# Patient Record
Sex: FEMALE | Race: WHITE | NOT HISPANIC OR LATINO | Employment: OTHER | URBAN - METROPOLITAN AREA
[De-identification: names, ages, dates, MRNs, and addresses within clinical notes are randomized per-mention and may not be internally consistent; named-entity substitution may affect disease eponyms.]

---

## 2022-06-06 ENCOUNTER — HOSPITAL ENCOUNTER (OUTPATIENT)
Facility: HOSPITAL | Age: 62
Discharge: HOME | End: 2022-06-08
Attending: EMERGENCY MEDICINE | Admitting: INTERNAL MEDICINE
Payer: MEDICARE

## 2022-06-06 DIAGNOSIS — R41.82 ALTERED MENTAL STATUS, UNSPECIFIED ALTERED MENTAL STATUS TYPE: Primary | ICD-10-CM

## 2022-06-06 DIAGNOSIS — R41.82 ALTERED MENTAL STATUS: ICD-10-CM

## 2022-06-06 DIAGNOSIS — I10 HYPERTENSION, UNSPECIFIED TYPE: ICD-10-CM

## 2022-06-06 DIAGNOSIS — Z79.899 POLYPHARMACY: ICD-10-CM

## 2022-06-06 DIAGNOSIS — G92.9 TOXIC ENCEPHALOPATHY: ICD-10-CM

## 2022-06-06 LAB
ALBUMIN SERPL-MCNC: 4.4 GM/DL (ref 3.4–4.8)
ALBUMIN/GLOB SERPL: 1.5 RATIO (ref 1.1–2)
ALP SERPL-CCNC: 127 UNIT/L (ref 40–150)
ALT SERPL-CCNC: 19 UNIT/L (ref 0–55)
AMPHET UR QL SCN: NEGATIVE
APAP SERPL-MCNC: <17.4 UG/ML (ref 17.4–30)
APPEARANCE UR: CLEAR
AST SERPL-CCNC: 24 UNIT/L (ref 5–34)
BACTERIA #/AREA URNS AUTO: NORMAL /HPF
BARBITURATE SCN PRESENT UR: NEGATIVE
BASOPHILS # BLD AUTO: 0.06 X10(3)/MCL (ref 0–0.2)
BASOPHILS NFR BLD AUTO: 1.1 %
BENZODIAZ UR QL SCN: POSITIVE
BILIRUB UR QL STRIP.AUTO: NEGATIVE MG/DL
BILIRUBIN DIRECT+TOT PNL SERPL-MCNC: 0.3 MG/DL
BUN SERPL-MCNC: 23.7 MG/DL (ref 9.8–20.1)
CALCIUM SERPL-MCNC: 9.2 MG/DL (ref 8.4–10.2)
CANNABINOIDS UR QL SCN: NEGATIVE
CHLORIDE SERPL-SCNC: 105 MMOL/L (ref 98–107)
CO2 SERPL-SCNC: 23 MMOL/L (ref 23–31)
COCAINE UR QL SCN: NEGATIVE
COLOR UR AUTO: YELLOW
CORRECTED TEMPERATURE (PCO2): 46 MMHG (ref 35–45)
CORRECTED TEMPERATURE (PH): 7.31 (ref 7.35–7.45)
CORRECTED TEMPERATURE (PO2): 76 MMHG (ref 80–100)
CREAT SERPL-MCNC: 1.11 MG/DL (ref 0.55–1.02)
EOSINOPHIL # BLD AUTO: 0.19 X10(3)/MCL (ref 0–0.9)
EOSINOPHIL NFR BLD AUTO: 3.6 %
ERYTHROCYTE [DISTWIDTH] IN BLOOD BY AUTOMATED COUNT: 14.4 % (ref 11.5–17)
ETHANOL SERPL-MCNC: <10 MG/DL
FENTANYL UR QL SCN: NEGATIVE
GLOBULIN SER-MCNC: 3 GM/DL (ref 2.4–3.5)
GLUCOSE SERPL-MCNC: 108 MG/DL (ref 82–115)
GLUCOSE UR QL STRIP.AUTO: NEGATIVE MG/DL
HCO3 UR-SCNC: 23.2 MMOL/L
HCT VFR BLD AUTO: 36.4 % (ref 37–47)
HGB BLD-MCNC: 11.2 G/DL (ref 12–16)
HGB BLD-MCNC: 11.4 GM/DL (ref 12–16)
IMM GRANULOCYTES # BLD AUTO: 0.02 X10(3)/MCL (ref 0–0.02)
IMM GRANULOCYTES NFR BLD AUTO: 0.4 % (ref 0–0.43)
KETONES UR QL STRIP.AUTO: NEGATIVE MG/DL
LEUKOCYTE ESTERASE UR QL STRIP.AUTO: NEGATIVE UNIT/L
LYMPHOCYTES # BLD AUTO: 1.9 X10(3)/MCL (ref 0.6–4.6)
LYMPHOCYTES NFR BLD AUTO: 36.2 %
MCH RBC QN AUTO: 29.7 PG (ref 27–31)
MCHC RBC AUTO-ENTMCNC: 31.3 MG/DL (ref 33–36)
MCV RBC AUTO: 94.8 FL (ref 80–94)
MDMA UR QL SCN: NEGATIVE
MONOCYTES # BLD AUTO: 0.42 X10(3)/MCL (ref 0.1–1.3)
MONOCYTES NFR BLD AUTO: 8 %
NEUTROPHILS # BLD AUTO: 2.7 X10(3)/MCL (ref 2.1–9.2)
NEUTROPHILS NFR BLD AUTO: 50.7 %
NITRITE UR QL STRIP.AUTO: NEGATIVE
NRBC BLD AUTO-RTO: 0 %
OPIATES UR QL SCN: POSITIVE
PCO2 BLDA: 46 MMHG (ref 35–45)
PCP UR QL: NEGATIVE
PH SMN: 7.31 [PH] (ref 7.35–7.45)
PH UR STRIP.AUTO: 5 [PH]
PH UR: 5 [PH] (ref 3–11)
PLATELET # BLD AUTO: 272 X10(3)/MCL (ref 130–400)
PMV BLD AUTO: 9 FL (ref 9.4–12.4)
PO2 BLDA: 76 MMHG (ref 80–100)
POC BASE DEFICIT: -3.1 MMOL/L (ref -2–2)
POC COHB: 0 %
POC IONIZED CALCIUM: 1.2 MMOL/L (ref 1.12–1.23)
POC METHB: 0 % (ref 0.4–1.5)
POC O2HB: 92.2 % (ref 94–97)
POC SATURATED O2: 93.7 %
POC TEMPERATURE: 37 °C
POTASSIUM BLD-SCNC: 3.8 MMOL/L (ref 3.5–5)
POTASSIUM SERPL-SCNC: 4.5 MMOL/L (ref 3.5–5.1)
PROT SERPL-MCNC: 7.4 GM/DL (ref 5.8–7.6)
PROT UR QL STRIP.AUTO: NEGATIVE MG/DL
RBC # BLD AUTO: 3.84 X10(6)/MCL (ref 4.2–5.4)
RBC #/AREA URNS AUTO: <5 /HPF
RBC UR QL AUTO: NEGATIVE UNIT/L
SALICYLATES SERPL-MCNC: <5 MG/DL
SARS-COV-2 RDRP RESP QL NAA+PROBE: NEGATIVE
SODIUM BLD-SCNC: 136 MMOL/L (ref 137–145)
SODIUM SERPL-SCNC: 140 MMOL/L (ref 136–145)
SP GR UR STRIP.AUTO: 1.02 (ref 1–1.03)
SPECIFIC GRAVITY, URINE AUTO (.000) (OHS): 1.02 (ref 1–1.03)
SPECIMEN SOURCE: ABNORMAL
SQUAMOUS #/AREA URNS AUTO: <4 /LPF
T4 FREE SERPL-MCNC: 0.91 NG/DL (ref 0.7–1.48)
TROPONIN I SERPL-MCNC: <0.01 NG/ML (ref 0–0.04)
TSH SERPL-ACNC: 9.41 UIU/ML (ref 0.35–4.94)
UROBILINOGEN UR STRIP-ACNC: 0.2 MG/DL
WBC # SPEC AUTO: 5.3 X10(3)/MCL (ref 4.5–11.5)
WBC #/AREA URNS AUTO: <5 /HPF

## 2022-06-06 PROCEDURE — 84439 ASSAY OF FREE THYROXINE: CPT | Performed by: EMERGENCY MEDICINE

## 2022-06-06 PROCEDURE — G0378 HOSPITAL OBSERVATION PER HR: HCPCS

## 2022-06-06 PROCEDURE — 82077 ASSAY SPEC XCP UR&BREATH IA: CPT | Performed by: EMERGENCY MEDICINE

## 2022-06-06 PROCEDURE — 25000003 PHARM REV CODE 250: Performed by: NURSE PRACTITIONER

## 2022-06-06 PROCEDURE — 82803 BLOOD GASES ANY COMBINATION: CPT

## 2022-06-06 PROCEDURE — 87635 SARS-COV-2 COVID-19 AMP PRB: CPT | Performed by: EMERGENCY MEDICINE

## 2022-06-06 PROCEDURE — 36415 COLL VENOUS BLD VENIPUNCTURE: CPT | Performed by: EMERGENCY MEDICINE

## 2022-06-06 PROCEDURE — 99900035 HC TECH TIME PER 15 MIN (STAT)

## 2022-06-06 PROCEDURE — 99285 EMERGENCY DEPT VISIT HI MDM: CPT | Mod: 25

## 2022-06-06 PROCEDURE — 36600 WITHDRAWAL OF ARTERIAL BLOOD: CPT

## 2022-06-06 PROCEDURE — 80143 DRUG ASSAY ACETAMINOPHEN: CPT | Performed by: EMERGENCY MEDICINE

## 2022-06-06 PROCEDURE — 85025 COMPLETE CBC W/AUTO DIFF WBC: CPT | Performed by: EMERGENCY MEDICINE

## 2022-06-06 PROCEDURE — 63600175 PHARM REV CODE 636 W HCPCS: Performed by: INTERNAL MEDICINE

## 2022-06-06 PROCEDURE — 63600175 PHARM REV CODE 636 W HCPCS: Performed by: EMERGENCY MEDICINE

## 2022-06-06 PROCEDURE — 96372 THER/PROPH/DIAG INJ SC/IM: CPT | Mod: 59 | Performed by: INTERNAL MEDICINE

## 2022-06-06 PROCEDURE — 96375 TX/PRO/DX INJ NEW DRUG ADDON: CPT

## 2022-06-06 PROCEDURE — 96374 THER/PROPH/DIAG INJ IV PUSH: CPT

## 2022-06-06 PROCEDURE — 84443 ASSAY THYROID STIM HORMONE: CPT | Performed by: EMERGENCY MEDICINE

## 2022-06-06 PROCEDURE — 93005 ELECTROCARDIOGRAM TRACING: CPT

## 2022-06-06 PROCEDURE — 96361 HYDRATE IV INFUSION ADD-ON: CPT

## 2022-06-06 PROCEDURE — 80307 DRUG TEST PRSMV CHEM ANLYZR: CPT | Performed by: EMERGENCY MEDICINE

## 2022-06-06 PROCEDURE — 84484 ASSAY OF TROPONIN QUANT: CPT | Performed by: EMERGENCY MEDICINE

## 2022-06-06 PROCEDURE — 63600175 PHARM REV CODE 636 W HCPCS

## 2022-06-06 PROCEDURE — 80053 COMPREHEN METABOLIC PANEL: CPT | Performed by: EMERGENCY MEDICINE

## 2022-06-06 PROCEDURE — 96372 THER/PROPH/DIAG INJ SC/IM: CPT | Performed by: INTERNAL MEDICINE

## 2022-06-06 PROCEDURE — 81001 URINALYSIS AUTO W/SCOPE: CPT | Performed by: EMERGENCY MEDICINE

## 2022-06-06 PROCEDURE — 80179 DRUG ASSAY SALICYLATE: CPT | Performed by: EMERGENCY MEDICINE

## 2022-06-06 RX ORDER — BUSPIRONE HYDROCHLORIDE 30 MG/1
1 TABLET ORAL 2 TIMES DAILY
COMMUNITY
Start: 2022-04-06

## 2022-06-06 RX ORDER — ENOXAPARIN SODIUM 100 MG/ML
40 INJECTION SUBCUTANEOUS EVERY 24 HOURS
Status: DISCONTINUED | OUTPATIENT
Start: 2022-06-06 | End: 2022-06-08 | Stop reason: HOSPADM

## 2022-06-06 RX ORDER — BACLOFEN 10 MG/1
TABLET ORAL
Status: ON HOLD | COMMUNITY
Start: 2021-08-08 | End: 2022-06-08 | Stop reason: HOSPADM

## 2022-06-06 RX ORDER — SUCRALFATE 1 G/10ML
SUSPENSION ORAL
Status: ON HOLD | COMMUNITY
Start: 2022-04-20 | End: 2022-06-08 | Stop reason: HOSPADM

## 2022-06-06 RX ORDER — GLUCOSAM/CHONDRO/HERB 149/HYAL 750-100 MG
1 TABLET ORAL
COMMUNITY
Start: 2021-10-01 | End: 2022-06-16 | Stop reason: ALTCHOICE

## 2022-06-06 RX ORDER — PANTOPRAZOLE SODIUM 40 MG/1
40 FOR SUSPENSION ORAL DAILY
Status: ON HOLD | COMMUNITY
Start: 2022-05-17 | End: 2022-06-08 | Stop reason: HOSPADM

## 2022-06-06 RX ORDER — CHOLECALCIFEROL (VITAMIN D3) 25 MCG
3000 TABLET,CHEWABLE ORAL
COMMUNITY
End: 2022-06-16 | Stop reason: ALTCHOICE

## 2022-06-06 RX ORDER — OXYCODONE HYDROCHLORIDE 15 MG/1
15 TABLET ORAL
Status: ON HOLD | COMMUNITY
Start: 2022-05-27 | End: 2022-06-08 | Stop reason: HOSPADM

## 2022-06-06 RX ORDER — DIAZEPAM 5 MG/1
TABLET ORAL
COMMUNITY
Start: 2022-03-31 | End: 2022-06-08

## 2022-06-06 RX ORDER — ARIPIPRAZOLE 2 MG/1
2 TABLET ORAL EVERY MORNING
COMMUNITY
Start: 2022-03-09 | End: 2022-06-08

## 2022-06-06 RX ORDER — ESOMEPRAZOLE MAGNESIUM 40 MG/1
40 GRANULE, DELAYED RELEASE ORAL DAILY
COMMUNITY
Start: 2022-05-12 | End: 2022-06-16 | Stop reason: ALTCHOICE

## 2022-06-06 RX ORDER — BUPROPION HYDROCHLORIDE 300 MG/1
1 TABLET ORAL DAILY
Status: ON HOLD | COMMUNITY
Start: 2022-05-12 | End: 2022-06-08 | Stop reason: HOSPADM

## 2022-06-06 RX ORDER — FERROUS SULFATE 325(65) MG
325 TABLET ORAL
COMMUNITY
End: 2022-06-16 | Stop reason: ALTCHOICE

## 2022-06-06 RX ORDER — LEVOTHYROXINE SODIUM 175 UG/1
175 TABLET ORAL
COMMUNITY
Start: 2022-03-31 | End: 2023-08-03

## 2022-06-06 RX ORDER — LANOLIN ALCOHOL/MO/W.PET/CERES
400 CREAM (GRAM) TOPICAL
Status: ON HOLD | COMMUNITY
End: 2022-06-08 | Stop reason: HOSPADM

## 2022-06-06 RX ORDER — NALOXONE HCL 0.4 MG/ML
2 VIAL (ML) INJECTION
Status: COMPLETED | OUTPATIENT
Start: 2022-06-06 | End: 2022-06-06

## 2022-06-06 RX ORDER — BREXPIPRAZOLE 0.5 MG/1
1 TABLET ORAL EVERY MORNING
Status: ON HOLD | COMMUNITY
Start: 2022-02-10 | End: 2022-06-08 | Stop reason: HOSPADM

## 2022-06-06 RX ORDER — LOSARTAN POTASSIUM 100 MG/1
1 TABLET ORAL DAILY
COMMUNITY
Start: 2022-03-31

## 2022-06-06 RX ORDER — HALOPERIDOL 5 MG/ML
2 INJECTION INTRAMUSCULAR
Status: COMPLETED | OUTPATIENT
Start: 2022-06-06 | End: 2022-06-06

## 2022-06-06 RX ORDER — ONDANSETRON 2 MG/ML
INJECTION INTRAMUSCULAR; INTRAVENOUS
Status: COMPLETED
Start: 2022-06-06 | End: 2022-06-06

## 2022-06-06 RX ORDER — DULOXETIN HYDROCHLORIDE 60 MG/1
60 CAPSULE, DELAYED RELEASE ORAL 2 TIMES DAILY
COMMUNITY
End: 2023-10-27 | Stop reason: SDUPTHER

## 2022-06-06 RX ORDER — ONDANSETRON HYDROCHLORIDE 8 MG/1
8 TABLET, FILM COATED ORAL EVERY 6 HOURS PRN
Status: ON HOLD | COMMUNITY
Start: 2022-05-17 | End: 2022-06-08 | Stop reason: HOSPADM

## 2022-06-06 RX ORDER — GABAPENTIN 600 MG/1
1 TABLET ORAL 4 TIMES DAILY
Status: ON HOLD | COMMUNITY
Start: 2022-03-31 | End: 2022-06-08 | Stop reason: HOSPADM

## 2022-06-06 RX ORDER — ONDANSETRON 2 MG/ML
4 INJECTION INTRAMUSCULAR; INTRAVENOUS EVERY 6 HOURS PRN
Status: DISCONTINUED | OUTPATIENT
Start: 2022-06-06 | End: 2022-06-08 | Stop reason: HOSPADM

## 2022-06-06 RX ORDER — MELATONIN 10 MG
1 TABLET, SUBLINGUAL SUBLINGUAL
COMMUNITY
End: 2022-06-16 | Stop reason: ALTCHOICE

## 2022-06-06 RX ORDER — DOCUSATE CALCIUM 240 MG
240 CAPSULE ORAL
Status: ON HOLD | COMMUNITY
End: 2022-06-08 | Stop reason: HOSPADM

## 2022-06-06 RX ORDER — ALBUTEROL SULFATE 90 UG/1
2 AEROSOL, METERED RESPIRATORY (INHALATION) EVERY 6 HOURS PRN
COMMUNITY
Start: 2022-01-10 | End: 2022-06-16 | Stop reason: ALTCHOICE

## 2022-06-06 RX ORDER — QUETIAPINE FUMARATE 300 MG/1
1 TABLET, FILM COATED ORAL NIGHTLY
COMMUNITY
Start: 2022-03-11

## 2022-06-06 RX ORDER — SODIUM CHLORIDE 9 MG/ML
INJECTION, SOLUTION INTRAVENOUS CONTINUOUS
Status: DISCONTINUED | OUTPATIENT
Start: 2022-06-06 | End: 2022-06-07

## 2022-06-06 RX ORDER — BACLOFEN 20 MG/1
TABLET ORAL
COMMUNITY
Start: 2021-08-08 | End: 2022-06-08

## 2022-06-06 RX ORDER — PROMETHAZINE HYDROCHLORIDE 12.5 MG/1
12.5 TABLET ORAL
Status: ON HOLD | COMMUNITY
Start: 2022-04-06 | End: 2022-06-08 | Stop reason: HOSPADM

## 2022-06-06 RX ORDER — NAPROXEN SODIUM 220 MG/1
TABLET, FILM COATED ORAL
COMMUNITY
Start: 2021-09-01 | End: 2022-06-16 | Stop reason: ALTCHOICE

## 2022-06-06 RX ORDER — FOLIC ACID 1 MG/1
1 TABLET ORAL DAILY
COMMUNITY
Start: 2022-04-21 | End: 2023-10-27

## 2022-06-06 RX ORDER — CELECOXIB 200 MG/1
200 CAPSULE ORAL DAILY
Status: ON HOLD | COMMUNITY
Start: 2022-03-11 | End: 2022-06-08 | Stop reason: HOSPADM

## 2022-06-06 RX ORDER — MECOBALAMIN 1000 MCG
TABLET,CHEWABLE ORAL
Status: ON HOLD | COMMUNITY
Start: 2021-09-01 | End: 2022-06-08 | Stop reason: HOSPADM

## 2022-06-06 RX ORDER — FOLIC ACID 1 MG/1
1000 TABLET ORAL DAILY
Status: ON HOLD | COMMUNITY
Start: 2022-05-11 | End: 2022-06-08 | Stop reason: HOSPADM

## 2022-06-06 RX ORDER — OXYCODONE AND ACETAMINOPHEN 7.5; 325 MG/1; MG/1
1 TABLET ORAL EVERY 6 HOURS PRN
Status: ON HOLD | COMMUNITY
Start: 2022-04-12 | End: 2022-06-08 | Stop reason: HOSPADM

## 2022-06-06 RX ORDER — HYDRALAZINE HYDROCHLORIDE 20 MG/ML
10 INJECTION INTRAMUSCULAR; INTRAVENOUS EVERY 4 HOURS PRN
Status: DISCONTINUED | OUTPATIENT
Start: 2022-06-06 | End: 2022-06-07

## 2022-06-06 RX ORDER — ETODOLAC 400 MG/1
400 TABLET, FILM COATED ORAL 2 TIMES DAILY
Status: ON HOLD | COMMUNITY
Start: 2022-04-06 | End: 2022-06-08 | Stop reason: HOSPADM

## 2022-06-06 RX ADMIN — ENOXAPARIN SODIUM 40 MG: 40 INJECTION SUBCUTANEOUS at 05:06

## 2022-06-06 RX ADMIN — NALXONE HYDROCHLORIDE 2 MG: 0.4 INJECTION INTRAMUSCULAR; INTRAVENOUS; SUBCUTANEOUS at 05:06

## 2022-06-06 RX ADMIN — SODIUM CHLORIDE: 9 INJECTION, SOLUTION INTRAVENOUS at 08:06

## 2022-06-06 RX ADMIN — HALOPERIDOL LACTATE 2 MG: 5 INJECTION, SOLUTION INTRAMUSCULAR at 12:06

## 2022-06-06 RX ADMIN — ONDANSETRON: 2 INJECTION INTRAMUSCULAR; INTRAVENOUS at 06:06

## 2022-06-06 NOTE — ED PROVIDER NOTES
Encounter Date: 6/6/2022    SCRIBE #1 NOTE: I, Kelsey Morales, am scribing for, and in the presence of,  Nilda May MD. I have scribed the following portions of the note - Other sections scribed: HPI, ROS, PE.       History     Chief Complaint   Patient presents with    Altered Mental Status     Last seen normal, EMS reports GCS of 7, only responds to pain.     63 y/o female presents to the ED via EMS for altered mental status. Pt last known normal around 23:00; pt found by family sitting up in chair but unresponsive this morning with her medications spilled onto the ground. EMS administered 2 narcan en route with no improvement noted. Initial  with EMS and blood pressure maintained around 150/70. Pt on multiple sedating medications per med list accompanying the patient including baclofen, buproprion, buspirone, duloxetine, gabapentin, oxycodone, promethazine, and quetiapine.     The history is provided by the EMS personnel. The history is limited by the condition of the patient.   Altered Mental Status  This is a new problem. The current episode started 6 to 12 hours ago (last normal at 2300). The problem occurs constantly. The problem has not changed since onset.Nothing aggravates the symptoms. Nothing relieves the symptoms. Treatments tried: narcan. The treatment provided no relief.     Review of patient's allergies indicates:  Not on File  Past Medical History:   Diagnosis Date    HLD (hyperlipidemia)     HTN (hypertension)     Hypothyroidism, unspecified     Lumbar radiculopathy     Unspecified osteoarthritis, unspecified site      History reviewed. No pertinent surgical history.  History reviewed. No pertinent family history.     Review of Systems   Unable to perform ROS: Mental status change (obtained per EMS)       Physical Exam     Initial Vitals [06/06/22 0510]   BP Pulse Resp Temp SpO2   (!) 151/74 80 12 -- (!) 91 %      MAP       --         Physical Exam    Nursing note and vitals  reviewed.  Constitutional:   Somnolent, rouses briskly to painful stimuli   HENT:   Head: Normocephalic and atraumatic.   Mouth/Throat: Oropharynx is clear and moist.   Eyes: Conjunctivae are normal. Pupils are equal, round, and reactive to light.   Pupils midrange, reactive to direct light   Neck: Neck supple.   Cardiovascular: Normal rate, regular rhythm and intact distal pulses.   No murmur heard.  Pulmonary/Chest: Breath sounds normal. No respiratory distress.   Abdominal: Abdomen is soft. Bowel sounds are normal. She exhibits no distension.   Musculoskeletal:      Cervical back: Neck supple.      Lumbar back: Normal. Normal range of motion.      Comments: No extremity deformity     Neurological:   Responds to painful stimuli; with sternal rub, pt used both arms, sat up, opened eyes and groaned then laid back down and went back to sleep   Skin: Skin is warm and dry.         ED Course   Critical Care    Date/Time: 6/6/2022 5:13 AM  Performed by: Nilda May MD  Authorized by: Nilda May MD   Total critical care time (exclusive of procedural time) : 0 minutes  Critical care time was exclusive of separately billable procedures and treating other patients.  Critical care was necessary to treat or prevent imminent or life-threatening deterioration of the following conditions: CNS failure or compromise and toxidrome.  Critical care was time spent personally by me on the following activities: evaluation of patient's response to treatment, examination of patient, ordering and review of radiographic studies, ordering and performing treatments and interventions, ordering and review of laboratory studies and pulse oximetry.        Labs Reviewed   COMPREHENSIVE METABOLIC PANEL - Abnormal; Notable for the following components:       Result Value    Blood Urea Nitrogen 23.7 (*)     Creatinine 1.11 (*)     All other components within normal limits   DRUG SCREEN, URINE (BEAKER) - Abnormal; Notable for the following  components:    Benzodiazepine, Urine Positive (*)     Opiates, Urine Positive (*)     All other components within normal limits    Narrative:     Cut off concentrations:    Amphetamines - 1000 ng/ml  Barbiturates - 200 ng/ml  Benzodiazepine - 200 ng/ml  Cannabinoids (THC) - 50 ng/ml  Cocaine - 300 ng/ml  Fentanyl - 1.0 ng/ml  MDMA - 500 ng/ml  Opiates - 300 ng/ml   Phencyclidine (PCP) - 25 ng/ml    Specimen submitted for drug analysis and tested for pH and specific gravity in order to evaluate sample integrity. Suspect tampering if specific gravity is <1.003 and/or pH is not within the range of 4.5 - 8.0  False negatives may result form substances such as bleach added to urine.  False positives may result for the presence of a substance with similar chemical structure to the drug or its metabolite.    This test provides only a PRELIMINARY analytical test result. A more specific alternate chemical method must be used in order to obtain a confirmed analytical result. Gas chromatography/mass spectrometry (GC/MS) is the preferred confirmatory method. Other chemical confirmation methods are available. Clinical consideration and professional judgement should be applied to any drug of abuse test result, particularly when preliminary positive results are used.    Positive results will be confirmed only at the physicians request. Unconfirmed screening results are to be used only for medical purposes (treatment).        ACETAMINOPHEN LEVEL - Abnormal; Notable for the following components:    Acetaminophen Level <17.4 (*)     All other components within normal limits   CBC WITH DIFFERENTIAL - Abnormal; Notable for the following components:    RBC 3.84 (*)     Hgb 11.4 (*)     Hct 36.4 (*)     MCV 94.8 (*)     MCHC 31.3 (*)     MPV 9.0 (*)     IG# 0.02 (*)     All other components within normal limits   TSH - Abnormal; Notable for the following components:    Thyroid Stimulating Hormone 9.4079 (*)     All other components  within normal limits   TROPONIN I - Normal   ALCOHOL,MEDICAL (ETHANOL) - Normal   URINALYSIS, REFLEX TO URINE CULTURE - Normal   T4, FREE - Normal   URINALYSIS, MICROSCOPIC - Normal   CBC W/ AUTO DIFFERENTIAL    Narrative:     The following orders were created for panel order CBC auto differential.  Procedure                               Abnormality         Status                     ---------                               -----------         ------                     CBC with Differential[[id number]]        Abnormal            Final result                 Please view results for these tests on the individual orders.   SALICYLATE LEVEL   SARS-COV-2 RNA AMPLIFICATION, QUAL     EKG Readings: (Independently Interpreted)   Initial Reading: No STEMI. Rhythm: Normal Sinus Rhythm. Ectopy: No Ectopy. Conduction: Normal. ST Segments: Normal ST Segments. T Waves: Normal. Axis: Normal. Clinical Impression: Normal Sinus Rhythm   6/6/22 @ 0514     ECG Results          EKG 12-lead (In process)  Result time 06/06/22 06:12:43    In process by Interface, Lab In [facility] (06/06/22 06:12:43)                 Narrative:    Test Reason : R41.82,    Vent. Rate : 087 BPM     Atrial Rate : 087 BPM     P-R Int : 138 ms          QRS Dur : 086 ms      QT Int : 392 ms       P-R-T Axes : 046 014 033 degrees     QTc Int : 471 ms    Sinus rhythm with occasional Premature ventricular complexes  Otherwise normal ECG  No previous ECGs available    Referred By: [email or web address]   SELF           Confirmed By:                   In process by Interface, Lab In [facility] (06/06/22 06:12:27)                 Narrative:    Test Reason : R41.82,    Vent. Rate : 087 BPM     Atrial Rate : 087 BPM     P-R Int : 138 ms          QRS Dur : 086 ms      QT Int : 392 ms       P-R-T Axes : 046 014 033 degrees     QTc Int : 471 ms    Sinus rhythm with occasional Premature ventricular complexes  Otherwise normal ECG  No previous ECGs available    Referred By: [email or web address]   SELF            Confirmed By:                             Imaging Results          CT Head Without Contrast (Final result)  Result time 06/06/22 06:58:43    Final result by Logan Cedeno MD (06/06/22 06:58:43)                 Impression:      No acute intracranial abnormality identified.  Findings of mild microvascular ischemic disease.      Electronically signed by: Logan Cedeno  Date:    06/06/2022  Time:    06:58             Narrative:    EXAMINATION:  CT HEAD WITHOUT CONTRAST    CLINICAL HISTORY:  Mental status change, unknown cause;    TECHNIQUE:  Low dose axial images were obtained through the head.  Coronal and sagittal reformations were also performed. Contrast was not administered.    Automatic exposure control was utilized to reduce the patient's radiation dose.    DLP= 978    COMPARISON:  None.    FINDINGS:  No acute intracranial hemorrhage, edema or mass. No acute parenchymal abnormality.    Mild diffuse cerebral atrophy with concordant ventricular enlargement.    There is normal gray white differentiation.    The osseous structures are normal.    The mastoid air cells are clear.    The auditory canals are patent bilaterally.    The globes and orbital contents are normal bilaterally.    The visualized maxillary, ethmoid and sphenoid sinuses are clear.                    Preliminary result by Logan Cedeno MD (06/06/22 05:38:52)                 Narrative:    START OF REPORT:  Technique: CT of the head was performed without intravenous contrast with axial as well as coronal and sagittal images.    Comparison: None.    Dosage Information: Automated exposure control was utilized.    Clinical history: EMS reports GCS of 7, only responds to pain.    Findings:  Hemorrhage: No acute intracranial hemorrhage is seen.  CSF spaces: The ventricles, sulci and basal cisterns all appear mildly prominent suggesting an element of global cerebral atrophy.  Brain parenchyma: There is preservation of the grey white junction  throughout.  Cerebellum: Unremarkable.  Sella and skull base: The sella appears to be within normal limits for age.  Intracranial calcifications: Incidental note is made of bilateral choroid plexus calcification. Incidental note is made of some pineal region calcification.  Calvarium: No acute linear or depressed skull fracture is seen.    Maxillofacial Structures:  Paranasal sinuses: The visualized paranasal sinuses appear clear with no significant mucoperiosteal thickening or air fluid levels identified.  Orbits: The orbits appear unremarkable.  Zygomatic arches: The zygomatic arches are intact and unremarkable.  Temporal bones and mastoids: The temporal bones and mastoids appear unremarkable.  TMJ: The mandibular condyles appear normally placed with respect to the mandibular fossa.      Impression:  1. No acute intracranial process identified. Details and other findings as noted above.                                   Medications   naloxone 0.4 mg/mL injection 2 mg (2 mg Intravenous Given 6/6/22 6328)   ondansetron 4 mg/2 mL injection ( Intravenous Given 6/6/22 7950)     Medical Decision Making:   Initial Assessment:   Ms. Cheek presented for evaluation of decreased responsiveness, last normal about 6 hours prior to arrival.  Hemodynamically stable but very somnolent.  Arouses briskly to painful stimuli but then quickly goes back to sleep.  In review of her medication list, multiple potentially sedating medications.  EMS reports that she was found sitting in her chair with her medications surrounding her.  No evidence that she would have intentionally taking excess medications; however, consider possible accidental overdose or polysubstance induced decreased responsiveness.  Differential Diagnosis:   Polysubstance intoxication/polypharmacy, ICH, ischemic CVA less likely given no lateralizing deficits, hypoglycemia, electrolyte derangements, hypoventilation/CO2 narcosis  Clinical Tests:   Lab Tests: Ordered and  Reviewed  Radiological Study: Ordered and Reviewed  Medical Tests: Ordered and Reviewed  ED Management:  Patient given narcan in ED and is more rousable but still agitated, unable to follow commands or be verbally redirected. Placed in roll belt for safety. Labs otherwise unrevealing. Will admit for further monitoring of suspected polypharmacy ingestion w/ associated encephalopathy. Findings and plan discussed with spouse who verbalized understanding and agreement.           Scribe Attestation:   Scribe #1: I performed the above scribed service and the documentation accurately describes the services I performed. I attest to the accuracy of the note.    Attending Attestation:           Physician Attestation for Scribe:  Physician Attestation Statement for Scribe #1: I, reviewed documentation, as scribed by Kelsey Morales in my presence, and it is both accurate and complete.             ED Course as of 06/06/22 0813 Mon Jun 06, 2022 0602 After narcan, patient now much more alert but still not able to converse, rolling around in gurney. Symmetric strength in all extremities.  As still not back to baseline mentation, suspect polypharmacy overdose. CT head w/o ICH on my interpretation, rad review pending. Likely to require admission for further monitoring/washout period for her meds to ensure returning to baseline mentation.  [KS]      ED Course User Index  [KS] Nilda May MD             Clinical Impression:   Final diagnoses:  [G92.9] Toxic encephalopathy  [Z79.899] Polypharmacy  [I10] Hypertension, unspecified type          ED Disposition Condition    Observation               Nilda May MD  06/06/22 0813

## 2022-06-06 NOTE — ED NOTES
Pt to ed gcs 7. Per ems surronded by empty pill bottles with plethora of different classes including pain meds, muscle relaxers, and psychoactive drugs. Pupils fixed. Protecting airway. Lalo in room on arrival. Iv start. Placed on monitors. ekg obtained. Straight cath urine obtained.        Placed on oxygen. Verbal orders for ct. Pt to ct c monitors and staff for safety

## 2022-06-06 NOTE — H&P
[facility] Hospital Medicine History & Physical Examination       Patient Name: Kasandra Cheek  MRN: [id number]  Patient Class: Emergency   Admission Date: 6/6/2022   Admitting Physician: Dr. Hamilton Crwaley  Length of Stay: 0  Attending Physician: MARILOU Macdonald  Primary Care Provider: Dr. Donald Mazariegos  Face-to-Face encounter date: 06/06/2022  Code Status: Full  Chief Complaint: Altered Mental Status (Last seen normal, EMS reports GCS of 7, only responds to pain.)        Patient information was obtained from patient, patient's family, past medical records and ER records.     HISTORY OF PRESENT ILLNESS:   Kasandra Cheek a 62-year-old female who has a past medical history of hypertension, hyperlipidemia, acquired hypothyroidism, lumbar radiculopathy, osteoarthritis of the left knee, opioid dependence, lumbar spinal stenosis with neurogenic claudication, iron deficiency anemia, major depression and generalized anxiety disorder, migraines herniated cervical discs, pulmonary nodule, chronic cough, and  RAISA positive who was brought in by EMS on 6/6/22 for altered mental status.The patient's family found her passed out in her recliner at home with medication bottle in her lap.  Family was unsure if additional medications were taken.  The patient is on multiple medications for her multiple medical problems which include baclofen, bupropion, buspirone, Celebrex, Valium, duloxetine, gabapentin, losartan, levothyroxine, Lodine, ferrous sulfate docusate, magnesium oxide, Celebrex,cholecalciferol, aspirin and vitamin B12. CT of brain was negative except for mild micro vascular ischemia.  Urine drug screen positive for benzodiazepines and opiates.  Patient was given Narcan 2 mg IVP and level of consciousness did improve. ABG's reveal:  7.31/ 46/ 76 however pt is not requiring any supplemental oxygen at this time.  The patient was seen and evaluated in the emergency department; history  obtained from  who is present at the bedside; [facility]. No airway compromise noted; pt is oxygenating well on RA.  PAST MEDICAL HISTORY:     Past Medical History:   Diagnosis Date    HLD (hyperlipidemia)     HTN (hypertension)     Hypothyroidism, unspecified     Lumbar radiculopathy     Unspecified osteoarthritis, unspecified site    lumbar spinal stenosis with neurogenic claudication  Opiate dependence  Iron deficiency anemia  Major depression  Generalized anxiety disorder  Migraines  Herniated cervical discs  Lumbar radiculopathy  Chronic cough  RAISA positive  OA of left knee  Pulmonary nodule    PAST SURGICAL HISTORY:   Gastric bypass  Knee surgery  Shoulder surgery  Back surgery  Neck surgery    ALLERGIES:   Patient has no allergy information on record.    FAMILY HISTORY:   Reviewed and negative    SOCIAL HISTORY:     Social History     Tobacco Use    Smoking status: Not on file    Smokeless tobacco: Not on file   Substance Use Topics    Alcohol use: Not on file    pt lives at home with her ; is on multiple medications for anxiety, depression, lumbar stenosis; previously smoked cigarettes at age 19, however has not smoked in 35 years;  denies any EtOH use    HOME MEDICATIONS:     Prior to Admission medications    Not on File         REVIEW OF SYSTEMS:   Except as documented, all other systems reviewed and negative     PHYSICAL EXAM:     VITAL SIGNS: 24 HRS MIN & MAX LAST   No data recorded     BP  Min: 137/85  Max: 182/104 (!) 161/83   Pulse  Min: 78  Max: 106  84   Resp  Min: 12  Max: 22 12   SpO2  Min: 91 %  Max: 100 % 100 %       General appearance: Obese CF lying on ED stretcher; no apparent distress.  HENT: Atraumatic head. Moist mucous membranes of oral cavity.  Eyes: sclerae is non-icteric. Pupils 3 mm and sluggishly respond to light  Neck: Supple, no JVD  Lungs: BBS essentially clear with occ. rhonchi  Heart: Regular  rate and rhythm. S1 and S2 present with no murmurs/gallop/rub. Sinus rhythm on monitor  Abdomen: large, soft, non-distended, non-tender. No rebound tenderness/guarding. Bowel sounds hypoactive   Extremities: No cyanosis, clubbing, or edema. 2+ pedal pulses  Skin: No rashes or bruises, skin intact  Neuro: Obtunded, does not respond to sternal rub; follows no simple commands; fans toes to plantar stroke; pupils 3 mm and respond sluggishly to light  Psych/mental status: Unable to evaluate    LABS AND IMAGING:     Recent Labs   Lab 06/06/22  0530   WBC 5.3   RBC 3.84*   HGB 11.4*   HCT 36.4*   MCV 94.8*   MCH 29.7   MCHC 31.3*   RDW 14.4      MPV 9.0*       Recent Labs   Lab 06/06/22  0530 06/06/22  0645     --    K 4.5  --    CO2 23  --    BUN 23.7*  --    CREATININE 1.11*  --    CALCIUM 9.2  --    PH  --  7.31*   ALBUMIN 4.4  --    ALKPHOS 127  --    ALT 19  --    AST 24  --    BILITOT 0.3  --        Microbiology Results (last 7 days)     ** No results found for the last 168 hours. **           CT Head Without Contrast  Narrative: EXAMINATION:  CT HEAD WITHOUT CONTRAST    CLINICAL HISTORY:  Mental status change, unknown cause;    TECHNIQUE:  Low dose axial images were obtained through the head.  Coronal and sagittal reformations were also performed. Contrast was not administered.    Automatic exposure control was utilized to reduce the patient's radiation dose.    DLP= 978    COMPARISON:  None.    FINDINGS:  No acute intracranial hemorrhage, edema or mass. No acute parenchymal abnormality.    Mild diffuse cerebral atrophy with concordant ventricular enlargement.    There is normal gray white differentiation.    The osseous structures are normal.    The mastoid air cells are clear.    The auditory canals are patent bilaterally.    The globes and orbital contents are normal bilaterally.    The visualized maxillary, ethmoid and sphenoid sinuses are clear.  Impression: No acute intracranial abnormality  identified.  Findings of mild microvascular ischemic disease.    Electronically signed by: Logan Cedeno  Date:    06/06/2022  Time:    06:58        ASSESSMENT & PLAN:   Altered mental status likely secondary to polypharmacy (UDS + for benzos / opiates)  -neuro checks frequently   -hold all sedatives, analgesics at this time (given Zofran in ED)    Mild SHIRA  -gently hydration with isotonic crystalloids  -repeat labs    Anemia of chronic disease  -trend labs    Hypertension / hyperlipidemia  -resume home medications when fully awake & alert  -prn antihypertensive    Chronic pain secondary to lumbar stenosis / herniate cervical discs/ previous neck / back surgeries    Major depression / generalized anxiety disorder              VTE Prophylaxis: Lovenox    Patient condition:  Gaurded    __________________________________________________________________________  INPATIENT LIST OF MEDICATIONS     Scheduled Meds:  Continuous Infusions:  PRN Meds:.      I,Lorna Liz, NP have reviewed and discussed the case with   Please see the following addendum for further assessment and plan from there attending MD.    06/06/2022    ________________________________________________________________________________    MD Addendum:  I, Dr. Crawley assumed care of this patient today at 11 am  For the patient encounter, I performed the substantive portion of the visit, I reviewed the NP/PA documentation, treatment plan, and medical decision making.  I had face to face time with this patient     A. History:  Patient was brought in with AMS.   Per , the last time he saw her awake and alert was lasst night. Then when he woke up in the middle of the night he found unresponsive.   Her mental status improved after receiving Narcan. She is on opioids at home.  No recent h/o fever, chills, dysuria, diarrhea, nausea or vomiting     B. Physical exam:  Patient obese, awake but non verbal. Not following verbal commands  Looks  confused   Heart RRR  Lungs clear   Abdomen soft and non tender   Moving all extremities     C. Medical decision making:  Pts labs, vitals and CT brain reviewed  She is disoriented and non verbal  CT brain negative   Will cont neuro checks q 4 hrs and tele monitoring   No signs/symptoms of sepsis   Cont iv fluids   Home meds to be updated in EMR  Keep NPO for now   Use iv prn BP meds   Will get neuro team to evalaute patient. ? EEG   Labs in am     VTE prophylaxis: Lovenox       Critical care note:  Critical care diagnosis: Hypertensive urgency needing iv Hydralazine   Critical care interventions: Hands-on evaluation, review of labs/radiographs/records and discussion with patient and family if present  Critical care time spent: 45 minutes      All diagnosis and differential diagnosis have been reviewed; assessment and plan has been documented; I have personally reviewed the labs and test results that are presently available; I have reviewed the patients medication list; I have reviewed the consulting providers response and recommendations. I have reviewed or attempted to review medical records based upon their availability.    All of the patient and family questions have been addressed and answered. Patient's is agreeable to the above stated plan. I will continue to monitor closely and make adjustments to medical management as needed.      MARILOU Macdonald   06/06/2022       Date of service: 6/6/22  Patient should be admitted for hospital observation service under my care

## 2022-06-07 LAB
ANION GAP SERPL CALC-SCNC: 11 MEQ/L
BASOPHILS # BLD AUTO: 0.04 X10(3)/MCL (ref 0–0.2)
BASOPHILS NFR BLD AUTO: 0.9 %
BUN SERPL-MCNC: 15.2 MG/DL (ref 9.8–20.1)
CALCIUM SERPL-MCNC: 9.4 MG/DL (ref 8.4–10.2)
CHLORIDE SERPL-SCNC: 112 MMOL/L (ref 98–107)
CO2 SERPL-SCNC: 21 MMOL/L (ref 23–31)
CREAT SERPL-MCNC: 0.79 MG/DL (ref 0.55–1.02)
CREAT/UREA NIT SERPL: 19
EOSINOPHIL # BLD AUTO: 0.01 X10(3)/MCL (ref 0–0.9)
EOSINOPHIL NFR BLD AUTO: 0.2 %
ERYTHROCYTE [DISTWIDTH] IN BLOOD BY AUTOMATED COUNT: 14.1 % (ref 11.5–17)
GLUCOSE SERPL-MCNC: 146 MG/DL (ref 82–115)
HCT VFR BLD AUTO: 37.4 % (ref 37–47)
HGB BLD-MCNC: 12 GM/DL (ref 12–16)
IMM GRANULOCYTES # BLD AUTO: 0.01 X10(3)/MCL (ref 0–0.02)
IMM GRANULOCYTES NFR BLD AUTO: 0.2 % (ref 0–0.43)
LYMPHOCYTES # BLD AUTO: 0.58 X10(3)/MCL (ref 0.6–4.6)
LYMPHOCYTES NFR BLD AUTO: 12.3 %
MCH RBC QN AUTO: 29.4 PG (ref 27–31)
MCHC RBC AUTO-ENTMCNC: 32.1 MG/DL (ref 33–36)
MCV RBC AUTO: 91.7 FL (ref 80–94)
MONOCYTES # BLD AUTO: 0.29 X10(3)/MCL (ref 0.1–1.3)
MONOCYTES NFR BLD AUTO: 6.2 %
NEUTROPHILS # BLD AUTO: 3.8 X10(3)/MCL (ref 2.1–9.2)
NEUTROPHILS NFR BLD AUTO: 80.2 %
NRBC BLD AUTO-RTO: 0 %
PLATELET # BLD AUTO: 277 X10(3)/MCL (ref 130–400)
PMV BLD AUTO: 9.2 FL (ref 9.4–12.4)
POTASSIUM SERPL-SCNC: 3.9 MMOL/L (ref 3.5–5.1)
RBC # BLD AUTO: 4.08 X10(6)/MCL (ref 4.2–5.4)
SODIUM SERPL-SCNC: 144 MMOL/L (ref 136–145)
WBC # SPEC AUTO: 4.7 X10(3)/MCL (ref 4.5–11.5)

## 2022-06-07 PROCEDURE — 63600175 PHARM REV CODE 636 W HCPCS: Performed by: NURSE PRACTITIONER

## 2022-06-07 PROCEDURE — 63600175 PHARM REV CODE 636 W HCPCS: Performed by: INTERNAL MEDICINE

## 2022-06-07 PROCEDURE — 25000003 PHARM REV CODE 250: Performed by: INTERNAL MEDICINE

## 2022-06-07 PROCEDURE — 36415 COLL VENOUS BLD VENIPUNCTURE: CPT | Performed by: INTERNAL MEDICINE

## 2022-06-07 PROCEDURE — 96372 THER/PROPH/DIAG INJ SC/IM: CPT | Performed by: INTERNAL MEDICINE

## 2022-06-07 PROCEDURE — 80048 BASIC METABOLIC PNL TOTAL CA: CPT | Performed by: NURSE PRACTITIONER

## 2022-06-07 PROCEDURE — 85025 COMPLETE CBC W/AUTO DIFF WBC: CPT | Performed by: INTERNAL MEDICINE

## 2022-06-07 PROCEDURE — 97162 PT EVAL MOD COMPLEX 30 MIN: CPT

## 2022-06-07 PROCEDURE — 96375 TX/PRO/DX INJ NEW DRUG ADDON: CPT

## 2022-06-07 PROCEDURE — G0378 HOSPITAL OBSERVATION PER HR: HCPCS

## 2022-06-07 PROCEDURE — 96376 TX/PRO/DX INJ SAME DRUG ADON: CPT

## 2022-06-07 PROCEDURE — 96361 HYDRATE IV INFUSION ADD-ON: CPT

## 2022-06-07 RX ORDER — ARIPIPRAZOLE 2 MG/1
2 TABLET ORAL EVERY MORNING
Status: DISCONTINUED | OUTPATIENT
Start: 2022-06-07 | End: 2022-06-08 | Stop reason: HOSPADM

## 2022-06-07 RX ORDER — BUPROPION HYDROCHLORIDE 150 MG/1
300 TABLET ORAL DAILY
Status: DISCONTINUED | OUTPATIENT
Start: 2022-06-07 | End: 2022-06-08 | Stop reason: HOSPADM

## 2022-06-07 RX ORDER — DULOXETIN HYDROCHLORIDE 30 MG/1
60 CAPSULE, DELAYED RELEASE ORAL DAILY
Status: DISCONTINUED | OUTPATIENT
Start: 2022-06-07 | End: 2022-06-08 | Stop reason: HOSPADM

## 2022-06-07 RX ORDER — HYDRALAZINE HYDROCHLORIDE 20 MG/ML
10 INJECTION INTRAMUSCULAR; INTRAVENOUS
Status: DISCONTINUED | OUTPATIENT
Start: 2022-06-07 | End: 2022-06-08 | Stop reason: HOSPADM

## 2022-06-07 RX ORDER — LABETALOL HCL 20 MG/4 ML
10 SYRINGE (ML) INTRAVENOUS
Status: DISCONTINUED | OUTPATIENT
Start: 2022-06-07 | End: 2022-06-08 | Stop reason: HOSPADM

## 2022-06-07 RX ORDER — DOCUSATE CALCIUM 240 MG
240 CAPSULE ORAL DAILY
Status: DISCONTINUED | OUTPATIENT
Start: 2022-06-07 | End: 2022-06-08 | Stop reason: HOSPADM

## 2022-06-07 RX ORDER — ACETAMINOPHEN 325 MG/1
650 TABLET ORAL EVERY 6 HOURS PRN
Status: DISCONTINUED | OUTPATIENT
Start: 2022-06-07 | End: 2022-06-08 | Stop reason: HOSPADM

## 2022-06-07 RX ORDER — ALBUTEROL SULFATE 90 UG/1
2 AEROSOL, METERED RESPIRATORY (INHALATION) EVERY 6 HOURS PRN
Status: DISCONTINUED | OUTPATIENT
Start: 2022-06-07 | End: 2022-06-08 | Stop reason: HOSPADM

## 2022-06-07 RX ORDER — NAPROXEN SODIUM 220 MG/1
81 TABLET, FILM COATED ORAL DAILY
Status: DISCONTINUED | OUTPATIENT
Start: 2022-06-07 | End: 2022-06-08 | Stop reason: HOSPADM

## 2022-06-07 RX ORDER — LABETALOL HYDROCHLORIDE 5 MG/ML
10 INJECTION, SOLUTION INTRAVENOUS
Status: DISCONTINUED | OUTPATIENT
Start: 2022-06-07 | End: 2022-06-07

## 2022-06-07 RX ORDER — CELECOXIB 200 MG/1
200 CAPSULE ORAL DAILY
Status: DISCONTINUED | OUTPATIENT
Start: 2022-06-07 | End: 2022-06-08 | Stop reason: HOSPADM

## 2022-06-07 RX ADMIN — DOCUSATE CALCIUM 240 MG: 240 CAPSULE, LIQUID FILLED ORAL at 03:06

## 2022-06-07 RX ADMIN — ASPIRIN 81 MG CHEWABLE TABLET 81 MG: 81 TABLET CHEWABLE at 03:06

## 2022-06-07 RX ADMIN — DULOXETINE 60 MG: 30 CAPSULE, DELAYED RELEASE ORAL at 03:06

## 2022-06-07 RX ADMIN — HYDRALAZINE HYDROCHLORIDE 10 MG: 20 INJECTION INTRAMUSCULAR; INTRAVENOUS at 11:06

## 2022-06-07 RX ADMIN — ONDANSETRON 4 MG: 2 INJECTION INTRAMUSCULAR; INTRAVENOUS at 10:06

## 2022-06-07 RX ADMIN — BUPROPION HYDROCHLORIDE 300 MG: 150 TABLET, FILM COATED, EXTENDED RELEASE ORAL at 03:06

## 2022-06-07 RX ADMIN — SODIUM CHLORIDE: 9 INJECTION, SOLUTION INTRAVENOUS at 05:06

## 2022-06-07 RX ADMIN — ARIPIPRAZOLE 2 MG: 2 TABLET ORAL at 03:06

## 2022-06-07 RX ADMIN — HYDRALAZINE HYDROCHLORIDE 10 MG: 20 INJECTION INTRAMUSCULAR; INTRAVENOUS at 02:06

## 2022-06-07 RX ADMIN — CELECOXIB 200 MG: 200 CAPSULE ORAL at 03:06

## 2022-06-07 RX ADMIN — HYDRALAZINE HYDROCHLORIDE 10 MG: 20 INJECTION INTRAMUSCULAR; INTRAVENOUS at 05:06

## 2022-06-07 RX ADMIN — HYDRALAZINE HYDROCHLORIDE 10 MG: 20 INJECTION INTRAMUSCULAR; INTRAVENOUS at 09:06

## 2022-06-07 RX ADMIN — ENOXAPARIN SODIUM 40 MG: 40 INJECTION SUBCUTANEOUS at 06:06

## 2022-06-07 NOTE — PLAN OF CARE
Problem: Physical Therapy  Goal: Physical Therapy Goal  Description: Goals to be met by: 2022    Patient will increase functional independence with mobility by performin. Gait  x 200 feet with Modified [place] using Rolling Walker or SPC.   2. Ascend/descend 4 stairs with bilateral Handrails Modified [place] using RW or SPC .     Outcome: Ongoing, Progressing

## 2022-06-07 NOTE — PT/OT/SLP EVAL
Physical Therapy Evaluation    Patient Name:  Kasandra Cheek   MRN:  [id number]    Recommendations:     Discharge Recommendations:  home with home health, home with family care  Discharge Equipment Recommendations: to be determined   Barriers to discharge: impaired cognition    Assessment:     Kasandra Cheek is a 62 y.o. female admitted with a medical diagnosis of AMS, toxic encephalopathy, polypharmacy.  She presents with the following impairments/functional limitations:  impaired cognition, gait instability. Patient willing to participate with PT today. Patient  and PCA present in room upon PT arrival. Patient fidgeting and demonstrated delayed processing and response time when asked questions. Patient able to ambulate 40 feet into hallway with SBA and RW.    Rehab Prognosis: Good; patient would benefit from acute skilled PT services to address these deficits and reach maximum level of function.    Recent Surgery: * No surgery found *      Plan:     During this hospitalization, patient to be seen daily to address the identified rehab impairments via gait training, therapeutic activities, therapeutic exercises and progress toward the following goals:    · Plan of Care Expires:  06/28/22    Subjective     Chief Complaint: having a hard time remembering things  Patient/Family Comments/goals: regain cognitive function  Pain/Comfort:  · Pain Rating 1: 0/10  · Pain Rating 2: 0/10    Patients cultural, spiritual, Roman Catholic conflicts given the current situation: no    Living Environment:  Patient lives with  in [facility] with 4 steps to enter.  Prior to admission, patients level of function was independent with all daily activities.  Equipment used at home: cane, straight.  DME owned (not currently used): single point cane.  Upon discharge, patient will have assistance from self and family.    Objective:     Communicated with RN prior to session.  Patient found HOB elevated with peripheral IV  upon PT entry to  room.    General Precautions: Standard, fall   Orthopedic Precautions:N/A   Braces: N/A  Respiratory Status: Room air    Exams:  · Cognitive Exam:  Patient is oriented to person and time with delayed processing and response; required cueing and selection x 2 to orient to place.  · RLE ROM: WNL  · RLE Strength: grossly 4/5  · LLE ROM: WNL  · LLE Strength: grossly 4/5    Functional Mobility:  · Bed Mobility:     · Supine to Sit: stand by assistance  · Sit to Supine: stand by assistance  · Transfers:     · Sit to Stand:  stand by assistance with no AD  · Gait: Normal gait with no LOB observed. Patient ambulated 40 feet into hallway with SBA and RW.      AM-PAC 6 CLICK MOBILITY  Total Score:22     Patient left HOB elevated with call button in reach and  and PCA present.    GOALS:   Multidisciplinary Problems     Physical Therapy Goals        Problem: Physical Therapy    Goal Priority Disciplines Outcome Goal Variances Interventions   Physical Therapy Goal     PT, PT/OT Ongoing, Progressing     Description: Goals to be met by: 2022    Patient will increase functional independence with mobility by performin. Gait  x 200 feet with Modified [place] using Rolling Walker or SPC.   2. Ascend/descend 4 stairs with bilateral Handrails Modified [place] using RW or SPC .                      History:     Past Medical History:   Diagnosis Date    HLD (hyperlipidemia)     HTN (hypertension)     Hypothyroidism, unspecified     Lumbar radiculopathy     Unspecified osteoarthritis, unspecified site        History reviewed. No pertinent surgical history.    Time Tracking:     PT Received On: 22  PT Start Time: 1415     PT Stop Time: 1425  PT Total Time (min): 10 min     Billable Minutes: Evaluation x 10 minutes; mod complexity      2022

## 2022-06-07 NOTE — PROGRESS NOTES
[facility] Hospital Medicine Progress Note        Chief Complaint: Inpatient Follow-up for Encephalopathy     HPI:   Kasandra Cheek a 62-year-old female who has a past medical history of hypertension, hyperlipidemia, acquired hypothyroidism, lumbar radiculopathy, osteoarthritis of the left knee, opioid dependence, lumbar spinal stenosis with neurogenic claudication, iron deficiency anemia, major depression and generalized anxiety disorder, migraines herniated cervical discs, pulmonary nodule, chronic cough, and  RAISA positive who was brought in by EMS on 6/6/22 for altered mental status.The patient's family found her passed out in her recliner at home with medication bottle in her lap.  Family was unsure if additional medications were taken.  The patient is on multiple medications for her multiple medical problems which include baclofen, bupropion, buspirone, Celebrex, Valium, duloxetine, gabapentin, losartan, levothyroxine, Lodine, ferrous sulfate docusate, magnesium oxide, Celebrex,cholecalciferol, aspirin and vitamin B12. CT of brain was negative except for mild micro vascular ischemia.  Urine drug screen positive for benzodiazepines and opiates.  Patient was given Narcan 2 mg IVP and level of consciousness did improve. ABG's reveal:  7.31/ 46/ 76 however pt is not requiring any supplemental oxygen at this time.  The patient was seen and evaluated in the emergency department; history obtained from  who is present at the bedside; patient normally goes to [facility] for her care. No airway compromise noted; pt is oxygenating well on RA.    Interval Hx:   Patient today awake and comfortable. Oriented x 3 and following all verbal commands. Denies any fever, chills weakness, dizziness or chest pain. She is hungry and wants to eat. Informed her about our suspicion of polypharmacy.  at bedside.     Objective/physical exam:  General: In no acute distress,  afebrile,          Obese   Chest: Clear to auscultation bilaterally  Heart: RRR, +S1, S2, no appreciable murmur  Abdomen: Soft, nontender, BS +  MSK: Warm, no lower extremity edema, no clubbing or cyanosis  Neurologic: Alert and oriented x4, Cranial nerve II-XII intact, Strength 5/5 in all 4 extremities    VITAL SIGNS: 24 HRS MIN & MAX LAST   Temp  Min: 98 °F (36.7 °C)  Max: 98.6 °F (37 °C) 98.3 °F (36.8 °C)   BP  Min: 110/53  Max: 193/102 (!) 167/89   Pulse  Min: 63  Max: 109  98   Resp  Min: 13  Max: 20 18   SpO2  Min: 92 %  Max: 97 % 96 %       Recent Labs   Lab 06/06/22  0530 06/07/22  0329   WBC 5.3 4.7   RBC 3.84* 4.08*   HGB 11.4* 12.0   HCT 36.4* 37.4   MCV 94.8* 91.7   MCH 29.7 29.4   MCHC 31.3* 32.1*   RDW 14.4 14.1    277   MPV 9.0* 9.2*       Recent Labs   Lab 06/06/22  0530 06/06/22  0645 06/07/22  0329     --  144   K 4.5  --  3.9   CO2 23  --  21*   BUN 23.7*  --  15.2   CREATININE 1.11*  --  0.79   CALCIUM 9.2  --  9.4   PH  --  7.31*  --    ALBUMIN 4.4  --   --    ALKPHOS 127  --   --    ALT 19  --   --    AST 24  --   --    BILITOT 0.3  --   --           Microbiology Results (last 7 days)     ** No results found for the last 168 hours. **           See below for Radiology    Scheduled Med:   ARIPiprazole  2 mg Oral QAM    aspirin  81 mg Oral Daily    buPROPion  300 mg Oral Daily    celecoxib  200 mg Oral Daily    docusate calcium  240 mg Oral Daily    DULoxetine  60 mg Oral Daily    enoxaparin  40 mg Subcutaneous Daily        Continuous Infusions:       PRN Meds:  acetaminophen, albuterol, hydrALAZINE, labetaloL, ondansetron       Assessment/Plan:  Toxic encephalopathy secondary to polypharmacy (UDS + for benzos / opiates): resolved   Mild SHIRA: resolved   Anemia of chronic disease   Hypertension / hyperlipidemia     Chronic pain secondary to lumbar stenosis / herniate cervical discs/ previous neck / back surgeries     Major depression / generalized anxiety disorder    Morbid  obesity     Plan:  Patient awake and oriented x 3. Mental status close to baseline  She still looks weak. Will start on PTx   Start po diet with aspiration precautions  Dc iv fluids  Home meds reviewed and started   Advised to wean off narcotics and Benzos  Cont supportive care     Continue strict aspiration, fall and decubitus precautions     Labs in am     Patient condition:  Fair    Anticipated discharge and Disposition:Dc to home in am if stable       All diagnosis and differential diagnosis have been reviewed; assessment and plan has been documented; I have personally reviewed the labs and test results that are presently available; I have reviewed the patients medication list; I have reviewed the consulting providers response and recommendations. I have reviewed or attempted to review medical records based upon their availability    All of the patient's questions have been  addressed and answered. Patient's is agreeable to the above stated plan. I will continue to monitor closely and make adjustments to medical management as needed.  _____________________________________________________________________    Nutrition Status:    Radiology:  CT Head Without Contrast  Narrative: EXAMINATION:  CT HEAD WITHOUT CONTRAST    CLINICAL HISTORY:  Mental status change, unknown cause;    TECHNIQUE:  Low dose axial images were obtained through the head.  Coronal and sagittal reformations were also performed. Contrast was not administered.    Automatic exposure control was utilized to reduce the patient's radiation dose.    DLP= 978    COMPARISON:  None.    FINDINGS:  No acute intracranial hemorrhage, edema or mass. No acute parenchymal abnormality.    Mild diffuse cerebral atrophy with concordant ventricular enlargement.    There is normal gray white differentiation.    The osseous structures are normal.    The mastoid air cells are clear.    The auditory canals are patent bilaterally.    The globes and orbital contents are  normal bilaterally.    The visualized maxillary, ethmoid and sphenoid sinuses are clear.  Impression: No acute intracranial abnormality identified.  Findings of mild microvascular ischemic disease.    Electronically signed by: Logan Cedeno  Date:    06/06/2022  Time:    06:58      Hamilton Crawley MD   06/07/2022

## 2022-06-08 VITALS
OXYGEN SATURATION: 98 % | BODY MASS INDEX: 32 KG/M2 | WEIGHT: 216.06 LBS | HEIGHT: 69 IN | SYSTOLIC BLOOD PRESSURE: 164 MMHG | HEART RATE: 97 BPM | RESPIRATION RATE: 20 BRPM | TEMPERATURE: 99 F | DIASTOLIC BLOOD PRESSURE: 85 MMHG

## 2022-06-08 PROBLEM — G92.9 ENCEPHALOPATHY, TOXIC: Status: ACTIVE | Noted: 2022-06-08

## 2022-06-08 LAB
ALBUMIN SERPL-MCNC: 3.8 GM/DL (ref 3.4–4.8)
ALBUMIN/GLOB SERPL: 1.3 RATIO (ref 1.1–2)
ALP SERPL-CCNC: 129 UNIT/L (ref 40–150)
ALT SERPL-CCNC: 23 UNIT/L (ref 0–55)
AST SERPL-CCNC: 39 UNIT/L (ref 5–34)
BASOPHILS # BLD AUTO: 0.03 X10(3)/MCL (ref 0–0.2)
BASOPHILS NFR BLD AUTO: 0.7 %
BILIRUBIN DIRECT+TOT PNL SERPL-MCNC: 0.3 MG/DL
BUN SERPL-MCNC: 9.5 MG/DL (ref 9.8–20.1)
CALCIUM SERPL-MCNC: 9.1 MG/DL (ref 8.4–10.2)
CHLORIDE SERPL-SCNC: 107 MMOL/L (ref 98–107)
CO2 SERPL-SCNC: 22 MMOL/L (ref 23–31)
CREAT SERPL-MCNC: 0.66 MG/DL (ref 0.55–1.02)
EOSINOPHIL # BLD AUTO: 0.01 X10(3)/MCL (ref 0–0.9)
EOSINOPHIL NFR BLD AUTO: 0.2 %
ERYTHROCYTE [DISTWIDTH] IN BLOOD BY AUTOMATED COUNT: 14.5 % (ref 11.5–17)
GLOBULIN SER-MCNC: 3 GM/DL (ref 2.4–3.5)
GLUCOSE SERPL-MCNC: 132 MG/DL (ref 82–115)
HCT VFR BLD AUTO: 38.1 % (ref 37–47)
HGB BLD-MCNC: 12.3 GM/DL (ref 12–16)
IMM GRANULOCYTES # BLD AUTO: 0.04 X10(3)/MCL (ref 0–0.02)
IMM GRANULOCYTES NFR BLD AUTO: 0.9 % (ref 0–0.43)
LYMPHOCYTES # BLD AUTO: 0.55 X10(3)/MCL (ref 0.6–4.6)
LYMPHOCYTES NFR BLD AUTO: 12.8 %
MCH RBC QN AUTO: 29.6 PG (ref 27–31)
MCHC RBC AUTO-ENTMCNC: 32.3 MG/DL (ref 33–36)
MCV RBC AUTO: 91.6 FL (ref 80–94)
MONOCYTES # BLD AUTO: 0.52 X10(3)/MCL (ref 0.1–1.3)
MONOCYTES NFR BLD AUTO: 12.1 %
NEUTROPHILS # BLD AUTO: 3.1 X10(3)/MCL (ref 2.1–9.2)
NEUTROPHILS NFR BLD AUTO: 73.3 %
NRBC BLD AUTO-RTO: 0 %
PLATELET # BLD AUTO: 275 X10(3)/MCL (ref 130–400)
PMV BLD AUTO: 9.1 FL (ref 9.4–12.4)
POTASSIUM SERPL-SCNC: 3.3 MMOL/L (ref 3.5–5.1)
PROT SERPL-MCNC: 6.8 GM/DL (ref 5.8–7.6)
RBC # BLD AUTO: 4.16 X10(6)/MCL (ref 4.2–5.4)
SODIUM SERPL-SCNC: 140 MMOL/L (ref 136–145)
WBC # SPEC AUTO: 4.3 X10(3)/MCL (ref 4.5–11.5)

## 2022-06-08 PROCEDURE — 36415 COLL VENOUS BLD VENIPUNCTURE: CPT | Performed by: INTERNAL MEDICINE

## 2022-06-08 PROCEDURE — 97116 GAIT TRAINING THERAPY: CPT | Mod: CQ

## 2022-06-08 PROCEDURE — 80053 COMPREHEN METABOLIC PANEL: CPT | Performed by: INTERNAL MEDICINE

## 2022-06-08 PROCEDURE — G0378 HOSPITAL OBSERVATION PER HR: HCPCS

## 2022-06-08 PROCEDURE — 85025 COMPLETE CBC W/AUTO DIFF WBC: CPT | Performed by: INTERNAL MEDICINE

## 2022-06-08 PROCEDURE — 94761 N-INVAS EAR/PLS OXIMETRY MLT: CPT

## 2022-06-08 PROCEDURE — 25000003 PHARM REV CODE 250: Performed by: INTERNAL MEDICINE

## 2022-06-08 RX ORDER — LOSARTAN POTASSIUM 50 MG/1
100 TABLET ORAL DAILY
Status: DISCONTINUED | OUTPATIENT
Start: 2022-06-08 | End: 2022-06-08 | Stop reason: HOSPADM

## 2022-06-08 RX ADMIN — DULOXETINE 60 MG: 30 CAPSULE, DELAYED RELEASE ORAL at 10:06

## 2022-06-08 RX ADMIN — ASPIRIN 81 MG CHEWABLE TABLET 81 MG: 81 TABLET CHEWABLE at 10:06

## 2022-06-08 RX ADMIN — ACETAMINOPHEN 650 MG: 325 TABLET ORAL at 12:06

## 2022-06-08 RX ADMIN — DOCUSATE CALCIUM 240 MG: 240 CAPSULE, LIQUID FILLED ORAL at 10:06

## 2022-06-08 RX ADMIN — ARIPIPRAZOLE 2 MG: 2 TABLET ORAL at 10:06

## 2022-06-08 RX ADMIN — CELECOXIB 200 MG: 200 CAPSULE ORAL at 10:06

## 2022-06-08 NOTE — DISCHARGE INSTRUCTIONS
Pt instructed to not take benzos or sedatives at home.  also educated and updated on new medications. Iv and telemetry discontinued. All questions answered.

## 2022-06-08 NOTE — PT/OT/SLP PROGRESS
Physical Therapy         Treatment        Kasandra Cheek   MRN: [id number]     PT Received On: 06/08/22  PT Start Time: 1137     PT Stop Time: 1148    PT Total Time (min): 11 min       Billable Minutes:  Gait [id number]  Total Minutes: 11    Treatment Type: Treatment  PT/PTA: PTA     PTA Visit Number: 1       General Precautions: Standard, fall  Orthopedic Precautions: Orthopedic Precautions : N/A   Braces: Braces: N/A    Spiritual, Cultural Beliefs, Adventist Practices, Values that Affect Care: no    Subjective:  Communicated with NSG prior to session.    Pain/Comfort  Pain Rating 1: 0/10    Objective:  Patient found in bed, with HOB elevated      Functional Mobility:  Bed Mobility:   Supine to sit: Independent   Sit to supine: Independent   Rolling: Standby Assistance       Balance:   Static Sit: FAIR+: Able to take MINIMAL challenges from all directions  Static Stand: GOOD-: Takes MODERATE challenges from all directions inconsistently  Dynamic stand: FAIR+: Needs CLOSE SUPERVISION during gait and is able to right self with minor LOB    Transfer Training:  Sit to stand:Stand-by Assistance with Rolling Walker Pt. performed 2 trials from EOB.        Gait Training:  Pt. amb 220 ft RW SBA. No LOB    Stair Training:  Pt ascended/descended 2 stair(s) with HHA with no handrails with Minimal Assistance.         Activity Tolerance:  Patient limited by fatigue    Patient left HOB elevated with all lines intact and call button in reach with CNA present.    Assessment:  Kasandra Cheek is a 62 y.o. female with a medical diagnosis ofAMS, toxic encephalopathy, polypharmacy.  She presents with the following impairments/functional limitations:  impaired cognition, gait instability .    Rehab potential is good.    Activity tolerance: Good    Discharge recommendations:       Equipment recommendations:       GOALS:   Multidisciplinary Problems     Physical Therapy Goals        Problem: Physical Therapy    Goal Priority Disciplines Outcome  Goal Variances Interventions   Physical Therapy Goal     PT, PT/OT Ongoing, Progressing     Description: Goals to be met by: 2022    Patient will increase functional independence with mobility by performin. Gait  x 200 feet with Modified [place] using Rolling Walker or SPC.   2. Ascend/descend 4 stairs with bilateral Handrails Modified [place] using RW or SPC .                      PLAN:    Patient to be seen daily  to address the above listed problems via gait training, therapeutic activities, therapeutic exercises  Plan of Care expires: 22  Plan of Care reviewed with: patient         2022

## 2022-06-08 NOTE — DISCHARGE SUMMARY
[facility] Hospital Medicine Discharge Summary    Admit Date: 6/6/2022  Discharge Date and Time: 6/8/20222:01 PM  Admitting Physician: DORI service   Discharging Physician: Hamilton Crawley MD.    Discharge Diagnoses:  Toxic encephalopathy secondary to polypharmacy (UDS + for benzos / opiates): resolved   Mild SHIRA: resolved   Anemia of chronic disease   Hypertension / hyperlipidemia     Chronic pain secondary to lumbar stenosis / herniate cervical discs/ previous neck / back surgeries     Major depression / generalized anxiety disorder     Morbid obesity     Hospital Course:   Kasandra Cheek a 62-year-old female who has a past medical history of hypertension, hyperlipidemia, acquired hypothyroidism, lumbar radiculopathy, osteoarthritis of the left knee, opioid dependence, lumbar spinal stenosis with neurogenic claudication, iron deficiency anemia, major depression and generalized anxiety disorder, migraines herniated cervical discs, pulmonary nodule, chronic cough, and  RAISA positive who was brought in by EMS on 6/6/22 for altered mental status.The patient's family found her passed out in her recliner at home with medication bottle in her lap.  Family was unsure if additional medications were taken.  The patient is on multiple medications for her multiple medical problems which include baclofen, bupropion, buspirone, Celebrex, Valium, duloxetine, gabapentin, losartan, levothyroxine, Lodine, ferrous sulfate docusate, magnesium oxide, Celebrex,cholecalciferol, aspirin and vitamin B12. CT of brain was negative except for mild micro vascular ischemia.  Urine drug screen positive for benzodiazepines and opiates.  Patient was given Narcan 2 mg IVP and level of consciousness did improve. ABG's reveal:  7.31/ 46/ 76 however pt is not requiring any supplemental oxygen at this time.  The patient was seen and evaluated in the emergency department; history obtained from  who is present at  the bedside; patient normally goes to [facility] her care. No airway compromise noted; pt is oxygenating well on RA. Received one dose of narcan and her mental status improved.     Patient was observed overnight. Continued on iv fluids. Her mentals status continue to get better. No was no sigs/symptoms of infection,. She was eating well and ambulating. She was advised to  Avoid narcotics and Benzos. Advised to revisit with her pain management MD and Psych MD.      at bedside. Understands the tx plan. Patient was discharged home in a stable condition.   Pt was seen and examined on the day of discharge  Vitals:  VITAL SIGNS: 24 HRS MIN & MAX LAST   Temp  Min: 98 °F (36.7 °C)  Max: 98.8 °F (37.1 °C) 98.8 °F (37.1 °C)   BP  Min: 145/76  Max: 182/92 (!) 164/85   Pulse  Min: 94  Max: 99  97   Resp  Min: 18  Max: 20 20   SpO2  Min: 95 %  Max: 98 % 98 %       Physical Exam:  Heart RRR  Lungs clear   Abdomen soft and non tedner  No FND    Procedures Performed: No admission procedures for hospital encounter.     Significant Diagnostic Studies: See Full reports for all details    Recent Labs   Lab 06/06/22  0530 06/07/22  0329 06/08/22  0423   WBC 5.3 4.7 4.3*   RBC 3.84* 4.08* 4.16*   HGB 11.4* 12.0 12.3   HCT 36.4* 37.4 38.1   MCV 94.8* 91.7 91.6   MCH 29.7 29.4 29.6   MCHC 31.3* 32.1* 32.3*   RDW 14.4 14.1 14.5    277 275   MPV 9.0* 9.2* 9.1*       Recent Labs   Lab 06/06/22  0530 06/06/22  0645 06/07/22  0329 06/08/22  0423     --  144 140   K 4.5  --  3.9 3.3*   CO2 23  --  21* 22*   BUN 23.7*  --  15.2 9.5*   CREATININE 1.11*  --  0.79 0.66   CALCIUM 9.2  --  9.4 9.1   PH  --  7.31*  --   --    ALBUMIN 4.4  --   --  3.8   ALKPHOS 127  --   --  129   ALT 19  --   --  23   AST 24  --   --  39*   BILITOT 0.3  --   --  0.3        Microbiology Results (last 7 days)     ** No results found for the last 168 hours. **           CT Head Without Contrast  Narrative: EXAMINATION:  CT HEAD  WITHOUT CONTRAST    CLINICAL HISTORY:  Mental status change, unknown cause;    TECHNIQUE:  Low dose axial images were obtained through the head.  Coronal and sagittal reformations were also performed. Contrast was not administered.    Automatic exposure control was utilized to reduce the patient's radiation dose.    DLP= 978    COMPARISON:  None.    FINDINGS:  No acute intracranial hemorrhage, edema or mass. No acute parenchymal abnormality.    Mild diffuse cerebral atrophy with concordant ventricular enlargement.    There is normal gray white differentiation.    The osseous structures are normal.    The mastoid air cells are clear.    The auditory canals are patent bilaterally.    The globes and orbital contents are normal bilaterally.    The visualized maxillary, ethmoid and sphenoid sinuses are clear.  Impression: No acute intracranial abnormality identified.  Findings of mild microvascular ischemic disease.    Electronically signed by: Logan Cedeno  Date:    06/06/2022  Time:    06:58         Medication List      CHANGE how you take these medications    folic acid 1 MG tablet  Commonly known as: FOLVITE  What changed: Another medication with the same name was removed. Continue taking this medication, and follow the directions you see here.        CONTINUE taking these medications    albuterol 90 mcg/actuation inhaler  Commonly known as: PROVENTIL/VENTOLIN HFA     ASPIRIN CHILDRENS 81 MG Chew  Generic drug: aspirin     busPIRone 30 MG Tab  Commonly known as: BUSPAR     cholecalciferol (vitamin D3) 250 mcg (10,000 unit) Tab     cyanocobalamin (vitamin B-12) 2,500 mcg Tab     DULoxetine 60 MG capsule  Commonly known as: CYMBALTA     esomeprazole 40 mg Grps  Commonly known as: NEXIUM     ferrous sulfate 325 mg (65 mg iron) Tab tablet  Commonly known as: FEOSOL     levothyroxine 175 MCG tablet  Commonly known as: SYNTHROID, LEVOTHROID     losartan 100 MG tablet  Commonly known as: COZAAR     omega 3-dha-epa-fish  oil 1,000 mg (120 mg-180 mg) Cap     QUEtiapine 300 MG Tab  Commonly known as: SEROQUEL        STOP taking these medications    ARIPiprazole 2 MG Tab  Commonly known as: ABILIFY     B12 ACTIVE 1,000 mcg Chew  Generic drug: mecobalamin (vitamin B12)     baclofen 10 MG tablet  Commonly known as: LIORESAL     baclofen 20 MG tablet  Commonly known as: LIORESAL     buPROPion 300 MG 24 hr tablet  Commonly known as: WELLBUTRIN XL     celecoxib 200 MG capsule  Commonly known as: CeleBREX     diazePAM 5 MG tablet  Commonly known as: VALIUM     docusate calcium 240 mg capsule  Commonly known as: SURFAK     etodolac 400 MG tablet  Commonly known as: LODINE     gabapentin 600 MG tablet  Commonly known as: NEURONTIN     magnesium oxide 400 mg (241.3 mg magnesium) tablet  Commonly known as: MAG-OX     ondansetron 8 MG tablet  Commonly known as: ZOFRAN     oxyCODONE 15 MG Tab  Commonly known as: ROXICODONE     oxyCODONE-acetaminophen 7.5-325 mg per tablet  Commonly known as: PERCOCET     pantoprazole 40 mg suspension  Commonly known as: PROTONIX     promethazine 12.5 MG Tab  Commonly known as: PHENERGAN     REXULTI 0.5 mg Tab  Generic drug: brexpiprazole     sucralfate 100 mg/mL suspension  Commonly known as: CARAFATE     VITAMIN K2 ORAL             Explained in detail to the patient about the discharge plan, medications, and follow-up visits. Pt understands and agrees with the treatment plan  Discharge Disposition:     Discharged Condition: stable  Diet-   Dietary Orders (From admission, onward)     Start     Ordered    06/07/22 1151  Diet Bariatric Soft  Diet effective now         06/07/22 1151               Medications Per MS med rec  Activities as tolerated    For further questions contact hospitalist office    Discharge time 33 minutes    For worsening symptoms, chest pain, shortness of breath, increased abdominal pain, high grade fever, stroke or stroke like symptoms, immediately go to the nearest Emergency Room or call 911  as soon as possible.      Hamilton Avila M.D on 6/8/2022. at 2:01 PM.

## 2022-06-08 NOTE — PLAN OF CARE
06/08/22 1223   Discharge Assessment   Assessment Type Discharge Planning Assessment   Source of Information patient   Reason For Admission encephalopathy, AMS   Lives With spouse  (Pt lives w Gama in a single story home with 3 steps to enter home and no rails)   Do you expect to return to your current living situation? Yes   Do you have help at home or someone to help you manage your care at home? Yes   Who are your caregiver(s) and their phone number(s)? pt's Gama --[phone]   Prior to hospitilization cognitive status: Alert/Oriented   Current cognitive status: Alert/Oriented  (Pt answered all questions appropriate; pt had AMS when she arrived at hospital)   Walking or Climbing Stairs Difficulty ambulation difficulty, requires equipment   Mobility Management Requires a cane   Dressing/Bathing Difficulty none   Home Layout Able to live on 1st floor   Equipment Currently Used at Home cane, straight   Patient currently being followed by outpatient case management? No   Do you currently have service(s) that help you manage your care at home? No   Who is going to help you get home at discharge? pt's Gama   Discharge Plan A   (Home w family care)   DME Needed Upon Discharge  other (see comments)  (TBD)   Discharge Plan discussed with: Spouse/sig other   Name(s) and Number(s) Gama--[phone]   Discharge Barriers Identified None   Relationship/Environment   Name(s) of Who Lives With Patient pt's Gama---[phone]       Pt's PCP is Ravinder Mazariegos DO located in [place]. She used [facility] in the past and would like to use them again should she need. She uses [facility] in [place]. She is disabled. She is able to drive, cook, clean, etc.. Her  will bring pt home upon dc. Follow for dc needs.

## 2022-06-16 ENCOUNTER — OFFICE VISIT (OUTPATIENT)
Dept: NEUROLOGY | Facility: CLINIC | Age: 62
End: 2022-06-16
Payer: MEDICARE

## 2022-06-16 VITALS
SYSTOLIC BLOOD PRESSURE: 120 MMHG | DIASTOLIC BLOOD PRESSURE: 78 MMHG | HEIGHT: 68 IN | WEIGHT: 285 LBS | BODY MASS INDEX: 43.19 KG/M2

## 2022-06-16 DIAGNOSIS — R56.9 SEIZURE: Primary | ICD-10-CM

## 2022-06-16 DIAGNOSIS — R55 SYNCOPE AND COLLAPSE: ICD-10-CM

## 2022-06-16 PROCEDURE — 3078F DIAST BP <80 MM HG: CPT | Mod: CPTII,S$GLB,, | Performed by: PSYCHIATRY & NEUROLOGY

## 2022-06-16 PROCEDURE — 3078F PR MOST RECENT DIASTOLIC BLOOD PRESSURE < 80 MM HG: ICD-10-PCS | Mod: CPTII,S$GLB,, | Performed by: PSYCHIATRY & NEUROLOGY

## 2022-06-16 PROCEDURE — 1159F PR MEDICATION LIST DOCUMENTED IN MEDICAL RECORD: ICD-10-PCS | Mod: CPTII,S$GLB,, | Performed by: PSYCHIATRY & NEUROLOGY

## 2022-06-16 PROCEDURE — 3008F PR BODY MASS INDEX (BMI) DOCUMENTED: ICD-10-PCS | Mod: CPTII,S$GLB,, | Performed by: PSYCHIATRY & NEUROLOGY

## 2022-06-16 PROCEDURE — 1159F MED LIST DOCD IN RCRD: CPT | Mod: CPTII,S$GLB,, | Performed by: PSYCHIATRY & NEUROLOGY

## 2022-06-16 PROCEDURE — 4010F ACE/ARB THERAPY RXD/TAKEN: CPT | Mod: CPTII,S$GLB,, | Performed by: PSYCHIATRY & NEUROLOGY

## 2022-06-16 PROCEDURE — 99999 PR PBB SHADOW E&M-EST. PATIENT-LVL IV: ICD-10-PCS | Mod: PBBFAC,,, | Performed by: PSYCHIATRY & NEUROLOGY

## 2022-06-16 PROCEDURE — 3074F SYST BP LT 130 MM HG: CPT | Mod: CPTII,S$GLB,, | Performed by: PSYCHIATRY & NEUROLOGY

## 2022-06-16 PROCEDURE — 4010F PR ACE/ARB THEARPY RXD/TAKEN: ICD-10-PCS | Mod: CPTII,S$GLB,, | Performed by: PSYCHIATRY & NEUROLOGY

## 2022-06-16 PROCEDURE — 99205 PR OFFICE/OUTPT VISIT, NEW, LEVL V, 60-74 MIN: ICD-10-PCS | Mod: S$GLB,,, | Performed by: PSYCHIATRY & NEUROLOGY

## 2022-06-16 PROCEDURE — 3074F PR MOST RECENT SYSTOLIC BLOOD PRESSURE < 130 MM HG: ICD-10-PCS | Mod: CPTII,S$GLB,, | Performed by: PSYCHIATRY & NEUROLOGY

## 2022-06-16 PROCEDURE — 3008F BODY MASS INDEX DOCD: CPT | Mod: CPTII,S$GLB,, | Performed by: PSYCHIATRY & NEUROLOGY

## 2022-06-16 PROCEDURE — 99999 PR PBB SHADOW E&M-EST. PATIENT-LVL IV: CPT | Mod: PBBFAC,,, | Performed by: PSYCHIATRY & NEUROLOGY

## 2022-06-16 PROCEDURE — 99205 OFFICE O/P NEW HI 60 MIN: CPT | Mod: S$GLB,,, | Performed by: PSYCHIATRY & NEUROLOGY

## 2022-06-16 RX ORDER — CHOLECALCIFEROL (VITAMIN D3) 25 MCG
2500 TABLET,CHEWABLE ORAL DAILY
COMMUNITY

## 2022-06-16 RX ORDER — BUPROPION HYDROCHLORIDE 300 MG/1
1 TABLET ORAL DAILY
COMMUNITY
Start: 2021-08-08

## 2022-06-16 RX ORDER — BUPROPION HYDROCHLORIDE 150 MG/1
150 TABLET ORAL DAILY
COMMUNITY
Start: 2022-04-05

## 2022-06-16 RX ORDER — CEPHRADINE 500 MG
1 CAPSULE ORAL DAILY
COMMUNITY
Start: 2021-09-01

## 2022-06-16 RX ORDER — ONDANSETRON HYDROCHLORIDE 8 MG/1
8 TABLET, FILM COATED ORAL EVERY 6 HOURS PRN
COMMUNITY
Start: 2022-03-31 | End: 2023-04-24

## 2022-06-16 RX ORDER — GABAPENTIN 600 MG/1
1 TABLET ORAL 4 TIMES DAILY
COMMUNITY
Start: 2021-08-08

## 2022-06-16 RX ORDER — FERROUS SULFATE 325(65) MG
1 TABLET ORAL DAILY
COMMUNITY
Start: 2021-09-01

## 2022-06-16 RX ORDER — PROMETHAZINE HYDROCHLORIDE 12.5 MG/1
12.5 TABLET ORAL 2 TIMES DAILY
COMMUNITY
End: 2022-09-13

## 2022-06-16 RX ORDER — OXYCODONE AND ACETAMINOPHEN 10; 325 MG/1; MG/1
1 TABLET ORAL EVERY 4 HOURS PRN
COMMUNITY
Start: 2022-04-28 | End: 2022-09-13

## 2022-06-16 RX ORDER — CALCIUM CARBONATE 300MG(750)
1 TABLET,CHEWABLE ORAL DAILY
COMMUNITY
Start: 2021-09-01 | End: 2022-07-12

## 2022-06-22 ENCOUNTER — PROCEDURE VISIT (OUTPATIENT)
Dept: SLEEP MEDICINE | Facility: HOSPITAL | Age: 62
End: 2022-06-22
Attending: PSYCHIATRY & NEUROLOGY
Payer: MEDICARE

## 2022-06-22 ENCOUNTER — TELEPHONE (OUTPATIENT)
Dept: NEUROLOGY | Facility: CLINIC | Age: 62
End: 2022-06-22
Payer: MEDICARE

## 2022-06-22 DIAGNOSIS — R56.9 SEIZURE: ICD-10-CM

## 2022-06-22 DIAGNOSIS — R55 SYNCOPE AND COLLAPSE: ICD-10-CM

## 2022-06-22 PROCEDURE — 95819 EEG AWAKE AND ASLEEP: CPT

## 2022-06-22 PROCEDURE — 95816 EEG AWAKE AND DROWSY: CPT | Mod: 26,,, | Performed by: PSYCHIATRY & NEUROLOGY

## 2022-06-22 PROCEDURE — 95816 PR EEG,W/AWAKE & DROWSY RECORD: ICD-10-PCS | Mod: 26,,, | Performed by: PSYCHIATRY & NEUROLOGY

## 2022-06-22 NOTE — PROCEDURES
Procedures   EEG    Dx: Syncope    Duration:32:52    Technical: Standard digital EEG was performed at [facility]. The 10/20 international system of electrode placement was used.  Photic   stimulation and hyperventilation were performed as activating   procedures.    Description: The posterior dominant rhythm was 8 Hz.  There   were no lateralizing features.  The patient was awake for the duration of the study.    Impression: Normal   awake.

## 2022-06-22 NOTE — TELEPHONE ENCOUNTER
----- Message from Paul Choi MA sent at 6/22/2022  2:13 PM CDT -----  Regarding: FYI  Contact: Self  Pt called to report she had an EEG today 06/22 and has an MRI 07/01/22.    NOV: 07/12/22    Phone: [phone]

## 2022-07-12 ENCOUNTER — OFFICE VISIT (OUTPATIENT)
Dept: NEUROLOGY | Facility: CLINIC | Age: 62
End: 2022-07-12
Payer: MEDICARE

## 2022-07-12 VITALS
BODY MASS INDEX: 43.65 KG/M2 | SYSTOLIC BLOOD PRESSURE: 122 MMHG | DIASTOLIC BLOOD PRESSURE: 82 MMHG | WEIGHT: 288 LBS | HEIGHT: 68 IN

## 2022-07-12 DIAGNOSIS — R56.9 SEIZURE: Primary | ICD-10-CM

## 2022-07-12 PROCEDURE — 1159F MED LIST DOCD IN RCRD: CPT | Mod: CPTII,S$GLB,, | Performed by: PSYCHIATRY & NEUROLOGY

## 2022-07-12 PROCEDURE — 99215 PR OFFICE/OUTPT VISIT, EST, LEVL V, 40-54 MIN: ICD-10-PCS | Mod: S$GLB,,, | Performed by: PSYCHIATRY & NEUROLOGY

## 2022-07-12 PROCEDURE — 99999 PR PBB SHADOW E&M-EST. PATIENT-LVL V: CPT | Mod: PBBFAC,,, | Performed by: PSYCHIATRY & NEUROLOGY

## 2022-07-12 PROCEDURE — 3079F DIAST BP 80-89 MM HG: CPT | Mod: CPTII,S$GLB,, | Performed by: PSYCHIATRY & NEUROLOGY

## 2022-07-12 PROCEDURE — 3074F PR MOST RECENT SYSTOLIC BLOOD PRESSURE < 130 MM HG: ICD-10-PCS | Mod: CPTII,S$GLB,, | Performed by: PSYCHIATRY & NEUROLOGY

## 2022-07-12 PROCEDURE — 3074F SYST BP LT 130 MM HG: CPT | Mod: CPTII,S$GLB,, | Performed by: PSYCHIATRY & NEUROLOGY

## 2022-07-12 PROCEDURE — 3008F PR BODY MASS INDEX (BMI) DOCUMENTED: ICD-10-PCS | Mod: CPTII,S$GLB,, | Performed by: PSYCHIATRY & NEUROLOGY

## 2022-07-12 PROCEDURE — 1159F PR MEDICATION LIST DOCUMENTED IN MEDICAL RECORD: ICD-10-PCS | Mod: CPTII,S$GLB,, | Performed by: PSYCHIATRY & NEUROLOGY

## 2022-07-12 PROCEDURE — 4010F PR ACE/ARB THEARPY RXD/TAKEN: ICD-10-PCS | Mod: CPTII,S$GLB,, | Performed by: PSYCHIATRY & NEUROLOGY

## 2022-07-12 PROCEDURE — 99215 OFFICE O/P EST HI 40 MIN: CPT | Mod: S$GLB,,, | Performed by: PSYCHIATRY & NEUROLOGY

## 2022-07-12 PROCEDURE — 3008F BODY MASS INDEX DOCD: CPT | Mod: CPTII,S$GLB,, | Performed by: PSYCHIATRY & NEUROLOGY

## 2022-07-12 PROCEDURE — 4010F ACE/ARB THERAPY RXD/TAKEN: CPT | Mod: CPTII,S$GLB,, | Performed by: PSYCHIATRY & NEUROLOGY

## 2022-07-12 PROCEDURE — 99999 PR PBB SHADOW E&M-EST. PATIENT-LVL V: ICD-10-PCS | Mod: PBBFAC,,, | Performed by: PSYCHIATRY & NEUROLOGY

## 2022-07-12 PROCEDURE — 3079F PR MOST RECENT DIASTOLIC BLOOD PRESSURE 80-89 MM HG: ICD-10-PCS | Mod: CPTII,S$GLB,, | Performed by: PSYCHIATRY & NEUROLOGY

## 2022-07-12 RX ORDER — LAMOTRIGINE 25 MG/1
50 TABLET ORAL DAILY
Qty: 60 TABLET | Refills: 11 | Status: SHIPPED | OUTPATIENT
Start: 2022-07-12 | End: 2022-09-13

## 2022-07-12 RX ORDER — NALOXONE HYDROCHLORIDE 4 MG/.1ML
1 SPRAY NASAL ONCE AS NEEDED
COMMUNITY

## 2022-07-12 RX ORDER — DOCUSATE SODIUM 250 MG
250 CAPSULE ORAL 2 TIMES DAILY
COMMUNITY

## 2022-07-12 RX ORDER — ACETAMINOPHEN 325 MG/1
325 TABLET ORAL
COMMUNITY
End: 2022-09-13

## 2022-07-12 NOTE — PROGRESS NOTES
Subjective:       Patient ID: Kasandra Cheek is a 62 y.o. female.    Chief Complaint: Seizures    HPI no events since last seen  No new issues  Had mri/eeg  Does endorse very rare prolonged events; few times/year smaller events where she loses track of conversation and spaces out  One nervous breakdown in the past  Depression is her dx; no h/o psychotic depression or chronic aggression/mood disturbance    Mri images reviewed; R cortical stroke; never had sx  eeg normal  Review of Systems    The remainder of the 14 system ROS is noncontributory or negative unless mentioned/reviewed above.    Objective:      Physical Exam  Mental Status: Alert and oriented x3. Language is fluent with good comprehension.    Cranial Nerve: Pupils are equal, round, and reactive to light. Visual fields are intact to confrontation. Normal fundi. Ocular movements are intact. Face is symmetric at rest and with activation with intact sensation throughout. Hearing intact to finger rub bilaterally. Muscles of tongue and palate activate symmetrically. No dysarthria. Strength is full in sternocleidomastoid and trapezius bilaterally.    Motor: Muscle bulk and tone are normal. Strength is 5/5 in all four extremities both proximally and distally. Intact fine motor movements bilaterally. There is no pronator drift or satelliting on arm roll.    Sensory: Sensation is intact to light touch, pinprick, vibration, and proprioception throughout. Romberg is negative.    Reflexes: 2+ and symmetric at the biceps, triceps, brachioradialis, patella, and Achilles bilaterally. Plantar response is flexor bilaterally.    Coordination: No dysmetria on finger-nose-finger or heel-knee-shin. Normal rapid alternating movements. Fast finger tapping with normal amplitude and speed.    Gait: Narrow based with normal stride length and good arm swing bilaterally. Able to walk on heels, toes, and in tandem.    Assessment:       Problem List Items Addressed This Visit    None      Visit Diagnoses     Seizure    -  Primary          Plan:       Questionable seiuzre  Long trial of lamictal 50 mg/day  Cautioned about rash/SJS/TEN  May have element of dissociation   rtc 4 mos

## 2022-07-29 ENCOUNTER — DOCUMENT SCAN (OUTPATIENT)
Dept: HOME HEALTH SERVICES | Facility: HOSPITAL | Age: 62
End: 2022-07-29
Payer: MEDICARE

## 2022-09-06 ENCOUNTER — TELEPHONE (OUTPATIENT)
Dept: NEUROLOGY | Facility: CLINIC | Age: 62
End: 2022-09-06
Payer: MEDICARE

## 2022-09-06 NOTE — TELEPHONE ENCOUNTER
LOV 07/12/2022  Rx'd Lamictal at appt    s/s began ~ 2 weeks ago  HA, neck pain, mood swings irritability

## 2022-09-06 NOTE — TELEPHONE ENCOUNTER
Pt reports intense headaches, mood swings, irritability worsening memory, and neck pain that goes form neck to the back of her head. States headaches are located over entire head, starts at back half of head and then wraps around the front.   Reports irritability and mood swing started 2 weeks ago and started getting worse last week. Is asking if this medication could be the cause of these symptoms.     Medication: Lamotrigine 50 mg    Pharmacy: [facility] [place]    Last Appointment: 07/12/22    Next Appointment: 11/15/22  Offered sooner appt 09/07/22. States she would like to wait to move appt until advised.

## 2022-09-13 ENCOUNTER — OFFICE VISIT (OUTPATIENT)
Dept: NEUROLOGY | Facility: CLINIC | Age: 62
End: 2022-09-13
Payer: MEDICARE

## 2022-09-13 VITALS — WEIGHT: 288 LBS | HEIGHT: 68 IN | BODY MASS INDEX: 43.65 KG/M2

## 2022-09-13 DIAGNOSIS — R40.4 ALTERED CONSCIOUSNESS: Primary | ICD-10-CM

## 2022-09-13 PROCEDURE — 99999 PR PBB SHADOW E&M-EST. PATIENT-LVL IV: CPT | Mod: PBBFAC,,, | Performed by: PSYCHIATRY & NEUROLOGY

## 2022-09-13 PROCEDURE — 1159F PR MEDICATION LIST DOCUMENTED IN MEDICAL RECORD: ICD-10-PCS | Mod: CPTII,S$GLB,, | Performed by: PSYCHIATRY & NEUROLOGY

## 2022-09-13 PROCEDURE — 3008F BODY MASS INDEX DOCD: CPT | Mod: CPTII,S$GLB,, | Performed by: PSYCHIATRY & NEUROLOGY

## 2022-09-13 PROCEDURE — 4010F ACE/ARB THERAPY RXD/TAKEN: CPT | Mod: CPTII,S$GLB,, | Performed by: PSYCHIATRY & NEUROLOGY

## 2022-09-13 PROCEDURE — 4010F PR ACE/ARB THEARPY RXD/TAKEN: ICD-10-PCS | Mod: CPTII,S$GLB,, | Performed by: PSYCHIATRY & NEUROLOGY

## 2022-09-13 PROCEDURE — 99999 PR PBB SHADOW E&M-EST. PATIENT-LVL IV: ICD-10-PCS | Mod: PBBFAC,,, | Performed by: PSYCHIATRY & NEUROLOGY

## 2022-09-13 PROCEDURE — 1159F MED LIST DOCD IN RCRD: CPT | Mod: CPTII,S$GLB,, | Performed by: PSYCHIATRY & NEUROLOGY

## 2022-09-13 PROCEDURE — 99212 OFFICE O/P EST SF 10 MIN: CPT | Mod: S$GLB,,, | Performed by: PSYCHIATRY & NEUROLOGY

## 2022-09-13 PROCEDURE — 3008F PR BODY MASS INDEX (BMI) DOCUMENTED: ICD-10-PCS | Mod: CPTII,S$GLB,, | Performed by: PSYCHIATRY & NEUROLOGY

## 2022-09-13 PROCEDURE — 99212 PR OFFICE/OUTPT VISIT, EST, LEVL II, 10-19 MIN: ICD-10-PCS | Mod: S$GLB,,, | Performed by: PSYCHIATRY & NEUROLOGY

## 2022-09-13 RX ORDER — OXYCODONE HYDROCHLORIDE 15 MG/1
15 TABLET ORAL
COMMUNITY
Start: 2022-09-12 | End: 2023-04-24

## 2022-09-13 NOTE — PROGRESS NOTES
Subjective:       Patient ID: Kasandra Cheek is a 62 y.o. female.    Chief Complaint: Seizures and Headache    HPI lamcital gave her a mood swings  No events since last seen  Pain meds have changed  No new issues  Review of Systems    The remainder of the 14 system ROS is noncontributory or negative unless mentioned/reviewed above.    Objective:      Physical Exam  Mental Status: Alert and oriented x3. Language is fluent with good comprehension.    Cranial Nerve: Pupils are equal, round, and reactive to light. Visual fields are intact to confrontation. Normal fundi. Ocular movements are intact. Face is symmetric at rest and with activation with intact sensation throughout. Hearing intact to finger rub bilaterally. Muscles of tongue and palate activate symmetrically. No dysarthria. Strength is full in sternocleidomastoid and trapezius bilaterally.    Motor: Muscle bulk and tone are normal. Strength is 5/5 in all four extremities both proximally and distally. Intact fine motor movements bilaterally. There is no pronator drift or satelliting on arm roll.    Sensory: Sensation is intact to light touch, pinprick, vibration, and proprioception throughout. Romberg is negative.    Reflexes: 2+ and symmetric at the biceps, triceps, brachioradialis, patella, and Achilles bilaterally. Plantar response is flexor bilaterally.    Coordination: No dysmetria on finger-nose-finger or heel-knee-shin. Normal rapid alternating movements. Fast finger tapping with normal amplitude and speed.    Gait: Narrow based with normal stride length and good arm swing bilaterally. Able to walk on heels, toes, and in tandem.    Assessment:       Problem List Items Addressed This Visit    None      Plan:       Presumptive seizure vs disocciation  Hold off on sz meds for now  Rtc prn

## 2023-01-30 ENCOUNTER — LAB REQUISITION (OUTPATIENT)
Dept: LAB | Facility: HOSPITAL | Age: 63
End: 2023-01-30
Payer: MEDICARE

## 2023-01-30 DIAGNOSIS — J31.0 CHRONIC RHINITIS: ICD-10-CM

## 2023-01-30 PROCEDURE — 87205 SMEAR GRAM STAIN: CPT | Performed by: OTOLARYNGOLOGY

## 2023-01-30 PROCEDURE — 87070 CULTURE OTHR SPECIMN AEROBIC: CPT | Performed by: OTOLARYNGOLOGY

## 2023-01-31 LAB
GRAM STN SPEC: NORMAL
GRAM STN SPEC: NORMAL

## 2023-02-02 LAB — BACTERIA SPEC CULT: NORMAL

## 2023-04-24 ENCOUNTER — HOSPITAL ENCOUNTER (OUTPATIENT)
Dept: RADIOLOGY | Facility: HOSPITAL | Age: 63
Discharge: HOME | End: 2023-04-24
Attending: INTERNAL MEDICINE
Payer: MEDICARE

## 2023-04-24 ENCOUNTER — OFFICE VISIT (OUTPATIENT)
Dept: RHEUMATOLOGY | Facility: CLINIC | Age: 63
End: 2023-04-24
Payer: MEDICARE

## 2023-04-24 VITALS
WEIGHT: 293 LBS | DIASTOLIC BLOOD PRESSURE: 74 MMHG | SYSTOLIC BLOOD PRESSURE: 117 MMHG | BODY MASS INDEX: 44.41 KG/M2 | HEIGHT: 68 IN | HEART RATE: 89 BPM | TEMPERATURE: 99 F | OXYGEN SATURATION: 97 %

## 2023-04-24 DIAGNOSIS — L40.50 PSORIATIC ARTHRITIS: ICD-10-CM

## 2023-04-24 DIAGNOSIS — L40.9 PSORIASIS OF NAIL: Primary | ICD-10-CM

## 2023-04-24 DIAGNOSIS — M80.00XA OSTEOPOROSIS WITH CURRENT PATHOLOGICAL FRACTURE, UNSPECIFIED OSTEOPOROSIS TYPE, INITIAL ENCOUNTER: ICD-10-CM

## 2023-04-24 DIAGNOSIS — L40.9 PSORIASIS OF NAIL: ICD-10-CM

## 2023-04-24 PROCEDURE — 99204 PR OFFICE/OUTPT VISIT, NEW, LEVL IV, 45-59 MIN: ICD-10-PCS | Mod: S$GLB,,, | Performed by: INTERNAL MEDICINE

## 2023-04-24 PROCEDURE — 1159F PR MEDICATION LIST DOCUMENTED IN MEDICAL RECORD: ICD-10-PCS | Mod: CPTII,S$GLB,, | Performed by: INTERNAL MEDICINE

## 2023-04-24 PROCEDURE — 4010F ACE/ARB THERAPY RXD/TAKEN: CPT | Mod: CPTII,S$GLB,, | Performed by: INTERNAL MEDICINE

## 2023-04-24 PROCEDURE — 99204 OFFICE O/P NEW MOD 45 MIN: CPT | Mod: S$GLB,,, | Performed by: INTERNAL MEDICINE

## 2023-04-24 PROCEDURE — 99999 PR PBB SHADOW E&M-EST. PATIENT-LVL V: CPT | Mod: PBBFAC,,, | Performed by: INTERNAL MEDICINE

## 2023-04-24 PROCEDURE — 4010F PR ACE/ARB THEARPY RXD/TAKEN: ICD-10-PCS | Mod: CPTII,S$GLB,, | Performed by: INTERNAL MEDICINE

## 2023-04-24 PROCEDURE — 3078F DIAST BP <80 MM HG: CPT | Mod: CPTII,S$GLB,, | Performed by: INTERNAL MEDICINE

## 2023-04-24 PROCEDURE — 1160F PR REVIEW ALL MEDS BY PRESCRIBER/CLIN PHARMACIST DOCUMENTED: ICD-10-PCS | Mod: CPTII,S$GLB,, | Performed by: INTERNAL MEDICINE

## 2023-04-24 PROCEDURE — 99999 PR PBB SHADOW E&M-EST. PATIENT-LVL V: ICD-10-PCS | Mod: PBBFAC,,, | Performed by: INTERNAL MEDICINE

## 2023-04-24 PROCEDURE — 72202 X-RAY EXAM SI JOINTS 3/> VWS: CPT | Mod: TC

## 2023-04-24 PROCEDURE — 1159F MED LIST DOCD IN RCRD: CPT | Mod: CPTII,S$GLB,, | Performed by: INTERNAL MEDICINE

## 2023-04-24 PROCEDURE — 3078F PR MOST RECENT DIASTOLIC BLOOD PRESSURE < 80 MM HG: ICD-10-PCS | Mod: CPTII,S$GLB,, | Performed by: INTERNAL MEDICINE

## 2023-04-24 PROCEDURE — 3074F SYST BP LT 130 MM HG: CPT | Mod: CPTII,S$GLB,, | Performed by: INTERNAL MEDICINE

## 2023-04-24 PROCEDURE — 3008F PR BODY MASS INDEX (BMI) DOCUMENTED: ICD-10-PCS | Mod: CPTII,S$GLB,, | Performed by: INTERNAL MEDICINE

## 2023-04-24 PROCEDURE — 1160F RVW MEDS BY RX/DR IN RCRD: CPT | Mod: CPTII,S$GLB,, | Performed by: INTERNAL MEDICINE

## 2023-04-24 PROCEDURE — 3074F PR MOST RECENT SYSTOLIC BLOOD PRESSURE < 130 MM HG: ICD-10-PCS | Mod: CPTII,S$GLB,, | Performed by: INTERNAL MEDICINE

## 2023-04-24 PROCEDURE — 3008F BODY MASS INDEX DOCD: CPT | Mod: CPTII,S$GLB,, | Performed by: INTERNAL MEDICINE

## 2023-04-24 RX ORDER — OXYCODONE HYDROCHLORIDE 20 MG/1
1 TABLET ORAL EVERY 4 HOURS PRN
COMMUNITY
Start: 2023-04-06

## 2023-04-24 RX ORDER — PROMETHAZINE HYDROCHLORIDE 12.5 MG/1
0.5 TABLET ORAL 2 TIMES DAILY PRN
COMMUNITY
Start: 2023-02-07 | End: 2023-08-03

## 2023-04-24 RX ORDER — LEFLUNOMIDE 20 MG/1
20 TABLET ORAL
Qty: 30 TABLET | Refills: 11 | Status: SHIPPED | OUTPATIENT
Start: 2023-04-24 | End: 2023-08-03

## 2023-04-24 RX ORDER — LANOLIN ALCOHOL/MO/W.PET/CERES
400 CREAM (GRAM) TOPICAL NIGHTLY
Qty: 30 TABLET | Refills: 5 | Status: SHIPPED | OUTPATIENT
Start: 2023-04-24 | End: 2023-10-27 | Stop reason: SDUPTHER

## 2023-04-24 RX ORDER — ONDANSETRON HYDROCHLORIDE 8 MG/1
8 TABLET, FILM COATED ORAL EVERY 6 HOURS PRN
COMMUNITY

## 2023-04-24 RX ORDER — PANTOPRAZOLE SODIUM 40 MG/1
40 TABLET, DELAYED RELEASE ORAL 2 TIMES DAILY
COMMUNITY
Start: 2023-04-18

## 2023-04-24 RX ORDER — RISEDRONATE SODIUM 150 MG/1
150 TABLET, FILM COATED ORAL
Qty: 1 TABLET | Refills: 11 | Status: SHIPPED | OUTPATIENT
Start: 2023-04-24 | End: 2024-04-23

## 2023-04-24 RX ORDER — CHIA/LINOLENIC/LINOLEIC/OLEIC 1000 MG
CAPSULE ORAL 2 TIMES DAILY
COMMUNITY
End: 2023-10-26

## 2023-04-24 NOTE — PROGRESS NOTES
"Subjective:       Patient ID: Kasandra Cheek is a 62 y.o. female.    Chief Complaint: Initial Visit (Initial visit. Patient states that she is losing bone density. Complains of weakness in bones. Back surgery in January. Have broken ribs in the past, multiple fall, brittle nails, lost teeth. Have been watching her bone density for the last few years but the last two have been really bad. )    Pt  is complaining of joint pain involving his MCP PIP wrist elbow shoulders hips knees and ankles bilaterally.  Is 3/10 in intensity dull in quality and continuous.  It is associated with a morning stiffness lasting for more than 60 minutes. Pt reports having difficulty maintaining a good night of sleep and this has been associated with myalgia of 3/10 in intensity.  This pain is dull continuous and gets worse mainly at night.  It is associated with fatigue.  No fever no chills no others.  T SCORE -3  PSORIATIC NAILS       Review of Systems   Constitutional:  Negative for appetite change, chills and fever.   HENT:  Negative for congestion, ear pain, mouth sores, nosebleeds and trouble swallowing.    Eyes:  Negative for photophobia and discharge.   Respiratory:  Negative for chest tightness and shortness of breath.    Cardiovascular:  Negative for chest pain.   Gastrointestinal:  Negative for abdominal pain and vomiting.   Endocrine: Negative.    Genitourinary:  Negative for hematuria.   Musculoskeletal:         As per HPI   Skin:  Negative for rash.        PSORIATIC NAILS    Neurological:  Negative for weakness.       Objective:   /74 (BP Location: Left arm, Patient Position: Sitting, BP Method: Large (Automatic))   Pulse 89   Temp 98.6 °F (37 °C) (Oral)   Ht 5' 8" (1.727 m)   Wt (!) 138.3 kg (304 lb 12.8 oz)   SpO2 97%   BMI 46.34 kg/m²      Physical Exam   Constitutional: She is oriented to person, place, and time. She appears well-developed and well-nourished. No distress.   HENT:   Head: Normocephalic and " atraumatic.   Right Ear: External ear normal.   Left Ear: External ear normal.   Eyes: Pupils are equal, round, and reactive to light.   Cardiovascular: Normal rate, regular rhythm and normal heart sounds.   Pulmonary/Chest: Breath sounds normal.   Abdominal: Soft. There is no abdominal tenderness.   Musculoskeletal:      Right shoulder: Tenderness present.      Left shoulder: Tenderness present.      Right elbow: Tenderness present.      Left elbow: Tenderness present.      Right wrist: Tenderness present.      Left wrist: Tenderness present.      Cervical back: Neck supple.      Right hip: Tenderness present.      Left hip: Tenderness present.      Right knee: Tenderness present.      Left knee: Tenderness present.      Right ankle: Tenderness present.      Left ankle: Tenderness present.   Lymphadenopathy:     She has no cervical adenopathy.   Neurological: She is alert and oriented to person, place, and time. She displays normal reflexes. No cranial nerve deficit or sensory deficit. She exhibits normal muscle tone. Coordination normal.   Skin: No rash noted. No erythema.   PSORIATIC NAILS    Vitals reviewed.      Right Side Rheumatological Exam     The patient is tender to palpation of the shoulder, elbow, wrist, knee, 1st PIP, 1st MCP, 2nd PIP, 2nd MCP, 3rd PIP, 3rd MCP, 4th PIP, 4th MCP, 5th PIP, hip, ankle, 1st MTP, 2nd MTP, 3rd MTP, 4th MTP, 5th MTP, 1st toe IP, 2nd toe IP, 3rd toe IP, 4th toe IP and 5th toe IP    Left Side Rheumatological Exam     The patient is tender to palpation of the shoulder, elbow, wrist, knee, 1st PIP, 1st MCP, 2nd PIP, 2nd MCP, 3rd PIP, 3rd MCP, 4th PIP, 4th MCP, 5th PIP, 5th MCP, hip, ankle, 1st MTP, 2nd MTP, 3rd MTP, 4th MTP, 5th MTP, 1st toe IP, 2nd toe IP, 3rd toe IP, 4th toe IP and 5th toe IP.       Completed Fibromyalgia exam 18/18 tender points.  No data to display     Assessment:       1. Psoriasis of nail    2. Psoriatic arthritis    3. Osteoporosis with current  pathological fracture, unspecified osteoporosis type, initial encounter          Medication List with Changes/Refills   New Medications    LEFLUNOMIDE (ARAVA) 20 MG TAB    Take 1 tablet (20 mg total) by mouth daily with dinner or evening meal.       Start Date: 4/24/2023 End Date: 4/23/2024    MAGNESIUM OXIDE (MAG-OX) 400 MG (241.3 MG MAGNESIUM) TABLET    Take 1 tablet (400 mg total) by mouth nightly.       Start Date: 4/24/2023 End Date: --    RISEDRONATE (ACTONEL) 150 MG TAB    Take 1 tablet (150 mg total) by mouth every 30 days.       Start Date: 4/24/2023 End Date: 4/23/2024   Current Medications    BUPROPION (WELLBUTRIN XL) 150 MG TB24 TABLET    Take 150 mg by mouth once daily.       Start Date: 4/5/2022  End Date: --    BUPROPION (WELLBUTRIN XL) 300 MG 24 HR TABLET    Take 1 tablet by mouth once daily.       Start Date: 8/8/2021  End Date: --    BUSPIRONE (BUSPAR) 30 MG TAB    Take 1 tablet by mouth 2 (two) times daily.       Start Date: 4/6/2022  End Date: --    MIKE/LINOLENIC/LINOLEIC/OLEIC (MIKE SEED OIL-OMEGA 3-6-9) 1,000 MG (580 MG) CAP    Take by mouth 2 (two) times a day.       Start Date: --        End Date: --    CHOLECALCIFEROL, VITAMIN D3, (VITAMIN D3) 250 MCG (10,000 UNIT) CAP CAPSULE    Take 1 tablet by mouth Daily.       Start Date: 9/1/2021  End Date: --    CYANOCOBALAMIN, VITAMIN B-12, 2,500 MCG TAB    Take 2,500 mcg by mouth Daily.       Start Date: --        End Date: --    DOCUSATE SODIUM (COLACE) 250 MG CAPSULE    Take 250 mg by mouth 2 (two) times a day.       Start Date: --        End Date: --    DULOXETINE (CYMBALTA) 60 MG CAPSULE    Take 60 mg by mouth 2 (two) times daily.       Start Date: --        End Date: --    FERROUS SULFATE (FEOSOL) 325 MG (65 MG IRON) TAB TABLET    Take 1 tablet by mouth Daily.       Start Date: 9/1/2021  End Date: --    FOLIC ACID (FOLVITE) 1 MG TABLET    Take 1 tablet by mouth once daily.       Start Date: 4/21/2022 End Date: 4/24/2023    GABAPENTIN  (NEURONTIN) 600 MG TABLET    Take 1 tablet by mouth 4 (four) times daily.       Start Date: 8/8/2021  End Date: --    LEVOTHYROXINE (SYNTHROID, LEVOTHROID) 175 MCG TABLET    Take 175 mcg by mouth.       Start Date: 3/31/2022 End Date: 4/24/2023    LOSARTAN (COZAAR) 100 MG TABLET    Take 1 tablet by mouth once daily.       Start Date: 3/31/2022 End Date: --    NALOXONE (NARCAN) 4 MG/ACTUATION SPRY    1 spray by Nasal route once as needed (medication overuse).       Start Date: --        End Date: --    ONDANSETRON (ZOFRAN) 8 MG TABLET    Take 8 mg by mouth every 6 (six) hours as needed for Nausea.       Start Date: --        End Date: --    OXYCODONE (ROXICODONE) 20 MG TAB IMMEDIATE RELEASE TABLET    Take 1 tablet by mouth every 4 (four) hours as needed.       Start Date: 4/6/2023  End Date: --    PANTOPRAZOLE (PROTONIX) 40 MG TABLET    Take 40 mg by mouth 2 (two) times daily.       Start Date: 4/18/2023 End Date: --    PROMETHAZINE (PHENERGAN) 12.5 MG TAB    Take 0.5 tablets by mouth 2 (two) times daily as needed.       Start Date: 2/7/2023  End Date: --    QUETIAPINE (SEROQUEL) 300 MG TAB    Take 1 tablet by mouth every evening.       Start Date: 3/11/2022 End Date: --    TURMERIC ORAL    Take 1,000 mg by mouth 2 (two) times a day.       Start Date: --        End Date: --    VITAMIN K2 ORAL    Take 150 mcg by mouth Daily.       Start Date: 9/1/2021  End Date: --   Discontinued Medications    FISH OIL/BORAGE/FLAX/OM3,6,9 1 (OMEGA 3-6-9 ORAL)    Take by mouth 2 (two) times a day.       Start Date: --        End Date: 4/24/2023    ONDANSETRON (ZOFRAN) 8 MG TABLET    Take 8 mg by mouth every 6 (six) hours as needed.       Start Date: 3/31/2022 End Date: 4/24/2023    OXYCODONE (ROXICODONE) 15 MG TAB    Take 15 mg by mouth 5 (five) times daily.       Start Date: 9/12/2022 End Date: 4/24/2023         Plan:         Problem List Items Addressed This Visit          Derm    Psoriasis of nail - Primary    Relevant Medications     risedronate (ACTONEL) 150 MG Tab    leflunomide (ARAVA) 20 MG Tab    magnesium oxide (MAG-OX) 400 mg (241.3 mg magnesium) tablet    Other Relevant Orders    Phosphorus    Magnesium    PTH, Intact    CBC Auto Differential    Comprehensive Metabolic Panel    CRP, High Sensitivity    Vitamin D    Rheumatoid Quantitative    Cyclic Citrullinated Peptide Antibody, IgG    Antinuclear Ab, HEp-2 Substrate    Hepatitis B Surface Antigen    Hepatitis C Antibody    TSH    T4, Free    X-Ray Sacroiliac Joints Complete       Orthopedic    Osteoporosis with current pathological fracture    Relevant Medications    risedronate (ACTONEL) 150 MG Tab    leflunomide (ARAVA) 20 MG Tab    magnesium oxide (MAG-OX) 400 mg (241.3 mg magnesium) tablet    Other Relevant Orders    Phosphorus    Magnesium    PTH, Intact    CBC Auto Differential    Comprehensive Metabolic Panel    CRP, High Sensitivity    Vitamin D    Rheumatoid Quantitative    Cyclic Citrullinated Peptide Antibody, IgG    Antinuclear Ab, HEp-2 Substrate    Hepatitis B Surface Antigen    Hepatitis C Antibody    TSH    T4, Free    X-Ray Sacroiliac Joints Complete    Psoriatic arthritis    Relevant Medications    risedronate (ACTONEL) 150 MG Tab    leflunomide (ARAVA) 20 MG Tab    magnesium oxide (MAG-OX) 400 mg (241.3 mg magnesium) tablet    Other Relevant Orders    Phosphorus    Magnesium    PTH, Intact    CBC Auto Differential    Comprehensive Metabolic Panel    CRP, High Sensitivity    Vitamin D    Rheumatoid Quantitative    Cyclic Citrullinated Peptide Antibody, IgG    Antinuclear Ab, HEp-2 Substrate    Hepatitis B Surface Antigen    Hepatitis C Antibody    TSH    T4, Free    X-Ray Sacroiliac Joints Complete

## 2023-05-12 ENCOUNTER — TELEPHONE (OUTPATIENT)
Dept: RHEUMATOLOGY | Facility: CLINIC | Age: 63
End: 2023-05-12
Payer: MEDICARE

## 2023-05-12 NOTE — TELEPHONE ENCOUNTER
Patient called wondering about her lab results. She states there is several flags on them. Please review the patients labs done on 04/27/23. Please advise.     ThanksAlondra

## 2023-08-03 ENCOUNTER — OFFICE VISIT (OUTPATIENT)
Dept: RHEUMATOLOGY | Facility: CLINIC | Age: 63
End: 2023-08-03
Payer: MEDICARE

## 2023-08-03 VITALS
HEIGHT: 68 IN | OXYGEN SATURATION: 97 % | WEIGHT: 290 LBS | SYSTOLIC BLOOD PRESSURE: 127 MMHG | TEMPERATURE: 99 F | DIASTOLIC BLOOD PRESSURE: 70 MMHG | BODY MASS INDEX: 43.95 KG/M2 | HEART RATE: 83 BPM

## 2023-08-03 DIAGNOSIS — M80.00XA OSTEOPOROSIS WITH CURRENT PATHOLOGICAL FRACTURE, UNSPECIFIED OSTEOPOROSIS TYPE, INITIAL ENCOUNTER: ICD-10-CM

## 2023-08-03 DIAGNOSIS — L40.9 PSORIASIS OF NAIL: ICD-10-CM

## 2023-08-03 DIAGNOSIS — L40.50 PSORIATIC ARTHRITIS: Primary | ICD-10-CM

## 2023-08-03 DIAGNOSIS — M79.7 FIBROMYALGIA SYNDROME: ICD-10-CM

## 2023-08-03 PROCEDURE — 99214 OFFICE O/P EST MOD 30 MIN: CPT | Mod: S$GLB,,, | Performed by: INTERNAL MEDICINE

## 2023-08-03 PROCEDURE — 3078F DIAST BP <80 MM HG: CPT | Mod: CPTII,S$GLB,, | Performed by: INTERNAL MEDICINE

## 2023-08-03 PROCEDURE — 3074F SYST BP LT 130 MM HG: CPT | Mod: CPTII,S$GLB,, | Performed by: INTERNAL MEDICINE

## 2023-08-03 PROCEDURE — 3008F PR BODY MASS INDEX (BMI) DOCUMENTED: ICD-10-PCS | Mod: CPTII,S$GLB,, | Performed by: INTERNAL MEDICINE

## 2023-08-03 PROCEDURE — 1160F PR REVIEW ALL MEDS BY PRESCRIBER/CLIN PHARMACIST DOCUMENTED: ICD-10-PCS | Mod: CPTII,S$GLB,, | Performed by: INTERNAL MEDICINE

## 2023-08-03 PROCEDURE — 99999 PR PBB SHADOW E&M-EST. PATIENT-LVL V: ICD-10-PCS | Mod: PBBFAC,,, | Performed by: INTERNAL MEDICINE

## 2023-08-03 PROCEDURE — 1159F MED LIST DOCD IN RCRD: CPT | Mod: CPTII,S$GLB,, | Performed by: INTERNAL MEDICINE

## 2023-08-03 PROCEDURE — 1159F PR MEDICATION LIST DOCUMENTED IN MEDICAL RECORD: ICD-10-PCS | Mod: CPTII,S$GLB,, | Performed by: INTERNAL MEDICINE

## 2023-08-03 PROCEDURE — 3078F PR MOST RECENT DIASTOLIC BLOOD PRESSURE < 80 MM HG: ICD-10-PCS | Mod: CPTII,S$GLB,, | Performed by: INTERNAL MEDICINE

## 2023-08-03 PROCEDURE — 1160F RVW MEDS BY RX/DR IN RCRD: CPT | Mod: CPTII,S$GLB,, | Performed by: INTERNAL MEDICINE

## 2023-08-03 PROCEDURE — 4010F ACE/ARB THERAPY RXD/TAKEN: CPT | Mod: CPTII,S$GLB,, | Performed by: INTERNAL MEDICINE

## 2023-08-03 PROCEDURE — 4010F PR ACE/ARB THEARPY RXD/TAKEN: ICD-10-PCS | Mod: CPTII,S$GLB,, | Performed by: INTERNAL MEDICINE

## 2023-08-03 PROCEDURE — 99214 PR OFFICE/OUTPT VISIT, EST, LEVL IV, 30-39 MIN: ICD-10-PCS | Mod: S$GLB,,, | Performed by: INTERNAL MEDICINE

## 2023-08-03 PROCEDURE — 3074F PR MOST RECENT SYSTOLIC BLOOD PRESSURE < 130 MM HG: ICD-10-PCS | Mod: CPTII,S$GLB,, | Performed by: INTERNAL MEDICINE

## 2023-08-03 PROCEDURE — 99999 PR PBB SHADOW E&M-EST. PATIENT-LVL V: CPT | Mod: PBBFAC,,, | Performed by: INTERNAL MEDICINE

## 2023-08-03 PROCEDURE — 3008F BODY MASS INDEX DOCD: CPT | Mod: CPTII,S$GLB,, | Performed by: INTERNAL MEDICINE

## 2023-08-03 RX ORDER — HYDROXYCHLOROQUINE SULFATE 200 MG/1
200 TABLET, FILM COATED ORAL
Qty: 90 TABLET | Refills: 3 | Status: SHIPPED | OUTPATIENT
Start: 2023-08-03 | End: 2023-10-27 | Stop reason: SDUPTHER

## 2023-08-03 RX ORDER — LEVOTHYROXINE SODIUM 200 UG/1
200 TABLET ORAL DAILY
COMMUNITY
Start: 2023-07-23

## 2023-08-03 RX ORDER — METHOTREXATE 2.5 MG/1
10 TABLET ORAL
Qty: 60 TABLET | Refills: 3 | Status: SHIPPED | OUTPATIENT
Start: 2023-08-03 | End: 2023-10-27

## 2023-08-03 NOTE — PROGRESS NOTES
"Subjective:       Patient ID: Kasandra Cheek is a 63 y.o. female.    Chief Complaint: Follow-up (Follow up. Patient complains of generalized pain. Patient wanting to know if any of her medications can cause her pain to increase.  Pain 5/10)    The patient is complaining of joint pain involving the MCP PIP wrist elbow shoulders hips knees and ankles bilaterally.  The pain is 5/10 in intensity dull in quality and continuous.  That is associated with a morning stiffness lasting for more than 60 minutes.  Is also having difficulty maintaining a good night of sleep.  This has been associated with myalgias.  Muscle aches are 5/10 in intensity dull in quality and continuous.  They are associated with fatigue.  No fever no chills no others.  Labs checked during this visit         Review of Systems   Constitutional:  Negative for appetite change, chills and fever.   HENT:  Negative for congestion, ear pain, mouth sores, nosebleeds and trouble swallowing.    Eyes:  Negative for photophobia and discharge.   Respiratory:  Negative for chest tightness and shortness of breath.    Cardiovascular:  Negative for chest pain.   Gastrointestinal:  Negative for abdominal pain and vomiting.   Endocrine: Negative.    Genitourinary:  Negative for hematuria.   Musculoskeletal:         As per HPI   Skin:  Negative for rash.   Neurological:  Negative for weakness.         Objective:   /70 (BP Location: Right arm, Patient Position: Sitting, BP Method: Large (Automatic))   Pulse 83   Temp 99.2 °F (37.3 °C) (Oral)   Ht 5' 8" (1.727 m)   Wt 131.5 kg (290 lb)   SpO2 97%   BMI 44.09 kg/m²      Physical Exam   Constitutional: She is oriented to person, place, and time. She appears well-developed and well-nourished. No distress.   HENT:   Head: Normocephalic and atraumatic.   Right Ear: External ear normal.   Left Ear: External ear normal.   Eyes: Pupils are equal, round, and reactive to light.   Cardiovascular: Normal rate, regular rhythm " and normal heart sounds.   Pulmonary/Chest: Breath sounds normal.   Abdominal: Soft. There is no abdominal tenderness.   Musculoskeletal:      Right shoulder: Tenderness present.      Left shoulder: Tenderness present.      Right elbow: Tenderness present.      Left elbow: Tenderness present.      Right wrist: Tenderness present.      Left wrist: Tenderness present.      Cervical back: Neck supple.      Right hip: Tenderness present.      Left hip: Tenderness present.      Right knee: Tenderness present.      Left knee: Tenderness present.      Right ankle: Tenderness present.      Left ankle: Tenderness present.   Lymphadenopathy:     She has no cervical adenopathy.   Neurological: She is alert and oriented to person, place, and time. She displays normal reflexes. No cranial nerve deficit or sensory deficit. She exhibits normal muscle tone. Coordination normal.   Skin: No rash noted. No erythema.   Vitals reviewed.      Right Side Rheumatological Exam     The patient is tender to palpation of the shoulder, elbow, wrist, knee, 1st PIP, 1st MCP, 2nd PIP, 2nd MCP, 3rd PIP, 3rd MCP, 4th PIP, 4th MCP, 5th PIP, hip, ankle, 1st MTP, 2nd MTP, 3rd MTP, 4th MTP, 5th MTP, 1st toe IP, 2nd toe IP, 3rd toe IP, 4th toe IP and 5th toe IP    Left Side Rheumatological Exam     The patient is tender to palpation of the shoulder, elbow, wrist, knee, 1st PIP, 1st MCP, 2nd PIP, 2nd MCP, 3rd PIP, 3rd MCP, 4th PIP, 4th MCP, 5th PIP, 5th MCP, hip, ankle, 1st MTP, 2nd MTP, 3rd MTP, 4th MTP, 5th MTP, 1st toe IP, 2nd toe IP, 3rd toe IP, 4th toe IP and 5th toe IP.         Completed Fibromyalgia exam 18/18 tender points.  No data to display     Assessment:       1. Psoriatic arthritis    2. Psoriasis of nail    3. Osteoporosis with current pathological fracture, unspecified osteoporosis type, initial encounter    4. Fibromyalgia syndrome          Medication List with Changes/Refills   New Medications    HYDROXYCHLOROQUINE (PLAQUENIL) 200 MG  TABLET    Take 1 tablet (200 mg total) by mouth after dinner. After food       Start Date: 8/3/2023  End Date: 7/28/2024    METHOTREXATE 2.5 MG TAB    Take 4 tablets (10 mg total) by mouth every 7 days. EVERY        TAKE 4 TAB TOGETHER AFTER SUPPER       Start Date: 8/3/2023  End Date: 9/26/2024   Current Medications    BUPROPION (WELLBUTRIN XL) 150 MG TB24 TABLET    Take 150 mg by mouth once daily.       Start Date: 4/5/2022  End Date: --    BUPROPION (WELLBUTRIN XL) 300 MG 24 HR TABLET    Take 1 tablet by mouth once daily.       Start Date: 8/8/2021  End Date: --    BUSPIRONE (BUSPAR) 30 MG TAB    Take 1 tablet by mouth 2 (two) times daily.       Start Date: 4/6/2022  End Date: --    MIKE/LINOLENIC/LINOLEIC/OLEIC (MIKE SEED OIL-OMEGA 3-6-9) 1,000 MG (580 MG) CAP    Take by mouth 2 (two) times a day.       Start Date: --        End Date: --    CHOLECALCIFEROL, VITAMIN D3, (VITAMIN D3) 250 MCG (10,000 UNIT) CAP CAPSULE    Take 1 tablet by mouth Daily.       Start Date: 9/1/2021  End Date: --    CYANOCOBALAMIN, VITAMIN B-12, 2,500 MCG TAB    Take 2,500 mcg by mouth Daily.       Start Date: --        End Date: --    DOCUSATE SODIUM (COLACE) 250 MG CAPSULE    Take 250 mg by mouth 2 (two) times a day.       Start Date: --        End Date: --    DULOXETINE (CYMBALTA) 60 MG CAPSULE    Take 60 mg by mouth 2 (two) times daily.       Start Date: --        End Date: --    FERROUS SULFATE (FEOSOL) 325 MG (65 MG IRON) TAB TABLET    Take 1 tablet by mouth Daily.       Start Date: 9/1/2021  End Date: --    FOLIC ACID (FOLVITE) 1 MG TABLET    Take 1 tablet by mouth once daily.       Start Date: 4/21/2022 End Date: 8/3/2023    GABAPENTIN (NEURONTIN) 600 MG TABLET    Take 1 tablet by mouth 4 (four) times daily.       Start Date: 8/8/2021  End Date: --    LEVOTHYROXINE (SYNTHROID) 200 MCG TABLET    Take 200 mcg by mouth once daily.       Start Date: 7/23/2023 End Date: --    LOSARTAN (COZAAR) 100 MG TABLET    Take 1 tablet by mouth  once daily.       Start Date: 3/31/2022 End Date: --    MAGNESIUM OXIDE (MAG-OX) 400 MG (241.3 MG MAGNESIUM) TABLET    Take 1 tablet (400 mg total) by mouth nightly.       Start Date: 4/24/2023 End Date: --    NALOXONE (NARCAN) 4 MG/ACTUATION SPRY    1 spray by Nasal route once as needed (medication overuse).       Start Date: --        End Date: --    ONDANSETRON (ZOFRAN) 8 MG TABLET    Take 8 mg by mouth every 6 (six) hours as needed for Nausea.       Start Date: --        End Date: --    OXYCODONE (ROXICODONE) 20 MG TAB IMMEDIATE RELEASE TABLET    Take 1 tablet by mouth every 4 (four) hours as needed.       Start Date: 4/6/2023  End Date: --    PANTOPRAZOLE (PROTONIX) 40 MG TABLET    Take 40 mg by mouth 2 (two) times daily.       Start Date: 4/18/2023 End Date: --    QUETIAPINE (SEROQUEL) 300 MG TAB    Take 1 tablet by mouth every evening.       Start Date: 3/11/2022 End Date: --    RISEDRONATE (ACTONEL) 150 MG TAB    Take 1 tablet (150 mg total) by mouth every 30 days.       Start Date: 4/24/2023 End Date: 4/23/2024    TURMERIC ORAL    Take 1,000 mg by mouth 2 (two) times a day.       Start Date: --        End Date: --    VITAMIN K2 ORAL    Take 150 mcg by mouth Daily.       Start Date: 9/1/2021  End Date: --   Discontinued Medications    LEFLUNOMIDE (ARAVA) 20 MG TAB    Take 1 tablet (20 mg total) by mouth daily with dinner or evening meal.       Start Date: 4/24/2023 End Date: 8/3/2023    LEVOTHYROXINE (SYNTHROID, LEVOTHROID) 175 MCG TABLET    Take 175 mcg by mouth.       Start Date: 3/31/2022 End Date: 8/3/2023    PROMETHAZINE (PHENERGAN) 12.5 MG TAB    Take 0.5 tablets by mouth 2 (two) times daily as needed.       Start Date: 2/7/2023  End Date: 8/3/2023         Plan:         Problem List Items Addressed This Visit          Derm    Psoriasis of nail    Relevant Medications    levothyroxine (SYNTHROID) 200 MCG tablet    methotrexate 2.5 MG Tab    hydrOXYchloroQUINE (PLAQUENIL) 200 mg tablet       Orthopedic     Osteoporosis with current pathological fracture    Relevant Medications    levothyroxine (SYNTHROID) 200 MCG tablet    methotrexate 2.5 MG Tab    hydrOXYchloroQUINE (PLAQUENIL) 200 mg tablet    Psoriatic arthritis - Primary    Relevant Medications    levothyroxine (SYNTHROID) 200 MCG tablet    methotrexate 2.5 MG Tab    hydrOXYchloroQUINE (PLAQUENIL) 200 mg tablet    Fibromyalgia syndrome    Relevant Medications    levothyroxine (SYNTHROID) 200 MCG tablet    methotrexate 2.5 MG Tab    hydrOXYchloroQUINE (PLAQUENIL) 200 mg tablet

## 2023-08-09 ENCOUNTER — TELEPHONE (OUTPATIENT)
Dept: RHEUMATOLOGY | Facility: CLINIC | Age: 63
End: 2023-08-09
Payer: MEDICARE

## 2023-08-09 NOTE — TELEPHONE ENCOUNTER
Actonel is still active on med list- she should continue it.   How often is she using the Phenergan? It comes up as historical med, I am unsure why it was discontinued at her visit with Dr. Ham. Is she nauseated daily?

## 2023-08-09 NOTE — TELEPHONE ENCOUNTER
Pt wants to know why she has to stop taking the promethazine and if she needs to continue to take the actonel or should she stop it. Please advise.

## 2023-08-10 NOTE — TELEPHONE ENCOUNTER
Pt states she take the promethazine twice a day and she is nauseated almost everyday. She states it has been like this since she had her gastric bypass surgery.

## 2023-08-11 NOTE — TELEPHONE ENCOUNTER
Called patient no answer left detailed voicemail and advised the patient to call back if she has any further questions.

## 2023-10-09 ENCOUNTER — TELEPHONE (OUTPATIENT)
Dept: RHEUMATOLOGY | Facility: CLINIC | Age: 63
End: 2023-10-09
Payer: MEDICARE

## 2023-10-09 NOTE — TELEPHONE ENCOUNTER
Patient called wanting to know if it is safe to get the covid vaccine with all of her medications that she is taking . Could methotrexate cause abdominal pain ? Please advise. Thanks

## 2023-10-09 NOTE — TELEPHONE ENCOUNTER
Yes she can take her COVID vaccine.  No issues with her treatment.  Methotrexate can cause abdominal pain.  In order to get rid of this pain she can take the methotrexate 2 tablets in the morning 2 tablets in the evening in 1 day instead of taking the 4 tablets together.  This would be very helpful.  Please make sure she is still taking the folic acid once a day.  Thank you BPH

## 2023-10-25 ENCOUNTER — TELEPHONE (OUTPATIENT)
Dept: RHEUMATOLOGY | Facility: CLINIC | Age: 63
End: 2023-10-25
Payer: MEDICARE

## 2023-10-25 NOTE — TELEPHONE ENCOUNTER
The back pain and her pain is not caused by the medications.  If she wants I can get her to be on gabapentin for this type of pain and she can stop the Celebrex.  Thank you BPH

## 2023-10-25 NOTE — TELEPHONE ENCOUNTER
The patient states that she is having stomach pains, back pains. She is wondering if it is being cause by her medications. She states that her put her on Celebrex.

## 2023-10-26 NOTE — TELEPHONE ENCOUNTER
Spoke with patient she has been on gabapentin 600mg taken four times daily for years. She did go over her med list with me over the phone and the medication list has  been updated . A lot of her medications weren't showing on her med list. Please Advise. Thanks

## 2023-10-27 ENCOUNTER — OFFICE VISIT (OUTPATIENT)
Dept: RHEUMATOLOGY | Facility: CLINIC | Age: 63
End: 2023-10-27
Payer: MEDICARE

## 2023-10-27 VITALS
SYSTOLIC BLOOD PRESSURE: 128 MMHG | DIASTOLIC BLOOD PRESSURE: 78 MMHG | HEIGHT: 68 IN | WEIGHT: 293 LBS | BODY MASS INDEX: 44.41 KG/M2 | HEART RATE: 96 BPM | TEMPERATURE: 99 F | OXYGEN SATURATION: 98 %

## 2023-10-27 DIAGNOSIS — M80.00XA OSTEOPOROSIS WITH CURRENT PATHOLOGICAL FRACTURE, UNSPECIFIED OSTEOPOROSIS TYPE, INITIAL ENCOUNTER: ICD-10-CM

## 2023-10-27 DIAGNOSIS — M79.7 FIBROMYALGIA SYNDROME: ICD-10-CM

## 2023-10-27 DIAGNOSIS — L40.9 PSORIASIS OF NAIL: ICD-10-CM

## 2023-10-27 DIAGNOSIS — L40.50 PSORIATIC ARTHRITIS: Primary | ICD-10-CM

## 2023-10-27 PROCEDURE — 1160F PR REVIEW ALL MEDS BY PRESCRIBER/CLIN PHARMACIST DOCUMENTED: ICD-10-PCS | Mod: CPTII,S$GLB,, | Performed by: INTERNAL MEDICINE

## 2023-10-27 PROCEDURE — 1159F PR MEDICATION LIST DOCUMENTED IN MEDICAL RECORD: ICD-10-PCS | Mod: CPTII,S$GLB,, | Performed by: INTERNAL MEDICINE

## 2023-10-27 PROCEDURE — 99999 PR PBB SHADOW E&M-EST. PATIENT-LVL V: ICD-10-PCS | Mod: PBBFAC,,, | Performed by: INTERNAL MEDICINE

## 2023-10-27 PROCEDURE — 4010F PR ACE/ARB THEARPY RXD/TAKEN: ICD-10-PCS | Mod: CPTII,S$GLB,, | Performed by: INTERNAL MEDICINE

## 2023-10-27 PROCEDURE — 1160F RVW MEDS BY RX/DR IN RCRD: CPT | Mod: CPTII,S$GLB,, | Performed by: INTERNAL MEDICINE

## 2023-10-27 PROCEDURE — 3078F PR MOST RECENT DIASTOLIC BLOOD PRESSURE < 80 MM HG: ICD-10-PCS | Mod: CPTII,S$GLB,, | Performed by: INTERNAL MEDICINE

## 2023-10-27 PROCEDURE — 3008F BODY MASS INDEX DOCD: CPT | Mod: CPTII,S$GLB,, | Performed by: INTERNAL MEDICINE

## 2023-10-27 PROCEDURE — 1159F MED LIST DOCD IN RCRD: CPT | Mod: CPTII,S$GLB,, | Performed by: INTERNAL MEDICINE

## 2023-10-27 PROCEDURE — 99214 OFFICE O/P EST MOD 30 MIN: CPT | Mod: S$GLB,,, | Performed by: INTERNAL MEDICINE

## 2023-10-27 PROCEDURE — 4010F ACE/ARB THERAPY RXD/TAKEN: CPT | Mod: CPTII,S$GLB,, | Performed by: INTERNAL MEDICINE

## 2023-10-27 PROCEDURE — 3008F PR BODY MASS INDEX (BMI) DOCUMENTED: ICD-10-PCS | Mod: CPTII,S$GLB,, | Performed by: INTERNAL MEDICINE

## 2023-10-27 PROCEDURE — 99214 PR OFFICE/OUTPT VISIT, EST, LEVL IV, 30-39 MIN: ICD-10-PCS | Mod: S$GLB,,, | Performed by: INTERNAL MEDICINE

## 2023-10-27 PROCEDURE — 3078F DIAST BP <80 MM HG: CPT | Mod: CPTII,S$GLB,, | Performed by: INTERNAL MEDICINE

## 2023-10-27 PROCEDURE — 3074F PR MOST RECENT SYSTOLIC BLOOD PRESSURE < 130 MM HG: ICD-10-PCS | Mod: CPTII,S$GLB,, | Performed by: INTERNAL MEDICINE

## 2023-10-27 PROCEDURE — 99999 PR PBB SHADOW E&M-EST. PATIENT-LVL V: CPT | Mod: PBBFAC,,, | Performed by: INTERNAL MEDICINE

## 2023-10-27 PROCEDURE — 3074F SYST BP LT 130 MM HG: CPT | Mod: CPTII,S$GLB,, | Performed by: INTERNAL MEDICINE

## 2023-10-27 RX ORDER — CELECOXIB 100 MG/1
100 CAPSULE ORAL 2 TIMES DAILY PRN
Qty: 180 CAPSULE | Refills: 3 | Status: SHIPPED | OUTPATIENT
Start: 2023-10-27

## 2023-10-27 RX ORDER — BACLOFEN 10 MG/1
10 TABLET ORAL 3 TIMES DAILY PRN
COMMUNITY
Start: 2023-10-18

## 2023-10-27 RX ORDER — HYDROXYCHLOROQUINE SULFATE 200 MG/1
200 TABLET, FILM COATED ORAL
Qty: 90 TABLET | Refills: 3 | Status: SHIPPED | OUTPATIENT
Start: 2023-10-27 | End: 2024-10-21

## 2023-10-27 RX ORDER — LANOLIN ALCOHOL/MO/W.PET/CERES
400 CREAM (GRAM) TOPICAL NIGHTLY
Qty: 30 TABLET | Refills: 5 | Status: SHIPPED | OUTPATIENT
Start: 2023-10-27

## 2023-10-27 RX ORDER — UPADACITINIB 15 MG/1
15 TABLET, EXTENDED RELEASE ORAL DAILY
Qty: 30 TABLET | Refills: 11 | Status: SHIPPED | OUTPATIENT
Start: 2023-10-27 | End: 2023-11-08 | Stop reason: SDUPTHER

## 2023-10-27 RX ORDER — DULOXETIN HYDROCHLORIDE 60 MG/1
60 CAPSULE, DELAYED RELEASE ORAL 2 TIMES DAILY
Qty: 180 CAPSULE | Refills: 3 | Status: SHIPPED | OUTPATIENT
Start: 2023-10-27

## 2023-10-27 NOTE — PROGRESS NOTES
"Subjective:       Patient ID: Kasandra Cheek is a 63 y.o. female.    Chief Complaint: Follow-up (Follow up. Patient states that she is having severe abd pain since being on the NSAIDs for back with the combination of the issues the methotrexate or other RA medications causes with her stomach she feels like maybe she needs to try something else instead of Methotrexate or whichever  because she does not want to stop the Celebrex because it is really helping her back pain. Pain 7/10.)    The patient is complaining of joint pain involving the MCP PIP wrist elbow shoulders hips knees and ankles bilaterally.  The pain is 5/10 in intensity dull in quality and continuous.  That is associated with a morning stiffness lasting for more than 60 minutes.  Is also having difficulty maintaining a good night of sleep.  This has been associated with myalgias.  Muscle aches are 5/10 in intensity dull in quality and continuous.  They are associated with fatigue.  No fever no chills no others.  Labs checked during this visit   PSORIATIC  ARTHRITIS RESISTANT TO METHOTREXATE PLAQUENIL STEROIDS AND NSAIDS. SHE ALSO HAS NEEDLE PHOBIA       Review of Systems   Constitutional:  Negative for appetite change, chills and fever.   HENT:  Negative for congestion, ear pain, mouth sores, nosebleeds and trouble swallowing.    Eyes:  Negative for photophobia and discharge.   Respiratory:  Negative for chest tightness and shortness of breath.    Cardiovascular:  Negative for chest pain.   Gastrointestinal:  Negative for abdominal pain and vomiting.   Endocrine: Negative.    Genitourinary:  Negative for hematuria.   Musculoskeletal:         As per HPI   Skin:  Negative for rash.   Neurological:  Negative for weakness.         Objective:   /78 (BP Location: Left arm, Patient Position: Sitting, BP Method: Large (Automatic))   Pulse 96   Temp 98.6 °F (37 °C) (Oral)   Ht 5' 8" (1.727 m)   Wt 133.3 kg (293 lb 12.8 oz)   SpO2 98%   BMI 44.67 kg/m² "      Physical Exam   Constitutional: She is oriented to person, place, and time. She appears well-developed and well-nourished. No distress.   HENT:   Head: Normocephalic and atraumatic.   Right Ear: External ear normal.   Left Ear: External ear normal.   Eyes: Pupils are equal, round, and reactive to light.   Cardiovascular: Normal rate, regular rhythm and normal heart sounds.   Pulmonary/Chest: Breath sounds normal.   Abdominal: Soft. There is no abdominal tenderness.   Musculoskeletal:      Right shoulder: Tenderness present.      Left shoulder: Tenderness present.      Right elbow: Tenderness present.      Left elbow: Tenderness present.      Right wrist: Tenderness present.      Left wrist: Tenderness present.      Cervical back: Neck supple.      Right hip: Tenderness present.      Left hip: Tenderness present.      Right knee: Tenderness present.      Left knee: Tenderness present.      Right ankle: Tenderness present.      Left ankle: Tenderness present.   Lymphadenopathy:     She has no cervical adenopathy.   Neurological: She is alert and oriented to person, place, and time. She displays normal reflexes. No cranial nerve deficit or sensory deficit. She exhibits normal muscle tone. Coordination normal.   Skin: No rash noted. No erythema.   Vitals reviewed.      Right Side Rheumatological Exam     The patient is tender to palpation of the shoulder, elbow, wrist, knee, 1st PIP, 1st MCP, 2nd PIP, 2nd MCP, 3rd PIP, 3rd MCP, 4th PIP, 4th MCP, 5th PIP, hip, ankle, 1st MTP, 2nd MTP, 3rd MTP, 4th MTP, 5th MTP, 1st toe IP, 2nd toe IP, 3rd toe IP, 4th toe IP and 5th toe IP    Left Side Rheumatological Exam     The patient is tender to palpation of the shoulder, elbow, wrist, knee, 1st PIP, 1st MCP, 2nd PIP, 2nd MCP, 3rd PIP, 3rd MCP, 4th PIP, 4th MCP, 5th PIP, 5th MCP, hip, ankle, 1st MTP, 2nd MTP, 3rd MTP, 4th MTP, 5th MTP, 1st toe IP, 2nd toe IP, 3rd toe IP, 4th toe IP and 5th toe IP.         Completed Fibromyalgia  exam 18/18 tender points.  No data to display     Assessment:       1. Psoriatic arthritis    2. Fibromyalgia syndrome    3. Osteoporosis with current pathological fracture, unspecified osteoporosis type, initial encounter    4. Psoriasis of nail          Medication List with Changes/Refills   New Medications    UPADACITINIB (RINVOQ) 15 MG 24 HR TABLET    Take 1 tablet (15 mg total) by mouth once daily.       Start Date: 10/27/2023End Date: --   Current Medications    ACETAMINOPHEN (TYLENOL) 325 MG TABLET           Start Date: 8/1/2022  End Date: --    APPLE CIDER VINEGAR ORAL    Take 1 capsule by mouth once daily.       Start Date: --        End Date: --    BACLOFEN (LIORESAL) 10 MG TABLET    Take 10 mg by mouth 3 (three) times daily as needed.       Start Date: 10/18/2023End Date: --    BUPROPION (WELLBUTRIN XL) 150 MG TB24 TABLET    Take 150 mg by mouth once daily.       Start Date: 4/5/2022  End Date: --    BUPROPION (WELLBUTRIN XL) 300 MG 24 HR TABLET    Take 1 tablet by mouth once daily.       Start Date: 8/8/2021  End Date: --    BUSPIRONE (BUSPAR) 30 MG TAB    Take 1 tablet by mouth 2 (two) times daily.       Start Date: 4/6/2022  End Date: --    CHLORHEXIDINE (PERIDEX) 0.12 % SOLUTION    SMARTSIG:By Mouth       Start Date: 7/24/2023 End Date: --    CHOLECALCIFEROL, VITAMIN D3, (VITAMIN D3) 250 MCG (10,000 UNIT) CAP CAPSULE    Take 1 tablet by mouth Daily.       Start Date: 9/1/2021  End Date: --    CYANOCOBALAMIN, VITAMIN B-12, 2,500 MCG TAB    Take 2,500 mcg by mouth Daily.       Start Date: --        End Date: --    DICLOFENAC SODIUM (VOLTAREN) 1 % GEL    APPLY 2 GRAMS TO AFFECTED AREA FOUR TIMES DAILY       Start Date: 8/15/2023 End Date: --    DOCUSATE SODIUM (COLACE) 250 MG CAPSULE    Take 250 mg by mouth 2 (two) times a day.       Start Date: --        End Date: --    FERROUS SULFATE (FEOSOL) 325 MG (65 MG IRON) TAB TABLET    Take 1 tablet by mouth Daily.       Start Date: 9/1/2021  End Date: --     GABAPENTIN (NEURONTIN) 600 MG TABLET    Take 1 tablet by mouth 4 (four) times daily.       Start Date: 8/8/2021  End Date: --    LEVOTHYROXINE (SYNTHROID) 200 MCG TABLET    Take 200 mcg by mouth once daily.       Start Date: 7/23/2023 End Date: --    LOSARTAN (COZAAR) 100 MG TABLET    Take 1 tablet by mouth once daily.       Start Date: 3/31/2022 End Date: --    NALOXONE (NARCAN) 4 MG/ACTUATION SPRY    1 spray by Nasal route once as needed (medication overuse).       Start Date: --        End Date: --    OMEGA-3 FATTY ACIDS/FISH OIL (FISH OIL OMEGA 3-6-9 ORAL)           Start Date: 1/1/2022  End Date: --    ONDANSETRON (ZOFRAN) 8 MG TABLET    Take 8 mg by mouth every 6 (six) hours as needed for Nausea.       Start Date: --        End Date: --    OXYCODONE (ROXICODONE) 20 MG TAB IMMEDIATE RELEASE TABLET    Take 1 tablet by mouth every 4 (four) hours as needed.       Start Date: 4/6/2023  End Date: --    PANTOPRAZOLE (PROTONIX) 40 MG TABLET    Take 40 mg by mouth 2 (two) times daily.       Start Date: 4/18/2023 End Date: --    PROMETHAZINE (PHENERGAN) 12.5 MG TAB           Start Date: 8/23/2023 End Date: --    QUETIAPINE (SEROQUEL) 300 MG TAB    Take 1 tablet by mouth every evening.       Start Date: 3/11/2022 End Date: --    RISEDRONATE (ACTONEL) 150 MG TAB    Take 1 tablet (150 mg total) by mouth every 30 days.       Start Date: 4/24/2023 End Date: 4/23/2024    VITAMIN K2 ORAL    Take 150 mcg by mouth Daily.       Start Date: 9/1/2021  End Date: --   Changed and/or Refilled Medications    Modified Medication Previous Medication    CELECOXIB (CELEBREX) 100 MG CAPSULE celecoxib (CELEBREX) 100 MG capsule       Take 1 capsule (100 mg total) by mouth 2 (two) times daily as needed for Pain.    Take 100 mg by mouth 2 (two) times daily as needed.       Start Date: 10/27/2023End Date: --    Start Date: 8/15/2023 End Date: 10/27/2023    DULOXETINE (CYMBALTA) 60 MG CAPSULE DULoxetine (CYMBALTA) 60 MG capsule       Take 1  capsule (60 mg total) by mouth 2 (two) times daily.    Take 60 mg by mouth 2 (two) times daily.       Start Date: 10/27/2023End Date: --    Start Date: --        End Date: 10/27/2023    HYDROXYCHLOROQUINE (PLAQUENIL) 200 MG TABLET hydrOXYchloroQUINE (PLAQUENIL) 200 mg tablet       Take 1 tablet (200 mg total) by mouth after dinner. After food    Take 1 tablet (200 mg total) by mouth after dinner. After food       Start Date: 10/27/2023End Date: 10/21/2024    Start Date: 8/3/2023  End Date: 10/27/2023    MAGNESIUM OXIDE (MAG-OX) 400 MG (241.3 MG MAGNESIUM) TABLET magnesium oxide (MAG-OX) 400 mg (241.3 mg magnesium) tablet       Take 1 tablet (400 mg total) by mouth nightly.    Take 1 tablet (400 mg total) by mouth nightly.       Start Date: 10/27/2023End Date: --    Start Date: 4/24/2023 End Date: 10/27/2023   Discontinued Medications    FOLIC ACID (FOLVITE) 1 MG TABLET    Take 1 tablet by mouth once daily.       Start Date: 4/21/2022 End Date: 10/27/2023    METHOTREXATE 2.5 MG TAB    Take 4 tablets (10 mg total) by mouth every 7 days. EVERY        TAKE 4 TAB TOGETHER AFTER SUPPER       Start Date: 8/3/2023  End Date: 10/27/2023    ONDANSETRON (ZOFRAN-ODT) 4 MG TBDL    Take 4 mg by mouth every 8 (eight) hours.       Start Date: 5/24/2023 End Date: 10/27/2023    TURMERIC ORAL    Take 1,000 mg by mouth 2 (two) times a day.       Start Date: --        End Date: 10/27/2023         Plan:         Problem List Items Addressed This Visit          Derm    Psoriasis of nail    Relevant Medications    upadacitinib (RINVOQ) 15 mg 24 hr tablet    celecoxib (CELEBREX) 100 MG capsule    DULoxetine (CYMBALTA) 60 MG capsule    hydroxychloroquine (PLAQUENIL) 200 mg tablet    magnesium oxide (MAG-OX) 400 mg (241.3 mg magnesium) tablet    Other Relevant Orders    Quantiferon Gold TB    CBC Auto Differential    Comprehensive Metabolic Panel    C-Reactive Protein       Orthopedic    Osteoporosis with current pathological fracture     Relevant Medications    upadacitinib (RINVOQ) 15 mg 24 hr tablet    celecoxib (CELEBREX) 100 MG capsule    DULoxetine (CYMBALTA) 60 MG capsule    hydroxychloroquine (PLAQUENIL) 200 mg tablet    magnesium oxide (MAG-OX) 400 mg (241.3 mg magnesium) tablet    Other Relevant Orders    Quantiferon Gold TB    CBC Auto Differential    Comprehensive Metabolic Panel    C-Reactive Protein    Psoriatic arthritis - Primary    Relevant Medications    upadacitinib (RINVOQ) 15 mg 24 hr tablet    celecoxib (CELEBREX) 100 MG capsule    DULoxetine (CYMBALTA) 60 MG capsule    hydroxychloroquine (PLAQUENIL) 200 mg tablet    magnesium oxide (MAG-OX) 400 mg (241.3 mg magnesium) tablet    Other Relevant Orders    Quantiferon Gold TB    CBC Auto Differential    Comprehensive Metabolic Panel    C-Reactive Protein    Fibromyalgia syndrome    Relevant Medications    upadacitinib (RINVOQ) 15 mg 24 hr tablet    celecoxib (CELEBREX) 100 MG capsule    DULoxetine (CYMBALTA) 60 MG capsule    hydroxychloroquine (PLAQUENIL) 200 mg tablet    magnesium oxide (MAG-OX) 400 mg (241.3 mg magnesium) tablet    Other Relevant Orders    Quantiferon Gold TB    CBC Auto Differential    Comprehensive Metabolic Panel    C-Reactive Protein

## 2023-11-07 ENCOUNTER — TELEPHONE (OUTPATIENT)
Dept: RHEUMATOLOGY | Facility: CLINIC | Age: 63
End: 2023-11-07
Payer: MEDICARE

## 2023-11-07 DIAGNOSIS — M80.00XA OSTEOPOROSIS WITH CURRENT PATHOLOGICAL FRACTURE, UNSPECIFIED OSTEOPOROSIS TYPE, INITIAL ENCOUNTER: ICD-10-CM

## 2023-11-07 DIAGNOSIS — L40.50 PSORIATIC ARTHRITIS: ICD-10-CM

## 2023-11-07 DIAGNOSIS — L40.9 PSORIASIS OF NAIL: ICD-10-CM

## 2023-11-07 DIAGNOSIS — M79.7 FIBROMYALGIA SYNDROME: ICD-10-CM

## 2023-11-07 NOTE — TELEPHONE ENCOUNTER
----- Message from Glory Rutledge sent at 11/6/2023  9:00 AM CST -----  Regarding: TB  Good Morning! I have the PA approved for this patient but I do not see a TB test done for her. Should I be waiting for that before dispensing? Sometimes I know they get it done at a different location so I wanted to be sure.

## 2023-11-07 NOTE — TELEPHONE ENCOUNTER
Spoke with patient she did go to [facility] at [facility] . I did ask her if she could have [facility] to fax the tb result to our office, and she did say that she will do that.

## 2023-11-08 LAB
ALBUMIN SERPL-MCNC: 4.6 G/DL (ref 3.9–4.9)
ALBUMIN/GLOB SERPL: 2.1 {RATIO} (ref 1.2–2.2)
ALP SERPL-CCNC: 129 IU/L (ref 44–121)
ALT SERPL-CCNC: 14 IU/L (ref 0–32)
AST SERPL-CCNC: 18 IU/L (ref 0–40)
BASOPHILS # BLD AUTO: 0.1 X10E3/UL (ref 0–0.2)
BASOPHILS NFR BLD AUTO: 1 %
BILIRUB SERPL-MCNC: 0.2 MG/DL (ref 0–1.2)
BUN SERPL-MCNC: 19 MG/DL (ref 8–27)
BUN/CREAT SERPL: 18 (ref 12–28)
CALCIUM SERPL-MCNC: 9.2 MG/DL (ref 8.7–10.3)
CHLORIDE SERPL-SCNC: 103 MMOL/L (ref 96–106)
CO2 SERPL-SCNC: 23 MMOL/L (ref 20–29)
CREAT SERPL-MCNC: 1.06 MG/DL (ref 0.57–1)
CRP SERPL-MCNC: <3 MG/L (ref 0–10)
EOSINOPHIL # BLD AUTO: 0.1 X10E3/UL (ref 0–0.4)
EOSINOPHIL NFR BLD AUTO: 3 %
ERYTHROCYTE [DISTWIDTH] IN BLOOD BY AUTOMATED COUNT: 13.5 % (ref 11.7–15.4)
EST. GFR  (NO RACE VARIABLE): 59 ML/MIN/1.73
GAMMA INTERFERON BACKGROUND BLD IA-ACNC: 0 IU/ML
GLOBULIN SER CALC-MCNC: 2.2 G/DL (ref 1.5–4.5)
GLUCOSE SERPL-MCNC: 101 MG/DL (ref 70–99)
HCT VFR BLD AUTO: 34.7 % (ref 34–46.6)
HGB BLD-MCNC: 10.9 G/DL (ref 11.1–15.9)
IMM GRANULOCYTES NFR BLD AUTO: 0 %
LYMPHOCYTES # BLD AUTO: 1.5 X10E3/UL (ref 0.7–3.1)
LYMPHOCYTES NFR BLD AUTO: 32 %
M TB IFN-G BLD-IMP: NEGATIVE
M TB IFN-G CD4+ T-CELLS BLD-ACNC: 0.03 IU/ML
M TBIFN-G CD4+ CD8+T-CELLS BLD-ACNC: 0 IU/ML
MCH RBC QN AUTO: 32.3 PG (ref 26.6–33)
MCHC RBC AUTO-ENTMCNC: 31.4 G/DL (ref 31.5–35.7)
MCV RBC AUTO: 103 FL (ref 79–97)
MITOGEN IGNF BLD-ACNC: >10 IU/ML
MONOCYTES # BLD AUTO: 0.3 X10E3/UL (ref 0.1–0.9)
MONOCYTES NFR BLD AUTO: 7 %
NEUTROPHILS # BLD AUTO: 2.7 X10E3/UL (ref 1.4–7)
NEUTROPHILS NFR BLD AUTO: 57 %
PLATELET # BLD AUTO: 239 X10E3/UL (ref 150–450)
POTASSIUM SERPL-SCNC: 5.3 MMOL/L (ref 3.5–5.2)
PROT SERPL-MCNC: 6.8 G/DL (ref 6–8.5)
QUANTIFERON TB GOLD (INCUBATED): NORMAL
RBC # BLD AUTO: 3.37 X10E6/UL (ref 3.77–5.28)
SERVICE CMNT-IMP: NORMAL
SODIUM SERPL-SCNC: 142 MMOL/L (ref 134–144)
SPECIMEN STATUS REPORT: NORMAL
WBC # BLD AUTO: 4.8 X10E3/UL (ref 3.4–10.8)

## 2023-11-08 RX ORDER — UPADACITINIB 15 MG/1
15 TABLET, EXTENDED RELEASE ORAL DAILY
Qty: 30 TABLET | Refills: 11 | Status: SHIPPED | OUTPATIENT
Start: 2023-11-08

## 2023-12-22 ENCOUNTER — HOSPITAL ENCOUNTER (EMERGENCY)
Facility: HOSPITAL | Age: 63
Discharge: HOME | End: 2023-12-22
Attending: STUDENT IN AN ORGANIZED HEALTH CARE EDUCATION/TRAINING PROGRAM
Payer: MEDICARE

## 2023-12-22 VITALS
TEMPERATURE: 98 F | OXYGEN SATURATION: 97 % | SYSTOLIC BLOOD PRESSURE: 127 MMHG | DIASTOLIC BLOOD PRESSURE: 91 MMHG | WEIGHT: 293 LBS | HEIGHT: 68 IN | RESPIRATION RATE: 17 BRPM | BODY MASS INDEX: 44.41 KG/M2 | HEART RATE: 75 BPM

## 2023-12-22 DIAGNOSIS — Z91.89 AT RISK FOR POLYPHARMACY: Primary | ICD-10-CM

## 2023-12-22 DIAGNOSIS — F19.90 MISUSE OF MEDICATION: ICD-10-CM

## 2023-12-22 DIAGNOSIS — R41.82 ALTERED MENTAL STATUS: ICD-10-CM

## 2023-12-22 LAB
ALBUMIN SERPL-MCNC: 4 G/DL (ref 3.4–4.8)
ALBUMIN/GLOB SERPL: 1.3 RATIO (ref 1.1–2)
ALP SERPL-CCNC: 98 UNIT/L (ref 40–150)
ALT SERPL-CCNC: 22 UNIT/L (ref 0–55)
AMMONIA PLAS-MSCNC: <13.5 UMOL/L (ref 18–72)
AMPHET UR QL SCN: NEGATIVE
APAP SERPL-MCNC: <17.4 UG/ML (ref 17.4–30)
APPEARANCE UR: CLEAR
APTT PPP: 29.8 SECONDS (ref 24.6–37.2)
AST SERPL-CCNC: 26 UNIT/L (ref 5–34)
BACTERIA #/AREA URNS AUTO: ABNORMAL /HPF
BARBITURATE SCN PRESENT UR: NEGATIVE
BASOPHILS # BLD AUTO: 0.04 X10(3)/MCL
BASOPHILS NFR BLD AUTO: 0.7 %
BENZODIAZ UR QL SCN: NEGATIVE
BILIRUB SERPL-MCNC: 0.2 MG/DL
BILIRUB UR QL STRIP.AUTO: NEGATIVE
BUN SERPL-MCNC: 21 MG/DL (ref 9.8–20.1)
CALCIUM SERPL-MCNC: 8.9 MG/DL (ref 8.4–10.2)
CANNABINOIDS UR QL SCN: NEGATIVE
CHLORIDE SERPL-SCNC: 108 MMOL/L (ref 98–107)
CO2 SERPL-SCNC: 23 MMOL/L (ref 23–31)
COCAINE UR QL SCN: NEGATIVE
COLOR UR AUTO: YELLOW
CREAT SERPL-MCNC: 1.26 MG/DL (ref 0.55–1.02)
EOSINOPHIL # BLD AUTO: 0.13 X10(3)/MCL (ref 0–0.9)
EOSINOPHIL NFR BLD AUTO: 2.2 %
ERYTHROCYTE [DISTWIDTH] IN BLOOD BY AUTOMATED COUNT: 12.5 % (ref 11.5–17)
ETHANOL SERPL-MCNC: <10 MG/DL
FENTANYL UR QL SCN: NEGATIVE
GFR SERPLBLD CREATININE-BSD FMLA CKD-EPI: 48 MLS/MIN/1.73/M2
GLOBULIN SER-MCNC: 3.2 GM/DL (ref 2.4–3.5)
GLUCOSE SERPL-MCNC: 94 MG/DL (ref 82–115)
GLUCOSE UR QL STRIP.AUTO: NEGATIVE
HCT VFR BLD AUTO: 35.1 % (ref 37–47)
HGB BLD-MCNC: 11.5 G/DL (ref 12–16)
IMM GRANULOCYTES # BLD AUTO: 0.04 X10(3)/MCL (ref 0–0.04)
IMM GRANULOCYTES NFR BLD AUTO: 0.7 %
INR PPP: 0.9
KETONES UR QL STRIP.AUTO: NEGATIVE
LEUKOCYTE ESTERASE UR QL STRIP.AUTO: ABNORMAL
LYMPHOCYTES # BLD AUTO: 1.74 X10(3)/MCL (ref 0.6–4.6)
LYMPHOCYTES NFR BLD AUTO: 30.1 %
MCH RBC QN AUTO: 32.4 PG (ref 27–31)
MCHC RBC AUTO-ENTMCNC: 32.8 G/DL (ref 33–36)
MCV RBC AUTO: 98.9 FL (ref 80–94)
MDMA UR QL SCN: NEGATIVE
MONOCYTES # BLD AUTO: 0.41 X10(3)/MCL (ref 0.1–1.3)
MONOCYTES NFR BLD AUTO: 7.1 %
NEUTROPHILS # BLD AUTO: 3.42 X10(3)/MCL (ref 2.1–9.2)
NEUTROPHILS NFR BLD AUTO: 59.2 %
NITRITE UR QL STRIP.AUTO: NEGATIVE
OPIATES UR QL SCN: NEGATIVE
PCP UR QL: NEGATIVE
PH UR STRIP.AUTO: 5.5 [PH]
PH UR: 5.5 [PH] (ref 3–11)
PLATELET # BLD AUTO: 222 X10(3)/MCL (ref 130–400)
PMV BLD AUTO: 8.6 FL (ref 7.4–10.4)
POTASSIUM SERPL-SCNC: 4.5 MMOL/L (ref 3.5–5.1)
PROT SERPL-MCNC: 7.2 GM/DL (ref 5.8–7.6)
PROT UR QL STRIP.AUTO: NEGATIVE
PROTHROMBIN TIME: 12.1 SECONDS (ref 12.5–14.5)
RBC # BLD AUTO: 3.55 X10(6)/MCL (ref 4.2–5.4)
RBC #/AREA URNS AUTO: ABNORMAL /HPF
RBC UR QL AUTO: NEGATIVE
SODIUM SERPL-SCNC: 142 MMOL/L (ref 136–145)
SP GR UR STRIP.AUTO: 1.01 (ref 1–1.03)
SPECIFIC GRAVITY, URINE AUTO (.000) (OHS): 1.01 (ref 1–1.03)
SQUAMOUS #/AREA URNS AUTO: ABNORMAL /HPF
TROPONIN I SERPL-MCNC: 0.01 NG/ML (ref 0–0.04)
TSH SERPL-ACNC: 2.85 UIU/ML (ref 0.35–4.94)
UROBILINOGEN UR STRIP-ACNC: 0.2
WBC # SPEC AUTO: 5.78 X10(3)/MCL (ref 4.5–11.5)
WBC #/AREA URNS AUTO: ABNORMAL /HPF

## 2023-12-22 PROCEDURE — 84484 ASSAY OF TROPONIN QUANT: CPT | Performed by: STUDENT IN AN ORGANIZED HEALTH CARE EDUCATION/TRAINING PROGRAM

## 2023-12-22 PROCEDURE — 80307 DRUG TEST PRSMV CHEM ANLYZR: CPT | Performed by: STUDENT IN AN ORGANIZED HEALTH CARE EDUCATION/TRAINING PROGRAM

## 2023-12-22 PROCEDURE — 85025 COMPLETE CBC W/AUTO DIFF WBC: CPT | Performed by: STUDENT IN AN ORGANIZED HEALTH CARE EDUCATION/TRAINING PROGRAM

## 2023-12-22 PROCEDURE — 82077 ASSAY SPEC XCP UR&BREATH IA: CPT | Performed by: STUDENT IN AN ORGANIZED HEALTH CARE EDUCATION/TRAINING PROGRAM

## 2023-12-22 PROCEDURE — 80053 COMPREHEN METABOLIC PANEL: CPT | Performed by: STUDENT IN AN ORGANIZED HEALTH CARE EDUCATION/TRAINING PROGRAM

## 2023-12-22 PROCEDURE — 93005 ELECTROCARDIOGRAM TRACING: CPT

## 2023-12-22 PROCEDURE — 81001 URINALYSIS AUTO W/SCOPE: CPT | Performed by: STUDENT IN AN ORGANIZED HEALTH CARE EDUCATION/TRAINING PROGRAM

## 2023-12-22 PROCEDURE — 85610 PROTHROMBIN TIME: CPT | Performed by: STUDENT IN AN ORGANIZED HEALTH CARE EDUCATION/TRAINING PROGRAM

## 2023-12-22 PROCEDURE — 82140 ASSAY OF AMMONIA: CPT | Performed by: STUDENT IN AN ORGANIZED HEALTH CARE EDUCATION/TRAINING PROGRAM

## 2023-12-22 PROCEDURE — 85730 THROMBOPLASTIN TIME PARTIAL: CPT | Performed by: STUDENT IN AN ORGANIZED HEALTH CARE EDUCATION/TRAINING PROGRAM

## 2023-12-22 PROCEDURE — 84443 ASSAY THYROID STIM HORMONE: CPT | Performed by: STUDENT IN AN ORGANIZED HEALTH CARE EDUCATION/TRAINING PROGRAM

## 2023-12-22 PROCEDURE — 99285 EMERGENCY DEPT VISIT HI MDM: CPT | Mod: 25

## 2023-12-22 PROCEDURE — 80143 DRUG ASSAY ACETAMINOPHEN: CPT | Performed by: STUDENT IN AN ORGANIZED HEALTH CARE EDUCATION/TRAINING PROGRAM

## 2023-12-22 NOTE — ED PROVIDER NOTES
Encounter Date: 12/22/2023       History     Chief Complaint   Patient presents with    Shortness of Breath     Pt states she is having dizziness, tingling, SOB x 3-4 days. Pt also states she took narcan because she thought she was going overdose on her pain meds.     HPI    63-year-old female with a past medical history of hypertension, hyperlipidemia, iron deficiency anemia, chronic back pain on high dose narcotics, anxiety/depression on multiple different medications presents emergency department for vague symptoms tonight.  She took all her medicines around 10:00 p.m..  She states that she started feeling tingling and took some Narcan because she thought she might have overdosed on pain medicine.   states that she is very sleepy and seems drunk.  States that this happened in the past and is because she took too much medicine.    Review of patient's allergies indicates:   Allergen Reactions    Sucralfate Nausea Only     Past Medical History:   Diagnosis Date    Anemia     Anxiety     Depression     Dyspareunia     Encounter for blood transfusion     HLD (hyperlipidemia)     HTN (hypertension)     Hypothyroidism, unspecified     Lumbar radiculopathy     Menopause     Migraine     Osteoporosis     Unspecified osteoarthritis, unspecified site     Urinary incontinence      Past Surgical History:   Procedure Laterality Date    ABDOMINAL SURGERY      Gastric Bypass    CERVICAL FUSION  12/28/2021    CHOLECYSTECTOMY  2015    COLPOSCOPY      FRACTURE SURGERY  2001    GASTRIC BYPASS  2011    HERNIA REPAIR  2014    JOINT REPLACEMENT  Knee 2015, Hip 2016    LUMBAR FUSION  05/28/2021    LUMBAR FUSION  02/21/2023    L3-S1    ROTATOR CUFF REPAIR  04/12/2022    SMALL INTESTINE SURGERY  2015    SPINE SURGERY  2021 & 2023    TONSILLECTOMY  2017     Family History   Problem Relation Age of Onset    Cystic fibrosis Sister     Arthritis Sister         Ankle,  hands    Depression Sister     Hypertension Sister         Me,  Kasandra hCeek    Vision loss Sister     Cystic fibrosis Brother     Early death Brother         CF when 28    Cancer Mother         Breast    Depression Mother     Mental illness Mother     Arthritis Father         Back, knees,  hands    Cancer Father         Lung    Diabetes Father     Hypertension Father     Vision loss Father     Early death Sister         CF when 5    Heart failure Maternal Grandmother         Heart Attack    Cancer Maternal Aunt         Breast Cancer    Cancer Maternal Aunt         Lung Cancer    Cancer Maternal Aunt         Breast Cancer    Cancer Paternal Aunt         Skin Cancer    Cancer Paternal Aunt         Skin Cancer     Social History     Tobacco Use    Smoking status: Former     Current packs/day: 0.00     Average packs/day: 1 pack/day for 27.0 years (27.0 ttl pk-yrs)     Types: Cigarettes     Start date: 1975     Quit date: 2002     Years since quittin.9    Smokeless tobacco: Never    Tobacco comments:     off and on when smoking   Substance Use Topics    Alcohol use: Not Currently    Drug use: Never     Review of Systems   Constitutional:  Positive for fatigue. Negative for fever.   Respiratory:  Negative for cough.    Cardiovascular:  Negative for chest pain.   Gastrointestinal:  Negative for abdominal pain, constipation, diarrhea, nausea and vomiting.   Neurological:  Negative for dizziness, seizures, speech difficulty, weakness, light-headedness and headaches.   All other systems reviewed and are negative.      Physical Exam     Initial Vitals [23 0258]   BP Pulse Resp Temp SpO2   (!) 165/89 91 20 97.6 °F (36.4 °C) 98 %      MAP       --         Physical Exam    Nursing note and vitals reviewed.  Constitutional: She appears well-developed and well-nourished. No distress.   Cardiovascular:  Normal rate and regular rhythm.           Pulmonary/Chest: Breath sounds normal. No respiratory distress. She has no wheezes. She has no rhonchi. She has no rales.    Abdominal: Abdomen is soft. There is no abdominal tenderness. There is no rebound and no guarding.   Musculoskeletal:         General: No tenderness. Normal range of motion.     Neurological: She is alert and oriented to person, place, and time. She has normal strength. No cranial nerve deficit or sensory deficit. GCS score is 15. GCS eye subscore is 4. GCS verbal subscore is 5. GCS motor subscore is 6.   Patient is lethargic and drowsy although easily arousable.  She dozes off during the interview.   Skin: Skin is warm. Capillary refill takes less than 2 seconds.         ED Course   Procedures  Labs Reviewed   COMPREHENSIVE METABOLIC PANEL - Abnormal; Notable for the following components:       Result Value    Chloride 108 (*)     Blood Urea Nitrogen 21.0 (*)     Creatinine 1.26 (*)     All other components within normal limits   URINALYSIS, REFLEX TO URINE CULTURE - Abnormal; Notable for the following components:    Leukocyte Esterase, UA 1+ (*)     All other components within normal limits   PROTIME-INR - Abnormal; Notable for the following components:    PT 12.1 (*)     All other components within normal limits   CBC WITH DIFFERENTIAL - Abnormal; Notable for the following components:    RBC 3.55 (*)     Hgb 11.5 (*)     Hct 35.1 (*)     MCV 98.9 (*)     MCH 32.4 (*)     MCHC 32.8 (*)     All other components within normal limits   AMMONIA - Abnormal; Notable for the following components:    Ammonia Level <13.5 (*)     All other components within normal limits   ACETAMINOPHEN LEVEL - Abnormal; Notable for the following components:    Acetaminophen Level <17.4 (*)     All other components within normal limits   TROPONIN I - Normal   ALCOHOL,MEDICAL (ETHANOL) - Normal   APTT - Normal   TSH - Normal   CBC W/ AUTO DIFFERENTIAL    Narrative:     The following orders were created for panel order CBC auto differential.  Procedure                               Abnormality         Status                     ---------                                -----------         ------                     CBC with Differential[[id number]]       Abnormal            Final result                 Please view results for these tests on the individual orders.   DRUG SCREEN, URINE (BEAKER)   URINALYSIS, MICROSCOPIC     EKG Readings: (Independently Interpreted)   Initial Reading: No STEMI. Rhythm: Normal Sinus Rhythm. Heart Rate: 83. Ectopy: No Ectopy. Conduction: Normal. ST Segments: Normal ST Segments. T Waves: Normal. Clinical Impression: Normal Sinus Rhythm       Imaging Results              CT Head Without Contrast (Preliminary result)  Result time 12/22/23 03:59:55      Preliminary result by Oliver Wood Jr., MD (12/22/23 03:59:55)                   Narrative:    START OF REPORT:  Technique: CT of the head was performed without intravenous contrast with axial as well as coronal and sagittal images.    Comparison: None.    Dosage Information: Automated exposure control was utilized.    Clinical history: Mental status change unknown cause.    Findings:  Hemorrhage: No acute intracranial hemorrhage is seen.  CSF spaces: The ventricles, sulci and basal cisterns all appear moderately prominent consistent with global cerebral atrophy.  Brain parenchyma: Moderate microvascular change is seen in portions of the periventricular and deep white matter tracts.  Cerebellum: Unremarkable.  Vascular: Unremarkable venous sinuses.  Sella and skull base: The sella appears to be within normal limits for age.  Cerebellopontine angles: Within normal limits.  Herniation: None.  Intracranial calcifications: Incidental note is made of bilateral choroid plexus calcification. Incidental note is made of some pineal region calcification. Incidental note is made of some calcification of the falx.  Calvarium: No acute linear or depressed skull fracture is seen.    Maxillofacial Structures:  Paranasal sinuses: The visualized paranasal sinuses appear clear with no  mucoperiosteal thickening or air fluid levels identified.  Orbits: The orbits appear unremarkable.  Zygomatic arches: The zygomatic arches are intact and unremarkable.  Temporal bones and mastoids: The temporal bones and mastoids appear unremarkable.  TMJ: The mandibular condyles appear normally placed with respect to the mandibular fossa.  Nasal Bones: The nasal septum is midline. No displaced nasal bone fracture is seen.    Visualized upper cervical spine: The visualized cervical spine appears unremarkable.      Impression:  1. No acute intracranial process identified. Details and other findings as noted above.                                         Medications - No data to display  Medical Decision Making  differential diagnosis  Polypharmacy, medication misuse, encephalopathy, CVA, metabolic derangement, UTI,  as well as multiple other possible etiologies    Patient is on multiple medications.  Per chart review she had a similar episode 6 months ago.  She has not having any lateralizing signs or symptoms concerning for CVA    Problems Addressed:  At risk for polypharmacy: chronic illness or injury  Misuse of medication: self-limited or minor problem    Amount and/or Complexity of Data Reviewed  Labs: ordered. Decision-making details documented in ED Course.  Radiology: ordered.  ECG/medicine tests: ordered and independent interpretation performed.    Risk  Prescription drug management.               ED Course as of 12/22/23 0450   Fri Dec 22, 2023   0350 WBC: 5.78 [BS]   0350 Hemoglobin(!): 11.5 [BS]   0350 Hematocrit(!): 35.1 [BS]   0350 Platelet Count: 222 [BS]   0403 Protime(!): 12.1 [BS]   0403 Ammonia(!): <13.5 [BS]   0410 Acetaminophen (Tylenol), Serum(!): <17.4 [BS]   0410 Sodium: 142 [BS]   0410 Potassium: 4.5 [BS]   0410 CO2: 23 [BS]   0410 Glucose: 94 [BS]   0410 BUN(!): 21.0 [BS]   0410 Creatinine(!): 1.26 [BS]   0419 Troponin I: 0.010 [BS]   0447 Patient is back to baseline.   states this  happens all the time when she takes her medicines at the same time.  Simply polypharmacy.  Patient will follow up with PCP in regards to simplifying regimen.  She is on multiple sedation medicines.  Will discharge. [BS]      ED Course User Index  [BS] Hans Romero MD                           Clinical Impression:  Final diagnoses:  [R41.82] Altered mental status  [Z91.89] At risk for polypharmacy (Primary)  [F19.90] Misuse of medication          ED Disposition Condition    Discharge Stable          ED Prescriptions    None       Follow-up Information       Follow up With Specialties Details Why Contact Info    Ravinder Mazariegos DO Family Medicine Schedule an appointment as soon as possible for a visit today  [address]  [address]  [facility]  [facility] [id number] [phone]      [facility] - Emergency Dept Emergency Medicine Go to  If symptoms worsen [address] [facility] [facility] [id number] [phone]             Hans Romero MD  12/22/23 2991

## 2024-01-22 DIAGNOSIS — M81.0 OSTEOPOROSIS, UNSPECIFIED OSTEOPOROSIS TYPE, UNSPECIFIED PATHOLOGICAL FRACTURE PRESENCE: Primary | ICD-10-CM

## 2024-02-05 ENCOUNTER — TELEPHONE (OUTPATIENT)
Dept: RHEUMATOLOGY | Facility: CLINIC | Age: 64
End: 2024-02-05
Payer: MEDICARE

## 2024-02-05 NOTE — TELEPHONE ENCOUNTER
Spoke with Nicole with [facility] inform  her pt 's insurance [employer] is not in network at   is not in network @ [facility]

## 2024-03-08 ENCOUNTER — HOSPITAL ENCOUNTER (EMERGENCY)
Facility: HOSPITAL | Age: 64
Discharge: HOME | End: 2024-03-08
Attending: FAMILY MEDICINE
Payer: MEDICARE

## 2024-03-08 VITALS
OXYGEN SATURATION: 94 % | HEART RATE: 80 BPM | DIASTOLIC BLOOD PRESSURE: 65 MMHG | TEMPERATURE: 97 F | HEIGHT: 67 IN | SYSTOLIC BLOOD PRESSURE: 126 MMHG | BODY MASS INDEX: 45.99 KG/M2 | RESPIRATION RATE: 16 BRPM | WEIGHT: 293 LBS

## 2024-03-08 DIAGNOSIS — N30.00 ACUTE CYSTITIS WITHOUT HEMATURIA: Primary | ICD-10-CM

## 2024-03-08 LAB
ALBUMIN SERPL-MCNC: 3.9 G/DL (ref 3.4–4.8)
ALBUMIN/GLOB SERPL: 1.1 RATIO (ref 1.1–2)
ALP SERPL-CCNC: 135 UNIT/L (ref 40–150)
ALT SERPL-CCNC: 25 UNIT/L (ref 0–55)
APPEARANCE UR: ABNORMAL
AST SERPL-CCNC: 28 UNIT/L (ref 5–34)
BACTERIA #/AREA URNS AUTO: ABNORMAL /HPF
BASOPHILS # BLD AUTO: 0.08 X10(3)/MCL
BASOPHILS NFR BLD AUTO: 1.3 %
BILIRUB SERPL-MCNC: 0.3 MG/DL
BILIRUB UR QL STRIP.AUTO: NEGATIVE
BUN SERPL-MCNC: 23 MG/DL (ref 9.8–20.1)
CALCIUM SERPL-MCNC: 9.2 MG/DL (ref 8.4–10.2)
CHLORIDE SERPL-SCNC: 106 MMOL/L (ref 98–107)
CO2 SERPL-SCNC: 28 MMOL/L (ref 23–31)
COLOR UR AUTO: ABNORMAL
CREAT SERPL-MCNC: 1.09 MG/DL (ref 0.55–1.02)
EOSINOPHIL # BLD AUTO: 0.27 X10(3)/MCL (ref 0–0.9)
EOSINOPHIL NFR BLD AUTO: 4.4 %
ERYTHROCYTE [DISTWIDTH] IN BLOOD BY AUTOMATED COUNT: 12.9 % (ref 11.5–17)
GFR SERPLBLD CREATININE-BSD FMLA CKD-EPI: 58 MLS/MIN/1.73/M2
GLOBULIN SER-MCNC: 3.5 GM/DL (ref 2.4–3.5)
GLUCOSE SERPL-MCNC: 99 MG/DL (ref 82–115)
GLUCOSE UR QL STRIP.AUTO: NEGATIVE
HCT VFR BLD AUTO: 38.1 % (ref 37–47)
HGB BLD-MCNC: 12.5 G/DL (ref 12–16)
IMM GRANULOCYTES # BLD AUTO: 0.04 X10(3)/MCL (ref 0–0.04)
IMM GRANULOCYTES NFR BLD AUTO: 0.6 %
KETONES UR QL STRIP.AUTO: NEGATIVE
LEUKOCYTE ESTERASE UR QL STRIP.AUTO: ABNORMAL
LYMPHOCYTES # BLD AUTO: 1.83 X10(3)/MCL (ref 0.6–4.6)
LYMPHOCYTES NFR BLD AUTO: 29.5 %
MCH RBC QN AUTO: 32.1 PG (ref 27–31)
MCHC RBC AUTO-ENTMCNC: 32.8 G/DL (ref 33–36)
MCV RBC AUTO: 97.9 FL (ref 80–94)
MONOCYTES # BLD AUTO: 0.53 X10(3)/MCL (ref 0.1–1.3)
MONOCYTES NFR BLD AUTO: 8.5 %
NEUTROPHILS # BLD AUTO: 3.45 X10(3)/MCL (ref 2.1–9.2)
NEUTROPHILS NFR BLD AUTO: 55.7 %
NITRITE UR QL STRIP.AUTO: POSITIVE
PH UR STRIP.AUTO: 7 [PH]
PLATELET # BLD AUTO: 274 X10(3)/MCL (ref 130–400)
PMV BLD AUTO: 8.7 FL (ref 7.4–10.4)
POTASSIUM SERPL-SCNC: 4.7 MMOL/L (ref 3.5–5.1)
PROT SERPL-MCNC: 7.4 GM/DL (ref 5.8–7.6)
PROT UR QL STRIP.AUTO: NEGATIVE
RBC # BLD AUTO: 3.89 X10(6)/MCL (ref 4.2–5.4)
RBC #/AREA URNS AUTO: ABNORMAL /HPF
RBC UR QL AUTO: NEGATIVE
SODIUM SERPL-SCNC: 142 MMOL/L (ref 136–145)
SP GR UR STRIP.AUTO: 1.01 (ref 1–1.03)
SQUAMOUS #/AREA URNS AUTO: ABNORMAL /HPF
UROBILINOGEN UR STRIP-ACNC: 0.2
WBC # SPEC AUTO: 6.2 X10(3)/MCL (ref 4.5–11.5)
WBC #/AREA URNS AUTO: ABNORMAL /HPF

## 2024-03-08 PROCEDURE — 80053 COMPREHEN METABOLIC PANEL: CPT | Performed by: FAMILY MEDICINE

## 2024-03-08 PROCEDURE — 87086 URINE CULTURE/COLONY COUNT: CPT | Performed by: FAMILY MEDICINE

## 2024-03-08 PROCEDURE — 25000003 PHARM REV CODE 250: Performed by: FAMILY MEDICINE

## 2024-03-08 PROCEDURE — 85025 COMPLETE CBC W/AUTO DIFF WBC: CPT | Performed by: FAMILY MEDICINE

## 2024-03-08 PROCEDURE — 81003 URINALYSIS AUTO W/O SCOPE: CPT | Performed by: FAMILY MEDICINE

## 2024-03-08 PROCEDURE — 99283 EMERGENCY DEPT VISIT LOW MDM: CPT

## 2024-03-08 RX ORDER — NITROFURANTOIN 25; 75 MG/1; MG/1
100 CAPSULE ORAL 2 TIMES DAILY
Qty: 10 CAPSULE | Refills: 0 | Status: SHIPPED | OUTPATIENT
Start: 2024-03-08 | End: 2024-03-13

## 2024-03-08 RX ORDER — NITROFURANTOIN 25; 75 MG/1; MG/1
100 CAPSULE ORAL
Status: COMPLETED | OUTPATIENT
Start: 2024-03-08 | End: 2024-03-08

## 2024-03-08 RX ADMIN — NITROFURANTOIN MONOHYDRATE/MACROCRYSTALS 100 MG: 75; 25 CAPSULE ORAL at 10:03

## 2024-03-08 NOTE — ED PROVIDER NOTES
Encounter Date: 3/8/2024       History     Chief Complaint   Patient presents with    Weakness     Weakness    started 2 -3 weeks ago   pt believes  she is anemic     Patient came in today complaining of generalized weakness.  States it has been going on for multiple weeks.  Patient was concerned she might be anemic.  Has a history of iron deficiency anemia previous requiring transfusion.  Denies urinary complaints.  No other new complaint.        Review of patient's allergies indicates:   Allergen Reactions    Sucralfate Nausea Only     Past Medical History:   Diagnosis Date    Anemia     Anxiety     Depression     Dyspareunia     Encounter for blood transfusion     HLD (hyperlipidemia)     HTN (hypertension)     Hypothyroidism, unspecified     Lumbar radiculopathy     Menopause     Migraine     Osteoporosis     Unspecified osteoarthritis, unspecified site     Urinary incontinence      Past Surgical History:   Procedure Laterality Date    ABDOMINAL SURGERY      Gastric Bypass    CERVICAL FUSION  12/28/2021    CHOLECYSTECTOMY  2015    COLPOSCOPY      FRACTURE SURGERY  2001    GASTRIC BYPASS  2011    HERNIA REPAIR  2014    JOINT REPLACEMENT  Knee 2015, Hip 2016    LUMBAR FUSION  05/28/2021    LUMBAR FUSION  02/21/2023    L3-S1    ROTATOR CUFF REPAIR  04/12/2022    SMALL INTESTINE SURGERY  2015    SPINE SURGERY  2021 & 2023    TONSILLECTOMY  2017     Family History   Problem Relation Age of Onset    Cystic fibrosis Sister     Arthritis Sister         Ankle,  hands    Depression Sister     Hypertension Sister         Me, Kasandra Ham    Vision loss Sister     Cystic fibrosis Brother     Early death Brother         CF when 28    Cancer Mother         Breast    Depression Mother     Mental illness Mother     Arthritis Father         Back, knees,  hands    Cancer Father         Lung    Diabetes Father     Hypertension Father     Vision loss Father     Early death Sister         CF when 5    Heart failure Maternal  Grandmother         Heart Attack    Cancer Maternal Aunt         Breast Cancer    Cancer Maternal Aunt         Lung Cancer    Cancer Maternal Aunt         Breast Cancer    Cancer Paternal Aunt         Skin Cancer    Cancer Paternal Aunt         Skin Cancer     Social History     Tobacco Use    Smoking status: Former     Current packs/day: 0.00     Average packs/day: 1 pack/day for 27.0 years (27.0 ttl pk-yrs)     Types: Cigarettes     Start date: 1975     Quit date: 2002     Years since quittin.1    Smokeless tobacco: Never    Tobacco comments:     off and on when smoking   Substance Use Topics    Alcohol use: Not Currently    Drug use: Never     Review of Systems   All other systems reviewed and are negative.      Physical Exam     Initial Vitals [24 0951]   BP Pulse Resp Temp SpO2   (!) 146/73 100 16 96.5 °F (35.8 °C) (!) 94 %      MAP       --         Physical Exam    Nursing note and vitals reviewed.  Constitutional: She appears well-developed and well-nourished.   HENT:   Head: Normocephalic and atraumatic.   Neck: Neck supple.   Cardiovascular:  Normal rate and regular rhythm.           Pulmonary/Chest: Breath sounds normal.   Abdominal: Abdomen is soft.   Musculoskeletal:      Cervical back: Neck supple.     Neurological: She is alert and oriented to person, place, and time.   Skin: Skin is warm.   Psychiatric: She has a normal mood and affect. Thought content normal.         ED Course   Procedures  Labs Reviewed   COMPREHENSIVE METABOLIC PANEL - Abnormal; Notable for the following components:       Result Value    Blood Urea Nitrogen 23.0 (*)     Creatinine 1.09 (*)     All other components within normal limits   URINALYSIS, REFLEX TO URINE CULTURE - Abnormal; Notable for the following components:    Appearance, UA Turbid (*)     Nitrites, UA Positive (*)     Leukocyte Esterase, UA 1+ (*)     All other components within normal limits   CBC WITH DIFFERENTIAL - Abnormal; Notable for the  following components:    RBC 3.89 (*)     MCV 97.9 (*)     MCH 32.1 (*)     MCHC 32.8 (*)     All other components within normal limits   URINALYSIS, MICROSCOPIC - Abnormal; Notable for the following components:    Bacteria, UA Many (*)     WBC, UA 21-50 (*)     Squamous Epithelial Cells, UA Few (*)     All other components within normal limits   CULTURE, URINE   CBC W/ AUTO DIFFERENTIAL    Narrative:     The following orders were created for panel order CBC auto differential.  Procedure                               Abnormality         Status                     ---------                               -----------         ------                     CBC with Differential[[id number]]       Abnormal            Final result                 Please view results for these tests on the individual orders.          Imaging Results    None          Medications   nitrofurantoin (macrocrystal-monohydrate) 100 MG capsule 100 mg (has no administration in time range)     Medical Decision Making  Amount and/or Complexity of Data Reviewed  Labs: ordered.                                      Clinical Impression:  Final diagnoses:  [N30.00] Acute cystitis without hematuria (Primary)          ED Disposition Condition    Discharge Stable          ED Prescriptions       Medication Sig Dispense Start Date End Date Auth. Provider    nitrofurantoin, macrocrystal-monohydrate, (MACROBID) 100 MG capsule Take 1 capsule (100 mg total) by mouth 2 (two) times daily. for 5 days 10 capsule 3/8/2024 3/13/2024 Jimbo Moe Jr., MD          Follow-up Information    None          Jimbo Moe Jr., MD  03/08/24 1043

## 2024-03-10 LAB — BACTERIA UR CULT: ABNORMAL

## 2024-04-30 ENCOUNTER — TELEPHONE (OUTPATIENT)
Dept: NEUROSURGERY | Facility: CLINIC | Age: 64
End: 2024-04-30
Payer: MEDICARE

## 2024-04-30 DIAGNOSIS — M48.062 SPINAL STENOSIS, LUMBAR REGION WITH NEUROGENIC CLAUDICATION: Primary | ICD-10-CM

## 2024-04-30 NOTE — TELEPHONE ENCOUNTER
63 year old female patient is being referred to Dr Hyde by Ravinder Mazariegos DO on 4/30/24 for Lumbar.     MRI Head 3/12/24 (Reports/Images in chart - unable to attach) impression states:  Chronic encephalomalacia and atrophy greater than expected for age as described with no evidence of internal auditory canal mass lesion or acute disease.       MRI Thoracic 2/26/24 (Reports/Images in chart - unable to attach) impression states:  1. 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 No evident abscess.     2. Ventral extradural 0.8 cm thick partially calcified lesion at T12-L1 is indeterminate, results in severe canal stenosis. No cord signal abnormalities.     Vertebrae:   Intervertebral disc T2 STIR hyperintensity at T12-L1 is associated with adjacent endplate irregularity, marrow edema and loss of vertebral body height (T12 > L1).   Otherwise no fractures, infection or neoplasm.     Canal:   Ventral extradural 0.8 cm thick calcified lesion at the T12-L1 level is new from comparison, results in severe canal stenosis. Otherwise mild multilevel canal narrowing related to disc bulges and facet hypertrophy.   No discrete drainable epidural fluid collections.     Foramina:   Severe bilateral foraminal narrowing at T12-L1 related to disc bulge and facet hypertrophy, moderate bilaterally at T11-12, mild multilevel elsewhere.     Please review and advise re scheduling. Thank you

## 2024-05-03 NOTE — TELEPHONE ENCOUNTER
Spoke with patient and scheduled to see MM on 6/18 at 11 am (also added to waitlist)     Patient stated that she has already had 2 lumbar sx w/Dr Bruner in [place] (1st was approx 1 1/2 years ago and the revision was about a year ago) but she doesn't want him to know that she is seeing another doctor. Patient stated that she was told that she has a fractured T12 that needs to be fixed.     Patient stated that she takes 20mg of oxycodone 6x a day and can only stand or walk for approx 10 minutes. Patient stated that the medication is working. Patient stated that the aching stabbing pain is in her right shoulder area and wakes her at night.

## 2024-06-03 NOTE — TELEPHONE ENCOUNTER
Patient called to cx appt. Patient isn't sure when she will be able to r/s; will CB when she can r/s.

## 2024-07-18 ENCOUNTER — HOSPITAL ENCOUNTER (EMERGENCY)
Facility: HOSPITAL | Age: 64
Discharge: PSYCHIATRIC OR SUBSTANCE USE FACILITY | End: 2024-07-18
Attending: EMERGENCY MEDICINE
Payer: MEDICARE

## 2024-07-18 VITALS
RESPIRATION RATE: 19 BRPM | HEIGHT: 67 IN | DIASTOLIC BLOOD PRESSURE: 87 MMHG | BODY MASS INDEX: 45.99 KG/M2 | OXYGEN SATURATION: 98 % | WEIGHT: 293 LBS | SYSTOLIC BLOOD PRESSURE: 160 MMHG | HEART RATE: 68 BPM | TEMPERATURE: 98 F

## 2024-07-18 DIAGNOSIS — F33.2 SEVERE EPISODE OF RECURRENT MAJOR DEPRESSIVE DISORDER, WITHOUT PSYCHOTIC FEATURES: Primary | ICD-10-CM

## 2024-07-18 DIAGNOSIS — F44.9 CONVERSION REACTION: ICD-10-CM

## 2024-07-18 LAB
ALBUMIN SERPL-MCNC: 3.8 G/DL (ref 3.4–4.8)
ALBUMIN/GLOB SERPL: 1.1 RATIO (ref 1.1–2)
ALP SERPL-CCNC: 116 UNIT/L (ref 40–150)
ALT SERPL-CCNC: 24 UNIT/L (ref 0–55)
AMMONIA PLAS-MSCNC: 13.5 UMOL/L (ref 18–72)
AMPHET UR QL SCN: NEGATIVE
ANION GAP SERPL CALC-SCNC: 10 MEQ/L
AST SERPL-CCNC: 29 UNIT/L (ref 5–34)
BACTERIA #/AREA URNS AUTO: ABNORMAL /HPF
BARBITURATE SCN PRESENT UR: NEGATIVE
BASOPHILS # BLD AUTO: 0.02 X10(3)/MCL
BASOPHILS NFR BLD AUTO: 0.7 %
BENZODIAZ UR QL SCN: NEGATIVE
BILIRUB SERPL-MCNC: 0.4 MG/DL
BILIRUB UR QL STRIP.AUTO: NEGATIVE
BUN SERPL-MCNC: 15 MG/DL (ref 9.8–20.1)
CALCIUM SERPL-MCNC: 9.4 MG/DL (ref 8.4–10.2)
CANNABINOIDS UR QL SCN: NEGATIVE
CHLORIDE SERPL-SCNC: 107 MMOL/L (ref 98–107)
CLARITY UR: ABNORMAL
CO2 SERPL-SCNC: 26 MMOL/L (ref 23–31)
COCAINE UR QL SCN: NEGATIVE
COLOR UR AUTO: YELLOW
CREAT SERPL-MCNC: 0.91 MG/DL (ref 0.55–1.02)
CREAT/UREA NIT SERPL: 16
EOSINOPHIL # BLD AUTO: 0.04 X10(3)/MCL (ref 0–0.9)
EOSINOPHIL NFR BLD AUTO: 1.3 %
ERYTHROCYTE [DISTWIDTH] IN BLOOD BY AUTOMATED COUNT: 12.4 % (ref 11.5–17)
ETHANOL SERPL-MCNC: <10 MG/DL
FENTANYL UR QL SCN: NEGATIVE
GFR SERPLBLD CREATININE-BSD FMLA CKD-EPI: >60 ML/MIN/1.73/M2
GLOBULIN SER-MCNC: 3.4 GM/DL (ref 2.4–3.5)
GLUCOSE SERPL-MCNC: 121 MG/DL (ref 82–115)
GLUCOSE UR QL STRIP: NEGATIVE
HCT VFR BLD AUTO: 39.5 % (ref 37–47)
HGB BLD-MCNC: 12.6 G/DL (ref 12–16)
HGB UR QL STRIP: NEGATIVE
IMM GRANULOCYTES # BLD AUTO: 0.02 X10(3)/MCL (ref 0–0.04)
IMM GRANULOCYTES NFR BLD AUTO: 0.7 %
KETONES UR QL STRIP: ABNORMAL
LEUKOCYTE ESTERASE UR QL STRIP: NEGATIVE
LYMPHOCYTES # BLD AUTO: 0.54 X10(3)/MCL (ref 0.6–4.6)
LYMPHOCYTES NFR BLD AUTO: 18.1 %
MAGNESIUM SERPL-MCNC: 1.9 MG/DL (ref 1.6–2.6)
MCH RBC QN AUTO: 31.4 PG (ref 27–31)
MCHC RBC AUTO-ENTMCNC: 31.9 G/DL (ref 33–36)
MCV RBC AUTO: 98.5 FL (ref 80–94)
MDMA UR QL SCN: NEGATIVE
MONOCYTES # BLD AUTO: 0.2 X10(3)/MCL (ref 0.1–1.3)
MONOCYTES NFR BLD AUTO: 6.7 %
NEUTROPHILS # BLD AUTO: 2.16 X10(3)/MCL (ref 2.1–9.2)
NEUTROPHILS NFR BLD AUTO: 72.5 %
NITRITE UR QL STRIP: POSITIVE
OPIATES UR QL SCN: NEGATIVE
PCP UR QL: NEGATIVE
PH UR STRIP: 8.5 [PH]
PH UR: 8.5 [PH] (ref 3–11)
PLATELET # BLD AUTO: 186 X10(3)/MCL (ref 130–400)
PMV BLD AUTO: 9.8 FL (ref 7.4–10.4)
POTASSIUM SERPL-SCNC: 4.1 MMOL/L (ref 3.5–5.1)
PROT SERPL-MCNC: 7.2 GM/DL (ref 5.8–7.6)
PROT UR QL STRIP: NEGATIVE
RBC # BLD AUTO: 4.01 X10(6)/MCL (ref 4.2–5.4)
RBC #/AREA URNS AUTO: ABNORMAL /HPF
SODIUM SERPL-SCNC: 143 MMOL/L (ref 136–145)
SP GR UR STRIP.AUTO: 1.01 (ref 1–1.03)
SPECIFIC GRAVITY, URINE AUTO (.000) (OHS): 1.01 (ref 1–1.03)
SQUAMOUS #/AREA URNS AUTO: ABNORMAL /HPF
TROPONIN I SERPL-MCNC: 0.01 NG/ML (ref 0–0.04)
TSH SERPL-ACNC: 0.7 UIU/ML (ref 0.35–4.94)
UROBILINOGEN UR STRIP-ACNC: 0.2
WBC # BLD AUTO: 2.98 X10(3)/MCL (ref 4.5–11.5)
WBC #/AREA URNS AUTO: ABNORMAL /HPF

## 2024-07-18 PROCEDURE — 96361 HYDRATE IV INFUSION ADD-ON: CPT

## 2024-07-18 PROCEDURE — 87077 CULTURE AEROBIC IDENTIFY: CPT | Performed by: EMERGENCY MEDICINE

## 2024-07-18 PROCEDURE — 99285 EMERGENCY DEPT VISIT HI MDM: CPT | Mod: 25

## 2024-07-18 PROCEDURE — 82077 ASSAY SPEC XCP UR&BREATH IA: CPT | Performed by: EMERGENCY MEDICINE

## 2024-07-18 PROCEDURE — 96375 TX/PRO/DX INJ NEW DRUG ADDON: CPT

## 2024-07-18 PROCEDURE — 83735 ASSAY OF MAGNESIUM: CPT | Performed by: EMERGENCY MEDICINE

## 2024-07-18 PROCEDURE — 84443 ASSAY THYROID STIM HORMONE: CPT | Performed by: EMERGENCY MEDICINE

## 2024-07-18 PROCEDURE — 87086 URINE CULTURE/COLONY COUNT: CPT | Performed by: EMERGENCY MEDICINE

## 2024-07-18 PROCEDURE — 63600175 PHARM REV CODE 636 W HCPCS: Performed by: EMERGENCY MEDICINE

## 2024-07-18 PROCEDURE — 25000003 PHARM REV CODE 250: Performed by: INTERNAL MEDICINE

## 2024-07-18 PROCEDURE — 81003 URINALYSIS AUTO W/O SCOPE: CPT | Performed by: EMERGENCY MEDICINE

## 2024-07-18 PROCEDURE — 82140 ASSAY OF AMMONIA: CPT | Performed by: EMERGENCY MEDICINE

## 2024-07-18 PROCEDURE — 80307 DRUG TEST PRSMV CHEM ANLYZR: CPT | Performed by: EMERGENCY MEDICINE

## 2024-07-18 PROCEDURE — 84484 ASSAY OF TROPONIN QUANT: CPT | Performed by: EMERGENCY MEDICINE

## 2024-07-18 PROCEDURE — 96365 THER/PROPH/DIAG IV INF INIT: CPT

## 2024-07-18 PROCEDURE — 85025 COMPLETE CBC W/AUTO DIFF WBC: CPT | Performed by: EMERGENCY MEDICINE

## 2024-07-18 PROCEDURE — 96376 TX/PRO/DX INJ SAME DRUG ADON: CPT

## 2024-07-18 PROCEDURE — 25000003 PHARM REV CODE 250: Performed by: EMERGENCY MEDICINE

## 2024-07-18 PROCEDURE — 80053 COMPREHEN METABOLIC PANEL: CPT | Performed by: EMERGENCY MEDICINE

## 2024-07-18 RX ORDER — LABETALOL HYDROCHLORIDE 5 MG/ML
20 INJECTION, SOLUTION INTRAVENOUS
Status: COMPLETED | OUTPATIENT
Start: 2024-07-18 | End: 2024-07-18

## 2024-07-18 RX ORDER — LABETALOL 100 MG/1
100 TABLET, FILM COATED ORAL
Status: COMPLETED | OUTPATIENT
Start: 2024-07-18 | End: 2024-07-18

## 2024-07-18 RX ORDER — LORAZEPAM 1 MG/1
2 TABLET ORAL
Status: COMPLETED | OUTPATIENT
Start: 2024-07-18 | End: 2024-07-18

## 2024-07-18 RX ORDER — LABETALOL HYDROCHLORIDE 5 MG/ML
10 INJECTION, SOLUTION INTRAVENOUS
Status: COMPLETED | OUTPATIENT
Start: 2024-07-18 | End: 2024-07-18

## 2024-07-18 RX ADMIN — LABETALOL HYDROCHLORIDE 10 MG: 5 INJECTION, SOLUTION INTRAVENOUS at 12:07

## 2024-07-18 RX ADMIN — SODIUM CHLORIDE, POTASSIUM CHLORIDE, SODIUM LACTATE AND CALCIUM CHLORIDE 1000 ML: 600; 310; 30; 20 INJECTION, SOLUTION INTRAVENOUS at 12:07

## 2024-07-18 RX ADMIN — CEFTRIAXONE SODIUM 1 G: 1 INJECTION, POWDER, FOR SOLUTION INTRAMUSCULAR; INTRAVENOUS at 06:07

## 2024-07-18 RX ADMIN — LABETALOL HYDROCHLORIDE 20 MG: 5 INJECTION, SOLUTION INTRAVENOUS at 04:07

## 2024-07-18 RX ADMIN — LABETALOL HYDROCHLORIDE 100 MG: 100 TABLET, FILM COATED ORAL at 07:07

## 2024-07-18 RX ADMIN — LORAZEPAM 2 MG: 1 TABLET ORAL at 04:07

## 2024-07-18 NOTE — ED PROVIDER NOTES
Encounter Date: 7/18/2024       History     Chief Complaint   Patient presents with    Altered Mental Status     AMS since yesterday at noon. PT has been having episodes of confusion for over a year. Has a neurologist and has been worked up for it     The history is provided by the patient. The history is limited by the condition of the patient.   Altered Mental Status  This is a recurrent problem. The current episode started yesterday. The problem occurs constantly. Pertinent negatives include no chest pain and no shortness of breath. Nothing aggravates the symptoms. Nothing relieves the symptoms.   Significant other reports intermittent periods of disorientation over the past couple of years that started out lasting a couple of hours, but now are lasting days on end.  Recently admitted to [facility] for similar spell that lasted about 3 days.  Followed by Dr. Downs (neurology) but no definitive diagnosis has been made    Review of patient's allergies indicates:   Allergen Reactions    Sucralfate Nausea Only     Past Medical History:   Diagnosis Date    Anemia     Anxiety     Depression     Dyspareunia     Encounter for blood transfusion     HLD (hyperlipidemia)     HTN (hypertension)     Hypothyroidism, unspecified     Lumbar radiculopathy     Menopause     Migraine     Osteoporosis     Spinal stenosis, lumbar region with neurogenic claudication     Unspecified osteoarthritis, unspecified site     Urinary incontinence      Past Surgical History:   Procedure Laterality Date    ABDOMINAL SURGERY      Gastric Bypass    CERVICAL FUSION  12/28/2021    CHOLECYSTECTOMY  2015    COLPOSCOPY      FRACTURE SURGERY  2001    GASTRIC BYPASS  2011    HERNIA REPAIR  2014    JOINT REPLACEMENT  Knee 2015, Hip 2016    LUMBAR FUSION  05/28/2021    LUMBAR FUSION  02/21/2023    L3-S1    ROTATOR CUFF REPAIR  04/12/2022    SMALL INTESTINE SURGERY  2015    SPINE SURGERY  2021 & 2023    TONSILLECTOMY  2017     Family History   Problem Relation  Name Age of Onset    Cystic fibrosis Sister Florence Kennedy     Arthritis Sister Florence Kennedy         Ankle,  hands    Depression Sister Florence Kennedy     Hypertension Sister Florence Kennedy         Me, Kasandra Ham    Vision loss Sister Florence Kennedy     Cystic fibrosis Brother Adriano Kennedy     Early death Brother Adriano Kennedy         CF when 28    Cancer Mother Latoya Kennedy         Breast    Depression Mother Latoya Kennedy     Mental illness Mother Latoya Kennedy     Arthritis Father Eduard Kennedy         Back, knees,  hands    Cancer Father Eduard Kennedy         Lung    Diabetes Father Eduard Kennedy     Hypertension Father Eduard Kennedy     Vision loss Father Eduard Kennedy     Early death Sister Sabina Kennedy         CF when 5    Heart failure Maternal Grandmother Letty Willett         Heart Attack    Cancer Maternal Aunt Gema Smith         Breast Cancer    Cancer Maternal Aunt Aparna Manrique         Lung Cancer    Cancer Maternal Aunt Jeanie Stacy         Breast Cancer    Cancer Paternal Aunt Hilary Matthews         Skin Cancer    Cancer Paternal Aunt Enma Alford         Skin Cancer     Social History     Tobacco Use    Smoking status: Former     Current packs/day: 0.00     Average packs/day: 1 pack/day for 27.0 years (27.0 ttl pk-yrs)     Types: Cigarettes     Start date: 1975     Quit date: 2002     Years since quittin.5    Smokeless tobacco: Never    Tobacco comments:     off and on when smoking   Substance Use Topics    Alcohol use: Not Currently    Drug use: Never     Review of Systems   Constitutional:  Negative for fever.   HENT:  Negative for sore throat.    Respiratory:  Negative for shortness of breath.    Cardiovascular:  Negative for chest pain.   Gastrointestinal:  Negative for nausea.   Genitourinary:  Negative for dysuria.   Musculoskeletal:  Negative for back pain.   Skin:  Negative for rash.   Neurological:  Negative for weakness.   Hematological:  Does not bruise/bleed easily.        Physical Exam     Initial Vitals [07/18/24 1130]   BP Pulse Resp Temp SpO2   (!) 164/87 83 (!) 22 98.1 °F (36.7 °C) 96 %      MAP       --         Physical Exam    Nursing note and vitals reviewed.  Constitutional: She appears well-developed and well-nourished.   HENT:   Head: Normocephalic and atraumatic.   Right Ear: External ear normal.   Left Ear: External ear normal.   Eyes: Conjunctivae and EOM are normal. Pupils are equal, round, and reactive to light.   Neck: Neck supple.   Normal range of motion.  Cardiovascular:  Normal rate, regular rhythm, normal heart sounds and intact distal pulses.           Pulmonary/Chest: Breath sounds normal.   Abdominal: Abdomen is soft. Bowel sounds are normal.   obese   Musculoskeletal:         General: Normal range of motion.      Cervical back: Normal range of motion and neck supple.     Neurological: She is alert. She is disoriented. GCS score is 15. GCS eye subscore is 4. GCS verbal subscore is 4. GCS motor subscore is 6.   Disoriented to date, place, and situation.   Skin: Skin is warm and dry. Capillary refill takes less than 2 seconds.   Psychiatric: She has a normal mood and affect. Her behavior is normal. Judgment and thought content normal.         ED Course   Procedures  Labs Reviewed   COMPREHENSIVE METABOLIC PANEL - Abnormal; Notable for the following components:       Result Value    Glucose 121 (*)     All other components within normal limits   AMMONIA - Abnormal; Notable for the following components:    Ammonia Level 13.5 (*)     All other components within normal limits   CBC WITH DIFFERENTIAL - Abnormal; Notable for the following components:    WBC 2.98 (*)     RBC 4.01 (*)     MCV 98.5 (*)     MCH 31.4 (*)     MCHC 31.9 (*)     Lymph # 0.54 (*)     All other components within normal limits   MAGNESIUM - Normal   TROPONIN I - Normal   TSH - Normal   ALCOHOL,MEDICAL (ETHANOL) - Normal   CBC W/ AUTO DIFFERENTIAL    Narrative:     The following orders were  created for panel order CBC auto differential.  Procedure                               Abnormality         Status                     ---------                               -----------         ------                     CBC with Differential[[id number]]       Abnormal            Final result                 Please view results for these tests on the individual orders.   URINALYSIS, REFLEX TO URINE CULTURE   DRUG SCREEN, URINE (BEAKER)          Imaging Results              CT Head Without Contrast (Final result)  Result time 07/18/24 13:45:41      Final result by Davian Lopez MD (07/18/24 13:45:41)                   Impression:      1. Significantly limited exam secondary to patient motion despite repeated attempts  2. No obvious acute intracranial abnormality identified  3. Mild generalized cerebral and cerebellar atrophy  4. Findings and other details as above      Electronically signed by: Davian Lopez  Date:    07/18/2024  Time:    13:45               Narrative:    EXAMINATION:  CT HEAD WITHOUT CONTRAST    CLINICAL HISTORY:  Mental status change, unknown cause;, .    TECHNIQUE:  PATIENT RADIATION DOSE: DLP(mGycm) 1598    As per PQRS measures, all CT scans at this facility used dose modulation, iterative reconstruction, and/or weight based dose adjustment when appropriate to reduce radiation dose to as low as reasonably achievable.    COMPARISON:  12/22/2023    FINDINGS:  Serial axial images were obtained of the head without the administration of IV contrast. Both brain and bone parenchymal windows were obtained.  Additional coronal and sagittal reconstructions were obtained.  The exam is limited due to patient motion despite repeated attempts.  Ventricles, cisterns, and sulci are mildly prominent in size.  There is no evidence of obvious intracranial hemorrhage, midline shift, mass effect, or abnormal extra-axial fluid collections.  Cerebellar tonsils extend caudally to the level of foramen magnum.  There  is beam hardening artifact at the skull base.  Visualized paranasal sinuses and mastoid air cells are relatively clear.                                       Medications   labetaloL injection 20 mg (has no administration in time range)   lactated ringers bolus 1,000 mL ( Intravenous Stopped 7/18/24 1348)   labetaloL injection 10 mg (10 mg Intravenous Given 7/18/24 1232)   LORazepam tablet 2 mg (2 mg Oral Given 7/18/24 1607)     Medical Decision Making  Amount and/or Complexity of Data Reviewed  Independent Historian: spouse     Details: All details of HPI obtained from spouse, as patient unable to give history due to disorientation.  External Data Reviewed: notes.     Details: Excerpt from D/C Summary from [facility] 7/4/24:    Hospital Course: 64-year-old female with history of HTN, hypothyroidism, depression, anxiety, lumbar stenosis with chronic pain on high-dose narcotics (in addition to multiple other centrally acting medications) presented to the ED with altered mental status.  She has had multiple episodes in the past of transient altered mentation and recently saw Dr Downs for the same complaints.  EEGs were performed and were negative.  There was some consideration given to psychogenic causes, possible dissociation reaction. She was admitted to the hospital for thorough investigation.  CT head, CTA head/neck, and MRI brain were negative for acute abnormality however showed right frontal lobe encephalomalacia.  On presentation she was awake and alert but unresponsive/nonverbal, rocking back and forth.  Prolonged EEG recording was obtained which showed no significant underlying epileptogenicity.  She was seen by neurology.  Overall suspicion remains for a psychogenic cause versus polypharmacy.  Notable home meds include amitriptyline, baclofen, bupropion (which I believe she recently stopped), buspar, duloxetine, gabapentin, meclizine, seroquel, and high dose oxycodone (20 mg q6 hrs).  Today patient is awake and  alert, oriented x3, completely verbal and seemingly back to baseline.  We were planning to have patient evaluated by psychiatry in the hospital to address her medication regimen however patient did not want to stay and requested discharge home.  She tells me she is established with outpatient psychiatry and agrees to follow up with them in the next 1-2 weeks for medication management.      Additionally- patient had elevated troponin consistent with type 2 MI.  EKG showed no ischemic changes and patient denied chest pain.  Echo showed normal EF and no focal wall motion abnormalities.        Labs: ordered. Decision-making details documented in ED Course.  Radiology: ordered. Decision-making details documented in ED Course.    Risk  Prescription drug management.    Differential includes:  neurodegenerative disorder, hepatic encephalopathy, vitamin deficiency in the setting of gastric bypass, thyroid disease, electrolyte disturbance, seizure disorder, psychogenic causes, idiopathic.  Will obtain CBC, CMP, mag, troponin, TSH, UA, UDS, EtOH, ammonia, head CT.  Will review records from [facility] to see if there is more insight.           ED Course as of 07/18/24 1618   Thu Jul 18, 2024   1604 Discussed results with patient's  - no apparent medical cause of patient's symptoms.  Neurologist at [facility] felt most likely psychogenic +/- polypharmacy.   states she has had no medications in over 24 hours.  I think inpatient psychiatric evaluation is the best course of action at this time.  Will complete PEC and seek placement for mental health treatment. [CL]      ED Course User Index  [CL] Kevin Garcia MD       Medically cleared for psychiatry placement: 7/18/2024  4:18 PM                   Clinical Impression:  Final diagnoses:  [F33.2] Severe episode of recurrent major depressive disorder, without psychotic features (Primary)  [F44.9] Conversion reaction          ED Disposition Condition    Transfer to Psych  Facility Stable          ED Prescriptions    None       Follow-up Information    None          Kevin Garcia MD  07/18/24 8523

## 2024-07-21 ENCOUNTER — TELEPHONE (OUTPATIENT)
Dept: EMERGENCY MEDICINE | Facility: HOSPITAL | Age: 64
End: 2024-07-21
Payer: MEDICARE

## 2024-07-21 LAB — BACTERIA UR CULT: ABNORMAL

## 2024-07-21 NOTE — TELEPHONE ENCOUNTER
----- Message from Lincoln Richardson MD sent at 7/21/2024  4:37 PM CDT -----  Patient with UTI on UA for  patient who was transferred to a psychiatric facility.  Patient will need a prescription for Augmentin and follow up with her primary care physician.

## 2024-08-28 ENCOUNTER — HOSPITAL ENCOUNTER (EMERGENCY)
Facility: HOSPITAL | Age: 64
Discharge: HOME | End: 2024-08-28
Attending: INTERNAL MEDICINE
Payer: MEDICARE

## 2024-08-28 VITALS
BODY MASS INDEX: 45.99 KG/M2 | OXYGEN SATURATION: 99 % | SYSTOLIC BLOOD PRESSURE: 168 MMHG | HEIGHT: 67 IN | HEART RATE: 89 BPM | RESPIRATION RATE: 18 BRPM | DIASTOLIC BLOOD PRESSURE: 87 MMHG | TEMPERATURE: 98 F | WEIGHT: 293 LBS

## 2024-08-28 DIAGNOSIS — R10.9 ABDOMINAL PAIN, UNSPECIFIED ABDOMINAL LOCATION: Primary | ICD-10-CM

## 2024-08-28 DIAGNOSIS — E87.6 HYPOKALEMIA: ICD-10-CM

## 2024-08-28 LAB
ALBUMIN SERPL-MCNC: 4 G/DL (ref 3.4–4.8)
ALBUMIN/GLOB SERPL: 1.1 RATIO (ref 1.1–2)
ALP SERPL-CCNC: 134 UNIT/L (ref 40–150)
ALT SERPL-CCNC: 22 UNIT/L (ref 0–55)
ANION GAP SERPL CALC-SCNC: 13 MEQ/L
AST SERPL-CCNC: 30 UNIT/L (ref 5–34)
BACTERIA #/AREA URNS AUTO: NORMAL /HPF
BASOPHILS # BLD AUTO: 0.05 X10(3)/MCL
BASOPHILS NFR BLD AUTO: 0.5 %
BILIRUB SERPL-MCNC: 0.6 MG/DL
BILIRUB UR QL STRIP.AUTO: NEGATIVE
BUN SERPL-MCNC: 9 MG/DL (ref 9.8–20.1)
CALCIUM SERPL-MCNC: 9.6 MG/DL (ref 8.4–10.2)
CHLORIDE SERPL-SCNC: 108 MMOL/L (ref 98–107)
CLARITY UR: CLEAR
CO2 SERPL-SCNC: 22 MMOL/L (ref 23–31)
COLOR UR AUTO: YELLOW
CREAT SERPL-MCNC: 0.72 MG/DL (ref 0.55–1.02)
CREAT/UREA NIT SERPL: 13
EOSINOPHIL # BLD AUTO: 0.01 X10(3)/MCL (ref 0–0.9)
EOSINOPHIL NFR BLD AUTO: 0.1 %
ERYTHROCYTE [DISTWIDTH] IN BLOOD BY AUTOMATED COUNT: 13.5 % (ref 11.5–17)
GFR SERPLBLD CREATININE-BSD FMLA CKD-EPI: >60 ML/MIN/1.73/M2
GLOBULIN SER-MCNC: 3.5 GM/DL (ref 2.4–3.5)
GLUCOSE SERPL-MCNC: 136 MG/DL (ref 82–115)
GLUCOSE UR QL STRIP: NEGATIVE
HCT VFR BLD AUTO: 36.9 % (ref 37–47)
HGB BLD-MCNC: 12.7 G/DL (ref 12–16)
HGB UR QL STRIP: ABNORMAL
IMM GRANULOCYTES # BLD AUTO: 0.07 X10(3)/MCL (ref 0–0.04)
IMM GRANULOCYTES NFR BLD AUTO: 0.6 %
KETONES UR QL STRIP: ABNORMAL
LEUKOCYTE ESTERASE UR QL STRIP: NEGATIVE
LIPASE SERPL-CCNC: 16 U/L
LYMPHOCYTES # BLD AUTO: 1.44 X10(3)/MCL (ref 0.6–4.6)
LYMPHOCYTES NFR BLD AUTO: 13.3 %
MCH RBC QN AUTO: 31.5 PG (ref 27–31)
MCHC RBC AUTO-ENTMCNC: 34.4 G/DL (ref 33–36)
MCV RBC AUTO: 91.6 FL (ref 80–94)
MONOCYTES # BLD AUTO: 0.68 X10(3)/MCL (ref 0.1–1.3)
MONOCYTES NFR BLD AUTO: 6.3 %
NEUTROPHILS # BLD AUTO: 8.59 X10(3)/MCL (ref 2.1–9.2)
NEUTROPHILS NFR BLD AUTO: 79.2 %
NITRITE UR QL STRIP: NEGATIVE
PH UR STRIP: 8 [PH]
PLATELET # BLD AUTO: 372 X10(3)/MCL (ref 130–400)
PMV BLD AUTO: 8.6 FL (ref 7.4–10.4)
POTASSIUM SERPL-SCNC: 3 MMOL/L (ref 3.5–5.1)
PROT SERPL-MCNC: 7.5 GM/DL (ref 5.8–7.6)
PROT UR QL STRIP: ABNORMAL
RBC # BLD AUTO: 4.03 X10(6)/MCL (ref 4.2–5.4)
RBC #/AREA URNS AUTO: NORMAL /HPF
SODIUM SERPL-SCNC: 143 MMOL/L (ref 136–145)
SP GR UR STRIP.AUTO: 1.02 (ref 1–1.03)
SQUAMOUS #/AREA URNS AUTO: NORMAL /HPF
UROBILINOGEN UR STRIP-ACNC: 0.2
WBC # BLD AUTO: 10.84 X10(3)/MCL (ref 4.5–11.5)
WBC #/AREA URNS AUTO: NORMAL /HPF

## 2024-08-28 PROCEDURE — 63600175 PHARM REV CODE 636 W HCPCS

## 2024-08-28 PROCEDURE — 96374 THER/PROPH/DIAG INJ IV PUSH: CPT

## 2024-08-28 PROCEDURE — 25500020 PHARM REV CODE 255

## 2024-08-28 PROCEDURE — 96372 THER/PROPH/DIAG INJ SC/IM: CPT

## 2024-08-28 PROCEDURE — 99285 EMERGENCY DEPT VISIT HI MDM: CPT | Mod: 25

## 2024-08-28 PROCEDURE — 25000003 PHARM REV CODE 250

## 2024-08-28 PROCEDURE — 83690 ASSAY OF LIPASE: CPT

## 2024-08-28 PROCEDURE — 85025 COMPLETE CBC W/AUTO DIFF WBC: CPT

## 2024-08-28 PROCEDURE — 80053 COMPREHEN METABOLIC PANEL: CPT

## 2024-08-28 PROCEDURE — 81003 URINALYSIS AUTO W/O SCOPE: CPT

## 2024-08-28 RX ORDER — IOPAMIDOL 755 MG/ML
100 INJECTION, SOLUTION INTRAVASCULAR
Status: COMPLETED | OUTPATIENT
Start: 2024-08-28 | End: 2024-08-28

## 2024-08-28 RX ORDER — ONDANSETRON 4 MG/1
4 TABLET, ORALLY DISINTEGRATING ORAL EVERY 6 HOURS PRN
Qty: 30 TABLET | Refills: 0 | Status: SHIPPED | OUTPATIENT
Start: 2024-08-28

## 2024-08-28 RX ORDER — PROMETHAZINE HYDROCHLORIDE 25 MG/1
25 SUPPOSITORY RECTAL EVERY 6 HOURS PRN
Qty: 10 SUPPOSITORY | Refills: 0 | Status: SHIPPED | OUTPATIENT
Start: 2024-08-28

## 2024-08-28 RX ORDER — DICYCLOMINE HYDROCHLORIDE 20 MG/1
20 TABLET ORAL EVERY 6 HOURS
Qty: 40 TABLET | Refills: 0 | Status: SHIPPED | OUTPATIENT
Start: 2024-08-28 | End: 2024-09-07

## 2024-08-28 RX ORDER — DICYCLOMINE HYDROCHLORIDE 10 MG/ML
20 INJECTION INTRAMUSCULAR
Status: COMPLETED | OUTPATIENT
Start: 2024-08-28 | End: 2024-08-28

## 2024-08-28 RX ORDER — ONDANSETRON 4 MG/1
4 TABLET, ORALLY DISINTEGRATING ORAL
Status: COMPLETED | OUTPATIENT
Start: 2024-08-28 | End: 2024-08-28

## 2024-08-28 RX ORDER — ONDANSETRON HYDROCHLORIDE 2 MG/ML
4 INJECTION, SOLUTION INTRAVENOUS
Status: COMPLETED | OUTPATIENT
Start: 2024-08-28 | End: 2024-08-28

## 2024-08-28 RX ADMIN — DICYCLOMINE HYDROCHLORIDE 20 MG: 10 INJECTION, SOLUTION INTRAMUSCULAR at 10:08

## 2024-08-28 RX ADMIN — ONDANSETRON 4 MG: 2 INJECTION INTRAMUSCULAR; INTRAVENOUS at 10:08

## 2024-08-28 RX ADMIN — ONDANSETRON 4 MG: 4 TABLET, ORALLY DISINTEGRATING ORAL at 08:08

## 2024-08-28 RX ADMIN — IOPAMIDOL 100 ML: 755 INJECTION, SOLUTION INTRAVENOUS at 09:08

## 2024-08-28 RX ADMIN — POTASSIUM BICARBONATE 25 MEQ: 977.5 TABLET, EFFERVESCENT ORAL at 08:08

## 2024-08-29 NOTE — DISCHARGE INSTRUCTIONS
For abdominal pain, take dicyclomine three times daily.    For nausea/ vomiting, take Zofran every 6 hours as needed. If you are unable to tolerate oral intake, please use Phenergan suppositories.     Please take potassium bicarbonate once daily for next 7 days. Recommend follow up with primary care for repeat lab work.    Please make follow up with primary care for ER visit.

## 2024-08-29 NOTE — ED PROVIDER NOTES
Encounter Date: 8/28/2024       History     Chief Complaint   Patient presents with    Abdominal Pain     Patient brought in with c/o abdominal pain for past few days. Patient has diagnosed AMS so  answers most questions for patient. Patient is able to point to bilateral upper abdominal quadrant. Patient also states nausea.      See MDM for details     The history is provided by the patient.     Review of patient's allergies indicates:   Allergen Reactions    Sucralfate Nausea Only     Past Medical History:   Diagnosis Date    Anemia     Anxiety     Depression     Dyspareunia     Encounter for blood transfusion     HLD (hyperlipidemia)     HTN (hypertension)     Hypothyroidism, unspecified     Lumbar radiculopathy     Menopause     Migraine     Osteoporosis     Spinal stenosis, lumbar region with neurogenic claudication     Unspecified osteoarthritis, unspecified site     Urinary incontinence      Past Surgical History:   Procedure Laterality Date    ABDOMINAL SURGERY      Gastric Bypass    CERVICAL FUSION  12/28/2021    CHOLECYSTECTOMY  2015    COLPOSCOPY      FRACTURE SURGERY  2001    GASTRIC BYPASS  2011    HERNIA REPAIR  2014    JOINT REPLACEMENT  Knee 2015, Hip 2016    LUMBAR FUSION  05/28/2021    LUMBAR FUSION  02/21/2023    L3-S1    ROTATOR CUFF REPAIR  04/12/2022    SMALL INTESTINE SURGERY  2015    SPINE SURGERY  2021 & 2023    TONSILLECTOMY  2017     Family History   Problem Relation Name Age of Onset    Cystic fibrosis Sister Florence Kennedy     Arthritis Sister Florence Kennedy         Ankle,  hands    Depression Sister Florence Kennedy     Hypertension Sister Florence Kennedy         Me, Kasandraclaus Cheek    Vision loss Sister Florence Kennedy     Cystic fibrosis Brother Adriano Kennedy     Early death Brother Adriano Kennedy         CF when 28    Cancer Mother Latoya Kennedy         Breast    Depression Mother Latoya Kennedy     Mental illness Mother Latoya Kennedy     Arthritis Father Eduard Kennedy         Back,  knees,  hands    Cancer Father Eduard Kennedy         Lung    Diabetes Father Eduard Kennedy     Hypertension Father Eduard Kennedy     Vision loss Father Eduard Kennedy     Early death Sister Sabina Kennedy         CF when 5    Heart failure Maternal Grandmother Letty Willett         Heart Attack    Cancer Maternal Aunt Gema Smith         Breast Cancer    Cancer Maternal Aunt Aparna Manrique         Lung Cancer    Cancer Maternal Aunt Jeanie Stacy         Breast Cancer    Cancer Paternal Aunt Hilary Matthews         Skin Cancer    Cancer Paternal Aunt Enma Alford         Skin Cancer     Social History     Tobacco Use    Smoking status: Former     Current packs/day: 0.00     Average packs/day: 1 pack/day for 27.0 years (27.0 ttl pk-yrs)     Types: Cigarettes     Start date: 1975     Quit date: 2002     Years since quittin.6    Smokeless tobacco: Never    Tobacco comments:     off and on when smoking   Substance Use Topics    Alcohol use: Not Currently    Drug use: Never     Review of Systems   Constitutional:  Negative for chills and fever.   Respiratory:  Negative for shortness of breath.    Cardiovascular:  Negative for chest pain.   Gastrointestinal:  Positive for abdominal pain and nausea. Negative for diarrhea and vomiting.   Genitourinary:  Negative for dysuria.   Psychiatric/Behavioral:  Positive for confusion.    All other systems reviewed and are negative.      Physical Exam     Initial Vitals [24 193]   BP Pulse Resp Temp SpO2   134/66 97 18 98.4 °F (36.9 °C) 97 %      MAP       --         Physical Exam    Nursing note and vitals reviewed.  Constitutional: She appears well-developed. No distress.   Obese   HENT:   Head: Normocephalic and atraumatic.   Eyes: Conjunctivae and EOM are normal.   Neck:   Normal range of motion.  Cardiovascular:  Normal rate and regular rhythm.           Pulmonary/Chest: No respiratory distress.   Abdominal: Abdomen is soft. There is abdominal tenderness.    Diffusely TTP in all quadrants There is no rebound and no guarding.   Musculoskeletal:         General: Normal range of motion.      Cervical back: Normal range of motion.     Neurological: She is alert and oriented to person, place, and time. GCS score is 15. GCS eye subscore is 4. GCS verbal subscore is 5. GCS motor subscore is 6.   Skin: Skin is warm and dry.   Psychiatric: She has a normal mood and affect. Thought content normal.   Patient appropriate to questions during my assessment. Per , she has been intermittently confused at home.         ED Course   Procedures  Labs Reviewed   COMPREHENSIVE METABOLIC PANEL - Abnormal       Result Value    Sodium 143      Potassium 3.0 (*)     Chloride 108 (*)     CO2 22 (*)     Glucose 136 (*)     Blood Urea Nitrogen 9.0 (*)     Creatinine 0.72      Calcium 9.6      Protein Total 7.5      Albumin 4.0      Globulin 3.5      Albumin/Globulin Ratio 1.1      Bilirubin Total 0.6            ALT 22      AST 30      eGFR >60      Anion Gap 13.0      BUN/Creatinine Ratio 13     URINALYSIS, REFLEX TO URINE CULTURE - Abnormal    Color, UA Yellow      Appearance, UA Clear      Specific Gravity, UA 1.020      pH, UA 8.0      Protein, UA 1+ (*)     Glucose, UA Negative      Ketones, UA 2+ (*)     Blood, UA Trace-Intact (*)     Bilirubin, UA Negative      Urobilinogen, UA 0.2      Nitrites, UA Negative      Leukocyte Esterase, UA Negative     CBC WITH DIFFERENTIAL - Abnormal    WBC 10.84      RBC 4.03 (*)     Hgb 12.7      Hct 36.9 (*)     MCV 91.6      MCH 31.5 (*)     MCHC 34.4      RDW 13.5      Platelet 372      MPV 8.6      Neut % 79.2      Lymph % 13.3      Mono % 6.3      Eos % 0.1      Basophil % 0.5      Lymph # 1.44      Neut # 8.59      Mono # 0.68      Eos # 0.01      Baso # 0.05      IG# 0.07 (*)     IG% 0.6     LIPASE - Normal    Lipase Level 16     URINALYSIS, MICROSCOPIC - Normal    Bacteria, UA None Seen      RBC, UA None Seen      WBC, UA None Seen       Squamous Epithelial Cells, UA None Seen     CBC W/ AUTO DIFFERENTIAL    Narrative:     The following orders were created for panel order CBC Auto Differential.  Procedure                               Abnormality         Status                     ---------                               -----------         ------                     CBC with Differential[[id number]]       Abnormal            Final result                 Please view results for these tests on the individual orders.          Imaging Results              CT Abdomen Pelvis With IV Contrast NO Oral Contrast (Preliminary result)  Result time 08/28/24 22:22:15      Preliminary result by Oliver Wood Jr., MD (08/28/24 22:22:15)                   Narrative:    START OF REPORT:  Technique: CT of the abdomen and pelvis was performed with axial images as well as sagittal and coronal reconstruction images with intravenous contrast but without oral contrast.    Comparison: None available.    Clinical History: Abdominal pain acute nonlocalized.    Dosage Information: Automated Exposure Control was utilized.    Findings:  Lines and Tubes: None.  Thorax:  Lungs: Mild opacity is present at the visualized lung bases, consistent with. No focal infiltrate or consolidation is seen.  Pleura: No effusions or thickening are seen. No pneumothorax is seen in the visualized lung bases.  Heart: The heart size is within normal limits.  Abdomen:  Abdominal Wall: No abdominal wall pathology is seen.  Liver: The liver appears unremarkable.  Biliary System: No intrahepatic or extrahepatic biliary duct dilatation is seen.  Gallbladder: Surgical clips are seen in the gallbladder fossa consistent with prior cholecystectomy.  Pancreas: Mild pancreatic atrophy is seen.  Spleen: The spleen appears unremarkable.  Adrenals: The adrenal glands appear unremarkable.  Kidneys: The kidneys appear unremarkable with no stones cysts masses or hydronephrosis.  Aorta: The abdominal aorta  appears unremarkable.  IVC: Unremarkable.  Bowel:  Esophagus: The visualized distal esophagus appears unremarkable.  Stomach: Note is made of surgical sutures in the proximal stomach. Correlate with surgical history.  Duodenum: Unremarkable appearing duodenum.  Small Bowel: The small bowel appears unremarkable.  Colon: A few diverticula are seen predominantly in the sigmoid colon. No associated inflammatory stranding or pericolonic fluid is seen to suggest diverticulitis.  Appendix: The appendix appears unremarkable and is partially seen on Image 55, Series 4.  Peritoneum: No intraperitoneal free air or ascites is seen.    Pelvis: The pelvic organs could not be clearly visualized due to streak artefacts from the right hip orthopedic implant.  Bladder: The bladder is nondistended and cannot be definitively evaluated.  Female: The uterus and ovaries could not be evaluated due to streak artefacts.    Bony structures:  Dorsal Spine: There is pronounced multilevel spondylosis of the visualized dorsal spine. Note is made of orthopedic hardware in the lower lumbar vertebrae. There is pronounced endplate irregularities of the T12 vertebra.  Bony Pelvis: Note is made of orthopedic implants in the right hip.      Impression:  1. A few diverticula are seen predominantly in the sigmoid colon. No associated inflammatory stranding or pericolonic fluid is seen to suggest diverticulitis.  2. No acute intraabdominal or pelvic solid organ or bowel pathology otherwise identified. Details and other findings as discussed above.                                         Medications   dicyclomine injection 20 mg (has no administration in time range)   ondansetron disintegrating tablet 4 mg (4 mg Oral Given 8/28/24 2011)   potassium bicarbonate disintegrating tablet 25 mEq (25 mEq Oral Given 8/28/24 2049)   iopamidoL (ISOVUE-370) injection 100 mL (100 mLs Intravenous Given 8/28/24 2132)   ondansetron injection 4 mg (4 mg Intravenous Given  8/28/24 1038)     Medical Decision Making  64 year old female presents to the ER for evaluation of diffuse abdominal pain for the last 2 weeks. Per  the patient has been complaining of a vague abdominal pain for the last few weeks which has worsened over the last 2-3 days. He also reports that over the last year or so the patient has been suffering with episodic altered mental statues. He shares that she has been seen for this in the past multiple times and they are unsure as to what causes this. He reports that his last episode began 3 days ago. During these episodes the patient is intermittently confused and does not always respond appropriately. Per , this episode is consistent with previous episodes in the past. On exam the patient is answering questions regarding her abdominal pain appropriately, however, details are limited. The patient localizes her pain to the diffuse abdominal. She is unable to describe the pain. She reports regular bowel movement. She reports intermittent nausea with dry heaving. The patient reports decreased oral intake 2/2 her abdominal discomfort.    Work up today grossly unremarkable. Hypokalemia is present, she did tolerate oral potassium. Zofran was given in ER for nausea. UA negative for any acute process. On physical exam the patient has no red flag symptoms for acute abdomen. Given the patient's AMS and vague exam, CT imaging of abdomen was ordered which was non-diagnostic. Discussed negative work up with the patient and her spouse and they verbalized understanding. At this time we will symptomatically treat the patient's symptoms. Will discharge home with zofran ODT, phenergan suppositories, potassium bicarb tablets, and bentyl. I do not believe that the patient's AMS requires work up today as this is an ongoing problem which has been evaluated in the past and is unchanged at this time. Recommend PCP follow up. Return to ER precautions reviewed.     Amount and/or  Complexity of Data Reviewed  Labs: ordered. Decision-making details documented in ED Course.  Radiology: ordered. Decision-making details documented in ED Course.    Risk  Prescription drug management.      Additional MDM:   Differential Diagnosis:   Other: The following diagnoses were also considered and will be evaluated: UTI, cholecystitis and gastroenteritis.            ED Course as of 08/28/24 2236   Wed Aug 28, 2024   2017 WBC: 10.84 [LM]   2017 CBC grossly unremarkable [LM]   2044 Potassium(!): 3.0 [LM]   2044 Lipase: 16 [LM]   2134 UA negative for UTI [LM]   2138 Bacteria, UA: None Seen [LM]   2156 Dispo pending CT [LM]   2226 CT Abd/ Pelvis: Impression:  1. A few diverticula are seen predominantly in the sigmoid colon. No associated inflammatory stranding or pericolonic fluid is seen to suggest diverticulitis.  2. No acute intraabdominal or pelvic solid organ or bowel pathology otherwise identified. Details and other findings as discussed above.   [LM]      ED Course User Index  [LM] Mickie Munoz PA                           Clinical Impression:  Final diagnoses:  [E87.6] Hypokalemia  [R10.9] Abdominal pain, unspecified abdominal location (Primary)          ED Disposition Condition    Discharge Stable          ED Prescriptions       Medication Sig Dispense Start Date End Date Auth. Provider    ondansetron (ZOFRAN-ODT) 4 MG TbDL Take 1 tablet (4 mg total) by mouth every 6 (six) hours as needed (nausea). 30 tablet 8/28/2024 -- Mickie Munoz PA    promethazine (PHENERGAN) 25 MG suppository Place 1 suppository (25 mg total) rectally every 6 (six) hours as needed for Nausea. 10 suppository 8/28/2024 -- Mickie Munoz PA    potassium bicarbonate (K-LYTE) disintegrating tablet Take 1 tablet (25 mEq total) by mouth once daily. for 7 days 7 tablet 8/28/2024 9/4/2024 Mickie Munoz PA    dicyclomine (BENTYL) 20 mg tablet Take 1 tablet (20 mg total) by mouth every 6 (six) hours. for 10 days 40 tablet 8/28/2024  9/7/2024 Mickie Munoz PA          Follow-up Information       Follow up With Specialties Details Why Contact Info    Ravinder Mazariegos,  Family Medicine Schedule an appointment as soon as possible for a visit  for ER follow up [address] [facility]  [facility] [id number]  [phone]               Mickie Munoz PA  08/28/24 9740

## 2024-09-10 DIAGNOSIS — G93.89 ENCEPHALOMALACIA ON IMAGING STUDY: ICD-10-CM

## 2024-09-10 DIAGNOSIS — G93.40 ENCEPHALOPATHY: Primary | ICD-10-CM

## 2024-09-26 ENCOUNTER — ANESTHESIA EVENT (OUTPATIENT)
Dept: SURGERY | Facility: HOSPITAL | Age: 64
End: 2024-09-26
Payer: MEDICARE

## 2024-10-01 RX ORDER — FUROSEMIDE 20 MG/1
20 TABLET ORAL 2 TIMES DAILY
COMMUNITY

## 2024-10-02 DIAGNOSIS — R42 DIZZINESS AND GIDDINESS: Primary | ICD-10-CM

## 2024-10-04 NOTE — PROGRESS NOTES
Pre-op labs fax to Dr. Sundar Mazariegos (PCP) to review GFR. GFR dropped from >60 to 30 in the last 5 weeks. Message left with call service for Dr. Mazariegos office to review on Monday 10/7/24

## 2024-10-04 NOTE — CLINICAL REVIEW
labs/EKG reviewed. GFR trend noted. Dr Mazariegos notified. cardiology notes utd. Echo/PFTs noted. CRA provided. chart review complete. ccv

## 2024-10-07 NOTE — PROGRESS NOTES
Left message with Mai at Dr Mazariegos's office to return call regarding GFR dropped from >60 to 30 in the last 5 weeks. Mai sending message to nurse to have her return call. Awaiting call back.    0819 Left message with Christian at Dr Mejia's for nurse to call back regarding GFR results, decreased >60 to 30 in the last 5 weeks. Christian states will have someone call back. Awaiting call back.     0844 TERI Moore from Dr Mazariegos's office returned call. Office received lab results from 10/3/24. Labs placed of Dr Mazariegos's desk for review. Will call back once Dr Mazariegos reviews.    0569 Char,  called back since Melissa is out of office. Will notify Dr Mejia of recent results regarding GFR, reported 8/28/24 >60, 9/6/24 GFR 48 and 10/3/24 GFR 30. Will call back once speaks c Dr Mejia. Aware that Dr Mazariegos has also been notified.    10/9/24 Melissa with Dr Mejia's office stating that patients PCP Dr Mazariegos gave instructions to patient to hydrate and ordered for repeat labs to be completed today. Once Melissa has received results she will fax    10/10/24 0836 repeat labs received and placed in EMR.

## 2024-10-10 NOTE — PRE-PROCEDURE INSTRUCTIONS
"[facility]: Outpatient Surgery  Preprocedure Check-In Instructions     Your arrival time for your surgery or procedure is __0600____.  We ask patients to arrive about 2 hours before surgery to allow for enough time to review your health history & medications, start your IV, complete any outstanding labwork or tests, and meet your Anesthesiologist.    Expectations: "Because of inconsistent procedure completion times, an unexpected wait may occur. The Physicians would like you to be here to prepare in the event they run ahead of time. We will make you as comfortable as possible and keep you informed. We apologize in advance if this happens."    You will arrive at [facility], [address], [place].  Enter through the [place] entrance next to the Emergency Room, and come to the 6th floor to the Outpatient Surgery Department.     Visitory Policy:  You are allowed 2 adult visitors to be with you in the hospital. All hospital visitors should be in good current health.  No small children.     What to Bring:  Please have your ID, insurance cards, and all home medication bottles with you at check in.  Bring your CPAP machine if one is used at home.     Fasting:  Nothing to eat or drink after midnight the night before your procedure. This includes no ice, gum, hard candies, and/or tobacco products.  Follow your doctor's instructions for taking any medications on the morning of your procedure.  If no instructions for taking medications were given, do not take any medications but bring your medications in their bottles to your procedure check in.     Follow your doctor's preoperative instructions regarding skin prep, bowel prep, bathing, or showering prior to your procedure.  If any special soaps were provided to you, please use according to your doctor's instructions. If no instructions were given from your doctor, take a good bath or shower with antibacterial soap the night " before and the morning of your procedure.  On the morning of procedure, wear loose, comfortable clothing.  No lotions, makeup, perfumes, colognes, deodorant, or jewelry to your procedure.  Removable items (glasses, contact lenses, dentures, retainers, hearing aids) need to be removed for your procedure.  Bring your storage containers for these items if you wear them.     Artificial nails, body jewelry, eyelash extensions, and/or hair extensions with metal clips are not allowed during your surgery.  If you currently wear any of these items, please arrange for them to be removed prior to your arrival to the hospital.     Outpatient or Same Day Surgeries:  Any patients receiving sedation/anesthesia are advised not to drive for 24 hours after their procedure.  We do not allow patients to drive themselves home after discharge.  If you are going home after your procedure, please have someone available to drive you home from the hospital.        You may call the Outpatient Surgery Department at [phone] with any questions or concerns.  We are looking forward to meeting you and taking great care of you for your procedure.  Thank you for choosing [facility] for your surgical needs.

## 2024-10-11 ENCOUNTER — ANESTHESIA (OUTPATIENT)
Dept: SURGERY | Facility: HOSPITAL | Age: 64
End: 2024-10-11
Payer: MEDICARE

## 2024-10-11 ENCOUNTER — HOSPITAL ENCOUNTER (INPATIENT)
Facility: HOSPITAL | Age: 64
LOS: 5 days | Discharge: HOME HEALTH | DRG: 457 | End: 2024-10-16
Attending: NEUROLOGICAL SURGERY | Admitting: NEUROLOGICAL SURGERY
Payer: MEDICARE

## 2024-10-11 DIAGNOSIS — M47.26 OTHER SPONDYLOSIS WITH RADICULOPATHY, LUMBAR REGION: ICD-10-CM

## 2024-10-11 DIAGNOSIS — R07.9 CHEST PAIN: ICD-10-CM

## 2024-10-11 LAB
ABORH RETYPE: NORMAL
ALBUMIN SERPL-MCNC: 3.2 G/DL (ref 3.4–4.8)
ALBUMIN/GLOB SERPL: 1.7 RATIO (ref 1.1–2)
ALP SERPL-CCNC: 84 UNIT/L (ref 40–150)
ALT SERPL-CCNC: 11 UNIT/L (ref 0–55)
ANION GAP SERPL CALC-SCNC: 8 MEQ/L
AST SERPL-CCNC: 23 UNIT/L (ref 5–34)
BASOPHILS # BLD AUTO: 0.05 X10(3)/MCL
BASOPHILS NFR BLD AUTO: 1.2 %
BILIRUB SERPL-MCNC: 0.3 MG/DL
BUN SERPL-MCNC: 10.2 MG/DL (ref 9.8–20.1)
CALCIUM SERPL-MCNC: 7.9 MG/DL (ref 8.4–10.2)
CHLORIDE SERPL-SCNC: 111 MMOL/L (ref 98–107)
CO2 SERPL-SCNC: 21 MMOL/L (ref 23–31)
CREAT SERPL-MCNC: 0.81 MG/DL (ref 0.55–1.02)
CREAT/UREA NIT SERPL: 13
EOSINOPHIL # BLD AUTO: 0.15 X10(3)/MCL (ref 0–0.9)
EOSINOPHIL NFR BLD AUTO: 3.7 %
ERYTHROCYTE [DISTWIDTH] IN BLOOD BY AUTOMATED COUNT: 13 % (ref 11.5–17)
GFR SERPLBLD CREATININE-BSD FMLA CKD-EPI: >60 ML/MIN/1.73/M2
GLOBULIN SER-MCNC: 1.9 GM/DL (ref 2.4–3.5)
GLUCOSE SERPL-MCNC: 107 MG/DL (ref 70–110)
GLUCOSE SERPL-MCNC: 153 MG/DL (ref 82–115)
GROUP & RH: NORMAL
HCO3 UR-SCNC: 22.5 MMOL/L (ref 24–28)
HCT VFR BLD AUTO: 29.8 % (ref 37–47)
HCT VFR BLD AUTO: 33.5 % (ref 37–47)
HCT VFR BLD CALC: 26 %PCV (ref 36–54)
HGB BLD-MCNC: 10.1 G/DL (ref 12–16)
HGB BLD-MCNC: 11.2 G/DL (ref 12–16)
HGB BLD-MCNC: 9 G/DL
IMM GRANULOCYTES # BLD AUTO: 0.02 X10(3)/MCL (ref 0–0.04)
IMM GRANULOCYTES NFR BLD AUTO: 0.5 %
INDIRECT COOMBS: NORMAL
LYMPHOCYTES # BLD AUTO: 1.09 X10(3)/MCL (ref 0.6–4.6)
LYMPHOCYTES NFR BLD AUTO: 26.7 %
MCH RBC QN AUTO: 31.7 PG (ref 27–31)
MCHC RBC AUTO-ENTMCNC: 33.4 G/DL (ref 33–36)
MCV RBC AUTO: 94.9 FL (ref 80–94)
MONOCYTES # BLD AUTO: 0.41 X10(3)/MCL (ref 0.1–1.3)
MONOCYTES NFR BLD AUTO: 10 %
NEUTROPHILS # BLD AUTO: 2.36 X10(3)/MCL (ref 2.1–9.2)
NEUTROPHILS NFR BLD AUTO: 57.9 %
NRBC BLD AUTO-RTO: 0 %
PCO2 BLDA: 32.2 MMHG (ref 35–45)
PH SMN: 7.45 [PH] (ref 7.35–7.45)
PLATELET # BLD AUTO: 218 X10(3)/MCL (ref 130–400)
PMV BLD AUTO: 8.9 FL (ref 7.4–10.4)
PO2 BLDA: 499 MMHG (ref 80–100)
POC BE: -1 MMOL/L
POC IONIZED CALCIUM: 1.1 MMOL/L (ref 1.06–1.42)
POC SATURATED O2: 100 % (ref 95–100)
POC TCO2: 23 MMOL/L (ref 23–27)
POTASSIUM BLD-SCNC: 4 MMOL/L (ref 3.5–5.1)
POTASSIUM SERPL-SCNC: 4.5 MMOL/L (ref 3.5–5.1)
PROT SERPL-MCNC: 5.1 GM/DL (ref 5.8–7.6)
RBC # BLD AUTO: 3.53 X10(6)/MCL (ref 4.2–5.4)
SAMPLE: ABNORMAL
SODIUM BLD-SCNC: 141 MMOL/L (ref 136–145)
SODIUM SERPL-SCNC: 140 MMOL/L (ref 136–145)
SPECIMEN OUTDATE: NORMAL
WBC # BLD AUTO: 4.08 X10(3)/MCL (ref 4.5–11.5)

## 2024-10-11 PROCEDURE — 25000003 PHARM REV CODE 250

## 2024-10-11 PROCEDURE — 63600175 PHARM REV CODE 636 W HCPCS

## 2024-10-11 PROCEDURE — 86901 BLOOD TYPING SEROLOGIC RH(D): CPT | Performed by: NEUROLOGICAL SURGERY

## 2024-10-11 PROCEDURE — 11000001 HC ACUTE MED/SURG PRIVATE ROOM

## 2024-10-11 PROCEDURE — 0RG7071 FUSION OF 2 TO 7 THORACIC VERTEBRAL JOINTS WITH AUTOLOGOUS TISSUE SUBSTITUTE, POSTERIOR APPROACH, POSTERIOR COLUMN, OPEN APPROACH: ICD-10-PCS | Performed by: NEUROLOGICAL SURGERY

## 2024-10-11 PROCEDURE — XRGE058 FUSION OF RIGHT SACROILIAC JOINT USING INTERNAL FIXATION DEVICE WITH TULIP CONNECTOR, OPEN APPROACH, NEW TECHNOLOGY GROUP 8: ICD-10-PCS | Performed by: NEUROLOGICAL SURGERY

## 2024-10-11 PROCEDURE — 63600175 PHARM REV CODE 636 W HCPCS: Performed by: ANESTHESIOLOGY

## 2024-10-11 PROCEDURE — 63600175 PHARM REV CODE 636 W HCPCS: Performed by: NEUROLOGICAL SURGERY

## 2024-10-11 PROCEDURE — 36000713 HC OR TIME LEV V EA ADD 15 MIN: Performed by: NEUROLOGICAL SURGERY

## 2024-10-11 PROCEDURE — 80053 COMPREHEN METABOLIC PANEL: CPT | Performed by: INTERNAL MEDICINE

## 2024-10-11 PROCEDURE — 27201423 OPTIME MED/SURG SUP & DEVICES STERILE SUPPLY: Performed by: NEUROLOGICAL SURGERY

## 2024-10-11 PROCEDURE — 0RGA071 FUSION OF THORACOLUMBAR VERTEBRAL JOINT WITH AUTOLOGOUS TISSUE SUBSTITUTE, POSTERIOR APPROACH, POSTERIOR COLUMN, OPEN APPROACH: ICD-10-PCS | Performed by: NEUROLOGICAL SURGERY

## 2024-10-11 PROCEDURE — 36415 COLL VENOUS BLD VENIPUNCTURE: CPT

## 2024-10-11 PROCEDURE — 01NB0ZZ RELEASE LUMBAR NERVE, OPEN APPROACH: ICD-10-PCS | Performed by: NEUROLOGICAL SURGERY

## 2024-10-11 PROCEDURE — 37000009 HC ANESTHESIA EA ADD 15 MINS: Performed by: NEUROLOGICAL SURGERY

## 2024-10-11 PROCEDURE — C2617 STENT, NON-COR, TEM W/O DEL: HCPCS | Performed by: NEUROLOGICAL SURGERY

## 2024-10-11 PROCEDURE — 01N80ZZ RELEASE THORACIC NERVE, OPEN APPROACH: ICD-10-PCS | Performed by: NEUROLOGICAL SURGERY

## 2024-10-11 PROCEDURE — 0QP104Z REMOVAL OF INTERNAL FIXATION DEVICE FROM SACRUM, OPEN APPROACH: ICD-10-PCS | Performed by: NEUROLOGICAL SURGERY

## 2024-10-11 PROCEDURE — 85025 COMPLETE CBC W/AUTO DIFF WBC: CPT | Performed by: NEUROLOGICAL SURGERY

## 2024-10-11 PROCEDURE — 25000003 PHARM REV CODE 250: Performed by: NURSE ANESTHETIST, CERTIFIED REGISTERED

## 2024-10-11 PROCEDURE — 86923 COMPATIBILITY TEST ELECTRIC: CPT | Mod: 91 | Performed by: ANESTHESIOLOGY

## 2024-10-11 PROCEDURE — 37000008 HC ANESTHESIA 1ST 15 MINUTES: Performed by: NEUROLOGICAL SURGERY

## 2024-10-11 PROCEDURE — 36000712 HC OR TIME LEV V 1ST 15 MIN: Performed by: NEUROLOGICAL SURGERY

## 2024-10-11 PROCEDURE — 36415 COLL VENOUS BLD VENIPUNCTURE: CPT | Performed by: INTERNAL MEDICINE

## 2024-10-11 PROCEDURE — 30233N1 TRANSFUSION OF NONAUTOLOGOUS RED BLOOD CELLS INTO PERIPHERAL VEIN, PERCUTANEOUS APPROACH: ICD-10-PCS | Performed by: NEUROLOGICAL SURGERY

## 2024-10-11 PROCEDURE — 85018 HEMOGLOBIN: CPT

## 2024-10-11 PROCEDURE — 36415 COLL VENOUS BLD VENIPUNCTURE: CPT | Performed by: NEUROLOGICAL SURGERY

## 2024-10-11 PROCEDURE — 63600175 PHARM REV CODE 636 W HCPCS: Performed by: NURSE ANESTHETIST, CERTIFIED REGISTERED

## 2024-10-11 PROCEDURE — 00QT0ZZ REPAIR SPINAL MENINGES, OPEN APPROACH: ICD-10-PCS | Performed by: NEUROLOGICAL SURGERY

## 2024-10-11 PROCEDURE — 25000003 PHARM REV CODE 250: Performed by: NEUROLOGICAL SURGERY

## 2024-10-11 PROCEDURE — C1713 ANCHOR/SCREW BN/BN,TIS/BN: HCPCS | Performed by: NEUROLOGICAL SURGERY

## 2024-10-11 PROCEDURE — 71000039 HC RECOVERY, EACH ADD'L HOUR: Performed by: NEUROLOGICAL SURGERY

## 2024-10-11 PROCEDURE — 0SG1071 FUSION OF 2 OR MORE LUMBAR VERTEBRAL JOINTS WITH AUTOLOGOUS TISSUE SUBSTITUTE, POSTERIOR APPROACH, POSTERIOR COLUMN, OPEN APPROACH: ICD-10-PCS | Performed by: NEUROLOGICAL SURGERY

## 2024-10-11 PROCEDURE — P9047 ALBUMIN (HUMAN), 25%, 50ML: HCPCS | Mod: JZ,JG | Performed by: NURSE ANESTHETIST, CERTIFIED REGISTERED

## 2024-10-11 PROCEDURE — 71000033 HC RECOVERY, INTIAL HOUR: Performed by: NEUROLOGICAL SURGERY

## 2024-10-11 PROCEDURE — 86850 RBC ANTIBODY SCREEN: CPT | Performed by: NEUROLOGICAL SURGERY

## 2024-10-11 DEVICE — IMPLANTABLE DEVICE: Type: IMPLANTABLE DEVICE | Site: SPINE LUMBAR | Status: FUNCTIONAL

## 2024-10-11 DEVICE — FILLER BONE SYN 1CC PARTIC: Type: IMPLANTABLE DEVICE | Site: SPINE LUMBAR | Status: FUNCTIONAL

## 2024-10-11 DEVICE — SCREW BONE SPINAL 45X6.5MM: Type: IMPLANTABLE DEVICE | Site: SPINE LUMBAR | Status: FUNCTIONAL

## 2024-10-11 RX ORDER — LEVOTHYROXINE SODIUM 100 UG/1
200 TABLET ORAL DAILY
Status: DISCONTINUED | OUTPATIENT
Start: 2024-10-12 | End: 2024-10-16 | Stop reason: HOSPADM

## 2024-10-11 RX ORDER — HYDROMORPHONE HYDROCHLORIDE 2 MG/ML
2 INJECTION, SOLUTION INTRAMUSCULAR; INTRAVENOUS; SUBCUTANEOUS
Status: DISCONTINUED | OUTPATIENT
Start: 2024-10-11 | End: 2024-10-16

## 2024-10-11 RX ORDER — MIDAZOLAM HYDROCHLORIDE 1 MG/ML
INJECTION INTRAMUSCULAR; INTRAVENOUS
Status: DISCONTINUED | OUTPATIENT
Start: 2024-10-11 | End: 2024-10-11

## 2024-10-11 RX ORDER — GLUCAGON 1 MG
1 KIT INJECTION
Status: DISCONTINUED | OUTPATIENT
Start: 2024-10-11 | End: 2024-10-16 | Stop reason: HOSPADM

## 2024-10-11 RX ORDER — NALOXONE HCL 0.4 MG/ML
0.02 VIAL (ML) INJECTION
Status: DISCONTINUED | OUTPATIENT
Start: 2024-10-11 | End: 2024-10-16 | Stop reason: HOSPADM

## 2024-10-11 RX ORDER — GABAPENTIN 300 MG/1
600 CAPSULE ORAL 4 TIMES DAILY
Status: DISCONTINUED | OUTPATIENT
Start: 2024-10-11 | End: 2024-10-16 | Stop reason: HOSPADM

## 2024-10-11 RX ORDER — CALCIUM CARBONATE 200(500)MG
500 TABLET,CHEWABLE ORAL DAILY PRN
Status: DISCONTINUED | OUTPATIENT
Start: 2024-10-11 | End: 2024-10-16 | Stop reason: HOSPADM

## 2024-10-11 RX ORDER — FENTANYL CITRATE 50 UG/ML
INJECTION, SOLUTION INTRAMUSCULAR; INTRAVENOUS
Status: DISCONTINUED | OUTPATIENT
Start: 2024-10-11 | End: 2024-10-11

## 2024-10-11 RX ORDER — HYDROCODONE BITARTRATE AND ACETAMINOPHEN 10; 325 MG/1; MG/1
1 TABLET ORAL EVERY 4 HOURS PRN
Status: DISCONTINUED | OUTPATIENT
Start: 2024-10-11 | End: 2024-10-12

## 2024-10-11 RX ORDER — MUPIROCIN 20 MG/G
OINTMENT TOPICAL 2 TIMES DAILY
Status: DISPENSED | OUTPATIENT
Start: 2024-10-11 | End: 2024-10-13

## 2024-10-11 RX ORDER — LANOLIN ALCOHOL/MO/W.PET/CERES
400 CREAM (GRAM) TOPICAL NIGHTLY
Status: DISCONTINUED | OUTPATIENT
Start: 2024-10-11 | End: 2024-10-16 | Stop reason: HOSPADM

## 2024-10-11 RX ORDER — MORPHINE SULFATE 4 MG/ML
4 INJECTION, SOLUTION INTRAMUSCULAR; INTRAVENOUS
Status: DISCONTINUED | OUTPATIENT
Start: 2024-10-11 | End: 2024-10-13

## 2024-10-11 RX ORDER — METHOCARBAMOL 750 MG/1
750 TABLET, FILM COATED ORAL 3 TIMES DAILY PRN
Status: DISCONTINUED | OUTPATIENT
Start: 2024-10-11 | End: 2024-10-12

## 2024-10-11 RX ORDER — QUETIAPINE FUMARATE 300 MG/1
300 TABLET, FILM COATED ORAL NIGHTLY
Status: DISCONTINUED | OUTPATIENT
Start: 2024-10-11 | End: 2024-10-16 | Stop reason: HOSPADM

## 2024-10-11 RX ORDER — LIDOCAINE HYDROCHLORIDE 20 MG/ML
INJECTION INTRAVENOUS
Status: DISCONTINUED | OUTPATIENT
Start: 2024-10-11 | End: 2024-10-11

## 2024-10-11 RX ORDER — IBUPROFEN 200 MG
24 TABLET ORAL
Status: DISCONTINUED | OUTPATIENT
Start: 2024-10-11 | End: 2024-10-16 | Stop reason: HOSPADM

## 2024-10-11 RX ORDER — LEVOTHYROXINE SODIUM 200 UG/1
200 TABLET ORAL DAILY
Status: DISCONTINUED | OUTPATIENT
Start: 2024-10-12 | End: 2024-10-11

## 2024-10-11 RX ORDER — HYDROCODONE BITARTRATE AND ACETAMINOPHEN 5; 325 MG/1; MG/1
1 TABLET ORAL EVERY 4 HOURS PRN
Status: DISCONTINUED | OUTPATIENT
Start: 2024-10-11 | End: 2024-10-12

## 2024-10-11 RX ORDER — HYDROMORPHONE HYDROCHLORIDE 2 MG/ML
0.5 INJECTION, SOLUTION INTRAMUSCULAR; INTRAVENOUS; SUBCUTANEOUS EVERY 5 MIN PRN
Status: DISCONTINUED | OUTPATIENT
Start: 2024-10-11 | End: 2024-10-11 | Stop reason: HOSPADM

## 2024-10-11 RX ORDER — PROCHLORPERAZINE EDISYLATE 5 MG/ML
10 INJECTION INTRAMUSCULAR; INTRAVENOUS EVERY 6 HOURS PRN
Status: DISCONTINUED | OUTPATIENT
Start: 2024-10-11 | End: 2024-10-16 | Stop reason: HOSPADM

## 2024-10-11 RX ORDER — GLUCAGON 1 MG
1 KIT INJECTION
Status: DISCONTINUED | OUTPATIENT
Start: 2024-10-11 | End: 2024-10-11 | Stop reason: HOSPADM

## 2024-10-11 RX ORDER — DULOXETIN HYDROCHLORIDE 30 MG/1
60 CAPSULE, DELAYED RELEASE ORAL 2 TIMES DAILY
Status: DISCONTINUED | OUTPATIENT
Start: 2024-10-11 | End: 2024-10-16 | Stop reason: HOSPADM

## 2024-10-11 RX ORDER — IBUPROFEN 200 MG
16 TABLET ORAL
Status: DISCONTINUED | OUTPATIENT
Start: 2024-10-11 | End: 2024-10-16 | Stop reason: HOSPADM

## 2024-10-11 RX ORDER — METHOCARBAMOL 100 MG/ML
1000 INJECTION, SOLUTION INTRAMUSCULAR; INTRAVENOUS ONCE
Status: COMPLETED | OUTPATIENT
Start: 2024-10-11 | End: 2024-10-11

## 2024-10-11 RX ORDER — PROPOFOL 10 MG/ML
VIAL (ML) INTRAVENOUS
Status: DISCONTINUED | OUTPATIENT
Start: 2024-10-11 | End: 2024-10-11

## 2024-10-11 RX ORDER — DIPHENHYDRAMINE HYDROCHLORIDE 50 MG/ML
25 INJECTION INTRAMUSCULAR; INTRAVENOUS EVERY 6 HOURS PRN
Status: DISCONTINUED | OUTPATIENT
Start: 2024-10-11 | End: 2024-10-11 | Stop reason: HOSPADM

## 2024-10-11 RX ORDER — BUSPIRONE HYDROCHLORIDE 15 MG/1
30 TABLET ORAL 2 TIMES DAILY
Status: DISCONTINUED | OUTPATIENT
Start: 2024-10-11 | End: 2024-10-16 | Stop reason: HOSPADM

## 2024-10-11 RX ORDER — ONDANSETRON HYDROCHLORIDE 2 MG/ML
4 INJECTION, SOLUTION INTRAVENOUS EVERY 8 HOURS PRN
Status: DISCONTINUED | OUTPATIENT
Start: 2024-10-11 | End: 2024-10-16 | Stop reason: HOSPADM

## 2024-10-11 RX ORDER — DESMOPRESSIN ACETATE 4 UG/ML
INJECTION, SOLUTION INTRAVENOUS; SUBCUTANEOUS
Status: DISCONTINUED | OUTPATIENT
Start: 2024-10-11 | End: 2024-10-11

## 2024-10-11 RX ORDER — SUCCINYLCHOLINE CHLORIDE 20 MG/ML
INJECTION INTRAMUSCULAR; INTRAVENOUS
Status: DISCONTINUED | OUTPATIENT
Start: 2024-10-11 | End: 2024-10-11

## 2024-10-11 RX ORDER — HYDROMORPHONE HYDROCHLORIDE 2 MG/ML
0.2 INJECTION, SOLUTION INTRAMUSCULAR; INTRAVENOUS; SUBCUTANEOUS EVERY 5 MIN PRN
Status: DISCONTINUED | OUTPATIENT
Start: 2024-10-11 | End: 2024-10-11 | Stop reason: HOSPADM

## 2024-10-11 RX ORDER — SODIUM CHLORIDE 9 MG/ML
INJECTION, SOLUTION INTRAVENOUS CONTINUOUS
Status: DISCONTINUED | OUTPATIENT
Start: 2024-10-11 | End: 2024-10-16

## 2024-10-11 RX ORDER — ONDANSETRON 4 MG/1
4 TABLET, ORALLY DISINTEGRATING ORAL EVERY 6 HOURS PRN
Status: DISCONTINUED | OUTPATIENT
Start: 2024-10-11 | End: 2024-10-11

## 2024-10-11 RX ORDER — ALBUMIN HUMAN 250 G/1000ML
SOLUTION INTRAVENOUS
Status: DISCONTINUED | OUTPATIENT
Start: 2024-10-11 | End: 2024-10-11

## 2024-10-11 RX ORDER — AMOXICILLIN 250 MG
2 CAPSULE ORAL 2 TIMES DAILY
Status: DISCONTINUED | OUTPATIENT
Start: 2024-10-11 | End: 2024-10-16 | Stop reason: HOSPADM

## 2024-10-11 RX ORDER — ACETAMINOPHEN 325 MG/1
650 TABLET ORAL EVERY 6 HOURS PRN
Status: DISCONTINUED | OUTPATIENT
Start: 2024-10-11 | End: 2024-10-16 | Stop reason: HOSPADM

## 2024-10-11 RX ORDER — CEFAZOLIN 2 G/1
INJECTION, POWDER, FOR SOLUTION INTRAMUSCULAR; INTRAVENOUS
Status: DISCONTINUED | OUTPATIENT
Start: 2024-10-11 | End: 2024-10-11

## 2024-10-11 RX ORDER — CEFAZOLIN SODIUM 2 G/50ML
2 SOLUTION INTRAVENOUS
Status: DISCONTINUED | OUTPATIENT
Start: 2024-10-11 | End: 2024-10-15

## 2024-10-11 RX ORDER — BUPROPION HYDROCHLORIDE 150 MG/1
150 TABLET ORAL DAILY
Status: DISCONTINUED | OUTPATIENT
Start: 2024-10-11 | End: 2024-10-16 | Stop reason: HOSPADM

## 2024-10-11 RX ORDER — MORPHINE SULFATE 4 MG/ML
2 INJECTION, SOLUTION INTRAMUSCULAR; INTRAVENOUS
Status: DISCONTINUED | OUTPATIENT
Start: 2024-10-11 | End: 2024-10-13

## 2024-10-11 RX ORDER — ENOXAPARIN SODIUM 100 MG/ML
40 INJECTION SUBCUTANEOUS EVERY 24 HOURS
Status: DISCONTINUED | OUTPATIENT
Start: 2024-10-13 | End: 2024-10-16 | Stop reason: HOSPADM

## 2024-10-11 RX ORDER — CEFAZOLIN SODIUM 2 G/50ML
2 SOLUTION INTRAVENOUS
Status: COMPLETED | OUTPATIENT
Start: 2024-10-11 | End: 2024-10-12

## 2024-10-11 RX ORDER — ONDANSETRON HYDROCHLORIDE 2 MG/ML
4 INJECTION, SOLUTION INTRAVENOUS ONCE
Status: COMPLETED | OUTPATIENT
Start: 2024-10-11 | End: 2024-10-11

## 2024-10-11 RX ORDER — LIDOCAINE HYDROCHLORIDE AND EPINEPHRINE 5; 5 MG/ML; UG/ML
INJECTION, SOLUTION INFILTRATION; PERINEURAL
Status: DISCONTINUED | OUTPATIENT
Start: 2024-10-11 | End: 2024-10-11 | Stop reason: HOSPADM

## 2024-10-11 RX ORDER — SODIUM CHLORIDE 0.9 % (FLUSH) 0.9 %
10 SYRINGE (ML) INJECTION EVERY 12 HOURS PRN
Status: DISCONTINUED | OUTPATIENT
Start: 2024-10-11 | End: 2024-10-16 | Stop reason: HOSPADM

## 2024-10-11 RX ORDER — PHENYLEPHRINE HCL IN 0.9% NACL 1 MG/10 ML
SYRINGE (ML) INTRAVENOUS
Status: DISCONTINUED | OUTPATIENT
Start: 2024-10-11 | End: 2024-10-11

## 2024-10-11 RX ORDER — CEFAZOLIN SODIUM 2 G/50ML
2 SOLUTION INTRAVENOUS
Status: DISCONTINUED | OUTPATIENT
Start: 2024-10-11 | End: 2024-10-11

## 2024-10-11 RX ORDER — MEPERIDINE HYDROCHLORIDE 25 MG/ML
12.5 INJECTION INTRAMUSCULAR; INTRAVENOUS; SUBCUTANEOUS ONCE
Status: DISCONTINUED | OUTPATIENT
Start: 2024-10-11 | End: 2024-10-11 | Stop reason: HOSPADM

## 2024-10-11 RX ORDER — OXYCODONE AND ACETAMINOPHEN 10; 325 MG/1; MG/1
1 TABLET ORAL EVERY 4 HOURS PRN
Status: DISCONTINUED | OUTPATIENT
Start: 2024-10-11 | End: 2024-10-16

## 2024-10-11 RX ORDER — ACETAMINOPHEN 325 MG/1
650 TABLET ORAL EVERY 4 HOURS PRN
Status: DISCONTINUED | OUTPATIENT
Start: 2024-10-11 | End: 2024-10-16 | Stop reason: HOSPADM

## 2024-10-11 RX ORDER — LOSARTAN POTASSIUM 50 MG/1
100 TABLET ORAL DAILY
Status: DISCONTINUED | OUTPATIENT
Start: 2024-10-12 | End: 2024-10-16 | Stop reason: HOSPADM

## 2024-10-11 RX ORDER — ONDANSETRON HYDROCHLORIDE 2 MG/ML
INJECTION, SOLUTION INTRAVENOUS
Status: DISCONTINUED | OUTPATIENT
Start: 2024-10-11 | End: 2024-10-11

## 2024-10-11 RX ORDER — BISACODYL 10 MG/1
10 SUPPOSITORY RECTAL DAILY
Status: DISCONTINUED | OUTPATIENT
Start: 2024-10-12 | End: 2024-10-16 | Stop reason: HOSPADM

## 2024-10-11 RX ORDER — DEXAMETHASONE SODIUM PHOSPHATE 4 MG/ML
INJECTION, SOLUTION INTRA-ARTICULAR; INTRALESIONAL; INTRAMUSCULAR; INTRAVENOUS; SOFT TISSUE
Status: DISCONTINUED | OUTPATIENT
Start: 2024-10-11 | End: 2024-10-11

## 2024-10-11 RX ORDER — ACETAMINOPHEN 10 MG/ML
INJECTION, SOLUTION INTRAVENOUS
Status: DISCONTINUED | OUTPATIENT
Start: 2024-10-11 | End: 2024-10-11

## 2024-10-11 RX ADMIN — PROPOFOL 200 MG: 10 INJECTION, EMULSION INTRAVENOUS at 09:10

## 2024-10-11 RX ADMIN — Medication 400 MG: at 09:10

## 2024-10-11 RX ADMIN — METHOCARBAMOL 750 MG: 750 TABLET ORAL at 10:10

## 2024-10-11 RX ADMIN — CEFAZOLIN SODIUM 2 G: 2 SOLUTION INTRAVENOUS at 09:10

## 2024-10-11 RX ADMIN — QUETIAPINE FUMARATE 300 MG: 300 TABLET ORAL at 09:10

## 2024-10-11 RX ADMIN — PROCHLORPERAZINE EDISYLATE 10 MG: 5 INJECTION INTRAMUSCULAR; INTRAVENOUS at 02:10

## 2024-10-11 RX ADMIN — METHOCARBAMOL 1000 MG: 100 INJECTION, SOLUTION INTRAMUSCULAR; INTRAVENOUS at 02:10

## 2024-10-11 RX ADMIN — CEFAZOLIN 2 G: 2 INJECTION, POWDER, FOR SOLUTION INTRAMUSCULAR; INTRAVENOUS at 09:10

## 2024-10-11 RX ADMIN — FENTANYL CITRATE 100 MCG: 50 INJECTION, SOLUTION INTRAMUSCULAR; INTRAVENOUS at 08:10

## 2024-10-11 RX ADMIN — DESMOPRESSIN ACETATE 4 MCG: 4 SOLUTION INTRAVENOUS at 01:10

## 2024-10-11 RX ADMIN — ONDANSETRON 4 MG: 2 INJECTION INTRAMUSCULAR; INTRAVENOUS at 01:10

## 2024-10-11 RX ADMIN — SODIUM CHLORIDE 0.2 MCG/KG/MIN: 9 INJECTION, SOLUTION INTRAVENOUS at 08:10

## 2024-10-11 RX ADMIN — BUSPIRONE HYDROCHLORIDE 30 MG: 15 TABLET ORAL at 09:10

## 2024-10-11 RX ADMIN — Medication 100 MCG: at 06:10

## 2024-10-11 RX ADMIN — HYDROCODONE BITARTRATE AND ACETAMINOPHEN 1 TABLET: 10; 325 TABLET ORAL at 10:10

## 2024-10-11 RX ADMIN — DEXAMETHASONE SODIUM PHOSPHATE 8 MG: 4 INJECTION, SOLUTION INTRA-ARTICULAR; INTRALESIONAL; INTRAMUSCULAR; INTRAVENOUS; SOFT TISSUE at 09:10

## 2024-10-11 RX ADMIN — HYDROMORPHONE HYDROCHLORIDE 1 MG: 2 INJECTION INTRAMUSCULAR; INTRAVENOUS; SUBCUTANEOUS at 01:10

## 2024-10-11 RX ADMIN — CEFAZOLIN 3 G: 2 INJECTION, POWDER, FOR SOLUTION INTRAMUSCULAR; INTRAVENOUS at 01:10

## 2024-10-11 RX ADMIN — HYDROMORPHONE HYDROCHLORIDE 0.5 MG: 2 INJECTION, SOLUTION INTRAMUSCULAR; INTRAVENOUS; SUBCUTANEOUS at 02:10

## 2024-10-11 RX ADMIN — FENTANYL CITRATE 50 MCG: 50 INJECTION, SOLUTION INTRAMUSCULAR; INTRAVENOUS at 09:10

## 2024-10-11 RX ADMIN — HYDROMORPHONE HYDROCHLORIDE 0.5 MG: 2 INJECTION, SOLUTION INTRAMUSCULAR; INTRAVENOUS; SUBCUTANEOUS at 01:10

## 2024-10-11 RX ADMIN — SODIUM CHLORIDE, SODIUM GLUCONATE, SODIUM ACETATE, POTASSIUM CHLORIDE AND MAGNESIUM CHLORIDE: 526; 502; 368; 37; 30 INJECTION, SOLUTION INTRAVENOUS at 08:10

## 2024-10-11 RX ADMIN — ACETAMINOPHEN 1000 MG: 10 INJECTION, SOLUTION INTRAVENOUS at 12:10

## 2024-10-11 RX ADMIN — OXYCODONE AND ACETAMINOPHEN 1 TABLET: 10; 325 TABLET ORAL at 03:10

## 2024-10-11 RX ADMIN — SODIUM CHLORIDE: 9 INJECTION, SOLUTION INTRAVENOUS at 03:10

## 2024-10-11 RX ADMIN — FENTANYL CITRATE 100 MCG: 50 INJECTION, SOLUTION INTRAMUSCULAR; INTRAVENOUS at 09:10

## 2024-10-11 RX ADMIN — SUCCINYLCHOLINE CHLORIDE 140 MG: 20 INJECTION, SOLUTION INTRAMUSCULAR; INTRAVENOUS at 08:10

## 2024-10-11 RX ADMIN — HYDROMORPHONE HYDROCHLORIDE 2 MG: 2 INJECTION INTRAMUSCULAR; INTRAVENOUS; SUBCUTANEOUS at 11:10

## 2024-10-11 RX ADMIN — CALCIUM CARBONATE (ANTACID) CHEW TAB 500 MG 500 MG: 500 CHEW TAB at 11:10

## 2024-10-11 RX ADMIN — PROPOFOL 60 MCG/KG/MIN: 10 INJECTION, EMULSION INTRAVENOUS at 08:10

## 2024-10-11 RX ADMIN — DULOXETINE HYDROCHLORIDE 60 MG: 30 CAPSULE, DELAYED RELEASE ORAL at 09:10

## 2024-10-11 RX ADMIN — PROPOFOL 200 MG: 10 INJECTION, EMULSION INTRAVENOUS at 08:10

## 2024-10-11 RX ADMIN — MUPIROCIN: 20 OINTMENT TOPICAL at 09:10

## 2024-10-11 RX ADMIN — LIDOCAINE HYDROCHLORIDE 40 MG: 20 INJECTION INTRAVENOUS at 09:10

## 2024-10-11 RX ADMIN — ONDANSETRON 4 MG: 2 INJECTION INTRAMUSCULAR; INTRAVENOUS at 11:10

## 2024-10-11 RX ADMIN — ONDANSETRON 4 MG: 2 INJECTION INTRAMUSCULAR; INTRAVENOUS at 08:10

## 2024-10-11 RX ADMIN — GABAPENTIN 600 MG: 300 CAPSULE ORAL at 05:10

## 2024-10-11 RX ADMIN — HYDROMORPHONE HYDROCHLORIDE 2 MG: 2 INJECTION INTRAMUSCULAR; INTRAVENOUS; SUBCUTANEOUS at 05:10

## 2024-10-11 RX ADMIN — PHENYLEPHRINE HYDROCHLORIDE 1 MCG/KG/MIN: 10 INJECTION INTRAVENOUS at 11:10

## 2024-10-11 RX ADMIN — GABAPENTIN 600 MG: 300 CAPSULE ORAL at 09:10

## 2024-10-11 RX ADMIN — ALBUMIN (HUMAN) 100 ML: 12.5 SOLUTION INTRAVENOUS at 11:10

## 2024-10-11 RX ADMIN — LIDOCAINE HYDROCHLORIDE 40 MG: 20 INJECTION INTRAVENOUS at 08:10

## 2024-10-11 RX ADMIN — MIDAZOLAM HYDROCHLORIDE 4 MG: 1 INJECTION, SOLUTION INTRAMUSCULAR; INTRAVENOUS at 08:10

## 2024-10-11 NOTE — ANESTHESIA PREPROCEDURE EVALUATION
10/11/2024  Kasandra Cheek is a 64 y.o., female.  From  Clinic Note:          Pre-op Assessment    I have reviewed the Patient Summary Reports.     I have reviewed the Nursing Notes. I have reviewed the NPO Status.   I have reviewed the Medications.     Review of Systems  Cardiovascular:     Hypertension                                  Hypertension         Hepatic/GI:      Liver Disease,         Liver Disease        Musculoskeletal:  Arthritis        Arthritis          Neurological:  TIA,  Neuromuscular Disease,  Headaches      Dx of Headaches   Arthritis               TIA - Transient Ischemic Attack             Neuromuscular Disease   Endocrine:   Hypothyroidism       Hypothyroidism          Psych:  Psychiatric History                  Physical Exam  General: Well nourished, Cooperative, Alert and Oriented    Airway:  Mallampati: II   Mouth Opening: Normal  TM Distance: Normal  Tongue: Normal  Neck ROM: Normal ROM    Dental:        Anesthesia Plan  Type of Anesthesia, risks & benefits discussed:    Anesthesia Type: Gen ETT  Intra-op Monitoring Plan: Standard ASA Monitors and Art Line  Post Op Pain Control Plan: multimodal analgesia and IV/PO Opioids PRN  Airway Plan: Direct, Post-Induction  Informed Consent: Informed consent signed with the Patient and all parties understand the risks and agree with anesthesia plan.  All questions answered. Patient consented to blood products? Yes  ASA Score: 3  Day of Surgery Review of History & Physical: H&P Update referred to the surgeon/provider.    Ready For Surgery From Anesthesia Perspective.     .    HGB 11.5

## 2024-10-11 NOTE — TRANSFER OF CARE
"Anesthesia Transfer of Care Note    Patient: Kasandra Cheek    Procedure(s) Performed: Procedure(s) (LRB):  LAMINECTOMY, SPINE, THORACIC, POSTERIOR APPROACH, USING COMPUTER-ASSISTED NAVIGATION (N/A)  FUSION, SPINE, LUMBAR, PLIF, USING COMPUTER-ASSISTED NAVIGATION (N/A)    Patient location: PACU    Anesthesia Type: general    Transport from OR: Transported from OR on 2-3 L/min O2 by NC with adequate spontaneous ventilation    Post pain: adequate analgesia    Post assessment: no apparent anesthetic complications    Post vital signs: stable    Level of consciousness: awake and alert    Nausea/Vomiting: no nausea/vomiting    Complications: none    Transfer of care protocol was followed      Last vitals: Visit Vitals  BP (!) 158/90 (BP Location: Right forearm, Patient Position: Lying)   Pulse 101   Temp 37.2 °C (99 °F)   Resp 18   Ht 5' 7" (1.702 m)   Wt 136.1 kg (300 lb)   SpO2 97%   Breastfeeding No   BMI 46.99 kg/m²     "

## 2024-10-11 NOTE — BRIEF OP NOTE
[facility] - Periop Services  Brief Operative Note    SUMMARY     Surgery Date: 10/11/2024     Surgeons and Role:     * Andra Mejia MD - Primary    Assisting Surgeon: Errol Jones PA-C    Pre-op Diagnosis:  Other spondylosis with radiculopathy, lumbar region [M47.26]    Post-op Diagnosis:  Post-Op Diagnosis Codes:     * Other spondylosis with radiculopathy, lumbar region [M47.26]    Procedure(s) (LRB):  LAMINECTOMY, SPINE, THORACIC, POSTERIOR APPROACH, USING COMPUTER-ASSISTED NAVIGATION (N/A)  FUSION, SPINE, LUMBAR, PLIF, USING COMPUTER-ASSISTED NAVIGATION (N/A)    T10-L3 decompression, removal of right S1 screw, replacement of right S2 iliac bolt, and T10-S2 fusion using Nicholas screws (Shimon T10: 5.5x40; Shimon T11: 5.5x40; Shimon L1: 6.5x45; Lt L2: 6.5x45) and osteoamp fibers.     Anesthesia: General    Implants:  Implant Name Type Inv. Item Serial No.  Lot No. LRB No. Used Action   FILLER BONE SYN 1CC PARTIC - [id number]  FILLER BONE SYN 1CC PARTIC  [id number] [id number] N/A 6 Implanted   Osteoamp [employer] [facility]   [id number]   N/A 1 Implanted   Osteoamp [employer] 10   [id number]   N/A 1 Implanted   Osteoamp [employer] [facility]   [id number]   N/A 1 Implanted   Osteoamp [employer] 10   [id number]   N/A 1 Implanted   SET CAPS    [employer]  N/A 8 Implanted   5.5MM X 40MM PA SCREW    [employer]  N/A 3 Implanted   SCREW BONE SPINAL 45X6.5MM - [id number]  SCREW BONE SPINAL 45X6.5MM  [employer]  N/A 2 Implanted       Operative Findings: Dictated    Estimated Blood Loss: * No values recorded between 10/11/2024  9:37 AM and 10/11/2024  1:19 PM *    Estimated Blood Loss has been documented.         Specimens:   Specimen (24h ago, onward)      None            [id number]

## 2024-10-12 LAB
ALBUMIN SERPL-MCNC: 2.9 G/DL (ref 3.4–4.8)
ALBUMIN/GLOB SERPL: 1.5 RATIO (ref 1.1–2)
ALP SERPL-CCNC: 72 UNIT/L (ref 40–150)
ALT SERPL-CCNC: 9 UNIT/L (ref 0–55)
ANION GAP SERPL CALC-SCNC: 5 MEQ/L
AST SERPL-CCNC: 20 UNIT/L (ref 5–34)
BASOPHILS # BLD AUTO: 0.04 X10(3)/MCL
BASOPHILS NFR BLD AUTO: 0.5 %
BILIRUB SERPL-MCNC: 0.3 MG/DL
BUN SERPL-MCNC: 11 MG/DL (ref 9.8–20.1)
CALCIUM SERPL-MCNC: 7.7 MG/DL (ref 8.4–10.2)
CHLORIDE SERPL-SCNC: 109 MMOL/L (ref 98–107)
CO2 SERPL-SCNC: 25 MMOL/L (ref 23–31)
CREAT SERPL-MCNC: 0.88 MG/DL (ref 0.55–1.02)
CREAT/UREA NIT SERPL: 13
EOSINOPHIL # BLD AUTO: 0 X10(3)/MCL (ref 0–0.9)
EOSINOPHIL NFR BLD AUTO: 0 %
ERYTHROCYTE [DISTWIDTH] IN BLOOD BY AUTOMATED COUNT: 13.2 % (ref 11.5–17)
GFR SERPLBLD CREATININE-BSD FMLA CKD-EPI: >60 ML/MIN/1.73/M2
GLOBULIN SER-MCNC: 1.9 GM/DL (ref 2.4–3.5)
GLUCOSE SERPL-MCNC: 123 MG/DL (ref 82–115)
HCT VFR BLD AUTO: 26.1 % (ref 37–47)
HGB BLD-MCNC: 8.4 G/DL (ref 12–16)
IMM GRANULOCYTES # BLD AUTO: 0.07 X10(3)/MCL (ref 0–0.04)
IMM GRANULOCYTES NFR BLD AUTO: 0.9 %
LYMPHOCYTES # BLD AUTO: 1.31 X10(3)/MCL (ref 0.6–4.6)
LYMPHOCYTES NFR BLD AUTO: 16.7 %
MCH RBC QN AUTO: 31.5 PG (ref 27–31)
MCHC RBC AUTO-ENTMCNC: 32.2 G/DL (ref 33–36)
MCV RBC AUTO: 97.8 FL (ref 80–94)
MONOCYTES # BLD AUTO: 0.83 X10(3)/MCL (ref 0.1–1.3)
MONOCYTES NFR BLD AUTO: 10.6 %
NEUTROPHILS # BLD AUTO: 5.58 X10(3)/MCL (ref 2.1–9.2)
NEUTROPHILS NFR BLD AUTO: 71.3 %
NRBC BLD AUTO-RTO: 0 %
PLATELET # BLD AUTO: 184 X10(3)/MCL (ref 130–400)
PMV BLD AUTO: 9 FL (ref 7.4–10.4)
POTASSIUM SERPL-SCNC: 4.1 MMOL/L (ref 3.5–5.1)
PROT SERPL-MCNC: 4.8 GM/DL (ref 5.8–7.6)
RBC # BLD AUTO: 2.67 X10(6)/MCL (ref 4.2–5.4)
SODIUM SERPL-SCNC: 139 MMOL/L (ref 136–145)
WBC # BLD AUTO: 7.83 X10(3)/MCL (ref 4.5–11.5)

## 2024-10-12 PROCEDURE — 36415 COLL VENOUS BLD VENIPUNCTURE: CPT

## 2024-10-12 PROCEDURE — 63600175 PHARM REV CODE 636 W HCPCS

## 2024-10-12 PROCEDURE — 97162 PT EVAL MOD COMPLEX 30 MIN: CPT

## 2024-10-12 PROCEDURE — 11000001 HC ACUTE MED/SURG PRIVATE ROOM

## 2024-10-12 PROCEDURE — 25000003 PHARM REV CODE 250

## 2024-10-12 PROCEDURE — 63600175 PHARM REV CODE 636 W HCPCS: Performed by: NEUROLOGICAL SURGERY

## 2024-10-12 PROCEDURE — 25000003 PHARM REV CODE 250: Performed by: NEUROLOGICAL SURGERY

## 2024-10-12 PROCEDURE — 99900035 HC TECH TIME PER 15 MIN (STAT)

## 2024-10-12 PROCEDURE — 80053 COMPREHEN METABOLIC PANEL: CPT

## 2024-10-12 PROCEDURE — 85025 COMPLETE CBC W/AUTO DIFF WBC: CPT

## 2024-10-12 RX ORDER — METHOCARBAMOL 750 MG/1
750 TABLET, FILM COATED ORAL 4 TIMES DAILY
Status: DISCONTINUED | OUTPATIENT
Start: 2024-10-12 | End: 2024-10-16 | Stop reason: HOSPADM

## 2024-10-12 RX ADMIN — HYDROCODONE BITARTRATE AND ACETAMINOPHEN 1 TABLET: 10; 325 TABLET ORAL at 06:10

## 2024-10-12 RX ADMIN — METHOCARBAMOL 750 MG: 750 TABLET ORAL at 01:10

## 2024-10-12 RX ADMIN — OXYCODONE AND ACETAMINOPHEN 1 TABLET: 10; 325 TABLET ORAL at 10:10

## 2024-10-12 RX ADMIN — OXYCODONE AND ACETAMINOPHEN 1 TABLET: 10; 325 TABLET ORAL at 05:10

## 2024-10-12 RX ADMIN — HYDROMORPHONE HYDROCHLORIDE 2 MG: 2 INJECTION INTRAMUSCULAR; INTRAVENOUS; SUBCUTANEOUS at 01:10

## 2024-10-12 RX ADMIN — GABAPENTIN 600 MG: 300 CAPSULE ORAL at 08:10

## 2024-10-12 RX ADMIN — BUPROPION HYDROCHLORIDE 150 MG: 150 TABLET, EXTENDED RELEASE ORAL at 08:10

## 2024-10-12 RX ADMIN — ONDANSETRON 4 MG: 2 INJECTION INTRAMUSCULAR; INTRAVENOUS at 10:10

## 2024-10-12 RX ADMIN — QUETIAPINE FUMARATE 300 MG: 300 TABLET ORAL at 09:10

## 2024-10-12 RX ADMIN — GABAPENTIN 600 MG: 300 CAPSULE ORAL at 09:10

## 2024-10-12 RX ADMIN — LOSARTAN POTASSIUM 100 MG: 50 TABLET, FILM COATED ORAL at 08:10

## 2024-10-12 RX ADMIN — SENNOSIDES AND DOCUSATE SODIUM 2 TABLET: 50; 8.6 TABLET ORAL at 09:10

## 2024-10-12 RX ADMIN — HYDROMORPHONE HYDROCHLORIDE 2 MG: 2 INJECTION INTRAMUSCULAR; INTRAVENOUS; SUBCUTANEOUS at 07:10

## 2024-10-12 RX ADMIN — LEVOTHYROXINE SODIUM 200 MCG: 100 TABLET ORAL at 08:10

## 2024-10-12 RX ADMIN — BUSPIRONE HYDROCHLORIDE 30 MG: 15 TABLET ORAL at 09:10

## 2024-10-12 RX ADMIN — GABAPENTIN 600 MG: 300 CAPSULE ORAL at 05:10

## 2024-10-12 RX ADMIN — Medication 400 MG: at 09:10

## 2024-10-12 RX ADMIN — METHOCARBAMOL 750 MG: 750 TABLET ORAL at 08:10

## 2024-10-12 RX ADMIN — HYDROMORPHONE HYDROCHLORIDE 2 MG: 2 INJECTION INTRAMUSCULAR; INTRAVENOUS; SUBCUTANEOUS at 08:10

## 2024-10-12 RX ADMIN — CEFAZOLIN SODIUM 2 G: 2 SOLUTION INTRAVENOUS at 01:10

## 2024-10-12 RX ADMIN — CALCIUM CARBONATE (ANTACID) CHEW TAB 500 MG 500 MG: 500 CHEW TAB at 10:10

## 2024-10-12 RX ADMIN — SENNOSIDES AND DOCUSATE SODIUM 2 TABLET: 50; 8.6 TABLET ORAL at 08:10

## 2024-10-12 RX ADMIN — HYDROMORPHONE HYDROCHLORIDE 2 MG: 2 INJECTION INTRAMUSCULAR; INTRAVENOUS; SUBCUTANEOUS at 05:10

## 2024-10-12 RX ADMIN — BISACODYL 10 MG: 10 SUPPOSITORY RECTAL at 08:10

## 2024-10-12 RX ADMIN — CEFAZOLIN SODIUM 2 G: 2 SOLUTION INTRAVENOUS at 04:10

## 2024-10-12 RX ADMIN — ACETAMINOPHEN 650 MG: 325 TABLET, FILM COATED ORAL at 11:10

## 2024-10-12 RX ADMIN — BUSPIRONE HYDROCHLORIDE 30 MG: 15 TABLET ORAL at 08:10

## 2024-10-12 RX ADMIN — METHOCARBAMOL 750 MG: 750 TABLET ORAL at 09:10

## 2024-10-12 RX ADMIN — OXYCODONE AND ACETAMINOPHEN 1 TABLET: 10; 325 TABLET ORAL at 09:10

## 2024-10-12 RX ADMIN — MUPIROCIN: 20 OINTMENT TOPICAL at 09:10

## 2024-10-12 RX ADMIN — MUPIROCIN: 20 OINTMENT TOPICAL at 08:10

## 2024-10-12 RX ADMIN — GABAPENTIN 600 MG: 300 CAPSULE ORAL at 01:10

## 2024-10-12 RX ADMIN — DULOXETINE HYDROCHLORIDE 60 MG: 30 CAPSULE, DELAYED RELEASE ORAL at 08:10

## 2024-10-12 RX ADMIN — ACETAMINOPHEN 650 MG: 325 TABLET, FILM COATED ORAL at 09:10

## 2024-10-12 RX ADMIN — METHOCARBAMOL 750 MG: 750 TABLET ORAL at 05:10

## 2024-10-12 RX ADMIN — DULOXETINE HYDROCHLORIDE 60 MG: 30 CAPSULE, DELAYED RELEASE ORAL at 09:10

## 2024-10-12 NOTE — OP NOTE
[facility]                       [address] [id number]    PATIENT NAME:      JORDAN COYNE   YOB: 1960  CSN:               [id number]  MRN:               [id number]  ADMIT DATE:        10/11/2024 06:14:00  PHYSICIAN:         Andra Mejia MD                          OPERATIVE REPORT      DATE OF SURGERY:    10/11/2024 05:49:21    SURGEON:  Andra Mejia MD    ASSISTANT:  Errol Jones.    PREOPERATIVE DIAGNOSES:  Severe stenosis at T12-L1 and L1-2 with back pain,   unremitting pain, previous surgery, possible hardware loosening on the right   side at S1 and iliac bolt with previous surgery from L3-2 with iliac bolt.    POSTOPERATIVE DIAGNOSES:  Severe stenosis at T12-L1 and L1-2 with back pain,   unremitting pain, previous surgery, possible hardware loosening on the right   side at S1 and iliac bolt with previous surgery from L3-2 with iliac bolt.    PROCEDURES:  Open exposure, removal of pressure on the spinal cord starting from   T10-11, 11-12, 12-1, 1-2, 2-3, and removal of previous S1 screw on the right,   placement of new iliac bolt, placement of screws bilaterally at T10, bilaterally   at T11, bilaterally at L1, on the left side at L2 and then fusion with new rods   and new caps and posterolateral fusion with the patient's own bone and OsteoAmp   fibers from about T10-11 down to about S2 iliac area, bars and caps were used.    Monitoring was used.  Microscope was used.  Cell Saver was used.    We had a small dural opening in midline, was closed completely.  There was   previous scar tissue.    BLOOD LOSS:  About 1200, about 500 was given back.    INDICATION FOR SURGERY:  The patient is a 64-year-old came to my office multiple   times with severe stenosis at L1-2, 12-1 with previous hardware and lower down,   done previously by another physician, now here for decompression and fusion   from about  T10-11 down to previous hardware at L3, also loosened hardware.  We   will explore that, remove any loose hardware, put new screws.  Consent and risks   were discussed.  We spent some time going over the surgery, the risks, why we   have to do the fusion, risks of bleeding, infection, weakness, CSF leak,   reoperation, hemorrhage, possibly this may not get better, possibly need further   surgery, discussed in detail.  They want me to proceed with surgery.  We spent   some time reviewing all the risks and benefits.    PROCEDURE IN DETAIL:  The patient was brought to the operating room, intubated,   Foy placed.  Monitors were placed.  Back was sterilely prepped and draped.  We   opened the previous incision and went down to the screws that were put further   up and higher up.  We put retractors in.  There was fair amount of oozing.  We   used Cell Saver throughout the case, gave blood at the end and throughout the   case.  We kept going higher up.  We exposed all the way up to 9-10 and all the   way down to the previous S2 iliac bolt.  Once exposure was done, we decorticated   some of the sides and packed it and then we subsequently took off the caps and   the rods.  We took out the S1 screw on the right that was loose.  We took out   the bolt on the right and we put a new bolt there.  We did not put any screw on   the S1 site.  Once the screws looked okay, we then proceeded and did   decompression going from previous work that was done at 3 going to 2, 1, 12, 11,   10, somewhat at 9, but at last part the pressure was taken off.  It was very   tight at L1-2 and going to 12-1.  We did decompression, laminectomy, medial   facet foraminotomy, drilling the bone off taking our time taking the pressure   off the spinal nerve root completely.  Thorough decompression and thorough   foraminotomy were done.  Once that was done, which showed good adequate   decompression as well as opening in the midline close to previous scar  tissue at   L3-L2 and we put a stitch then and sutured it.  There was no leak after having   done this.  We then did a spin and put screws at T10 bilaterally, T11   bilaterally, L1 bilaterally and L2 on the left.  Once that was done, we put new   blanca and new caps all the way from the different places from T10 all the way down   to the iliac bolt.  Once the caps and rods were in, final tightening was done.    Sides had been roughed and decorticated and packed the patient's own bone and   OsteoAmp fibers.  This allowed us to completely make sure everything was nice   and dry and opened up and then put 2 drains, closed in multi-layers of 0 Vicryl,   3-0 Vicryl running subcu.  Final x-rays looked good.  There was no change in   monitoring.    I spent a lot of time with the  discussing the care and difficulty of the   surgery.  He understands and hopefully she will get improved with time and go   to rehab.        ______________________________  MD RUFUS Singh/AVA  DD:  10/11/2024  Time:  01:29PM  DT:  10/11/2024  Time:  08:03PM  Job #:  [id number]      OPERATIVE REPORT

## 2024-10-12 NOTE — PLAN OF CARE
Problem: Adult Inpatient Plan of Care  Goal: Plan of Care Review  Outcome: Progressing  Flowsheets (Taken 10/12/2024 3257)  Plan of Care Reviewed With:   patient   spouse

## 2024-10-12 NOTE — PT/OT/SLP PROGRESS
OT attempted this morning, PT just exiting room.  PT reporting hypotension with standing.  OT to f/u as schedule permits.

## 2024-10-12 NOTE — CONSULTS
[facility] Hospital Medicine - CONSULT note    Patient Name: Kasandra Cheek  : 1960  MRN: [id number]  PCP: Ravinder Mazariegos DO  Admitting Physician: Kevin Thomas MD  Admission Class: IP- Inpatient   Length of Stay: 0  Face-to-Face encounter date: 10/11/2024  Code status: full    Chief Complaint   Postop medical management    History of Present Illness   65yo female presented today to [facility] for schedule thoracic laminectomy by Dr. Andra Mejia. No apparent pre/intra/post-operative complications of note. Pt has a pmhx of depression, anxiety, migraines, htn, hypothyroidism, TIA. Upon my visit the patient reports severe postoperative mid-back pain without radiation. She denies CP, SOB, fever, chills, and reports that the pain medication is working. My visit was right before her next dose of medication.     Pt denies any vomiting but reports nausea. Otherwise no complaints.VSS.  ROS   Except as documented, all other systems reviewed and negative     Past Medical History     Past Medical History:   Diagnosis Date    Anemia     Anxiety     Depression     Dyspareunia     Encounter for blood transfusion     HTN (hypertension)     Hypothyroidism, unspecified     Liver disease     fatty liver    Lumbar radiculopathy     Menopause     Migraine     Obesity     Osteoporosis     Other spondylosis with radiculopathy, lumbar region     Personal history of fall     Spinal stenosis, lumbar region with neurogenic claudication     TIA (transient ischemic attack)     Unspecified osteoarthritis, unspecified site     Urinary incontinence        Past Surgical History     Past Surgical History:   Procedure Laterality Date    ABDOMINAL SURGERY      Gastric Bypass    CARPAL TUNNEL RELEASE Bilateral     CERVICAL FUSION  2021    CHOLECYSTECTOMY  2015    COLONOSCOPY      COLPOSCOPY      FOOT SURGERY Left     FRACTURE SURGERY      GASTRIC BYPASS  2011    HAND SURGERY Left     thumb     HERNIA REPAIR  2014    JOINT REPLACEMENT  Knee 2015, Hip 2016    LUMBAR FUSION  2021    LUMBAR FUSION  2023    L3-S1    ROTATOR CUFF REPAIR Bilateral 2022    SMALL INTESTINE SURGERY  2015    SPINE SURGERY   &     TONSILLECTOMY  2017       Social History     Screening for Social Drivers for health: Patient screened for food insecurity, housing instability, transportation needs, utility   difficulties, and interpersonal safety (select all that apply as identified as concern)  []Housing or Food  []Transportation Needs  []Utility Difficulties  []Interpersonal safety  [x]None    Social History     Tobacco Use    Smoking status: Former     Current packs/day: 0.00     Average packs/day: 1 pack/day for 27.0 years (27.0 ttl pk-yrs)     Types: Cigarettes     Start date: 1975     Quit date: 2002     Years since quittin.7    Smokeless tobacco: Never    Tobacco comments:     off and on when smoking   Substance Use Topics    Alcohol use: Not Currently     Comment: I do not drink        Family History   Reviewed and negative    Allergies   Sucralfate    Home Medications     Prior to Admission medications    Medication Sig Start Date End Date Taking? Authorizing Provider   acetaminophen (TYLENOL) 325 MG tablet Take 325 mg by mouth as needed. Pt stated takes w/Oxycodone 22  Yes Provider, Historical   APPLE CIDER VINEGAR ORAL Take 1 capsule by mouth once daily.   Yes Provider, Historical   buPROPion (WELLBUTRIN XL) 150 MG TB24 tablet Take 150 mg by mouth once daily. 22  Yes Provider, Historical   buPROPion (WELLBUTRIN XL) 300 MG 24 hr tablet Take 1 tablet by mouth once daily. 21  Yes Provider, Historical   busPIRone (BUSPAR) 30 MG Tab Take 1 tablet by mouth 2 (two) times daily. 22  Yes Provider, Historical   cholecalciferol, vitamin D3, (VITAMIN D3) 250 mcg (10,000 unit) Cap capsule Take 1 tablet by mouth Daily. 21  Yes Provider, Historical   cyanocobalamin, vitamin B-12,  2,500 mcg Tab Take 2,500 mcg by mouth Daily.   Yes Provider, Historical   docusate sodium (COLACE) 250 MG capsule Take 250 mg by mouth 2 (two) times a day.   Yes Provider, Historical   DULoxetine (CYMBALTA) 60 MG capsule Take 1 capsule (60 mg total) by mouth 2 (two) times daily. 10/27/23  Yes Raman Ham MD   ferrous sulfate (FEOSOL) 325 mg (65 mg iron) Tab tablet Take 1 tablet by mouth Daily. 9/1/21  Yes Provider, Historical   furosemide (LASIX) 20 MG tablet Take 20 mg by mouth 2 (two) times daily.   Yes Provider, Historical   gabapentin (NEURONTIN) 600 MG tablet Take 1 tablet by mouth 4 (four) times daily. 8/8/21  Yes Provider, Historical   levothyroxine (SYNTHROID) 200 MCG tablet Take 200 mcg by mouth once daily. 7/23/23  Yes Provider, Historical   losartan (COZAAR) 100 MG tablet Take 1 tablet by mouth once daily. 3/31/22  Yes Provider, Historical   magnesium oxide (MAG-OX) 400 mg (241.3 mg magnesium) tablet Take 1 tablet (400 mg total) by mouth nightly. 10/27/23  Yes Raman Ham MD   omega-3 fatty acids/fish oil (FISH OIL OMEGA 3-6-9 ORAL)  1/1/22  Yes Provider, Historical   ondansetron (ZOFRAN) 8 MG tablet Take 8 mg by mouth every 6 (six) hours as needed for Nausea.   Yes Provider, Historical   oxyCODONE (ROXICODONE) 20 mg Tab immediate release tablet Take 1 tablet by mouth every 4 (four) hours as needed. 4/6/23  Yes Provider, Historical   pantoprazole (PROTONIX) 40 MG tablet Take 40 mg by mouth 2 (two) times daily. 4/18/23  Yes Provider, Historical   promethazine (PHENERGAN) 12.5 MG Tab Take 12.5 mg by mouth 2 (two) times a day. 8/23/23  Yes Provider, Historical   QUEtiapine (SEROQUEL) 300 MG Tab Take 1 tablet by mouth every evening. 3/11/22  Yes Provider, Historical   VITAMIN K2 ORAL Take 150 mcg by mouth Daily. 9/1/21  Yes Provider, Historical   baclofen (LIORESAL) 10 MG tablet Take 10 mg by mouth 3 (three) times daily as needed. 10/18/23   Provider, Historical   celecoxib (CELEBREX) 100 MG  capsule Take 1 capsule (100 mg total) by mouth 2 (two) times daily as needed for Pain. 10/27/23   Raman Ham MD   chlorhexidine (PERIDEX) 0.12 % solution SMARTSIG:By Mouth 7/24/23   Provider, Historical   diclofenac sodium (VOLTAREN) 1 % Gel APPLY 2 GRAMS TO AFFECTED AREA FOUR TIMES DAILY 8/15/23   Provider, Historical   hydroxychloroquine (PLAQUENIL) 200 mg tablet Take 1 tablet (200 mg total) by mouth after dinner. After food 10/27/23 10/21/24  Raman Ham MD   naloxone (NARCAN) 4 mg/actuation Spry 1 spray by Nasal route once as needed (medication overuse).    Provider, Historical   ondansetron (ZOFRAN-ODT) 4 MG TbDL Take 1 tablet (4 mg total) by mouth every 6 (six) hours as needed (nausea). 8/28/24   Mickie Munoz PA   promethazine (PHENERGAN) 25 MG suppository Place 1 suppository (25 mg total) rectally every 6 (six) hours as needed for Nausea. 8/28/24   Mickie Munoz PA   risedronate (ACTONEL) 150 MG Tab Take 1 tablet (150 mg total) by mouth every 30 days. 4/24/23 4/23/24  Raman Ham MD   upadacitinib (RINVOQ) 15 mg 24 hr tablet Take 1 tablet (15 mg total) by mouth once daily. 11/8/23   Raman Ham MD   ARIPiprazole (ABILIFY) 2 MG Tab Take 2 mg by mouth every morning. 3/9/22 6/8/22  Provider, Historical   diazePAM (VALIUM) 5 MG tablet Take by mouth. 3/31/22 6/8/22  Provider, Historical        Physical Exam   Vital Signs  Temp:  [97.6 °F (36.4 °C)-99.7 °F (37.6 °C)]   Pulse:  []   Resp:  [10-18]   BP: (106-158)/(62-92)   SpO2:  [93 %-100 %]    General: Well developed, well nourished. In moderate acute distress, non-toxic appearing  HEENT: NC/AT  Neck:  Supple. No JVD  Chest: Clear bilaterally, no wheezing, no accessory muscle use.  CVS: Regular rhythm. Normal S1/S2.  Abdomen: nondistended, normoactive BS, soft and non-tender.  MSK: No obvious deformity or joint swelling  Skin: Warm and dry  Neuro: AAOx3, no focal neurological deficit  Psych: Cooperative      Labs  "    Recent Labs     10/11/24  0640 10/11/24  1136 10/11/24  1358   WBC 4.08*  --   --    RBC 3.53*  --   --    HGB 11.2*  --  10.1*   HCT 33.5* 26* 29.8*   MCV 94.9*  --   --    MCH 31.7*  --   --    MCHC 33.4  --   --    RDW 13.0  --   --      --   --      No results for input(s): "PROTIME", "INR", "PTT", "D-DIMER", "FERRITIN", "IRON", "TRANS", "TIBC", "LABIRON", "[id number]", "FOLATE", "LDH", "HAPTOGLOBIN", "RETICCNTAUTO", "RETABS", "PERIPSMEAREV" in the last 72 hours.   Recent Labs     10/11/24  2122      K 4.5   CO2 21*   BUN 10.2   CREATININE 0.81   EGFRNORACEVR >60   GLUCOSE 153*   CALCIUM 7.9*   ALBUMIN 3.2*   GLOBULIN 1.9*   ALKPHOS 84   ALT 11   AST 23   BILITOT 0.3     No results for input(s): "LACTIC" in the last 72 hours.  No results for input(s): "CPK", "TROPONINI" in the last 72 hours.     Microbiology Results (last 7 days)       ** No results found for the last 168 hours. **           Imaging     SURG FL Surgery Fluoro Usage   Final Result        Assessment & Plan   Thoracic Laminectomy  - Postoperative Management as per primary.   -Continue pain control.    2. History of Anxiety/Depression  -Pt takes Quetiapine nightly. Continue this for tonight.  -Continue psychiatric medications as indicated.    3. Hx of HTN  -VSS. Continue home antihypertensives.    4. Hx of Hypothyroidism  -continue levothyroxine.    General Plan:  -O2 delivery as needed  -Monitor WBC, signs of infection  -Monitor Creatinine  -Antihypertensives as needed  -Resume home meds as appropriate  -Labs in AM        I, Hans Bass PA-C have reviewed and discussed this case with Kevin Thomas MD. Please see addendum for further assessment and plan from attending MD.       ___________________________________________________________________  I, Dr. Kevin Thomas assumed care of this patient.  For the patient encounter, I performed the substantive portion of the visit, I reviewed the NPPA documentation, treatment " plan, and medical decision making.  I had face to face time with this patient.  I have personally reviewed the labs and test results that are presently available. I have reviewed or attempted to review medical records based upon their availability. If patient was admitted under observational status it is with my approval.      Pleasant 64-year-old female underwent elective thoracic laminectomy.  She has past medical history as above and all of her home medications have been resumed.  Currently on exam she was a brace in place, lungs clear anteriorly, awake and alert.  We will happily be available peripherally/as needed as there are currently no unstable medical conditions to manage.    Time seen: 11pm 10/11  Kevin Thomas MD

## 2024-10-12 NOTE — PT/OT/SLP EVAL
Physical Therapy Evaluation    Patient Name:  Kasandra Cheek   MRN:  [id number]    Recommendations:     Discharge therapy intensity: Low Intensity Therapy   Discharge Equipment Recommendations: none   Barriers to discharge: Impaired mobility and Ongoing medical needs    Assessment:     Kasandra Cheek is a 64 y.o. female admitted with a medical diagnosis of T10-L3 decompression, removal of right S1 screw, replacement of right S2 iliac bold and T10-S2 fusion. Upon Eval, She presents with the following impairments/functional limitations: weakness, gait instability, impaired endurance, pain, impaired functional mobility. Pt's mobility limited by hypotension. Pt performed bed mobility and sit to stand transfer with Min A-CGA. After a few moments of pt standing, she started to c/o of symptoms of lightheadedness and HA. Pt returned to sitting; attempted to assess BP in sitting. Machine having difficulty to read pressure. Returned patient to supine. BP reading 95/49. After a few minutes, patients symptoms subsided and BP assessed again at 99/51. RN notified. No further mobilization on this due to hypotension. Pt lives with her  and uses a RW at baseline.     Patient should progress well to be able to discharge with low intensity therapy (home health). Will update recs as appropriate.     Rehab Prognosis: Good; patient would benefit from acute skilled PT services to address these deficits and reach maximum level of function.    Recent Surgery: Procedure(s) (LRB):  LAMINECTOMY, SPINE, THORACIC, POSTERIOR APPROACH, USING COMPUTER-ASSISTED NAVIGATION (N/A)  FUSION, SPINE, LUMBAR, PLIF, USING COMPUTER-ASSISTED NAVIGATION (N/A) 1 Day Post-Op    Plan:     During this hospitalization, patient would benefit from acute PT services 6 x/week to address the identified rehab impairments via gait training, therapeutic activities, therapeutic exercises and progress toward the following goals:    Plan of Care Expires:    11/12/2024    Subjective     Chief Complaint: lightheadedness   Patient/Family Comments/goals: get better   Pain/Comfort:  Pain Rating 1: 7/10  Location - Side 1: Bilateral  Location 1: back    Patients cultural, spiritual, Uatsdin conflicts given the current situation: no    Living Environment:  Pt lives with her  in a SLH with 3 LEILANI (no handrails).  is able to assist patient at home.   Prior to admission, patients level of function was Mod I.  Equipment used at home: rollator, walker, rolling, shower chair.  DME owned (not currently used): none.  Upon discharge, patient will have assistance from .    Objective:     Communicated with RN prior to session.  Patient found HOB elevated with peripheral IV, oxygen, ELSIE drain (2) upon PT entry to room.    General Precautions: Standard, fall  Orthopedic Precautions:spinal precautions   Braces: TLSO when HOB greater than 30 degrees or OOB mobility  Respiratory Status: Nasal cannula, flow 2.5 L/min, 96%  Blood Pressure:   95/49 supine   99/51 reassessed 2 min after above reading   *RN notified       Exams:  RLE ROM: WFL  RLE Strength: WFL  LLE ROM: WFL  LLE Strength: WFL  Skin integrity: Visible skin intact      Functional Mobility:  Bed Mobility:     Supine to Sit: minimum assistance - VC for log rolling technique   Sit to Supine: moderate assistance  Transfers:     Sit to Stand:  contact guard assistance with rolling walker      AM-PAC 6 CLICK MOBILITY  Total Score:18       Treatment & Education:  Educated patient on spinal precautions, log rolling technique, and wearing TLSO when HOB is greater than 30 and when OOB.     Patient and spouse were provided with verbal education education regarding PT role/goals/POC, post-op precautions, fall prevention, safety awareness, and discharge/DME recommendations.  Understanding was verbalized, however additional teaching warranted.     Patient left HOB elevated with all lines intact, call button in reach, and  spouse and RN present.    GOALS:   Multidisciplinary Problems       Physical Therapy Goals          Problem: Physical Therapy    Goal Priority Disciplines Outcome Interventions   Physical Therapy Goal     PT, PT/OT Progressing    Description: Goals to be met by: 2024     Patient will increase functional independence with mobility by performin. Supine to sit with Modified [place]  2. Sit to supine with Modified [place]  3. Sit to stand transfer with Modified [place]  4. Gait  x 50 feet with Modified [place] using Rolling Walker.   5. Pt to ascend/descend 3 stairs using no handrails, SBA.                          History:     Past Medical History:   Diagnosis Date    Anemia     Anxiety     Depression     Dyspareunia     Encounter for blood transfusion     HTN (hypertension)     Hypothyroidism, unspecified     Liver disease     fatty liver    Lumbar radiculopathy     Menopause     Migraine     Obesity     Osteoporosis     Other spondylosis with radiculopathy, lumbar region     Personal history of fall     Spinal stenosis, lumbar region with neurogenic claudication     TIA (transient ischemic attack)     Unspecified osteoarthritis, unspecified site     Urinary incontinence        Past Surgical History:   Procedure Laterality Date    ABDOMINAL SURGERY      Gastric Bypass    CARPAL TUNNEL RELEASE Bilateral     CERVICAL FUSION  2021    CHOLECYSTECTOMY  2015    COLONOSCOPY      COLPOSCOPY      FOOT SURGERY Left     FRACTURE SURGERY      GASTRIC BYPASS  2011    HAND SURGERY Left     thumb    HERNIA REPAIR  2014    JOINT REPLACEMENT  Knee 2015, Hip 2016    LUMBAR FUSION  2021    LUMBAR FUSION  2023    L3-S1    ROTATOR CUFF REPAIR Bilateral 2022    SMALL INTESTINE SURGERY  2015    SPINE SURGERY   &     TONSILLECTOMY         Time Tracking:     PT Received On: 10/12/24  PT Start Time: 915     PT Stop Time: 947  PT Total Time (min): 32 min     Billable  Minutes: Evaluation 20 and Therapeutic Activity 12      10/12/2024

## 2024-10-12 NOTE — PROGRESS NOTES
Hospital Progress Note  [facility] Neurosurgery      Admit Date: 10/11/2024  Post-operative Day: 1 Day Post-Op  Hospital Day: 2    I am cross covering this patient for neurosurgeon Dr. Mejia.    SUBJECTIVE:     POD #1 T10-L3 decompression, removal of right S1 screw, replacement of right S2 iliac bold and T10-S2 fusion     Interval History:  Evaluated the patient with family member at the bedside.  She is resting quietly in bed with Aspen TLSO brace in place.  States that she is feeling better postoperatively this time as opposed to last surgery. Reports moderate back pain that is not well controlled with current medications.  Hemovac in place and working well.  Has not yet worked with physical therapy.     Morning labs low, remain within acceptable limits at this time. VSS. Drain output has slowed.   H/H 8.4 and 26.1      Scheduled Meds:   bisacodyL  10 mg Rectal Daily    buPROPion  150 mg Oral Daily    busPIRone  30 mg Oral BID    ceFAZolin (Ancef) IV (PEDS and ADULTS)  2 g Intravenous Q8H    DULoxetine  60 mg Oral BID    [START ON 10/13/2024] enoxparin  40 mg Subcutaneous Daily    gabapentin  600 mg Oral QID    levothyroxine  200 mcg Oral Daily    losartan  100 mg Oral Daily    magnesium oxide  400 mg Oral Nightly    mupirocin   Nasal BID    QUEtiapine  300 mg Oral QHS    senna-docusate 8.6-50 mg  2 tablet Oral BID     Continuous Infusions:   0.9% NaCl   Intravenous Continuous 100 mL/hr at 10/11/24 1548 New Bag at 10/11/24 1548     PRN Meds:  Current Facility-Administered Medications:     acetaminophen, 650 mg, Oral, Q6H PRN    acetaminophen, 650 mg, Oral, Q4H PRN    calcium carbonate, 500 mg, Oral, Daily PRN    ceFAZolin 2 g/50mL Dextrose IVPB, 2 g, Intravenous, On Call Procedure    dextrose 10%, 12.5 g, Intravenous, PRN    dextrose 10%, 12.5 g, Intravenous, PRN    dextrose 10%, 25 g, Intravenous, PRN    dextrose 10%, 25 g, Intravenous, PRN    glucagon (human recombinant), 1 mg, Intramuscular,  PRN    glucose, 16 g, Oral, PRN    glucose, 24 g, Oral, PRN    HYDROcodone-acetaminophen, 1 tablet, Oral, Q4H PRN    HYDROcodone-acetaminophen, 1 tablet, Oral, Q4H PRN    HYDROmorphone, 2 mg, Intravenous, Q3H PRN    methocarbamoL, 750 mg, Oral, TID PRN    morphine, 2 mg, Intravenous, Q1H PRN    morphine, 4 mg, Intravenous, Q3H PRN    naloxone, 0.02 mg, Intravenous, PRN    ondansetron, 4 mg, Intravenous, Q8H PRN    oxyCODONE-acetaminophen, 1 tablet, Oral, Q4H PRN    prochlorperazine, 10 mg, Intravenous, Q6H PRN    sodium chloride 0.9%, 10 mL, Intravenous, Q12H PRN    Review of patient's allergies indicates:   Allergen Reactions    Sucralfate Nausea Only       Past Medical History:   Diagnosis Date    Anemia     Anxiety     Depression     Dyspareunia     Encounter for blood transfusion     HTN (hypertension)     Hypothyroidism, unspecified     Liver disease     fatty liver    Lumbar radiculopathy     Menopause     Migraine     Obesity     Osteoporosis     Other spondylosis with radiculopathy, lumbar region     Personal history of fall     Spinal stenosis, lumbar region with neurogenic claudication     TIA (transient ischemic attack) 1994    Unspecified osteoarthritis, unspecified site     Urinary incontinence      Past Surgical History:   Procedure Laterality Date    ABDOMINAL SURGERY      Gastric Bypass    CARPAL TUNNEL RELEASE Bilateral     CERVICAL FUSION  12/28/2021    CHOLECYSTECTOMY  2015    COLONOSCOPY      COLPOSCOPY      FOOT SURGERY Left     FRACTURE SURGERY  2001    GASTRIC BYPASS  2011    HAND SURGERY Left     thumb    HERNIA REPAIR  2014    JOINT REPLACEMENT  Knee 2015, Hip 2016    LUMBAR FUSION  05/28/2021    LUMBAR FUSION  02/21/2023    L3-S1    ROTATOR CUFF REPAIR Bilateral 04/12/2022    SMALL INTESTINE SURGERY  2015    SPINE SURGERY  2021 & 2023    TONSILLECTOMY  2017       OBJECTIVE:     Vital Signs (Most Recent)  Temp: 98.5 °F (36.9 °C) (10/12/24 0454)  Pulse: 106 (10/12/24 0454)  Resp: 12 (10/12/24  "0642)  BP: (!) 118/57 (10/12/24 0454)  SpO2: 98 % (10/12/24 0454)    Vital Signs Range (Last 24H):  Temp:  [97.6 °F (36.4 °C)-99.7 °F (37.6 °C)]   Pulse:  []   Resp:  [10-18]   BP: (104-158)/(56-92)   SpO2:  [93 %-100 %]     Drain: 640 cc since surgery, 100 cc 8 hrs serosanguinous      Physical Exam:  General Alert, no acute distress  GCS- 15  E-4, V-5, M-6    Opens eyes spontaneously  Oriented x 3  Follows commands in all extremities  Aspen TLSO brace in place     Motor Strength 5/5 x 4 extremities         Sensory Nl to light touch x 4 extremities     Wound Dressing- clean, dry, and intact         Laboratory Review:    CBC without Differential:  Lab Results   Component Value Date    WBC 7.83 10/12/2024    HGB 8.4 (L) 10/12/2024    HCT 26.1 (L) 10/12/2024     10/12/2024    MCV 97.8 (H) 10/12/2024     Differential:   Lab Results   Component Value Date    NEUTROABS 3.0 01/31/2024    LYMPHSABS 1.6 01/31/2024       Basic Metabolic Panel:  Lab Results   Component Value Date     10/12/2024    K 4.1 10/12/2024     (H) 10/12/2024     (H) 11/03/2023    BUN 11.0 10/12/2024    MG 1.90 07/18/2024    PHOS 4.4 04/24/2023    ANIONGAP 10 07/04/2024     Coagulation Studies:  Lab Results   Component Value Date    INR 0.9 12/22/2023    INR 1.0 04/16/2022    INR 1.0 04/20/2021     No results found for: "PTT"  Urinalysis:  Lab Results   Component Value Date    PHURINE 8.5 07/18/2024    LEUKOCYTESUR Negative 08/28/2024    UROBILINOGEN 0.2 08/28/2024        Radiology:    I have personally reviewed and evaluated the following reports as well as radiographic studies:      ASSESSMENT/PLAN:     Ms. Cheek is a 64 year old female with past medical history significant for hypertension, TIA, HLD, hypothyroid, mitral regurgitation, chronic pain and s/p gastric bypass. Patient is POD #1 T10-L3 decompression, removal of right S1 screw, replacement of right S2 iliac bold and T10-S2 fusion. She has a normal motor and " sensory exam.     Plan:     Floor status  Neuro checks Q4  Continue drain. Continue antibiotics.  Document drain output Q4 and Q24h  MM pain control. Not well controlled with current meds. Discontinued Norco. Continue percocet at this time. Added robaxin.   Encouraged PT/OT  TLSO brace to be worn if HOB greater than 30 degrees or OOB  CM consulted for discharge planning  SCDs for DVT  Fall precautions    Neurosurgery will continue to follow the patient.  Please do not hesitate to contact neurosurgery for any additional questions or concerns.       LISSETTE Isidro  Neurosurgery

## 2024-10-12 NOTE — PLAN OF CARE
Problem: Physical Therapy  Goal: Physical Therapy Goal  Description: Goals to be met by: 2024     Patient will increase functional independence with mobility by performin. Supine to sit with Modified [place]  2. Sit to supine with Modified [place]  3. Sit to stand transfer with Modified [place]  4. Gait  x 50 feet with Modified [place] using Rolling Walker.   5. Pt to ascend/descend 3 stairs using no handrails, SBA.     10/12/2024 1307 by Angela Garcia, PT  Outcome: Progressing  10/12/2024 1306 by Angela Garcia, PT  Outcome: Progressing

## 2024-10-13 LAB
ABO + RH BLD: NORMAL
ABO + RH BLD: NORMAL
ALBUMIN SERPL-MCNC: 2.8 G/DL (ref 3.4–4.8)
ALBUMIN/GLOB SERPL: 1.4 RATIO (ref 1.1–2)
ALP SERPL-CCNC: 79 UNIT/L (ref 40–150)
ALT SERPL-CCNC: 6 UNIT/L (ref 0–55)
ANION GAP SERPL CALC-SCNC: 6 MEQ/L
AST SERPL-CCNC: 14 UNIT/L (ref 5–34)
BASOPHILS # BLD AUTO: 0.03 X10(3)/MCL
BASOPHILS NFR BLD AUTO: 0.5 %
BILIRUB SERPL-MCNC: 0.2 MG/DL
BLD PROD TYP BPU: NORMAL
BLD PROD TYP BPU: NORMAL
BLOOD UNIT EXPIRATION DATE: NORMAL
BLOOD UNIT EXPIRATION DATE: NORMAL
BLOOD UNIT TYPE CODE: 600
BLOOD UNIT TYPE CODE: 600
BUN SERPL-MCNC: 9.3 MG/DL (ref 9.8–20.1)
CALCIUM SERPL-MCNC: 7.8 MG/DL (ref 8.4–10.2)
CHLORIDE SERPL-SCNC: 108 MMOL/L (ref 98–107)
CO2 SERPL-SCNC: 25 MMOL/L (ref 23–31)
CREAT SERPL-MCNC: 0.71 MG/DL (ref 0.55–1.02)
CREAT/UREA NIT SERPL: 13
CROSSMATCH INTERPRETATION: NORMAL
CROSSMATCH INTERPRETATION: NORMAL
DISPENSE STATUS: NORMAL
DISPENSE STATUS: NORMAL
EOSINOPHIL # BLD AUTO: 0.13 X10(3)/MCL (ref 0–0.9)
EOSINOPHIL NFR BLD AUTO: 2.4 %
ERYTHROCYTE [DISTWIDTH] IN BLOOD BY AUTOMATED COUNT: 13.1 % (ref 11.5–17)
GFR SERPLBLD CREATININE-BSD FMLA CKD-EPI: >60 ML/MIN/1.73/M2
GLOBULIN SER-MCNC: 2 GM/DL (ref 2.4–3.5)
GLUCOSE SERPL-MCNC: 115 MG/DL (ref 82–115)
HCT VFR BLD AUTO: 22.9 % (ref 37–47)
HGB BLD-MCNC: 7.4 G/DL (ref 12–16)
IMM GRANULOCYTES # BLD AUTO: 0.06 X10(3)/MCL (ref 0–0.04)
IMM GRANULOCYTES NFR BLD AUTO: 1.1 %
LYMPHOCYTES # BLD AUTO: 1.13 X10(3)/MCL (ref 0.6–4.6)
LYMPHOCYTES NFR BLD AUTO: 20.5 %
MCH RBC QN AUTO: 31.6 PG (ref 27–31)
MCHC RBC AUTO-ENTMCNC: 32.3 G/DL (ref 33–36)
MCV RBC AUTO: 97.9 FL (ref 80–94)
MONOCYTES # BLD AUTO: 0.53 X10(3)/MCL (ref 0.1–1.3)
MONOCYTES NFR BLD AUTO: 9.6 %
NEUTROPHILS # BLD AUTO: 3.63 X10(3)/MCL (ref 2.1–9.2)
NEUTROPHILS NFR BLD AUTO: 65.9 %
NRBC BLD AUTO-RTO: 0 %
PLATELET # BLD AUTO: 135 X10(3)/MCL (ref 130–400)
PMV BLD AUTO: 8.9 FL (ref 7.4–10.4)
POTASSIUM SERPL-SCNC: 4.1 MMOL/L (ref 3.5–5.1)
PROT SERPL-MCNC: 4.8 GM/DL (ref 5.8–7.6)
RBC # BLD AUTO: 2.34 X10(6)/MCL (ref 4.2–5.4)
SODIUM SERPL-SCNC: 139 MMOL/L (ref 136–145)
UNIT NUMBER: NORMAL
UNIT NUMBER: NORMAL
WBC # BLD AUTO: 5.51 X10(3)/MCL (ref 4.5–11.5)

## 2024-10-13 PROCEDURE — 11000001 HC ACUTE MED/SURG PRIVATE ROOM

## 2024-10-13 PROCEDURE — 25000003 PHARM REV CODE 250

## 2024-10-13 PROCEDURE — 99900035 HC TECH TIME PER 15 MIN (STAT)

## 2024-10-13 PROCEDURE — P9016 RBC LEUKOCYTES REDUCED: HCPCS | Performed by: ANESTHESIOLOGY

## 2024-10-13 PROCEDURE — C1751 CATH, INF, PER/CENT/MIDLINE: HCPCS

## 2024-10-13 PROCEDURE — 05HY33Z INSERTION OF INFUSION DEVICE INTO UPPER VEIN, PERCUTANEOUS APPROACH: ICD-10-PCS | Performed by: NEUROLOGICAL SURGERY

## 2024-10-13 PROCEDURE — 63600175 PHARM REV CODE 636 W HCPCS

## 2024-10-13 PROCEDURE — 76937 US GUIDE VASCULAR ACCESS: CPT

## 2024-10-13 PROCEDURE — 36430 TRANSFUSION BLD/BLD COMPNT: CPT

## 2024-10-13 PROCEDURE — A4216 STERILE WATER/SALINE, 10 ML: HCPCS

## 2024-10-13 PROCEDURE — 97166 OT EVAL MOD COMPLEX 45 MIN: CPT

## 2024-10-13 PROCEDURE — 25000003 PHARM REV CODE 250: Performed by: NEUROLOGICAL SURGERY

## 2024-10-13 PROCEDURE — 36410 VNPNXR 3YR/> PHY/QHP DX/THER: CPT

## 2024-10-13 PROCEDURE — 85025 COMPLETE CBC W/AUTO DIFF WBC: CPT

## 2024-10-13 PROCEDURE — 80053 COMPREHEN METABOLIC PANEL: CPT

## 2024-10-13 PROCEDURE — 94799 UNLISTED PULMONARY SVC/PX: CPT

## 2024-10-13 PROCEDURE — 36415 COLL VENOUS BLD VENIPUNCTURE: CPT

## 2024-10-13 RX ORDER — BUTALBITAL, ACETAMINOPHEN AND CAFFEINE 50; 325; 40 MG/1; MG/1; MG/1
1 TABLET ORAL EVERY 4 HOURS PRN
Status: DISCONTINUED | OUTPATIENT
Start: 2024-10-13 | End: 2024-10-16 | Stop reason: HOSPADM

## 2024-10-13 RX ORDER — SODIUM CHLORIDE 0.9 % (FLUSH) 0.9 %
10 SYRINGE (ML) INJECTION
Status: DISCONTINUED | OUTPATIENT
Start: 2024-10-13 | End: 2024-10-16 | Stop reason: HOSPADM

## 2024-10-13 RX ORDER — SODIUM CHLORIDE 0.9 % (FLUSH) 0.9 %
10 SYRINGE (ML) INJECTION EVERY 6 HOURS
Status: DISCONTINUED | OUTPATIENT
Start: 2024-10-13 | End: 2024-10-16 | Stop reason: HOSPADM

## 2024-10-13 RX ORDER — HYDROCODONE BITARTRATE AND ACETAMINOPHEN 500; 5 MG/1; MG/1
TABLET ORAL
Status: DISCONTINUED | OUTPATIENT
Start: 2024-10-13 | End: 2024-10-16 | Stop reason: HOSPADM

## 2024-10-13 RX ADMIN — METHOCARBAMOL 750 MG: 750 TABLET ORAL at 08:10

## 2024-10-13 RX ADMIN — SENNOSIDES AND DOCUSATE SODIUM 2 TABLET: 50; 8.6 TABLET ORAL at 08:10

## 2024-10-13 RX ADMIN — BUSPIRONE HYDROCHLORIDE 30 MG: 15 TABLET ORAL at 07:10

## 2024-10-13 RX ADMIN — METHOCARBAMOL 750 MG: 750 TABLET ORAL at 07:10

## 2024-10-13 RX ADMIN — DULOXETINE HYDROCHLORIDE 60 MG: 30 CAPSULE, DELAYED RELEASE ORAL at 08:10

## 2024-10-13 RX ADMIN — PROCHLORPERAZINE EDISYLATE 10 MG: 5 INJECTION INTRAMUSCULAR; INTRAVENOUS at 09:10

## 2024-10-13 RX ADMIN — OXYCODONE AND ACETAMINOPHEN 1 TABLET: 10; 325 TABLET ORAL at 10:10

## 2024-10-13 RX ADMIN — HYDROMORPHONE HYDROCHLORIDE 2 MG: 2 INJECTION INTRAMUSCULAR; INTRAVENOUS; SUBCUTANEOUS at 07:10

## 2024-10-13 RX ADMIN — HYDROMORPHONE HYDROCHLORIDE 2 MG: 2 INJECTION INTRAMUSCULAR; INTRAVENOUS; SUBCUTANEOUS at 03:10

## 2024-10-13 RX ADMIN — OXYCODONE AND ACETAMINOPHEN 1 TABLET: 10; 325 TABLET ORAL at 02:10

## 2024-10-13 RX ADMIN — BUSPIRONE HYDROCHLORIDE 30 MG: 15 TABLET ORAL at 08:10

## 2024-10-13 RX ADMIN — HYDROMORPHONE HYDROCHLORIDE 2 MG: 2 INJECTION INTRAMUSCULAR; INTRAVENOUS; SUBCUTANEOUS at 12:10

## 2024-10-13 RX ADMIN — GABAPENTIN 600 MG: 300 CAPSULE ORAL at 12:10

## 2024-10-13 RX ADMIN — DULOXETINE HYDROCHLORIDE 60 MG: 30 CAPSULE, DELAYED RELEASE ORAL at 07:10

## 2024-10-13 RX ADMIN — ENOXAPARIN SODIUM 40 MG: 40 INJECTION SUBCUTANEOUS at 04:10

## 2024-10-13 RX ADMIN — QUETIAPINE FUMARATE 300 MG: 300 TABLET ORAL at 08:10

## 2024-10-13 RX ADMIN — GABAPENTIN 600 MG: 300 CAPSULE ORAL at 04:10

## 2024-10-13 RX ADMIN — Medication 10 ML: at 06:10

## 2024-10-13 RX ADMIN — SENNOSIDES AND DOCUSATE SODIUM 2 TABLET: 50; 8.6 TABLET ORAL at 07:10

## 2024-10-13 RX ADMIN — METHOCARBAMOL 750 MG: 750 TABLET ORAL at 04:10

## 2024-10-13 RX ADMIN — OXYCODONE AND ACETAMINOPHEN 1 TABLET: 10; 325 TABLET ORAL at 07:10

## 2024-10-13 RX ADMIN — Medication 400 MG: at 08:10

## 2024-10-13 RX ADMIN — GABAPENTIN 600 MG: 300 CAPSULE ORAL at 07:10

## 2024-10-13 RX ADMIN — LEVOTHYROXINE SODIUM 200 MCG: 100 TABLET ORAL at 07:10

## 2024-10-13 RX ADMIN — LOSARTAN POTASSIUM 100 MG: 50 TABLET, FILM COATED ORAL at 07:10

## 2024-10-13 RX ADMIN — HYDROMORPHONE HYDROCHLORIDE 2 MG: 2 INJECTION INTRAMUSCULAR; INTRAVENOUS; SUBCUTANEOUS at 08:10

## 2024-10-13 RX ADMIN — GABAPENTIN 600 MG: 300 CAPSULE ORAL at 08:10

## 2024-10-13 RX ADMIN — Medication 10 ML: at 12:10

## 2024-10-13 RX ADMIN — ACETAMINOPHEN 650 MG: 325 TABLET, FILM COATED ORAL at 07:10

## 2024-10-13 RX ADMIN — BUPROPION HYDROCHLORIDE 150 MG: 150 TABLET, EXTENDED RELEASE ORAL at 07:10

## 2024-10-13 RX ADMIN — BISACODYL 10 MG: 10 SUPPOSITORY RECTAL at 07:10

## 2024-10-13 RX ADMIN — ONDANSETRON 4 MG: 2 INJECTION INTRAMUSCULAR; INTRAVENOUS at 08:10

## 2024-10-13 RX ADMIN — METHOCARBAMOL 750 MG: 750 TABLET ORAL at 12:10

## 2024-10-13 RX ADMIN — OXYCODONE AND ACETAMINOPHEN 1 TABLET: 10; 325 TABLET ORAL at 01:10

## 2024-10-13 RX ADMIN — OXYCODONE AND ACETAMINOPHEN 1 TABLET: 10; 325 TABLET ORAL at 06:10

## 2024-10-13 NOTE — PLAN OF CARE
Problem: Occupational Therapy  Goal: Occupational Therapy Goal  Description: Goals to be met by: 11/13/24     Patient will increase functional independence with ADLs by performing:    UE Dressing with Modified [place].  LE Dressing with Modified [place] with use of AE.  Grooming while standing at sink with Modified [place].  Toileting from toilet with Modified [place] for hygiene and clothing management.   Toilet transfer to toilet with Modified [place].    Outcome: Progressing

## 2024-10-13 NOTE — NURSING
Pt noted DTV 12pm-2pm. Pt did not void. Performed bladder scan and noted with 384 mL at 3pm. Redid bladder scan, resulted 61ml at 6pm Informed NP, charan,informed to place indwelling cath and repeat

## 2024-10-13 NOTE — PT/OT/SLP EVAL
Occupational Therapy  Evaluation    Name: Kasandra Cheek  MRN: [id number]  Admitting Diagnosis: back pain  Recent Surgery: Procedure(s) (LRB):  LAMINECTOMY, SPINE, THORACIC, POSTERIOR APPROACH, USING COMPUTER-ASSISTED NAVIGATION (N/A)  FUSION, SPINE, LUMBAR, PLIF, USING COMPUTER-ASSISTED NAVIGATION (N/A) 2 Days Post-Op    Recommendations:     Discharge therapy intensity: Low Intensity Therapy   Discharge Equipment Recommendations:  none  Barriers to discharge:  None    Assessment:     Kasandra Cheek is a 64 y.o. female with a medical diagnosis of  T10-L3 decompression, removal of right S1 screw, replacement of right S2 iliac bold and T10-S2 fusion.  She presents with the following performance deficits affecting function: weakness, impaired endurance, impaired self care skills, impaired functional mobility, gait instability, impaired balance, decreased lower extremity function, decreased upper extremity function, orthopedic precautions.     Pt with great tolerance to OT session this date, very motivated to participate, BP stable throughout. Pt completed bed mobility with Min A and sit>stand transfers from bed, bathroom commode, and bedside chair with CGA using RW. Pt ambulated household distance throughout room with CGA using RW. At baseline pt required assist with LB ADLs from her spouse and walked with rollator. Pt would benefit from low intensity therapy upon discharge.     Rehab Prognosis: Good; patient would benefit from acute skilled OT services to address these deficits and reach maximum level of function.       Plan:     Patient to be seen 5 x/week to address the above listed problems via self-care/home management, therapeutic activities, therapeutic exercises  Plan of Care Expires: 11/13/24  Plan of Care Reviewed with: patient, spouse, son    Subjective     Chief Complaint: wanting to mobilize  Patient/Family Comments/goals: to go home    Occupational Profile:  Living Environment: lives with spouse in [facility] with 3  LEILANI and no rails; pt has walk in shower with seat  Previous level of function: Mod I for mobility, assist with washing hair 2/2 shoulder injuries and LB dressing from spouse  Roles and Routines: wife, mother  Equipment Used at Home: shower chair, rollator, walker, rolling  Assistance upon Discharge: spouse present 24/7    Pain/Comfort:  Pain Rating 1: 4/10  Location 1: back  Pain Addressed 1: Reposition, Distraction, Cessation of Activity  Pain Rating Post-Intervention 1: 4/10    Patients cultural, spiritual, Uatsdin conflicts given the current situation: no    Objective:     OT communicated with RN prior to session.      Patient was found HOB elevated < 30 deg.with peripheral IV, march catheter, ELSIE drain upon OT entry to room.    General Precautions: Standard, fall  Orthopedic Precautions: spinal precautions  Braces: TLSO    Vital Signs: Blood Pressure: pre 139/76, post 139/81    Bed Mobility:    Patient completed Rolling/Turning to Left with  contact guard assistance  Patient completed Rolling/Turning to Right with contact guard assistance  Patient completed Supine to Sit with minimum assistance    Functional Mobility/Transfers:  Patient completed Sit <> Stand Transfer with contact guard assistance  with  rolling walker   Patient completed Bed <> Chair Transfer using Step Transfer technique with contact guard assistance with rolling walker  Patient completed Toilet Transfer Step Transfer technique with contact guard assistance with  rolling walker  Functional Mobility: pt ambulated household distance within room using RW with CGA. No LOB or safety concerns    Activities of Daily Living:  Lower Body Dressing: maximal assistance donning socks (spouse assists with this); pt would benefit from LB dressing equipment for increased independence     Functional Cognition:  Orientation: oriented to Person, Place, Time, and Situation    Visual Perceptual Skills:  Intact    Upper Extremity Function:  Right Upper Extremity:    Strength: WFL    Left Upper Extremity:  Strength: WFL    Balance:   Static sitting balance: WFL  Dynamic sitting balance:WFL  Static standing balance:CGA at RW  Dynamic standing balance:CGA at RW    Therapeutic Positioning  Risk for acquired pressure injuries is decreased due to ability to get to BSC/toilet with assist.    OT interventions performed during the course of today's session:   Education was provided on benefits of and recommendations for therapeutic positioning    Skin assessment: visible skin intact    OT recommendations for therapeutic positioning throughout hospitalization:   Follow [facility] Pressure Injury Prevention Protocol    Patient Education:  Patient, spouse were, and son/s were provided with verbal education education regarding OT role/goals/POC, post op precautions, fall prevention, safety awareness, and Discharge/DME recommendations.  Understanding was verbalized.     Patient left up in chair with all lines intact, call button in reach, RN notified, and spouse and son present.    GOALS:   Multidisciplinary Problems       Occupational Therapy Goals          Problem: Occupational Therapy    Goal Priority Disciplines Outcome Interventions   Occupational Therapy Goal     OT, PT/OT Progressing    Description: Goals to be met by: 11/13/24     Patient will increase functional independence with ADLs by performing:    UE Dressing with Modified [place].  LE Dressing with Modified [place] with use of AE.  Grooming while standing at sink with Modified [place].  Toileting from toilet with Modified [place] for hygiene and clothing management.   Toilet transfer to toilet with Modified [place].                         History:     Past Medical History:   Diagnosis Date    Anemia     Anxiety     Depression     Dyspareunia     Encounter for blood transfusion     HTN (hypertension)     Hypothyroidism, unspecified     Liver disease     fatty liver    Lumbar radiculopathy     Menopause      Migraine     Obesity     Osteoporosis     Other spondylosis with radiculopathy, lumbar region     Personal history of fall     Spinal stenosis, lumbar region with neurogenic claudication     TIA (transient ischemic attack) 1994    Unspecified osteoarthritis, unspecified site     Urinary incontinence          Past Surgical History:   Procedure Laterality Date    ABDOMINAL SURGERY      Gastric Bypass    CARPAL TUNNEL RELEASE Bilateral     CERVICAL FUSION  12/28/2021    CHOLECYSTECTOMY  2015    COLONOSCOPY      COLPOSCOPY      FOOT SURGERY Left     FRACTURE SURGERY  2001    GASTRIC BYPASS  2011    HAND SURGERY Left     thumb    HERNIA REPAIR  2014    JOINT REPLACEMENT  Knee 2015, Hip 2016    LUMBAR FUSION  05/28/2021    LUMBAR FUSION  02/21/2023    L3-S1    ROTATOR CUFF REPAIR Bilateral 04/12/2022    SMALL INTESTINE SURGERY  2015    SPINE SURGERY  2021 & 2023    TONSILLECTOMY  2017       Time Tracking:     OT Date of Treatment: 10/13/24  OT Start Time: 1250  OT Stop Time: 1315  OT Total Time (min): 25 min    Billable Minutes:Evaluation mod complexity    10/13/2024

## 2024-10-13 NOTE — PROCEDURES
"Kasandra Cheek is a 64 y.o. female patient.    Temp: 98.5 °F (36.9 °C) (10/13/24 0755)  Pulse: 102 (10/13/24 0755)  Resp: 16 (10/13/24 0758)  BP: (!) 146/82 (10/13/24 0755)  SpO2: 95 % (10/13/24 0755)  Weight: 136.1 kg (300 lb) (10/01/24 1411)  Height: 5' 7" (170.2 cm) (10/01/24 1411)    PICC  Date/Time: 10/13/2024 9:02 AM  Performed by: Roel Villegas, RN  Consent Done: Yes  Time out: Immediately prior to procedure a time out was called to verify the correct patient, procedure, equipment, support staff and site/side marked as required  Indications: med administration and vascular access  Anesthesia: local infiltration  Local anesthetic: lidocaine 1% without epinephrine  Anesthetic Total (mL): 4  Preparation: skin prepped with ChloraPrep  Skin prep agent dried: skin prep agent completely dried prior to procedure  Sterile barriers: all five maximum sterile barriers used - cap, mask, sterile gown, sterile gloves, and large sterile sheet  Hand hygiene: hand hygiene performed prior to central venous catheter insertion  Location details: left basilic  Catheter type: single lumen  Catheter size: 4 Fr  Catheter Length: 17cm    Ultrasound guidance: yes  Vessel Caliber: patent, compressibility normal  Vascular Doppler: not done  Needle advanced into vessel with real time Ultrasound guidance.  Guidewire confirmed in vessel.  Sterile sheath used.  no esophageal manometryNumber of attempts: 1  Post-procedure: blood return through all ports, sterile dressing applied and chlorhexidine patch    Assessment: successful placement  Complications: none          Roel Villegas, PONCHO  10/13/2024    "

## 2024-10-13 NOTE — PROGRESS NOTES
Hospital Progress Note  [facility] Neurosurgery      Admit Date: 10/11/2024  Post-operative Day: 2 Days Post-Op  Hospital Day: 3    I am cross covering this patient for neurosurgeon Dr. Mejia.    SUBJECTIVE:     POD #2 T10-L3 decompression, removal of right S1 screw, replacement of right S2 iliac bold and T10-S2 fusion     Interval History:  Evaluated the patient with family member at the bedside.  She is resting quietly in bed with Aspen TLSO brace at the bedside.  Reports moderate back pain that is not well controlled with current medications. Requesting IV med for break through pain.  ELSIE drain in place and working well. Copious amounts of drainage documented over last 24 hours. Dizzy with PT yesterday, not able to complete. Urinary retention after pulling march yesterday. Reinserted overnight with goal to remove after working with PT today. Complains of HA rating 9/10.    Drain output continued to increased throughout the day yesterday. Patient with acute blood loss anemia this morning.   H/H 7.4 and 22.9      Scheduled Meds:   bisacodyL  10 mg Rectal Daily    buPROPion  150 mg Oral Daily    busPIRone  30 mg Oral BID    DULoxetine  60 mg Oral BID    enoxparin  40 mg Subcutaneous Daily    gabapentin  600 mg Oral QID    levothyroxine  200 mcg Oral Daily    losartan  100 mg Oral Daily    magnesium oxide  400 mg Oral Nightly    methocarbamoL  750 mg Oral QID    mupirocin   Nasal BID    QUEtiapine  300 mg Oral QHS    senna-docusate 8.6-50 mg  2 tablet Oral BID     Continuous Infusions:   0.9% NaCl   Intravenous Continuous 100 mL/hr at 10/11/24 1548 New Bag at 10/11/24 1548     PRN Meds:  Current Facility-Administered Medications:     acetaminophen, 650 mg, Oral, Q6H PRN    acetaminophen, 650 mg, Oral, Q4H PRN    calcium carbonate, 500 mg, Oral, Daily PRN    ceFAZolin 2 g/50mL Dextrose IVPB, 2 g, Intravenous, On Call Procedure    dextrose 10%, 12.5 g, Intravenous, PRN    dextrose 10%, 12.5 g,  Intravenous, PRN    dextrose 10%, 25 g, Intravenous, PRN    dextrose 10%, 25 g, Intravenous, PRN    glucagon (human recombinant), 1 mg, Intramuscular, PRN    glucose, 16 g, Oral, PRN    glucose, 24 g, Oral, PRN    HYDROmorphone, 2 mg, Intravenous, Q3H PRN    naloxone, 0.02 mg, Intravenous, PRN    ondansetron, 4 mg, Intravenous, Q8H PRN    oxyCODONE-acetaminophen, 1 tablet, Oral, Q4H PRN    prochlorperazine, 10 mg, Intravenous, Q6H PRN    sodium chloride 0.9%, 10 mL, Intravenous, Q12H PRN    Review of patient's allergies indicates:   Allergen Reactions    Sucralfate Nausea Only       Past Medical History:   Diagnosis Date    Anemia     Anxiety     Depression     Dyspareunia     Encounter for blood transfusion     HTN (hypertension)     Hypothyroidism, unspecified     Liver disease     fatty liver    Lumbar radiculopathy     Menopause     Migraine     Obesity     Osteoporosis     Other spondylosis with radiculopathy, lumbar region     Personal history of fall     Spinal stenosis, lumbar region with neurogenic claudication     TIA (transient ischemic attack) 1994    Unspecified osteoarthritis, unspecified site     Urinary incontinence      Past Surgical History:   Procedure Laterality Date    ABDOMINAL SURGERY      Gastric Bypass    CARPAL TUNNEL RELEASE Bilateral     CERVICAL FUSION  12/28/2021    CHOLECYSTECTOMY  2015    COLONOSCOPY      COLPOSCOPY      FOOT SURGERY Left     FRACTURE SURGERY  2001    GASTRIC BYPASS  2011    HAND SURGERY Left     thumb    HERNIA REPAIR  2014    JOINT REPLACEMENT  Knee 2015, Hip 2016    LUMBAR FUSION  05/28/2021    LUMBAR FUSION  02/21/2023    L3-S1    ROTATOR CUFF REPAIR Bilateral 04/12/2022    SMALL INTESTINE SURGERY  2015    SPINE SURGERY  2021 & 2023    TONSILLECTOMY  2017       OBJECTIVE:     Vital Signs (Most Recent)  Temp: 98.5 °F (36.9 °C) (10/13/24 0416)  Pulse: 102 (10/13/24 0416)  Resp: 17 (10/13/24 0603)  BP: (!) 121/55 (10/13/24 0416)  SpO2: (!) 91 % (10/13/24  "0416)    Vital Signs Range (Last 24H):  Temp:  [98 °F (36.7 °C)-98.5 °F (36.9 °C)]   Pulse:  []   Resp:  [16-18]   BP: ()/(51-74)   SpO2:  [91 %-99 %]     Drain: 645 cc since surgery, 180 cc 8 hrs serosanguinous      Physical Exam:  General Alert, no acute distress  GCS- 15  E-4, V-5, M-6    Opens eyes spontaneously  Oriented x 3  Follows commands in all extremities  Aspen TLSO brace in place     Motor Strength 5/5 x 4 extremities         Sensory Nl to light touch x 4 extremities     Wound Dressing- moderate drainage, dry, and intact         Laboratory Review:    CBC without Differential:  Lab Results   Component Value Date    WBC 5.51 10/13/2024    HGB 7.4 (L) 10/13/2024    HCT 22.9 (L) 10/13/2024     10/13/2024    MCV 97.9 (H) 10/13/2024     Differential:   Lab Results   Component Value Date    NEUTROABS 3.0 01/31/2024    LYMPHSABS 1.6 01/31/2024       Basic Metabolic Panel:  Lab Results   Component Value Date     10/13/2024    K 4.1 10/13/2024     (H) 10/13/2024     (H) 11/03/2023    BUN 9.3 (L) 10/13/2024    MG 1.90 07/18/2024    PHOS 4.4 04/24/2023    ANIONGAP 10 07/04/2024     Coagulation Studies:  Lab Results   Component Value Date    INR 0.9 12/22/2023    INR 1.0 04/16/2022    INR 1.0 04/20/2021     No results found for: "PTT"  Urinalysis:  Lab Results   Component Value Date    PHURINE 8.5 07/18/2024    LEUKOCYTESUR Negative 08/28/2024    UROBILINOGEN 0.2 08/28/2024        Radiology:    I have personally reviewed and evaluated the following reports as well as radiographic studies:      ASSESSMENT/PLAN:     Ms. Cheek is a 64 year old female with past medical history significant for hypertension, TIA, HLD, hypothyroid, mitral regurgitation, chronic pain and s/p gastric bypass. Patient is POD #2 T10-L3 decompression, removal of right S1 screw, replacement of right S2 iliac bold and T10-S2 fusion. She has a normal motor and sensory exam.     Plan:     Floor status  Neuro " checks Q4  Continue drain. Continue antibiotics.  Document drain output Q4 and Q24h  Daily dressing changes PRN saturation  Anemia due to acute blood loss, 2 units PRBC ordered  MM pain control. Not well controlled with current meds. Discontinued Norco. Continue percocet at this time. Added robaxin.   Encouraged PT/OT  TLSO brace to be worn if HOB greater than 30 degrees or OOB  CM consulted for discharge planning  SCDs for DVT  Fall precautions    Neurosurgery will continue to follow the patient.  Please do not hesitate to contact neurosurgery for any additional questions or concerns.       Char Beckford, LISSETTE  Neurosurgery

## 2024-10-14 LAB
ALBUMIN SERPL-MCNC: 3.1 G/DL (ref 3.4–4.8)
ALBUMIN/GLOB SERPL: 1.2 RATIO (ref 1.1–2)
ALP SERPL-CCNC: 92 UNIT/L (ref 40–150)
ALT SERPL-CCNC: 9 UNIT/L (ref 0–55)
ANION GAP SERPL CALC-SCNC: 9 MEQ/L
AST SERPL-CCNC: 17 UNIT/L (ref 5–34)
BASOPHILS # BLD AUTO: 0.03 X10(3)/MCL
BASOPHILS NFR BLD AUTO: 0.5 %
BILIRUB SERPL-MCNC: 0.4 MG/DL
BUN SERPL-MCNC: 8.2 MG/DL (ref 9.8–20.1)
CALCIUM SERPL-MCNC: 8.4 MG/DL (ref 8.4–10.2)
CHLORIDE SERPL-SCNC: 106 MMOL/L (ref 98–107)
CO2 SERPL-SCNC: 25 MMOL/L (ref 23–31)
CREAT SERPL-MCNC: 0.62 MG/DL (ref 0.55–1.02)
CREAT/UREA NIT SERPL: 13
EOSINOPHIL # BLD AUTO: 0.03 X10(3)/MCL (ref 0–0.9)
EOSINOPHIL NFR BLD AUTO: 0.5 %
ERYTHROCYTE [DISTWIDTH] IN BLOOD BY AUTOMATED COUNT: 13.9 % (ref 11.5–17)
GFR SERPLBLD CREATININE-BSD FMLA CKD-EPI: >60 ML/MIN/1.73/M2
GLOBULIN SER-MCNC: 2.5 GM/DL (ref 2.4–3.5)
GLUCOSE SERPL-MCNC: 126 MG/DL (ref 82–115)
HCT VFR BLD AUTO: 31.2 % (ref 37–47)
HGB BLD-MCNC: 10.6 G/DL (ref 12–16)
IMM GRANULOCYTES # BLD AUTO: 0.13 X10(3)/MCL (ref 0–0.04)
IMM GRANULOCYTES NFR BLD AUTO: 2 %
LYMPHOCYTES # BLD AUTO: 0.83 X10(3)/MCL (ref 0.6–4.6)
LYMPHOCYTES NFR BLD AUTO: 12.5 %
MCH RBC QN AUTO: 31.5 PG (ref 27–31)
MCHC RBC AUTO-ENTMCNC: 34 G/DL (ref 33–36)
MCV RBC AUTO: 92.9 FL (ref 80–94)
MONOCYTES # BLD AUTO: 0.4 X10(3)/MCL (ref 0.1–1.3)
MONOCYTES NFR BLD AUTO: 6 %
NEUTROPHILS # BLD AUTO: 5.22 X10(3)/MCL (ref 2.1–9.2)
NEUTROPHILS NFR BLD AUTO: 78.5 %
NRBC BLD AUTO-RTO: 0.3 %
PLATELET # BLD AUTO: 165 X10(3)/MCL (ref 130–400)
PMV BLD AUTO: 9 FL (ref 7.4–10.4)
POCT GLUCOSE: 110 MG/DL (ref 70–110)
POTASSIUM SERPL-SCNC: 4 MMOL/L (ref 3.5–5.1)
PROT SERPL-MCNC: 5.6 GM/DL (ref 5.8–7.6)
RBC # BLD AUTO: 3.36 X10(6)/MCL (ref 4.2–5.4)
SODIUM SERPL-SCNC: 140 MMOL/L (ref 136–145)
WBC # BLD AUTO: 6.64 X10(3)/MCL (ref 4.5–11.5)

## 2024-10-14 PROCEDURE — 25000003 PHARM REV CODE 250

## 2024-10-14 PROCEDURE — 80053 COMPREHEN METABOLIC PANEL: CPT

## 2024-10-14 PROCEDURE — 63600175 PHARM REV CODE 636 W HCPCS

## 2024-10-14 PROCEDURE — 25000003 PHARM REV CODE 250: Performed by: NEUROLOGICAL SURGERY

## 2024-10-14 PROCEDURE — 97116 GAIT TRAINING THERAPY: CPT | Mod: CQ

## 2024-10-14 PROCEDURE — 97535 SELF CARE MNGMENT TRAINING: CPT

## 2024-10-14 PROCEDURE — 94799 UNLISTED PULMONARY SVC/PX: CPT

## 2024-10-14 PROCEDURE — 11000001 HC ACUTE MED/SURG PRIVATE ROOM

## 2024-10-14 PROCEDURE — 85025 COMPLETE CBC W/AUTO DIFF WBC: CPT

## 2024-10-14 PROCEDURE — 36415 COLL VENOUS BLD VENIPUNCTURE: CPT

## 2024-10-14 PROCEDURE — A4216 STERILE WATER/SALINE, 10 ML: HCPCS

## 2024-10-14 RX ADMIN — DULOXETINE HYDROCHLORIDE 60 MG: 30 CAPSULE, DELAYED RELEASE ORAL at 08:10

## 2024-10-14 RX ADMIN — ENOXAPARIN SODIUM 40 MG: 40 INJECTION SUBCUTANEOUS at 04:10

## 2024-10-14 RX ADMIN — METHOCARBAMOL 750 MG: 750 TABLET ORAL at 04:10

## 2024-10-14 RX ADMIN — ONDANSETRON 4 MG: 2 INJECTION INTRAMUSCULAR; INTRAVENOUS at 09:10

## 2024-10-14 RX ADMIN — HYDROMORPHONE HYDROCHLORIDE 2 MG: 2 INJECTION INTRAMUSCULAR; INTRAVENOUS; SUBCUTANEOUS at 02:10

## 2024-10-14 RX ADMIN — Medication 10 ML: at 04:10

## 2024-10-14 RX ADMIN — OXYCODONE AND ACETAMINOPHEN 1 TABLET: 10; 325 TABLET ORAL at 02:10

## 2024-10-14 RX ADMIN — HYDROMORPHONE HYDROCHLORIDE 2 MG: 2 INJECTION INTRAMUSCULAR; INTRAVENOUS; SUBCUTANEOUS at 09:10

## 2024-10-14 RX ADMIN — SENNOSIDES AND DOCUSATE SODIUM 2 TABLET: 50; 8.6 TABLET ORAL at 08:10

## 2024-10-14 RX ADMIN — HYDROMORPHONE HYDROCHLORIDE 2 MG: 2 INJECTION INTRAMUSCULAR; INTRAVENOUS; SUBCUTANEOUS at 04:10

## 2024-10-14 RX ADMIN — METHOCARBAMOL 750 MG: 750 TABLET ORAL at 08:10

## 2024-10-14 RX ADMIN — GABAPENTIN 600 MG: 300 CAPSULE ORAL at 04:10

## 2024-10-14 RX ADMIN — HYDROMORPHONE HYDROCHLORIDE 2 MG: 2 INJECTION INTRAMUSCULAR; INTRAVENOUS; SUBCUTANEOUS at 07:10

## 2024-10-14 RX ADMIN — OXYCODONE AND ACETAMINOPHEN 1 TABLET: 10; 325 TABLET ORAL at 11:10

## 2024-10-14 RX ADMIN — QUETIAPINE FUMARATE 300 MG: 300 TABLET ORAL at 08:10

## 2024-10-14 RX ADMIN — OXYCODONE AND ACETAMINOPHEN 1 TABLET: 10; 325 TABLET ORAL at 04:10

## 2024-10-14 RX ADMIN — Medication 10 ML: at 11:10

## 2024-10-14 RX ADMIN — OXYCODONE AND ACETAMINOPHEN 1 TABLET: 10; 325 TABLET ORAL at 07:10

## 2024-10-14 RX ADMIN — ONDANSETRON 4 MG: 2 INJECTION INTRAMUSCULAR; INTRAVENOUS at 04:10

## 2024-10-14 RX ADMIN — BUPROPION HYDROCHLORIDE 150 MG: 150 TABLET, EXTENDED RELEASE ORAL at 08:10

## 2024-10-14 RX ADMIN — GABAPENTIN 600 MG: 300 CAPSULE ORAL at 08:10

## 2024-10-14 RX ADMIN — Medication 10 ML: at 12:10

## 2024-10-14 RX ADMIN — BUSPIRONE HYDROCHLORIDE 30 MG: 15 TABLET ORAL at 08:10

## 2024-10-14 RX ADMIN — METHOCARBAMOL 750 MG: 750 TABLET ORAL at 01:10

## 2024-10-14 RX ADMIN — GABAPENTIN 600 MG: 300 CAPSULE ORAL at 01:10

## 2024-10-14 RX ADMIN — Medication 400 MG: at 08:10

## 2024-10-14 RX ADMIN — LEVOTHYROXINE SODIUM 200 MCG: 100 TABLET ORAL at 08:10

## 2024-10-14 RX ADMIN — LOSARTAN POTASSIUM 100 MG: 50 TABLET, FILM COATED ORAL at 08:10

## 2024-10-14 NOTE — PLAN OF CARE
Problem: Adult Inpatient Plan of Care  Goal: Plan of Care Review  Outcome: Progressing  Goal: Patient-Specific Goal (Individualized)  Outcome: Progressing  Goal: Absence of Hospital-Acquired Illness or Injury  Outcome: Progressing  Goal: Optimal Comfort and Wellbeing  Outcome: Progressing  Goal: Readiness for Transition of Care  Outcome: Progressing     Problem: Bariatric Environmental Safety  Goal: Safety Maintained with Care  Outcome: Progressing     Problem: Infection  Goal: Absence of Infection Signs and Symptoms  Outcome: Progressing     Problem: Wound  Goal: Optimal Coping  Outcome: Progressing  Goal: Optimal Functional Ability  Outcome: Progressing  Goal: Absence of Infection Signs and Symptoms  Outcome: Progressing  Goal: Improved Oral Intake  Outcome: Progressing  Goal: Optimal Pain Control and Function  Outcome: Progressing  Goal: Skin Health and Integrity  Outcome: Progressing     Problem: Fall Injury Risk  Goal: Absence of Fall and Fall-Related Injury  Outcome: Progressing

## 2024-10-14 NOTE — PROGRESS NOTES
[facility] Neuro  Neurosurgery  Progress Note    Subjective:     Interval History: POD 3. Received blood over the weekend. Reports post op lumbar soreness but has seen improvement of pre op lower extremity symptoms. Both ELSIE drain with high output of clear to bloody fluid. Has been ambulating short distances with PT. Urinary retention over the weekend, march was removed earlier this morning.     Post-Op Info:  Procedure(s) (LRB):  LAMINECTOMY, SPINE, THORACIC, POSTERIOR APPROACH, USING COMPUTER-ASSISTED NAVIGATION (N/A)  FUSION, SPINE, LUMBAR, PLIF, USING COMPUTER-ASSISTED NAVIGATION (N/A)   3 Days Post-Op      Medications:  Continuous Infusions:   0.9% NaCl   Intravenous Continuous 100 mL/hr at 10/11/24 1548 New Bag at 10/11/24 1548     Scheduled Meds:   bisacodyL  10 mg Rectal Daily    buPROPion  150 mg Oral Daily    busPIRone  30 mg Oral BID    DULoxetine  60 mg Oral BID    enoxparin  40 mg Subcutaneous Daily    gabapentin  600 mg Oral QID    levothyroxine  200 mcg Oral Daily    losartan  100 mg Oral Daily    magnesium oxide  400 mg Oral Nightly    methocarbamoL  750 mg Oral QID    QUEtiapine  300 mg Oral QHS    senna-docusate 8.6-50 mg  2 tablet Oral BID    sodium chloride 0.9%  10 mL Intravenous Q6H     PRN Meds:  Current Facility-Administered Medications:     0.9%  NaCl infusion (for blood administration), , Intravenous, Q24H PRN    acetaminophen, 650 mg, Oral, Q6H PRN    acetaminophen, 650 mg, Oral, Q4H PRN    butalbital-acetaminophen-caffeine -40 mg, 1 tablet, Oral, Q4H PRN    calcium carbonate, 500 mg, Oral, Daily PRN    ceFAZolin 2 g/50mL Dextrose IVPB, 2 g, Intravenous, On Call Procedure    dextrose 10%, 12.5 g, Intravenous, PRN    dextrose 10%, 12.5 g, Intravenous, PRN    dextrose 10%, 25 g, Intravenous, PRN    dextrose 10%, 25 g, Intravenous, PRN    glucagon (human recombinant), 1 mg, Intramuscular, PRN    glucose, 16 g, Oral, PRN    glucose, 24 g, Oral, PRN    HYDROmorphone,  2 mg, Intravenous, Q3H PRN    naloxone, 0.02 mg, Intravenous, PRN    ondansetron, 4 mg, Intravenous, Q8H PRN    oxyCODONE-acetaminophen, 1 tablet, Oral, Q4H PRN    prochlorperazine, 10 mg, Intravenous, Q6H PRN    sodium chloride 0.9%, 10 mL, Intravenous, Q12H PRN    Flushing PICC/Midline Protocol, , , Until Discontinued **AND** sodium chloride 0.9%, 10 mL, Intravenous, Q6H **AND** sodium chloride 0.9%, 10 mL, Intravenous, PRN     Review of Systems  Objective:     Weight: 136.1 kg (300 lb)  Body mass index is 46.99 kg/m².  Vital Signs (Most Recent):  Temp: 98.1 °F (36.7 °C) (10/14/24 0413)  Pulse: 106 (10/14/24 0413)  Resp: 17 (10/14/24 0414)  BP: (!) 162/80 (10/14/24 0413)  SpO2: 97 % (10/14/24 0413) Vital Signs (24h Range):  Temp:  [97.7 °F (36.5 °C)-98.8 °F (37.1 °C)] 98.1 °F (36.7 °C)  Pulse:  [] 106  Resp:  [16-19] 17  SpO2:  [85 %-99 %] 97 %  BP: (103-162)/(62-87) 162/80                              Closed/Suction Drain 10/11/24 1249 Inferior;Left Back Bulb 10 Fr. (Active)   Site Description Unable to view 10/13/24 2000   Dressing Type Gauze 10/14/24 0234   Dressing Status Intact 10/14/24 0234   Dressing Intervention Sterile dressing change 10/14/24 0234   Drainage Bloody 10/14/24 0234   Status To bulb suction 10/14/24 0234   Output (mL) 50 mL 10/14/24 0234            Closed/Suction Drain 10/11/24 1249 Inferior;Right Back Bulb 10 Fr. (Active)   Site Description Unable to view 10/13/24 2000   Dressing Type Gauze 10/14/24 0234   Dressing Status Intact 10/14/24 0234   Dressing Intervention Sterile dressing change 10/14/24 0234   Drainage Bloody 10/14/24 0234   Status To bulb suction 10/14/24 0234   Output (mL) 40 mL 10/14/24 0425       Neurosurgery Physical Exam  Awake, alert, oriented.   NAD. Resting in bed.   Moves legs on bed.   Todd drain with clear to bloody discharge     Significant Labs:  Recent Labs   Lab 10/13/24  0449 10/14/24  0432    140   K 4.1 4.0   * 106   CO2 25 25   BUN 9.3* 8.2*  "  CREATININE 0.71 0.62   CALCIUM 7.8* 8.4     Recent Labs   Lab 10/13/24  0449 10/14/24  0432   WBC 5.51 6.64   HGB 7.4* 10.6*   HCT 22.9* 31.2*    165     No results for input(s): "LABPT", "INR", "APTT" in the last 48 hours.  Microbiology Results (last 7 days)       ** No results found for the last 168 hours. **              Assessment/Plan:     Active Diagnoses:    Diagnosis Date Noted POA    PRINCIPAL PROBLEM:  Other spondylosis with radiculopathy, lumbar region [M47.26] 10/11/2024 Yes      Problems Resolved During this Admission:     -H&H stable this morning  -Both Todd drain to gravity suction  -Monitor urination, replace march and consult urology if retention continues.   -Multimodal pain control  -TLSO when OOB  -PTOT  -Planning for home with home health this week.     KENIA Enrique  Neurosurgery  [facility]    "

## 2024-10-14 NOTE — PLAN OF CARE
Problem: Adult Inpatient Plan of Care  Goal: Plan of Care Review  Outcome: Progressing  Goal: Patient-Specific Goal (Individualized)  Outcome: Progressing  Goal: Absence of Hospital-Acquired Illness or Injury  Outcome: Progressing  Goal: Optimal Comfort and Wellbeing  Outcome: Progressing  Goal: Readiness for Transition of Care  Outcome: Progressing     Problem: Bariatric Environmental Safety  Goal: Safety Maintained with Care  Outcome: Progressing     Problem: Infection  Goal: Absence of Infection Signs and Symptoms  Outcome: Progressing     Problem: Wound  Goal: Optimal Coping  Outcome: Progressing  Goal: Optimal Functional Ability  Outcome: Progressing  Goal: Absence of Infection Signs and Symptoms  Outcome: Progressing  Goal: Improved Oral Intake  Outcome: Progressing  Goal: Optimal Pain Control and Function  Outcome: Progressing  Goal: Skin Health and Integrity  Outcome: Progressing  Goal: Optimal Wound Healing  Outcome: Progressing     Problem: Fall Injury Risk  Goal: Absence of Fall and Fall-Related Injury  Outcome: Progressing     Problem: Skin Injury Risk Increased  Goal: Skin Health and Integrity  Outcome: Progressing

## 2024-10-14 NOTE — PT/OT/SLP PROGRESS
Physical Therapy Treatment    Patient Name:  Kasandra Cheek   MRN:  [id number]    Recommendations:     Discharge therapy intensity: Low Intensity Therapy   Discharge Equipment Recommendations: none  Barriers to discharge: None    Assessment:     Kasandra Cheek is a 64 y.o. female admitted with a medical diagnosis of T10-L3 decompression, removal of right S1 screw, replacement of right S2 iliac bold and T10-S2 fusion .  She presents with the following impairments/functional limitations: weakness, gait instability, impaired endurance, pain, impaired functional mobility .    Rehab Prognosis: Good; patient would benefit from acute skilled PT services to address these deficits and reach maximum level of function.    Recent Surgery: Procedure(s) (LRB):  LAMINECTOMY, SPINE, THORACIC, POSTERIOR APPROACH, USING COMPUTER-ASSISTED NAVIGATION (N/A)  FUSION, SPINE, LUMBAR, PLIF, USING COMPUTER-ASSISTED NAVIGATION (N/A) 3 Days Post-Op    Plan:     During this hospitalization, patient would benefit from acute PT services 6 x/week to address the identified rehab impairments via gait training, therapeutic activities, therapeutic exercises and progress toward the following goals:    Plan of Care Expires:       Subjective     Chief Complaint: none stated  Patient/Family Comments/goals: none stated  Pain/Comfort:         Objective:     Communicated with nurse prior to session.  Patient found HOB elevated with peripheral IV, PICC line, ELSIE drain upon PT entry to room.     General Precautions: Standard, fall  Orthopedic Precautions: spinal precautions  Braces: TLSO  Respiratory Status: Room air  Blood Pressure: nt- patient reports no adverse symptoms with position changes  Skin Integrity: Visible skin intact      Functional Mobility:  Bed Mobility:     Supine to Sit: contact guard assistance  Transfers:     Sit to Stand:  contact guard assistance with rolling walker  Gait: patient amb 100ft with rolling walker with CGA.    Stairs:  Pt  ascended/descended 2 stair(s) with No Assistive Device with right handrail with Contact Guard Assistance.       Education:  Patient provided with verbal education education regarding PT role/goals/POC, post-op precautions, fall prevention, and safety awareness.  Understanding was verbalized.     Patient left up in chair with all lines intact and call button in reach    GOALS:   Multidisciplinary Problems       Physical Therapy Goals          Problem: Physical Therapy    Goal Priority Disciplines Outcome Interventions   Physical Therapy Goal     PT, PT/OT Progressing    Description: Goals to be met by: 2024     Patient will increase functional independence with mobility by performin. Supine to sit with Modified [place]  2. Sit to supine with Modified [place]  3. Sit to stand transfer with Modified [place]  4. Gait  x 50 feet with Modified [place] using Rolling Walker.   5. Pt to ascend/descend 3 stairs using no handrails, SBA.                          Time Tracking:     PT Received On: 10/14/24  PT Start Time: 1534     PT Stop Time: 1557  PT Total Time (min): 23 min     Billable Minutes: Gait Training 23    Treatment Type: Treatment  PT/PTA: PTA     Number of PTA visits since last PT visit: 1     10/14/2024

## 2024-10-14 NOTE — ANESTHESIA POSTPROCEDURE EVALUATION
Anesthesia Post Evaluation    Patient: Kasandra Cheek    Procedure(s) Performed: Procedure(s) (LRB):  LAMINECTOMY, SPINE, THORACIC, POSTERIOR APPROACH, USING COMPUTER-ASSISTED NAVIGATION (N/A)  FUSION, SPINE, LUMBAR, PLIF, USING COMPUTER-ASSISTED NAVIGATION (N/A)    Final Anesthesia Type: general      Patient location during evaluation: PACU  Patient participation: Yes- Able to Participate  Level of consciousness: awake and alert  Post-procedure vital signs: reviewed and stable  Pain management: adequate  Airway patency: patent      Anesthetic complications: no      Cardiovascular status: hemodynamically stable  Respiratory status: unassisted  Hydration status: euvolemic  Follow-up not needed.              Vitals Value Taken Time   /80 10/14/24 0413   Temp 36.7 °C (98.1 °F) 10/14/24 0413   Pulse 106 10/14/24 0413   Resp 17 10/14/24 0414   SpO2 97 % 10/14/24 0413         Event Time   Out of Recovery 15:20:00         Pain/Toshia Score: Pain Rating Prior to Med Admin: 7 (10/14/2024  4:14 AM)  Pain Rating Post Med Admin: 5 (10/14/2024  4:44 AM)

## 2024-10-14 NOTE — PLAN OF CARE
10/14/24 1425   Discharge Assessment   Assessment Type Discharge Planning Assessment   Confirmed/corrected address, phone number and insurance Yes   Confirmed Demographics Correct on Facesheet   Source of Information patient   Communicated CHUCK with patient/caregiver Date not available/Unable to determine   Reason For Admission Other spondylosis with radiculopathy, lumbar region   People in Home spouse   Do you expect to return to your current living situation? Yes   Do you have help at home or someone to help you manage your care at home? Yes   Who are your caregiver(s) and their phone number(s)? jose alejandro colin, spouse, [phone]   Prior to hospitilization cognitive status: Unable to Assess   Current cognitive status: Alert/Oriented   Home Accessibility stairs to enter home   Number of Stairs, Main Entrance three   Stair Railings, Main Entrance railings safe and in good condition   Home Layout Able to live on 1st floor   Equipment Currently Used at Home wheelchair;walker, rolling;rollator   Readmission within 30 days? No   Patient currently being followed by outpatient case management? No   Do you currently have service(s) that help you manage your care at home? No   Do you take prescription medications? Yes   Do you have prescription coverage? Yes   Coverage [employer] managed [employer]   Do you have any problems affording any of your prescribed medications? No   Is the patient taking medications as prescribed? yes   Who is going to help you get home at discharge? family   How do you get to doctors appointments? family or friend will provide;car, drives self   Are you on dialysis? No   Do you take coumadin? No   Discharge Plan A Home Health   Discharge Plan B Home Health   DME Needed Upon Discharge  none   Discharge Plan discussed with: Patient   Transition of Care Barriers None   OTHER   Name(s) of People in Home spouse     Completed assessment with pt at bedside. Introduced self and explained role as SW. Pt verb  understanding to all questions asked. PCP is Ravinder Mazariegos and pharmacy is [facility] in [place]. Pt agreeable with HH and requested referral be sent to [facility]. Referral sent via [facility].     Gaby Edward LCSW

## 2024-10-14 NOTE — PT/OT/SLP PROGRESS
Occupational Therapy   Treatment    Name: Kasandra Cheek  MRN: [id number]  Admitting Diagnosis:  Other spondylosis with radiculopathy, lumbar region  3 Days Post-Op    Recommendations:     Recommended therapy intensity at discharge: Low Intensity Therapy   Discharge Equipment Recommendations:  none  Barriers to discharge:  None    Assessment:     Kasandra Cheek is a 64 y.o. female with a medical diagnosis of Other spondylosis with radiculopathy, lumbar region.  She presents with Diagnoses of Other spondylosis with radiculopathy, lumbar region and Chest pain were pertinent to this visit.  . Performance deficits affecting function are weakness, impaired endurance, impaired self care skills, impaired functional mobility, gait instability, impaired balance, pain, orthopedic precautions.     Pt progressing towards goals. Able to perform functional mobility in room with RW for performance of ADLs. Does report pain limiting at this time. Cont OT POC     Rehab Prognosis:  Good; patient would benefit from acute skilled OT services to address these deficits and reach maximum level of function.       Plan:     Patient to be seen 5 x/week to address the above listed problems via self-care/home management, therapeutic activities, therapeutic exercises  Plan of Care Expires: 11/13/24  Plan of Care Reviewed with: patient    Subjective     Pain/Comfort:  Pain Rating 1: 4/10  Location - Orientation 1: generalized  Location 1: back  Pain Addressed 1: Reposition, Distraction, Cessation of Activity, Nurse notified, Pre-medicate for activity  Pain Rating Post-Intervention 1: 4/10    Objective:     Communicated with: nsg prior to session.  Patient found supine with peripheral IV, PICC line, ELSIE drain upon OT entry to room.    General Precautions: Standard, fall    Orthopedic Precautions:spinal precautions  Braces: TLSO  Respiratory Status: Room air  V     Occupational Performance:     Bed Mobility:    Patient completed Rolling/Turning to Left  with  contact guard assistance  Patient completed Scooting/Bridging with contact guard assistance  Patient completed Supine to Sit with minimum assistance  Patient completed Sit to Supine with moderate assistance and maximal assistance     Functional Mobility/Transfers:  Patient completed Sit <> Stand Transfer with contact guard assistance  with  rolling walker   Functional Mobility: CGA- SBA with RW; cues for upright posture and proximity to RW     Activities of Daily Living:  Grooming: stand by assistance at sink for oral hygiene   Upper Body Dressing: moderate assistance lamar TLSO brace   Declining performance of LBD with use of dsg DME- reports familiar with devices at home and  assists 2/2 not preferring to use DME     Therapeutic Activities:  Pt performed functional mobility in room with RW for performance of ADLs    Requires re-education on spinal precautions and log rolling    [facility] 6 Click ADL: 17    Patient Education:  Patient provided with verbal education education regarding OT role/goals/POC, post op precautions, fall prevention, safety awareness, and Discharge/DME recommendations.  Understanding was verbalized.      Patient left supine with all lines intact, call button in reach, bed alarm on, and nsg notified.    GOALS:   Multidisciplinary Problems       Occupational Therapy Goals          Problem: Occupational Therapy    Goal Priority Disciplines Outcome Interventions   Occupational Therapy Goal     OT, PT/OT Progressing    Description: Goals to be met by: 11/13/24     Patient will increase functional independence with ADLs by performing:    UE Dressing with Modified [place].  LE Dressing with Modified [place] with use of AE.  Grooming while standing at sink with Modified [place].  Toileting from toilet with Modified [place] for hygiene and clothing management.   Toilet transfer to toilet with Modified [place].                         Time Tracking:     OT Date of  Treatment: 10/14/24  OT Start Time: 1002  OT Stop Time: 1029  OT Total Time (min): 27 min    Billable Minutes:Self Care/Home Management 27    OT/INDRA: OT     Number of INDRA visits since last OT visit: 1    10/14/2024

## 2024-10-15 LAB — RBCS: NORMAL

## 2024-10-15 PROCEDURE — 25000003 PHARM REV CODE 250

## 2024-10-15 PROCEDURE — 11000001 HC ACUTE MED/SURG PRIVATE ROOM

## 2024-10-15 PROCEDURE — 25000003 PHARM REV CODE 250: Performed by: NEUROLOGICAL SURGERY

## 2024-10-15 PROCEDURE — A4216 STERILE WATER/SALINE, 10 ML: HCPCS

## 2024-10-15 PROCEDURE — 94799 UNLISTED PULMONARY SVC/PX: CPT

## 2024-10-15 PROCEDURE — 63600175 PHARM REV CODE 636 W HCPCS

## 2024-10-15 PROCEDURE — 97116 GAIT TRAINING THERAPY: CPT | Mod: CQ

## 2024-10-15 PROCEDURE — 94760 N-INVAS EAR/PLS OXIMETRY 1: CPT

## 2024-10-15 RX ADMIN — HYDROMORPHONE HYDROCHLORIDE 2 MG: 2 INJECTION INTRAMUSCULAR; INTRAVENOUS; SUBCUTANEOUS at 03:10

## 2024-10-15 RX ADMIN — ENOXAPARIN SODIUM 40 MG: 40 INJECTION SUBCUTANEOUS at 05:10

## 2024-10-15 RX ADMIN — ACETAMINOPHEN 650 MG: 325 TABLET, FILM COATED ORAL at 04:10

## 2024-10-15 RX ADMIN — DULOXETINE HYDROCHLORIDE 60 MG: 30 CAPSULE, DELAYED RELEASE ORAL at 10:10

## 2024-10-15 RX ADMIN — LEVOTHYROXINE SODIUM 200 MCG: 100 TABLET ORAL at 08:10

## 2024-10-15 RX ADMIN — ONDANSETRON 4 MG: 2 INJECTION INTRAMUSCULAR; INTRAVENOUS at 04:10

## 2024-10-15 RX ADMIN — GABAPENTIN 600 MG: 300 CAPSULE ORAL at 01:10

## 2024-10-15 RX ADMIN — OXYCODONE AND ACETAMINOPHEN 1 TABLET: 10; 325 TABLET ORAL at 08:10

## 2024-10-15 RX ADMIN — Medication 400 MG: at 10:10

## 2024-10-15 RX ADMIN — METHOCARBAMOL 750 MG: 750 TABLET ORAL at 10:10

## 2024-10-15 RX ADMIN — Medication 10 ML: at 05:10

## 2024-10-15 RX ADMIN — DULOXETINE HYDROCHLORIDE 60 MG: 30 CAPSULE, DELAYED RELEASE ORAL at 08:10

## 2024-10-15 RX ADMIN — HYDROMORPHONE HYDROCHLORIDE 2 MG: 2 INJECTION INTRAMUSCULAR; INTRAVENOUS; SUBCUTANEOUS at 05:10

## 2024-10-15 RX ADMIN — Medication 10 ML: at 11:10

## 2024-10-15 RX ADMIN — GABAPENTIN 600 MG: 300 CAPSULE ORAL at 08:10

## 2024-10-15 RX ADMIN — OXYCODONE AND ACETAMINOPHEN 1 TABLET: 10; 325 TABLET ORAL at 10:10

## 2024-10-15 RX ADMIN — SENNOSIDES AND DOCUSATE SODIUM 2 TABLET: 50; 8.6 TABLET ORAL at 08:10

## 2024-10-15 RX ADMIN — METHOCARBAMOL 750 MG: 750 TABLET ORAL at 05:10

## 2024-10-15 RX ADMIN — PROCHLORPERAZINE EDISYLATE 10 MG: 5 INJECTION INTRAMUSCULAR; INTRAVENOUS at 08:10

## 2024-10-15 RX ADMIN — QUETIAPINE FUMARATE 300 MG: 300 TABLET ORAL at 10:10

## 2024-10-15 RX ADMIN — OXYCODONE AND ACETAMINOPHEN 1 TABLET: 10; 325 TABLET ORAL at 02:10

## 2024-10-15 RX ADMIN — OXYCODONE AND ACETAMINOPHEN 1 TABLET: 10; 325 TABLET ORAL at 05:10

## 2024-10-15 RX ADMIN — BUSPIRONE HYDROCHLORIDE 30 MG: 15 TABLET ORAL at 10:10

## 2024-10-15 RX ADMIN — BUTALBITAL, ACETAMINOPHEN, AND CAFFEINE 1 TABLET: 325; 50; 40 TABLET ORAL at 08:10

## 2024-10-15 RX ADMIN — HYDROMORPHONE HYDROCHLORIDE 2 MG: 2 INJECTION INTRAMUSCULAR; INTRAVENOUS; SUBCUTANEOUS at 07:10

## 2024-10-15 RX ADMIN — GABAPENTIN 600 MG: 300 CAPSULE ORAL at 05:10

## 2024-10-15 RX ADMIN — GABAPENTIN 600 MG: 300 CAPSULE ORAL at 10:10

## 2024-10-15 RX ADMIN — METHOCARBAMOL 750 MG: 750 TABLET ORAL at 08:10

## 2024-10-15 RX ADMIN — LOSARTAN POTASSIUM 100 MG: 50 TABLET, FILM COATED ORAL at 08:10

## 2024-10-15 RX ADMIN — HYDROMORPHONE HYDROCHLORIDE 2 MG: 2 INJECTION INTRAMUSCULAR; INTRAVENOUS; SUBCUTANEOUS at 11:10

## 2024-10-15 RX ADMIN — BUPROPION HYDROCHLORIDE 150 MG: 150 TABLET, EXTENDED RELEASE ORAL at 08:10

## 2024-10-15 RX ADMIN — SENNOSIDES AND DOCUSATE SODIUM 2 TABLET: 50; 8.6 TABLET ORAL at 10:10

## 2024-10-15 RX ADMIN — METHOCARBAMOL 750 MG: 750 TABLET ORAL at 01:10

## 2024-10-15 RX ADMIN — BUSPIRONE HYDROCHLORIDE 30 MG: 15 TABLET ORAL at 08:10

## 2024-10-15 NOTE — PROGRESS NOTES
Better. Ambulating.  ELSIE-150/  Milagros.  Remove 1 ELSIE.  Therapy/brace.  Home end of the week.

## 2024-10-15 NOTE — PT/OT/SLP PROGRESS
Physical Therapy Treatment    Patient Name:  Kasandra Cheek   MRN:  [id number]    Recommendations:     Discharge therapy intensity: Low Intensity Therapy   Discharge Equipment Recommendations: none  Barriers to discharge: None    Assessment:     Kasandra Cheek is a 64 y.o. female admitted with a medical diagnosis of T10-L3 decompression, removal of right S1 screw, replacement of right S2 iliac bold and T10-S2 fusion .  She presents with the following impairments/functional limitations: weakness, gait instability, impaired endurance, pain, impaired functional mobility .    Patient and spouse educated and able to donning and doffing TLSO. Patient able to perform bed mobility and other functional mobility tasks with assistance from .     Rehab Prognosis: Good; patient would benefit from acute skilled PT services to address these deficits and reach maximum level of function.    Recent Surgery: Procedure(s) (LRB):  LAMINECTOMY, SPINE, THORACIC, POSTERIOR APPROACH, USING COMPUTER-ASSISTED NAVIGATION (N/A)  FUSION, SPINE, LUMBAR, PLIF, USING COMPUTER-ASSISTED NAVIGATION (N/A) 4 Days Post-Op    Plan:     During this hospitalization, patient would benefit from acute PT services 6 x/week to address the identified rehab impairments via gait training, therapeutic activities, therapeutic exercises and progress toward the following goals:    Plan of Care Expires:       Subjective     Chief Complaint: none stated  Patient/Family Comments/goals: none stated  Pain/Comfort:  Pain Rating 1: 0/10      Objective:     Communicated with nurse prior to session.  Patient found HOB elevated with peripheral IV, PICC line, ELSIE drain upon PT entry to room.     General Precautions: Standard, fall  Orthopedic Precautions: spinal precautions  Braces: TLSO  Respiratory Status: Room air  Blood Pressure: nt- patient reports no adverse symptoms with position changes  Skin Integrity: Visible skin intact      Functional Mobility:  Bed Mobility:      Supine <> Sit: Yi with assistance from   Transfers:     Sit <> Stand:  contact guard assistance with rolling walker  Gait: patient amb [id number] with rolling walker with SBA.    Stairs:  Pt ascended/descended 3 stair(s) with No Assistive Device with right handrail with Contact Guard Assistance.       Education:  Patient provided with verbal education education regarding PT role/goals/POC, post-op precautions, fall prevention, and safety awareness.  Understanding was verbalized.     Patient left up in chair with all lines intact and call button in reach    GOALS:   Multidisciplinary Problems       Physical Therapy Goals          Problem: Physical Therapy    Goal Priority Disciplines Outcome Interventions   Physical Therapy Goal     PT, PT/OT Progressing    Description: Goals to be met by: 2024     Patient will increase functional independence with mobility by performin. Supine to sit with Modified [place]  2. Sit to supine with Modified [place]  3. Sit to stand transfer with Modified [place]  4. Gait  x 50 feet with Modified [place] using Rolling Walker.   5. Pt to ascend/descend 3 stairs using no handrails, SBA.                          Time Tracking:     PT Received On: 10/15/24  PT Start Time: 0950     PT Stop Time: 1030  PT Total Time (min): 40 min     Billable Minutes: Gait Training 40    Treatment Type: Treatment  PT/PTA: PTA     Number of PTA visits since last PT visit: 2     10/15/2024

## 2024-10-16 VITALS
HEIGHT: 67 IN | RESPIRATION RATE: 18 BRPM | SYSTOLIC BLOOD PRESSURE: 135 MMHG | DIASTOLIC BLOOD PRESSURE: 73 MMHG | HEART RATE: 85 BPM | TEMPERATURE: 98 F | OXYGEN SATURATION: 97 % | WEIGHT: 284.38 LBS | BODY MASS INDEX: 44.63 KG/M2

## 2024-10-16 PROCEDURE — 97535 SELF CARE MNGMENT TRAINING: CPT | Mod: CO

## 2024-10-16 PROCEDURE — 63600175 PHARM REV CODE 636 W HCPCS

## 2024-10-16 PROCEDURE — 36410 VNPNXR 3YR/> PHY/QHP DX/THER: CPT

## 2024-10-16 PROCEDURE — 25000003 PHARM REV CODE 250: Performed by: NEUROLOGICAL SURGERY

## 2024-10-16 PROCEDURE — A4216 STERILE WATER/SALINE, 10 ML: HCPCS

## 2024-10-16 PROCEDURE — 25000003 PHARM REV CODE 250

## 2024-10-16 RX ORDER — OXYCODONE AND ACETAMINOPHEN 10; 325 MG/1; MG/1
2 TABLET ORAL EVERY 4 HOURS PRN
Status: DISCONTINUED | OUTPATIENT
Start: 2024-10-16 | End: 2024-10-16 | Stop reason: HOSPADM

## 2024-10-16 RX ADMIN — Medication 10 ML: at 06:10

## 2024-10-16 RX ADMIN — HYDROMORPHONE HYDROCHLORIDE 2 MG: 2 INJECTION INTRAMUSCULAR; INTRAVENOUS; SUBCUTANEOUS at 01:10

## 2024-10-16 RX ADMIN — DULOXETINE HYDROCHLORIDE 60 MG: 30 CAPSULE, DELAYED RELEASE ORAL at 08:10

## 2024-10-16 RX ADMIN — OXYCODONE AND ACETAMINOPHEN 2 TABLET: 10; 325 TABLET ORAL at 10:10

## 2024-10-16 RX ADMIN — LEVOTHYROXINE SODIUM 200 MCG: 100 TABLET ORAL at 08:10

## 2024-10-16 RX ADMIN — OXYCODONE AND ACETAMINOPHEN 1 TABLET: 10; 325 TABLET ORAL at 06:10

## 2024-10-16 RX ADMIN — BUSPIRONE HYDROCHLORIDE 30 MG: 15 TABLET ORAL at 08:10

## 2024-10-16 RX ADMIN — LOSARTAN POTASSIUM 100 MG: 50 TABLET, FILM COATED ORAL at 08:10

## 2024-10-16 RX ADMIN — BUPROPION HYDROCHLORIDE 150 MG: 150 TABLET, EXTENDED RELEASE ORAL at 08:10

## 2024-10-16 RX ADMIN — HYDROMORPHONE HYDROCHLORIDE 2 MG: 2 INJECTION INTRAMUSCULAR; INTRAVENOUS; SUBCUTANEOUS at 08:10

## 2024-10-16 RX ADMIN — GABAPENTIN 600 MG: 300 CAPSULE ORAL at 08:10

## 2024-10-16 RX ADMIN — ONDANSETRON 4 MG: 2 INJECTION INTRAMUSCULAR; INTRAVENOUS at 10:10

## 2024-10-16 RX ADMIN — METHOCARBAMOL 750 MG: 750 TABLET ORAL at 08:10

## 2024-10-16 NOTE — DISCHARGE INSTRUCTIONS
Attend all follow up appointments  Continue daily dressing changes  OK to shower. Pat incision dry after and redress  Resume home pain management regimen

## 2024-10-16 NOTE — PROGRESS NOTES
POD#5 T10-L3 decompression, removal of right S1 screw, replacement of right S2 iliac bolt, and T10-S2 fusion   She is sitting up in her chair, NAD  She denies leg pain  Her back pain is currently controlled  She did walk the bai with PT earlier and did well    AFVSS  PERRL, EOMI  Alert, oriented to all spheres. Speech clear. Follows commands in all ext  Milagros well, no deficits appreciated  Incision c/d/I  ELSIE output 10/22    Plan: WE will dc her ELSIE drain  Continue daily dressing changes  OK to shower. Pat incision dry after and redress  She will need to f/u with Dr. Mejia in 2 weeks  She is in pain management. Discussed resuming pre op pain medication as discussed with her PM doctor

## 2024-10-16 NOTE — NURSING
Complete discharge instructions given; all questions answered; Midline d/c; belongings gathered; pt calm and cooperative at time of discharge with no concerns regarding discharge

## 2024-10-16 NOTE — PLAN OF CARE
10/16/24 1137   Final Note   Assessment Type Final Discharge Note   Anticipated Discharge Disposition Home-Health   Hospital Resources/Appts/Education Provided Post-Acute resouces added to AVS   Post-Acute Status   Post-Acute Authorization Home Health   Home Health Status Set-up Complete/Auth obtained   Discharge Delays None known at this time     Pt to d/c home.  arranged with Almondy; updates and AVS uploaded to [employer].     Gaby Edward LCSW

## 2024-10-16 NOTE — PT/OT/SLP PROGRESS
Occupational Therapy   Treatment    Name: Kasandra Cheek  MRN: [id number]    Recommendations:     Recommended therapy intensity at discharge: Low Intensity Therapy   Discharge Equipment Recommendations:  none  Barriers to discharge:       Assessment:     Kasandra Cheek is a 64 y.o. female with a medical diagnosis of T10-L3 decompression, removal of right S1 screw, replacement of right S2 iliac bold and T10-S2 fusion. Performance deficits affecting function are weakness, impaired endurance, impaired self care skills, impaired functional mobility, gait instability, impaired balance, pain, orthopedic precautions. Tolerated session well. Able to verbalize all spinal pxns and adhere to during session.     Rehab Prognosis:  Good; patient would benefit from acute skilled OT services to address these deficits and reach maximum level of function.       Plan:     Patient to be seen 5 x/week to address the above listed problems via self-care/home management, therapeutic activities, therapeutic exercises  Plan of Care Expires: 11/13/24  Plan of Care Reviewed with: patient, spouse    Subjective     Pain/Comfort:  Pain Rating 1: 0/10    Objective:     Communicated with: RN prior to session.  Patient found  in bathroom  with ELSIE drain upon OT entry to room.    General Precautions: Standard, fall    Orthopedic Precautions:spinal precautions  Braces: TLSO  Respiratory Status: Room air     Occupational Performance:     Functional Mobility/Transfers:  Patient completed Sit <> Stand Transfer with stand by assistance  with  rolling walker  x2 trials from chair.   Functional Mobility: ambulated throughout room and in hallway with SBA and RW.     Activities of Daily Living:  Grooming: completed oral hygiene standing at sink with SBA.   Bathing: performed shower t/f with CGA while stepping over threshold. Declined long handled sponge,  states he assists with task at home. Educated on use of shower bench for increased safety and energy  conservation.   UE dressing: educated on donning/doffing TLSO. Pt states she can complete with assist from .   LE dressing: pt reports she uses sock aid and reacher at baseline. Did not need education on use.     Therapeutic Positioning    OT interventions performed during the course of today's session in an effort to prevent and/or reduce acquired pressure injuries:   Education was provided on benefits of and recommendations for therapeutic positioning    [facility] 6 Click ADL: 19    Patient Education:  Patient provided with verbal education education regarding OT role/goals/POC, fall prevention, safety awareness, and Discharge/DME recommendations.  Understanding was verbalized.      Patient left up in chair with all lines intact, call button in reach, and  present.    GOALS:   Multidisciplinary Problems       Occupational Therapy Goals          Problem: Occupational Therapy    Goal Priority Disciplines Outcome Interventions   Occupational Therapy Goal     OT, PT/OT Progressing    Description: Goals to be met by: 11/13/24     Patient will increase functional independence with ADLs by performing:    UE Dressing with Modified [place].  LE Dressing with Modified [place] with use of AE.  Grooming while standing at sink with Modified [place].  Toileting from toilet with Modified [place] for hygiene and clothing management.   Toilet transfer to toilet with Modified [place].                         Time Tracking:     OT Date of Treatment: 10/16/24  OT Start Time: 0841  OT Stop Time: 0907  OT Total Time (min): 26 min    Billable Minutes:Self Care/Home Management 26    OT/INDRA: INDRA     Number of INDRA visits since last OT visit: 2    10/16/2024

## 2024-10-16 NOTE — PLAN OF CARE
Problem: Adult Inpatient Plan of Care  Goal: Plan of Care Review  Outcome: Progressing  Goal: Patient-Specific Goal (Individualized)  Outcome: Progressing  Goal: Absence of Hospital-Acquired Illness or Injury  Outcome: Progressing  Goal: Optimal Comfort and Wellbeing  Outcome: Progressing  Goal: Readiness for Transition of Care  Outcome: Progressing     Problem: Bariatric Environmental Safety  Goal: Safety Maintained with Care  Outcome: Progressing     Problem: Infection  Goal: Absence of Infection Signs and Symptoms  Outcome: Progressing     Problem: Wound  Goal: Optimal Coping  Outcome: Progressing  Goal: Optimal Functional Ability  Outcome: Progressing  Goal: Absence of Infection Signs and Symptoms  Outcome: Progressing  Goal: Improved Oral Intake  Outcome: Progressing  Goal: Optimal Pain Control and Function  Outcome: Progressing  Goal: Skin Health and Integrity  Outcome: Progressing  Goal: Optimal Wound Healing  Outcome: Progressing     Problem: Fall Injury Risk  Goal: Absence of Fall and Fall-Related Injury  Outcome: Progressing     Problem: Skin Injury Risk Increased  Goal: Skin Health and Integrity  Outcome: Progressing     Problem: Pain Acute  Goal: Optimal Pain Control and Function  Outcome: Progressing

## 2024-10-18 ENCOUNTER — PATIENT OUTREACH (OUTPATIENT)
Dept: ADMINISTRATIVE | Facility: CLINIC | Age: 64
End: 2024-10-18
Payer: MEDICARE

## 2024-10-18 ENCOUNTER — PATIENT MESSAGE (OUTPATIENT)
Dept: ADMINISTRATIVE | Facility: CLINIC | Age: 64
End: 2024-10-18
Payer: MEDICARE

## 2024-10-18 DIAGNOSIS — Z91.89 AT HIGH RISK FOR BREAST CANCER: Primary | ICD-10-CM

## 2024-10-18 NOTE — PROGRESS NOTES
C3 nurse attempted to contact Kasandra Cheek for a TCC post hospital discharge follow up call. No answer. Left voicemail with callback information. The patient does not have a scheduled HOSFU appointment, that C3 nurse can locate. Unable to route message to PCP staff for assistance with scheduling.

## 2024-10-21 ENCOUNTER — TELEPHONE (OUTPATIENT)
Facility: CLINIC | Age: 64
End: 2024-10-21
Payer: MEDICARE

## 2024-10-21 ENCOUNTER — NURSE TRIAGE (OUTPATIENT)
Dept: ADMINISTRATIVE | Facility: CLINIC | Age: 64
End: 2024-10-21
Payer: MEDICARE

## 2024-10-21 NOTE — PLAN OF CARE
[facility] Emergency Department Plan of Care Note    Referral source: Nurse On-Call      Discussed patient with: Nurse On Call      Reason for consult:  neck pain after surgery      Medical Record Review: S/p neck surgery by Neurosurg.  Having persistent severe discomfort.  No fever      Disposition recommended: Scheduled a f/u today at 1pm      Additional Recommendations: Pt can increase frequency of pain medication to every 4 hours as well as 325mg of Tylenol every 4 hours.

## 2024-10-21 NOTE — TELEPHONE ENCOUNTER
"Karen follow up.  Patient spoke with OOC RN with complaint of "Pt recently had laminectomy on 10/11. Pain not controlled with pain meds. Just took oxycodone with tylenol at 7:15 this morning and current pain rating is 7/10. Pain goes up to 10/10 about an hour before time for next dose."    OOC RN consulted with Karen provider, Dr. Teran disposition is to follow up with neurosurgeon.  The following urgent in-basket message sent to Dr. Mejia: "Patient spoke with OOC RN and Karen provider Dr. Teran and disposition was to follow up with neurosurgeon for follow up for pain worsening.  Patient's call back number is [phone]."    Spoke with patient and updated.  Patient ok with waiting for Dr. Mejia to follow up to schedule appointment.      "

## 2024-10-21 NOTE — TELEPHONE ENCOUNTER
Pt transferred from post discharge, recently had laminectomy on 10/11. Pain not controlled with pain meds. Just took oxycodone with tylenol at 7:15 this morning and current pain rating is 7/10. Pain goes up to 10/10 about an hour before time for next dose. Dispo discuss with pcp and call back by nurse within 1 hour. Called surgeon's office who paged PA. No call back from provider. Sent to Karen. Dr. Teran advised for pt to be seen by neurosurgeon today. Appt booked by Karen for 1pm today with Hilary Grey PA-C. Pt verbalized understanding. Advised to call back with further concerns.    Reason for Disposition   SEVERE post-op pain (e.g., excruciating, pain scale 8-10) that is not controlled with pain medications    Additional Information   Negative: Sounds like a life-threatening emergency to the triager   Negative: Bright red, wide-spread, sunburn-like rash   Negative: SEVERE headache (e.g., excruciating) and after spinal (epidural) anesthesia   Negative: Vomiting and persists > 4 hours   Negative: Vomiting and abdomen looks much more swollen than usual   Negative: Drinking very little and dehydration suspected (e.g., no urine > 12 hours, very dry mouth, very lightheaded)   Negative: Patient sounds very sick or weak to the triager   Negative: Sounds like a serious complication to the triager   Negative: Caller has URGENT question and triager unable to answer question    Protocols used: Post-Op Symptoms and [id number]

## 2024-10-21 NOTE — PLAN OF CARE
[facility] Emergency Department Plan of Care Note    Referral source: Nurse On-Call      Discussed patient with: Nurse On Call      Reason for consult: Neck Pain      Medical Record Review:  patient is status post laminectomy presents with persistent pain in her neck.  No signs of infection per patient.  Requesting information about increasing her pain medication and follow up.      Disposition recommended:   After numerous communications with several offices, we were finally able to secure a follow-up visit with Dr. Mejia  tomorrow at noon      Additional Recommendations:  she was instructed that she can take her pain medication every 4 hours as opposed to every 6

## 2024-10-22 ENCOUNTER — PATIENT MESSAGE (OUTPATIENT)
Dept: ADMINISTRATIVE | Facility: CLINIC | Age: 64
End: 2024-10-22
Payer: MEDICARE

## 2024-10-28 ENCOUNTER — HOSPITAL ENCOUNTER (EMERGENCY)
Facility: HOSPITAL | Age: 64
Discharge: HOME | End: 2024-10-28
Attending: INTERNAL MEDICINE
Payer: MEDICARE

## 2024-10-28 VITALS
RESPIRATION RATE: 18 BRPM | DIASTOLIC BLOOD PRESSURE: 81 MMHG | WEIGHT: 270 LBS | OXYGEN SATURATION: 96 % | HEART RATE: 88 BPM | HEIGHT: 67 IN | BODY MASS INDEX: 42.38 KG/M2 | SYSTOLIC BLOOD PRESSURE: 168 MMHG | TEMPERATURE: 98 F

## 2024-10-28 DIAGNOSIS — R41.82 ALTERED MENTAL STATUS: ICD-10-CM

## 2024-10-28 DIAGNOSIS — Z79.899 POLYPHARMACY: ICD-10-CM

## 2024-10-28 DIAGNOSIS — F44.7: Primary | ICD-10-CM

## 2024-10-28 LAB
ALBUMIN SERPL-MCNC: 3.2 G/DL (ref 3.4–4.8)
ALBUMIN/GLOB SERPL: 1.2 RATIO (ref 1.1–2)
ALLENS TEST: ABNORMAL
ALP SERPL-CCNC: 134 UNIT/L (ref 40–150)
ALT SERPL-CCNC: 21 UNIT/L (ref 0–55)
AMMONIA PLAS-MSCNC: 21.5 UMOL/L (ref 18–72)
AMPHET UR QL SCN: NEGATIVE
ANION GAP SERPL CALC-SCNC: 11 MEQ/L
AST SERPL-CCNC: 30 UNIT/L (ref 5–34)
BARBITURATE SCN PRESENT UR: NEGATIVE
BASOPHILS # BLD AUTO: 0.06 X10(3)/MCL
BASOPHILS NFR BLD AUTO: 0.8 %
BENZODIAZ UR QL SCN: NEGATIVE
BILIRUB SERPL-MCNC: 0.4 MG/DL
BILIRUB UR QL STRIP.AUTO: NEGATIVE
BNP BLD-MCNC: 51.7 PG/ML
BUN SERPL-MCNC: 21 MG/DL (ref 9.8–20.1)
CALCIUM SERPL-MCNC: 9.3 MG/DL (ref 8.4–10.2)
CANNABINOIDS UR QL SCN: NEGATIVE
CHLORIDE SERPL-SCNC: 112 MMOL/L (ref 98–107)
CLARITY UR: CLEAR
CO2 SERPL-SCNC: 22 MMOL/L (ref 23–31)
COCAINE UR QL SCN: NEGATIVE
COLOR UR AUTO: YELLOW
CREAT SERPL-MCNC: 0.77 MG/DL (ref 0.55–1.02)
CREAT/UREA NIT SERPL: 27
DELSYS: ABNORMAL
EOSINOPHIL # BLD AUTO: 0.22 X10(3)/MCL (ref 0–0.9)
EOSINOPHIL NFR BLD AUTO: 3.1 %
ERYTHROCYTE [DISTWIDTH] IN BLOOD BY AUTOMATED COUNT: 13 % (ref 11.5–17)
ETHANOL SERPL-MCNC: <10 MG/DL
FENTANYL UR QL SCN: NEGATIVE
GFR SERPLBLD CREATININE-BSD FMLA CKD-EPI: >60 ML/MIN/1.73/M2
GLOBULIN SER-MCNC: 2.6 GM/DL (ref 2.4–3.5)
GLUCOSE SERPL-MCNC: 114 MG/DL (ref 82–115)
GLUCOSE UR QL STRIP: NEGATIVE
HCO3 UR-SCNC: 22.8 MMOL/L (ref 24–28)
HCT VFR BLD AUTO: 33.5 % (ref 37–47)
HGB BLD-MCNC: 11 G/DL (ref 12–16)
HGB UR QL STRIP: NEGATIVE
IMM GRANULOCYTES # BLD AUTO: 0.05 X10(3)/MCL (ref 0–0.04)
IMM GRANULOCYTES NFR BLD AUTO: 0.7 %
KETONES UR QL STRIP: ABNORMAL
LACTATE SERPL-SCNC: 1.3 MMOL/L (ref 0.5–2.2)
LEUKOCYTE ESTERASE UR QL STRIP: NEGATIVE
LYMPHOCYTES # BLD AUTO: 1.54 X10(3)/MCL (ref 0.6–4.6)
LYMPHOCYTES NFR BLD AUTO: 21.4 %
MCH RBC QN AUTO: 31 PG (ref 27–31)
MCHC RBC AUTO-ENTMCNC: 32.8 G/DL (ref 33–36)
MCV RBC AUTO: 94.4 FL (ref 80–94)
MDMA UR QL SCN: NEGATIVE
MONOCYTES # BLD AUTO: 0.53 X10(3)/MCL (ref 0.1–1.3)
MONOCYTES NFR BLD AUTO: 7.4 %
NEUTROPHILS # BLD AUTO: 4.79 X10(3)/MCL (ref 2.1–9.2)
NEUTROPHILS NFR BLD AUTO: 66.6 %
NITRITE UR QL STRIP: NEGATIVE
OPIATES UR QL SCN: POSITIVE
PCO2 BLDA: 24.8 MMHG (ref 35–45)
PCP UR QL: NEGATIVE
PH SMN: 7.57 [PH] (ref 7.35–7.45)
PH UR STRIP: 6 [PH]
PH UR: 6 [PH] (ref 3–11)
PLATELET # BLD AUTO: 325 X10(3)/MCL (ref 130–400)
PMV BLD AUTO: 9.1 FL (ref 7.4–10.4)
PO2 BLDA: 78 MMHG (ref 80–100)
POC BE: 1 MMOL/L
POC SATURATED O2: 97 % (ref 95–100)
POC TCO2: 24 MMOL/L (ref 23–27)
POTASSIUM SERPL-SCNC: 3.6 MMOL/L (ref 3.5–5.1)
PROT SERPL-MCNC: 5.8 GM/DL (ref 5.8–7.6)
PROT UR QL STRIP: NEGATIVE
RBC # BLD AUTO: 3.55 X10(6)/MCL (ref 4.2–5.4)
SAMPLE: ABNORMAL
SITE: ABNORMAL
SODIUM SERPL-SCNC: 145 MMOL/L (ref 136–145)
SP GR UR STRIP.AUTO: 1.02 (ref 1–1.03)
SPECIFIC GRAVITY, URINE AUTO (.000) (OHS): 1.02 (ref 1–1.03)
TROPONIN I SERPL-MCNC: 0.03 NG/ML (ref 0–0.04)
UROBILINOGEN UR STRIP-ACNC: 0.2
WBC # BLD AUTO: 7.19 X10(3)/MCL (ref 4.5–11.5)

## 2024-10-28 PROCEDURE — 85025 COMPLETE CBC W/AUTO DIFF WBC: CPT | Performed by: INTERNAL MEDICINE

## 2024-10-28 PROCEDURE — 81003 URINALYSIS AUTO W/O SCOPE: CPT | Mod: 59 | Performed by: INTERNAL MEDICINE

## 2024-10-28 PROCEDURE — 87040 BLOOD CULTURE FOR BACTERIA: CPT | Performed by: INTERNAL MEDICINE

## 2024-10-28 PROCEDURE — 80053 COMPREHEN METABOLIC PANEL: CPT | Performed by: INTERNAL MEDICINE

## 2024-10-28 PROCEDURE — 36600 WITHDRAWAL OF ARTERIAL BLOOD: CPT

## 2024-10-28 PROCEDURE — 96372 THER/PROPH/DIAG INJ SC/IM: CPT | Performed by: INTERNAL MEDICINE

## 2024-10-28 PROCEDURE — 83880 ASSAY OF NATRIURETIC PEPTIDE: CPT | Performed by: INTERNAL MEDICINE

## 2024-10-28 PROCEDURE — 84484 ASSAY OF TROPONIN QUANT: CPT | Performed by: INTERNAL MEDICINE

## 2024-10-28 PROCEDURE — 82077 ASSAY SPEC XCP UR&BREATH IA: CPT | Performed by: INTERNAL MEDICINE

## 2024-10-28 PROCEDURE — 99285 EMERGENCY DEPT VISIT HI MDM: CPT | Mod: 25

## 2024-10-28 PROCEDURE — 99900035 HC TECH TIME PER 15 MIN (STAT)

## 2024-10-28 PROCEDURE — 83605 ASSAY OF LACTIC ACID: CPT | Performed by: INTERNAL MEDICINE

## 2024-10-28 PROCEDURE — 93010 ELECTROCARDIOGRAM REPORT: CPT | Mod: ,,, | Performed by: INTERNAL MEDICINE

## 2024-10-28 PROCEDURE — 80307 DRUG TEST PRSMV CHEM ANLYZR: CPT | Performed by: INTERNAL MEDICINE

## 2024-10-28 PROCEDURE — 82803 BLOOD GASES ANY COMBINATION: CPT

## 2024-10-28 PROCEDURE — 94640 AIRWAY INHALATION TREATMENT: CPT

## 2024-10-28 PROCEDURE — 82140 ASSAY OF AMMONIA: CPT | Performed by: INTERNAL MEDICINE

## 2024-10-28 PROCEDURE — 93005 ELECTROCARDIOGRAM TRACING: CPT

## 2024-10-28 PROCEDURE — 25000242 PHARM REV CODE 250 ALT 637 W/ HCPCS: Performed by: INTERNAL MEDICINE

## 2024-10-28 PROCEDURE — 94761 N-INVAS EAR/PLS OXIMETRY MLT: CPT

## 2024-10-28 PROCEDURE — 63600175 PHARM REV CODE 636 W HCPCS: Performed by: INTERNAL MEDICINE

## 2024-10-28 RX ORDER — HALOPERIDOL 5 MG/ML
10 INJECTION INTRAMUSCULAR
Status: COMPLETED | OUTPATIENT
Start: 2024-10-28 | End: 2024-10-28

## 2024-10-28 RX ORDER — DIPHENHYDRAMINE HYDROCHLORIDE 50 MG/ML
50 INJECTION INTRAMUSCULAR; INTRAVENOUS
Status: COMPLETED | OUTPATIENT
Start: 2024-10-28 | End: 2024-10-28

## 2024-10-28 RX ORDER — IPRATROPIUM BROMIDE AND ALBUTEROL SULFATE 2.5; .5 MG/3ML; MG/3ML
3 SOLUTION RESPIRATORY (INHALATION)
Status: COMPLETED | OUTPATIENT
Start: 2024-10-28 | End: 2024-10-28

## 2024-10-28 RX ORDER — OLANZAPINE 10 MG/2ML
10 INJECTION, POWDER, FOR SOLUTION INTRAMUSCULAR ONCE
Status: COMPLETED | OUTPATIENT
Start: 2024-10-28 | End: 2024-10-28

## 2024-10-28 RX ADMIN — IPRATROPIUM BROMIDE AND ALBUTEROL SULFATE 3 ML: .5; 3 SOLUTION RESPIRATORY (INHALATION) at 07:10

## 2024-10-28 RX ADMIN — DIPHENHYDRAMINE HYDROCHLORIDE 50 MG: 50 INJECTION INTRAMUSCULAR; INTRAVENOUS at 09:10

## 2024-10-28 RX ADMIN — HALOPERIDOL LACTATE 10 MG: 5 INJECTION, SOLUTION INTRAMUSCULAR at 07:10

## 2024-10-28 RX ADMIN — OLANZAPINE 10 MG: 10 INJECTION, POWDER, FOR SOLUTION INTRAMUSCULAR at 10:10

## 2024-10-28 NOTE — ED PROVIDER NOTES
"Encounter Date: 10/28/2024       History     Chief Complaint   Patient presents with    Altered Mental Status     Brought by [employer] for altered mental status. Spouse states that "this happens often."     64-year-old white female brought in with altered mental status.      Review of patient's allergies indicates:   Allergen Reactions    Sucralfate Nausea Only     Past Medical History:   Diagnosis Date    Anemia     Anxiety     Depression     Dyspareunia     Encounter for blood transfusion     HTN (hypertension)     Hypothyroidism, unspecified     Liver disease     fatty liver    Lumbar radiculopathy     Menopause     Migraine     Obesity     Osteoporosis     Other spondylosis with radiculopathy, lumbar region     Personal history of fall     Spinal stenosis, lumbar region with neurogenic claudication     TIA (transient ischemic attack) 1994    Unspecified osteoarthritis, unspecified site     Urinary incontinence      Past Surgical History:   Procedure Laterality Date    ABDOMINAL SURGERY      Gastric Bypass    CARPAL TUNNEL RELEASE Bilateral     CERVICAL FUSION  12/28/2021    CHOLECYSTECTOMY  2015    COLONOSCOPY      COLPOSCOPY      FOOT SURGERY Left     FRACTURE SURGERY  2001    GASTRIC BYPASS  2011    HAND SURGERY Left     thumb    HERNIA REPAIR  2014    JOINT REPLACEMENT  Knee 2015, Hip 2016    LAMINECTOMY OF THORACIC SPINE BY POSTERIOR APPROACH USING COMPUTER-ASSISTED NAVIGATION N/A 10/11/2024    Procedure: LAMINECTOMY, SPINE, THORACIC, POSTERIOR APPROACH, USING COMPUTER-ASSISTED NAVIGATION;  Surgeon: Andra Mejia MD;  Location: [facility];  Service: Neurosurgery;  Laterality: N/A;  T9-L3 DECOMPRESSION / REMOVAL OF RIGHT ILIAC BOLT AND S1 SCREW / EXTENSION OF FUSION  T9-S2 ILIAC BOLT //  PRONE ADENIKE // DRILL // SCOPE // O-ARM //  DIRECT SPINE // NDM //       LUMBAR FUSION  05/28/2021    LUMBAR FUSION  02/21/2023    L3-S1    POSTERIOR LUMBAR INTERBODY FUSION (PLIF) WITH COMPUTER-ASSISTED NAVIGATION N/A " 10/11/2024    Procedure: FUSION, SPINE, LUMBAR, PLIF, USING COMPUTER-ASSISTED NAVIGATION;  Surgeon: Andra Mejia MD;  Location: [facility] OR;  Service: Neurosurgery;  Laterality: N/A;    ROTATOR CUFF REPAIR Bilateral 2022    SMALL INTESTINE SURGERY      SPINE SURGERY   &     TONSILLECTOMY       Family History   Problem Relation Name Age of Onset    Cystic fibrosis Sister Florence Kennedy     Arthritis Sister Florence Kennedy         Ankle,  hands    Depression Sister Florence Kennedy     Hypertension Sister Florence Kennedy         Me, Kasandra Ham    Vision loss Sister Florence Kennedy     Cystic fibrosis Brother Adriano Kennedy     Early death Brother Adriano Kennedy         CF when 28    Cancer Mother Latoya Kennedy         Breast    Depression Mother Latoya Kennedy     Mental illness Mother Latoya Kennedy     Arthritis Father Eduard Kennedy         Back, knees,  hands    Cancer Father Eduard Kennedy         Lung    Diabetes Father Eduard Kennedy     Hypertension Father Eduard Kennedy     Vision loss Father Eduard Kennedy     Early death Sister Sabina Kennedy         CF when 5    Heart failure Maternal Grandmother Letty Willett         Heart Attack    Cancer Maternal Aunt Gema Smith         Breast Cancer    Cancer Maternal Aunt Aparna Manrique         Lung Cancer    Cancer Maternal Aunt Jeanie Stacy         Breast Cancer    Cancer Paternal Aunt Hilary Matthews         Skin Cancer    Cancer Paternal Aunt Enma Alford         Skin Cancer     Social History     Tobacco Use    Smoking status: Former     Current packs/day: 0.00     Average packs/day: 1 pack/day for 27.0 years (27.0 ttl pk-yrs)     Types: Cigarettes     Start date: 1975     Quit date: 2002     Years since quittin.8    Smokeless tobacco: Never    Tobacco comments:     off and on when smoking   Substance Use Topics    Alcohol use: Not Currently     Comment: I do not drink    Drug use: Never     Review of Systems   Unable to perform ROS: Mental  status change       Physical Exam     Initial Vitals [10/28/24 1800]   BP Pulse Resp Temp SpO2   (!) 189/91 90 (!) 22 97.9 °F (36.6 °C) 97 %      MAP       --         Physical Exam    Nursing note and vitals reviewed.  Constitutional: She appears well-developed and well-nourished. She is not diaphoretic. No distress.   Morbid obesity   HENT:   Head: Normocephalic and atraumatic. Mouth/Throat: Oropharynx is clear and moist. No oropharyngeal exudate.   Eyes: Conjunctivae and EOM are normal. Pupils are equal, round, and reactive to light. No scleral icterus.   Neck: Neck supple. No JVD present.   Normal range of motion.  Cardiovascular:  Regular rhythm, normal heart sounds and intact distal pulses.     Exam reveals no gallop and no friction rub.       No murmur heard.  Tachycardia   Pulmonary/Chest: Breath sounds normal. No respiratory distress. She has no wheezes. She exhibits no tenderness.   Abdominal: Abdomen is soft. Bowel sounds are normal. She exhibits no distension. There is no abdominal tenderness. There is no rebound.   Musculoskeletal:         General: Normal range of motion.      Cervical back: Normal range of motion and neck supple.     Neurological: She is alert. She has normal strength. No cranial nerve deficit or sensory deficit. GCS eye subscore is 4. GCS verbal subscore is 4. GCS motor subscore is 6.   Patient was intermittently repeat sentences and answer questions but she does answer appropriately but she repeats the question that is asked her directly   Skin: Skin is warm and dry. Capillary refill takes less than 2 seconds.   Psychiatric: She has a normal mood and affect. Her behavior is normal. Judgment and thought content normal.         ED Course   Procedures  Labs Reviewed   COMPREHENSIVE METABOLIC PANEL - Abnormal       Result Value    Sodium 145      Potassium 3.6      Chloride 112 (*)     CO2 22 (*)     Glucose 114      Blood Urea Nitrogen 21.0 (*)     Creatinine 0.77      Calcium 9.3       Protein Total 5.8      Albumin 3.2 (*)     Globulin 2.6      Albumin/Globulin Ratio 1.2      Bilirubin Total 0.4            ALT 21      AST 30      eGFR >60      Anion Gap 11.0      BUN/Creatinine Ratio 27     URINALYSIS, REFLEX TO URINE CULTURE - Abnormal    Color, UA Yellow      Appearance, UA Clear      Specific Gravity, UA 1.020      pH, UA 6.0      Protein, UA Negative      Glucose, UA Negative      Ketones, UA Trace (*)     Blood, UA Negative      Bilirubin, UA Negative      Urobilinogen, UA 0.2      Nitrites, UA Negative      Leukocyte Esterase, UA Negative     DRUG SCREEN, URINE (BEAKER) - Abnormal    Amphetamines, Urine Negative      Barbiturates, Urine Negative      Benzodiazepine, Urine Negative      Cannabinoids, Urine Negative      Cocaine, Urine Negative      Fentanyl, Urine Negative      MDMA, Urine Negative      Opiates, Urine Positive (*)     Phencyclidine, Urine Negative      pH, Urine 6.0      Specific Gravity, Urine Auto 1.020      Narrative:     Cut off concentrations:    Amphetamines - 1000 ng/ml  Barbiturates - 200 ng/ml  Benzodiazepine - 200 ng/ml  Cannabinoids (THC) - 50 ng/ml  Cocaine - 300 ng/ml  Fentanyl - 1.0 ng/ml  MDMA - 500 ng/ml  Opiates - 300 ng/ml   Phencyclidine (PCP) - 25 ng/ml    Specimen submitted for drug analysis and tested for pH and specific gravity in order to evaluate sample integrity. Suspect tampering if specific gravity is <1.003 and/or pH is not within the range of 4.5 - 8.0  False negatives may result form substances such as bleach added to urine.  False positives may result for the presence of a substance with similar chemical structure to the drug or its metabolite.    This test provides only a PRELIMINARY analytical test result. A more specific alternate chemical method must be used in order to obtain a confirmed analytical result. Gas chromatography/mass spectrometry (GC/MS) is the preferred confirmatory method. Other chemical confirmation methods are  available. Clinical consideration and professional judgement should be applied to any drug of abuse test result, particularly when preliminary positive results are used.    Positive results will be confirmed only at the physicians request. Unconfirmed screening results are to be used only for medical purposes (treatment).        CBC WITH DIFFERENTIAL - Abnormal    WBC 7.19      RBC 3.55 (*)     Hgb 11.0 (*)     Hct 33.5 (*)     MCV 94.4 (*)     MCH 31.0      MCHC 32.8 (*)     RDW 13.0      Platelet 325      MPV 9.1      Neut % 66.6      Lymph % 21.4      Mono % 7.4      Eos % 3.1      Basophil % 0.8      Lymph # 1.54      Neut # 4.79      Mono # 0.53      Eos # 0.22      Baso # 0.06      IG# 0.05 (*)     IG% 0.7     ISTAT PROCEDURE - Abnormal    POC PH 7.571 (*)     POC PCO2 24.8 (*)     POC PO2 78 (*)     POC HCO3 22.8 (*)     POC BE 1      POC SATURATED O2 97      POC TCO2 24      Sample ARTERIAL      Site RR      Allens Test Pass      DelSys Room Air     ALCOHOL,MEDICAL (ETHANOL) - Normal    Ethanol Level <10.0     LACTIC ACID, PLASMA - Normal    Lactic Acid Level 1.3     TROPONIN I - Normal    Troponin-I 0.028     B-TYPE NATRIURETIC PEPTIDE - Normal    Natriuretic Peptide 51.7     AMMONIA - Normal    Ammonia Level 21.5     BLOOD CULTURE OLG   BLOOD CULTURE OLG   CBC W/ AUTO DIFFERENTIAL    Narrative:     The following orders were created for panel order CBC auto differential.  Procedure                               Abnormality         Status                     ---------                               -----------         ------                     CBC with Differential[[id number]]       Abnormal            Final result                 Please view results for these tests on the individual orders.     EKG Readings: (Independently Interpreted)   EKG done at 18 18 on October 28, 2024 shows normal sinus rhythm nonspecific T-wave abnormalities ventricular rate is 86 beats per minute       Imaging Results               X-Ray Chest AP Portable (Preliminary result)  Result time 10/28/24 23:28:24      Wet Read by Aayush Gu MD (10/28/24 23:28:24, [facility] Emergency Dept, Emergency Medicine)    Increased markings in his right lung as compared to her left                                     Medications   albuterol-ipratropium 2.5 mg-0.5 mg/3 mL nebulizer solution 3 mL (3 mLs Nebulization Given 10/28/24 1931)   haloperidol lactate injection 10 mg (10 mg Intramuscular Given 10/28/24 1955)   diphenhydrAMINE injection 50 mg (50 mg Intramuscular Given 10/28/24 2117)   OLANZapine injection 10 mg (10 mg Intramuscular Given 10/28/24 2211)     Medical Decision Making  64-year-old white female presents via EMS with altered mental status.  Differential diagnosis is extensive which includes encephalitis, intracranial bleed, cerebrovascular accident, metabolic encephalopathy, sepsis, septic shock, urinary tract infection, dehydration, psychiatric disorder.  Workup included blood work and chest x-ray which all relatively benign.  Extensive chart review was done while the workup was being done and a note she recently underwent and marked spinal surgery but her history also includes a psychiatric admission and numerous neurology visits which she appears to have a conversion disorder and polypharmacy.   review shows she gets 180 oxycodone a month which seems extremely excessive.  At this point she was given some Haldol and Benadryl and this profoundly improved her symptoms therefore I did consider getting a CT of the head but she has been imaged previously while having these episodes I did not want to expose her and a anymore radiation and a soon as we began to discuss discharge she became symptomatic again so that point she was given some Zyprexa and I have discussed with her  at bedside he states that she does this frequently and it appears that she does this when she wants a tension so will go ahead and discharge  her to home and plain to the  she should give her her Seroquel at bedtime and have her get some sleep tonight and I would withhold all her pain meds from her until she would become coherent and  agreed.  Her chest rate x-ray did show some increased haziness throughout her right lung and I suspect that she may have had a mucus plug at some point because her lung sounds were clear on that side so I gave her a breathing treatment at blood gas was then obtained to ensure she was oxygenating well and it appears she was hyperventilating which is in course with her anxiety and conversion disorder.  After the breathing treatment she continues to sat % on room air.  There does not appear to be any infiltrate on the right side is just some haziness    Problems Addressed:  Altered mental status: acute illness or injury  Conversion disorder, acute episode, with mixed symptoms: acute illness or injury with systemic symptoms  Polypharmacy: chronic illness or injury    Amount and/or Complexity of Data Reviewed  Independent Historian: spouse and EMS  External Data Reviewed: labs, radiology and notes.  Labs: ordered. Decision-making details documented in ED Course.  Radiology: ordered and independent interpretation performed.    Risk  Prescription drug management.  Drug therapy requiring intensive monitoring for toxicity.  Diagnosis or treatment significantly limited by social determinants of health.                                      Clinical Impression:  Final diagnoses:  [R41.82] Altered mental status  [F44.7] Conversion disorder, acute episode, with mixed symptoms (Primary)  [Z79.899] Polypharmacy          ED Disposition Condition    Discharge Stable          ED Prescriptions    None       Follow-up Information       Follow up With Specialties Details Why Contact Ravinder Ramirez DO Family Medicine In 2 days  [address]  [address]  [facility]  [place] [id number] [phone]    "           Hilary Tesfaye, RNRegistered Nurse was present during the entire interaction with this patient    Portions of this note have been created with voice recognition software. Occasional "wrong-words" or "sound alike" substitutions may have occurred due to inherent limitations of voice software. Please read the note carefully and recognize, using context, word substitutions may have occurred.       Aayush Gu MD  10/28/24 2322       Aayush Gu MD  10/28/24 2325       Aayush Gu MD  10/29/24 0101    "

## 2024-10-29 LAB
OHS QRS DURATION: 90 MS
OHS QTC CALCULATION: 495 MS

## 2024-10-30 ENCOUNTER — HOSPITAL ENCOUNTER (INPATIENT)
Facility: HOSPITAL | Age: 64
LOS: 12 days | Discharge: NURSING, REHAB OR LONG-TERM CARE | DRG: 092 | End: 2024-11-11
Attending: HOSPITALIST | Admitting: STUDENT IN AN ORGANIZED HEALTH CARE EDUCATION/TRAINING PROGRAM
Payer: MEDICARE

## 2024-10-30 DIAGNOSIS — R41.82 ALTERED MENTAL STATUS: ICD-10-CM

## 2024-10-30 DIAGNOSIS — I63.9 STROKE: ICD-10-CM

## 2024-10-30 DIAGNOSIS — R41.82 ALTERED MENTAL STATUS, UNSPECIFIED ALTERED MENTAL STATUS TYPE: Primary | ICD-10-CM

## 2024-10-30 DIAGNOSIS — I63.9 ACUTE CVA (CEREBROVASCULAR ACCIDENT): ICD-10-CM

## 2024-10-30 DIAGNOSIS — Z01.89 ENCOUNTER FOR IMAGING TO SCREEN FOR METAL PRIOR TO MAGNETIC RESONANCE IMAGING (MRI): ICD-10-CM

## 2024-10-30 LAB
ALBUMIN SERPL-MCNC: 3.4 G/DL (ref 3.4–4.8)
ALBUMIN/GLOB SERPL: 1 RATIO (ref 1.1–2)
ALP SERPL-CCNC: 161 UNIT/L (ref 40–150)
ALT SERPL-CCNC: 26 UNIT/L (ref 0–55)
AMMONIA PLAS-MSCNC: 27.8 UMOL/L (ref 18–72)
AMPHET UR QL SCN: NEGATIVE
ANION GAP SERPL CALC-SCNC: 12 MEQ/L
AST SERPL-CCNC: 42 UNIT/L (ref 5–34)
BACTERIA #/AREA URNS AUTO: ABNORMAL /HPF
BARBITURATE SCN PRESENT UR: NEGATIVE
BASOPHILS # BLD AUTO: 0.07 X10(3)/MCL
BASOPHILS NFR BLD AUTO: 0.8 %
BENZODIAZ UR QL SCN: NEGATIVE
BILIRUB SERPL-MCNC: 0.7 MG/DL
BILIRUB UR QL STRIP.AUTO: NEGATIVE
BUN SERPL-MCNC: 14.9 MG/DL (ref 9.8–20.1)
CALCIUM SERPL-MCNC: 9 MG/DL (ref 8.4–10.2)
CANNABINOIDS UR QL SCN: NEGATIVE
CHLORIDE SERPL-SCNC: 110 MMOL/L (ref 98–107)
CLARITY UR: CLEAR
CO2 SERPL-SCNC: 20 MMOL/L (ref 23–31)
COCAINE UR QL SCN: NEGATIVE
COLOR UR AUTO: YELLOW
CREAT SERPL-MCNC: 0.71 MG/DL (ref 0.55–1.02)
CREAT/UREA NIT SERPL: 21
EOSINOPHIL # BLD AUTO: 0.08 X10(3)/MCL (ref 0–0.9)
EOSINOPHIL NFR BLD AUTO: 0.9 %
ERYTHROCYTE [DISTWIDTH] IN BLOOD BY AUTOMATED COUNT: 13.3 % (ref 11.5–17)
ETHANOL SERPL-MCNC: <10 MG/DL
FENTANYL UR QL SCN: NEGATIVE
GFR SERPLBLD CREATININE-BSD FMLA CKD-EPI: >60 ML/MIN/1.73/M2
GLOBULIN SER-MCNC: 3.5 GM/DL (ref 2.4–3.5)
GLUCOSE SERPL-MCNC: 126 MG/DL (ref 82–115)
GLUCOSE UR QL STRIP: NORMAL
HCT VFR BLD AUTO: 37.7 % (ref 37–47)
HGB BLD-MCNC: 12.9 G/DL (ref 12–16)
HGB UR QL STRIP: NEGATIVE
HYALINE CASTS #/AREA URNS LPF: ABNORMAL /LPF
IMM GRANULOCYTES # BLD AUTO: 0.08 X10(3)/MCL (ref 0–0.04)
IMM GRANULOCYTES NFR BLD AUTO: 0.9 %
KETONES UR QL STRIP: ABNORMAL
LACTATE SERPL-SCNC: 1.2 MMOL/L (ref 0.5–2.2)
LEUKOCYTE ESTERASE UR QL STRIP: NEGATIVE
LYMPHOCYTES # BLD AUTO: 1.79 X10(3)/MCL (ref 0.6–4.6)
LYMPHOCYTES NFR BLD AUTO: 19.6 %
MAGNESIUM SERPL-MCNC: 1.9 MG/DL (ref 1.6–2.6)
MCH RBC QN AUTO: 31.2 PG (ref 27–31)
MCHC RBC AUTO-ENTMCNC: 34.2 G/DL (ref 33–36)
MCV RBC AUTO: 91.3 FL (ref 80–94)
MDMA UR QL SCN: NEGATIVE
MONOCYTES # BLD AUTO: 0.64 X10(3)/MCL (ref 0.1–1.3)
MONOCYTES NFR BLD AUTO: 7 %
MUCOUS THREADS URNS QL MICRO: ABNORMAL /LPF
NEUTROPHILS # BLD AUTO: 6.46 X10(3)/MCL (ref 2.1–9.2)
NEUTROPHILS NFR BLD AUTO: 70.8 %
NITRITE UR QL STRIP: NEGATIVE
NRBC BLD AUTO-RTO: 0 %
OPIATES UR QL SCN: POSITIVE
PCP UR QL: NEGATIVE
PH UR STRIP: 6.5 [PH]
PH UR: 6.5 [PH] (ref 3–11)
PLATELET # BLD AUTO: 376 X10(3)/MCL (ref 130–400)
PMV BLD AUTO: 8.9 FL (ref 7.4–10.4)
POCT GLUCOSE: 100 MG/DL (ref 70–110)
POTASSIUM SERPL-SCNC: 3.7 MMOL/L (ref 3.5–5.1)
PROT SERPL-MCNC: 6.9 GM/DL (ref 5.8–7.6)
PROT UR QL STRIP: ABNORMAL
RBC # BLD AUTO: 4.13 X10(6)/MCL (ref 4.2–5.4)
RBC #/AREA URNS AUTO: ABNORMAL /HPF
SODIUM SERPL-SCNC: 142 MMOL/L (ref 136–145)
SP GR UR STRIP.AUTO: 1.02 (ref 1–1.03)
SPECIFIC GRAVITY, URINE AUTO (.000) (OHS): 1.02 (ref 1–1.03)
SQUAMOUS #/AREA URNS LPF: ABNORMAL /HPF
TSH SERPL-ACNC: 4.36 UIU/ML (ref 0.35–4.94)
UROBILINOGEN UR STRIP-ACNC: 2
WBC # BLD AUTO: 9.12 X10(3)/MCL (ref 4.5–11.5)
WBC #/AREA URNS AUTO: ABNORMAL /HPF

## 2024-10-30 PROCEDURE — 81003 URINALYSIS AUTO W/O SCOPE: CPT | Mod: 59

## 2024-10-30 PROCEDURE — 83605 ASSAY OF LACTIC ACID: CPT

## 2024-10-30 PROCEDURE — 82962 GLUCOSE BLOOD TEST: CPT

## 2024-10-30 PROCEDURE — 96374 THER/PROPH/DIAG INJ IV PUSH: CPT

## 2024-10-30 PROCEDURE — 84443 ASSAY THYROID STIM HORMONE: CPT

## 2024-10-30 PROCEDURE — 25500020 PHARM REV CODE 255: Performed by: HOSPITALIST

## 2024-10-30 PROCEDURE — 11000001 HC ACUTE MED/SURG PRIVATE ROOM

## 2024-10-30 PROCEDURE — 82077 ASSAY SPEC XCP UR&BREATH IA: CPT

## 2024-10-30 PROCEDURE — 21400001 HC TELEMETRY ROOM

## 2024-10-30 PROCEDURE — 82140 ASSAY OF AMMONIA: CPT

## 2024-10-30 PROCEDURE — 80053 COMPREHEN METABOLIC PANEL: CPT

## 2024-10-30 PROCEDURE — 85025 COMPLETE CBC W/AUTO DIFF WBC: CPT

## 2024-10-30 PROCEDURE — 83735 ASSAY OF MAGNESIUM: CPT

## 2024-10-30 PROCEDURE — 51702 INSERT TEMP BLADDER CATH: CPT

## 2024-10-30 PROCEDURE — 80307 DRUG TEST PRSMV CHEM ANLYZR: CPT

## 2024-10-30 PROCEDURE — 63600175 PHARM REV CODE 636 W HCPCS

## 2024-10-30 PROCEDURE — 99285 EMERGENCY DEPT VISIT HI MDM: CPT | Mod: 25

## 2024-10-30 RX ORDER — HYDRALAZINE HYDROCHLORIDE 20 MG/ML
10 INJECTION INTRAMUSCULAR; INTRAVENOUS
Status: COMPLETED | OUTPATIENT
Start: 2024-10-30 | End: 2024-10-30

## 2024-10-30 RX ORDER — DIPHENHYDRAMINE HYDROCHLORIDE 50 MG/ML
25 INJECTION INTRAMUSCULAR; INTRAVENOUS
Status: DISCONTINUED | OUTPATIENT
Start: 2024-10-30 | End: 2024-10-30

## 2024-10-30 RX ORDER — BISACODYL 10 MG/1
10 SUPPOSITORY RECTAL DAILY PRN
Status: DISCONTINUED | OUTPATIENT
Start: 2024-10-30 | End: 2024-11-11 | Stop reason: HOSPADM

## 2024-10-30 RX ORDER — MUPIROCIN 20 MG/G
OINTMENT TOPICAL 2 TIMES DAILY
Status: DISPENSED | OUTPATIENT
Start: 2024-10-30 | End: 2024-11-04

## 2024-10-30 RX ORDER — ASPIRIN 300 MG/1
300 SUPPOSITORY RECTAL EVERY 6 HOURS PRN
Status: DISCONTINUED | OUTPATIENT
Start: 2024-10-30 | End: 2024-10-31

## 2024-10-30 RX ORDER — HYDRALAZINE HYDROCHLORIDE 20 MG/ML
10 INJECTION INTRAMUSCULAR; INTRAVENOUS EVERY 4 HOURS PRN
Status: DISCONTINUED | OUTPATIENT
Start: 2024-10-30 | End: 2024-11-11 | Stop reason: HOSPADM

## 2024-10-30 RX ORDER — HYDRALAZINE HYDROCHLORIDE 20 MG/ML
20 INJECTION INTRAMUSCULAR; INTRAVENOUS
Status: DISCONTINUED | OUTPATIENT
Start: 2024-10-30 | End: 2024-10-30

## 2024-10-30 RX ORDER — LABETALOL HYDROCHLORIDE 5 MG/ML
10 INJECTION, SOLUTION INTRAVENOUS EVERY 6 HOURS PRN
Status: DISCONTINUED | OUTPATIENT
Start: 2024-10-30 | End: 2024-11-11 | Stop reason: HOSPADM

## 2024-10-30 RX ADMIN — IOHEXOL 60 ML: 350 INJECTION, SOLUTION INTRAVENOUS at 05:10

## 2024-10-30 RX ADMIN — HYDRALAZINE HYDROCHLORIDE 10 MG: 20 INJECTION INTRAMUSCULAR; INTRAVENOUS at 01:10

## 2024-10-30 NOTE — ASSESSMENT & PLAN NOTE
- presented with altered mental status x3 days (ongoing waxing and waning episodes over the course of 1 year per )  - Stroke RF: HTN  - Intervention: none, OOW symptom onset 10/27  - Etiology: TBD    Stroke workup:  -CTh: Remote appearing infarcts right frontal and parietal lobes though these are new compared to July 2024. No acute hemorrhage   -CTA h/n: Impression: No large vessel occlusion or flow-limiting stenosis.  -MRI brain:    -ECHO:  - CUS: The right internal carotid artery is patent with no evidence of stenosis.  The left internal carotid artery is patent with less than 50% stenosis.   Bilateral vertebral arteries are patent with antegrade flow.  - LDL: 121  -A1C: 5.2  -TSH: 4.362 Normal  -home medications include: no aspirin, plavix or anticoagulants noted     NIH Stroke Scale  Current NIH Stroke Score Values      Flowsheet Row Most Recent Value   Interval shift assessment   1a. Level of Consciousness 0-->Alert, keenly responsive   1b. LOC Questions 2-->Answers neither question correctly   1c. LOC Commands 1-->Performs one task correctly   2. Best Gaze 0-->Normal   3. Visual 0-->No visual loss   4. Facial Palsy 0-->Normal symmetrical movements   5a. Motor Arm, Left 0-->No drift, limb holds 90 (or 45) degrees for full 10 secs   5b. Motor Arm, Right 0-->No drift, limb holds 90 (or 45) degrees for full 10 secs   6a. Motor Leg, Left 0-->No drift, leg holds 30 degree position for full 5 secs   6b. Motor Leg, Right 0-->No drift, leg holds 30 degree position for full 5 secs   7. Limb Ataxia 0-->Absent   8. Sensory 0-->Normal, no sensory loss   9. Best Language 1-->Mild-to-moderate aphasia, some obvious loss of fluency or facility of comprehension, without significant limitation on ideas expressed or form of expression. Reduction of speech and/or comprehension, however, makes conversation. . . (see row details)   10. Dysarthria 1-->Mild-to-moderate dysarthria, patient slurs at least some words and, at  worst, can be understood with some difficulty   11. Extinction and Inattention (formerly Neglect) 0-->No abnormality   Total (NIH Stroke Scale) 5              Plan:  -continue stroke workup (pending MRI brain)  -Continue Aspirin 81mg daily  -Continue Atorvastatin 40mg daily  - ok to normalize BP, symptoms started on 10/27  - Neuro checks q4hr ... stat CTh if any neuro change   - DVT ppx with SCD or lovenox 40 s/c daily  - Continuous telemetry monitoring  - NPO until passes Perez or cleared by SLP  - No bedrest, ok for patient to ambulate, RN to observe first ambulation for safety  - PT/OT/SLP to evaluate   - Other recommendations may follow from MD

## 2024-10-30 NOTE — HPI
64 year old female with a past medical history of HTN, obesity, migraines, TIA, hypothyroidism, anxiety, depression, fatty liver disease, and spinal surgeries presented to ED on 10/30 for altered mental status.   reports about 1 year ago she had a gradual decline, she requires assistance to go to the bathroom and transfer to wheelchair.  She has had a few episodes of waxing and waning mental status with UTI. He reports on Sunday, she was sitting in her wheel chair in the office was going to purchase a couple of things. He stopped her from purchasing the things, walked out of the room, when he came back, she was slumped over, not really responding.  He reports he EMS brought her to ED in [place],  reports they took her vital signs and discharged her home.  He reports it took 4 people to get her out of the car because he was so weak.  He brought her to ED at [facility] for further evaluation.  CT head was significant for right frontal and right parietal infarcts which are new from 7/2024 but appear chronic.  Neurology was consulted for stroke workup.

## 2024-10-30 NOTE — H&P
[facility] Hospital Medicine History & Physical Examination       Patient Name: Kasandra Cheek  MRN: [id number]  Patient Class: IP- Inpatient   Admission Date: 10/30/2024   Admitting Physician: DORI Service   Attending Physician: Manolo Matamoros MD  Primary Care Provider: Ravinder Mazariegos DO  Face-to-Face encounter date: 10/30/2024  Code Status:Code Status Discussion Note   Chief Complaint: Altered Mental Status ( reports AMS x 3 days.  states pt usually presents this way when she has a UTI. Pt arrives GCS 12.)         Patient information was obtained from patient, patient's family, past medical records and ER records.     HISTORY OF PRESENT ILLNESS:   Kasandra Cheek is a 64 y.o. female who  has a past medical history of Anemia, Anxiety, Depression, Dyspareunia, Encounter for blood transfusion, HTN (hypertension), Hypothyroidism, unspecified, Liver disease, Lumbar radiculopathy, Menopause, Migraine, Obesity, Osteoporosis, Other spondylosis with radiculopathy, lumbar region, Personal history of fall, Spinal stenosis, lumbar region with neurogenic claudication, TIA (transient ischemic attack) (1994), Unspecified osteoarthritis, unspecified site, and Urinary incontinence.. The patient presented to [facility] on 10/30/2024     64-year-old female with past medical history of anxiety/depression, hypertension, hypothyroidism, fatty liver disease, chronic pain syndrome presents to the emergency room with confusion for the last 72 hours.   is at the bedside helping give history.  Reports getting confused at home.  Some imbalance.  Blood pressure has been elevated.  On arrival to the emergency room she was not oriented.  GCS of 9.  Not following commands.  Vital signs show some mild hypertension but otherwise stable.  No fever.  She was no leukocytosis.  Mild metabolic acidosis appears to be chronic.  UDS was positive for opiates which he was prescribed.  Urinalysis was bland.  CT of the  head performed in the ER showed remote infarcts involving the right frontal and parietal lobes which are new compared to scan performed 3 months ago.  Carotid ultrasound showed minimal stenosis of the left carotid and no stenosis of the right.  Echocardiogram is pending.  Neurology was consulted from the ER.  Recommending full stroke workup.  Added aspirin and statin.  Okay to normalize blood pressure.  She was being admitted to the hospitalist group    PAST MEDICAL HISTORY:     Past Medical History:   Diagnosis Date    Anemia     Anxiety     Depression     Dyspareunia     Encounter for blood transfusion     HTN (hypertension)     Hypothyroidism, unspecified     Liver disease     fatty liver    Lumbar radiculopathy     Menopause     Migraine     Obesity     Osteoporosis     Other spondylosis with radiculopathy, lumbar region     Personal history of fall     Spinal stenosis, lumbar region with neurogenic claudication     TIA (transient ischemic attack) 1994    Unspecified osteoarthritis, unspecified site     Urinary incontinence        PAST SURGICAL HISTORY:     Past Surgical History:   Procedure Laterality Date    ABDOMINAL SURGERY      Gastric Bypass    CARPAL TUNNEL RELEASE Bilateral     CERVICAL FUSION  12/28/2021    CHOLECYSTECTOMY  2015    COLONOSCOPY      COLPOSCOPY      FOOT SURGERY Left     FRACTURE SURGERY  2001    GASTRIC BYPASS  2011    HAND SURGERY Left     thumb    HERNIA REPAIR  2014    JOINT REPLACEMENT  Knee 2015, Hip 2016    LAMINECTOMY OF THORACIC SPINE BY POSTERIOR APPROACH USING COMPUTER-ASSISTED NAVIGATION N/A 10/11/2024    Procedure: LAMINECTOMY, SPINE, THORACIC, POSTERIOR APPROACH, USING COMPUTER-ASSISTED NAVIGATION;  Surgeon: Andra Mejia MD;  Location: [facility];  Service: Neurosurgery;  Laterality: N/A;  T9-L3 DECOMPRESSION / REMOVAL OF RIGHT ILIAC BOLT AND S1 SCREW / EXTENSION OF FUSION  T9-S2 ILIAC BOLT //  PRONE ADENIKE // DRILL // SCOPE // O-ARM //  DIRECT SPINE // NDM //        LUMBAR FUSION  2021    LUMBAR FUSION  2023    L3-S1    POSTERIOR LUMBAR INTERBODY FUSION (PLIF) WITH COMPUTER-ASSISTED NAVIGATION N/A 10/11/2024    Procedure: FUSION, SPINE, LUMBAR, PLIF, USING COMPUTER-ASSISTED NAVIGATION;  Surgeon: Andra Mejia MD;  Location: [facility];  Service: Neurosurgery;  Laterality: N/A;    ROTATOR CUFF REPAIR Bilateral 2022    SMALL INTESTINE SURGERY      SPINE SURGERY   &     TONSILLECTOMY         ALLERGIES:   Sucralfate    FAMILY HISTORY:   Reviewed and negative    SOCIAL HISTORY:     Social History     Tobacco Use    Smoking status: Former     Current packs/day: 0.00     Average packs/day: 1 pack/day for 27.0 years (27.0 ttl pk-yrs)     Types: Cigarettes     Start date: 1975     Quit date: 2002     Years since quittin.8    Smokeless tobacco: Never    Tobacco comments:     off and on when smoking   Substance Use Topics    Alcohol use: Not Currently     Comment: I do not drink        HOME MEDICATIONS:     Prior to Admission medications    Medication Sig Start Date End Date Taking? Authorizing Provider   acetaminophen (TYLENOL) 325 MG tablet Take 325 mg by mouth as needed. Pt stated takes w/Oxycodone 22  Yes Provider, Historical   APPLE CIDER VINEGAR ORAL Take 1 capsule by mouth once daily.   Yes Provider, Historical   buPROPion (WELLBUTRIN XL) 150 MG TB24 tablet Take 150 mg by mouth once daily. 22  Yes Provider, Historical   buPROPion (WELLBUTRIN XL) 300 MG 24 hr tablet Take 1 tablet by mouth once daily. 21  Yes Provider, Historical   busPIRone (BUSPAR) 30 MG Tab Take 1 tablet by mouth 2 (two) times daily. 22  Yes Provider, Historical   celecoxib (CELEBREX) 100 MG capsule Take 1 capsule (100 mg total) by mouth 2 (two) times daily as needed for Pain. 10/27/23  Yes Raman Ham MD   cholecalciferol, vitamin D3, (VITAMIN D3) 250 mcg (10,000 unit) Cap capsule Take 1 tablet by mouth Daily. 21  Yes Provider, Historical    cyanocobalamin, vitamin B-12, 2,500 mcg Tab Take 2,500 mcg by mouth Daily.   Yes Provider, Historical   docusate sodium (COLACE) 250 MG capsule Take 250 mg by mouth 2 (two) times a day.   Yes Provider, Historical   DULoxetine (CYMBALTA) 60 MG capsule Take 1 capsule (60 mg total) by mouth 2 (two) times daily. 10/27/23  Yes Raman Ham MD   ferrous sulfate (FEOSOL) 325 mg (65 mg iron) Tab tablet Take 1 tablet by mouth Daily. 9/1/21  Yes Provider, Historical   furosemide (LASIX) 20 MG tablet Take 20 mg by mouth 2 (two) times daily.   Yes Provider, Historical   gabapentin (NEURONTIN) 600 MG tablet Take 1 tablet by mouth 4 (four) times daily. 8/8/21  Yes Provider, Historical   levothyroxine (SYNTHROID) 200 MCG tablet Take 200 mcg by mouth once daily. 7/23/23  Yes Provider, Historical   losartan (COZAAR) 100 MG tablet Take 1 tablet by mouth once daily. 3/31/22  Yes Provider, Historical   magnesium oxide (MAG-OX) 400 mg (241.3 mg magnesium) tablet Take 1 tablet (400 mg total) by mouth nightly. 10/27/23  Yes Raman Ham MD   omega-3 fatty acids/fish oil (FISH OIL OMEGA 3-6-9 ORAL)  1/1/22  Yes Provider, Historical   ondansetron (ZOFRAN) 8 MG tablet Take 8 mg by mouth every 6 (six) hours as needed for Nausea.   Yes Provider, Historical   oxyCODONE (ROXICODONE) 20 mg Tab immediate release tablet Take 1 tablet by mouth every 4 (four) hours as needed. 4/6/23  Yes Provider, Historical   pantoprazole (PROTONIX) 40 MG tablet Take 40 mg by mouth 2 (two) times daily. 4/18/23  Yes Provider, Historical   promethazine (PHENERGAN) 12.5 MG Tab Take 12.5 mg by mouth 2 (two) times a day. 8/23/23  Yes Provider, Historical   QUEtiapine (SEROQUEL) 300 MG Tab Take 1 tablet by mouth every evening. 3/11/22  Yes Provider, Historical   VITAMIN K2 ORAL Take 150 mcg by mouth Daily. 9/1/21  Yes Provider, Historical   ARIPiprazole (ABILIFY) 2 MG Tab Take 2 mg by mouth every morning. 3/9/22 6/8/22  Provider, Historical   diazePAM  (VALIUM) 5 MG tablet Take by mouth. 3/31/22 6/8/22  Provider, Historical       REVIEW OF SYSTEMS:   Except as documented, all other systems reviewed and negative     PHYSICAL EXAM:     VITAL SIGNS: 24 HRS MIN & MAX LAST   Temp  Min: 97 °F (36.1 °C)  Max: 97 °F (36.1 °C) 97 °F (36.1 °C)   BP  Min: 155/93  Max: 174/94 (!) 169/92   Pulse  Min: 86  Max: 101  100   Resp  Min: 18  Max: 18 18   SpO2  Min: 96 %  Max: 98 % 98 %       General appearance: Well-developed, well-nourished female in no apparent distress.  HENT: Atraumatic head. Moist mucous membranes of oral cavity.  Eyes: Normal extraocular movements.   Neck: Supple.   Lungs: Clear to auscultation bilaterally. No wheezing present.   Heart: Regular rate and rhythm. S1 and S2 present with no murmurs/gallop/rub. No pedal edema. No JVD present.   Abdomen: Soft, non-distended, non-tender. No rebound tenderness/guarding. Bowel sounds are normal.   Extremities: No cyanosis, clubbing, or edema.  Skin:  Previous surgical incision noted over lumbar spine  Neuro:  Global weakness.  Not following commands.  Not oriented.  Awake    Psych/mental status: Appropriate mood and affect. Responds appropriately to questions.     LABS AND IMAGING:     Recent Labs   Lab 10/28/24  1832 10/30/24  1219   WBC 7.19 9.12   RBC 3.55* 4.13*   HGB 11.0* 12.9   HCT 33.5* 37.7   MCV 94.4* 91.3   MCH 31.0 31.2*   MCHC 32.8* 34.2   RDW 13.0 13.3    376   MPV 9.1 8.9       Recent Labs   Lab 10/28/24  1832 10/28/24  1947 10/30/24  1219     --  142   K 3.6  --  3.7   *  --  110*   CO2 22*  --  20*   BUN 21.0*  --  14.9   CREATININE 0.77  --  0.71   CALCIUM 9.3  --  9.0   PH  --  7.571*  --    MG  --   --  1.90   ALBUMIN 3.2*  --  3.4   ALKPHOS 134  --  161*   ALT 21  --  26   AST 30  --  42*   BILITOT 0.4  --  0.7       Microbiology Results (last 7 days)       ** No results found for the last 168 hours. **             CT Head Without Contrast  Narrative: EXAMINATION:  CT HEAD  WITHOUT CONTRAST    CLINICAL HISTORY:  Mental status change, unknown cause;    TECHNIQUE:  CT imaging of the head performed from the skull base to the vertex without intravenous contrast.  DLP 1095 mGycm. Automatic exposure control, adjustment of mA/kV or iterative reconstruction technique was used to reduce radiation.    COMPARISON:  18 July 2024    FINDINGS:  There are areas of hypoattenuation in the right frontal and parietal lobes images 22 and 31 series 3.  These have the appearance of chronic infarcts but they are new compared to July 2024.  There is no acute hemorrhage.  The ventricles are not enlarged.    Visualized paranasal sinuses and mastoid air cells are clear.  Impression: Remote appearing infarcts right frontal and parietal lobes though these are new compared to July 2024. No acute hemorrhage.    Electronically signed by: Milo Bravo  Date:    10/30/2024  Time:    12:41      _____________________________________  INPATIENT LIST OF MEDICATIONS     Current Facility-Administered Medications:     bisacodyL suppository 10 mg, 10 mg, Rectal, Daily PRN, Mickie Zaragoza AGACNP-BC    labetaloL injection 10 mg, 10 mg, Intravenous, Q6H PRN, Mickie Zaragoza AGACNP-BC    Current Outpatient Medications:     acetaminophen (TYLENOL) 325 MG tablet, Take 325 mg by mouth as needed. Pt stated takes w/Oxycodone, Disp: , Rfl:     APPLE CIDER VINEGAR ORAL, Take 1 capsule by mouth once daily., Disp: , Rfl:     buPROPion (WELLBUTRIN XL) 150 MG TB24 tablet, Take 150 mg by mouth once daily., Disp: , Rfl:     buPROPion (WELLBUTRIN XL) 300 MG 24 hr tablet, Take 1 tablet by mouth once daily., Disp: , Rfl:     busPIRone (BUSPAR) 30 MG Tab, Take 1 tablet by mouth 2 (two) times daily., Disp: , Rfl:     celecoxib (CELEBREX) 100 MG capsule, Take 1 capsule (100 mg total) by mouth 2 (two) times daily as needed for Pain., Disp: 180 capsule, Rfl: 3    cholecalciferol, vitamin D3, (VITAMIN D3) 250 mcg (10,000 unit) Cap capsule, Take 1  tablet by mouth Daily., Disp: , Rfl:     cyanocobalamin, vitamin B-12, 2,500 mcg Tab, Take 2,500 mcg by mouth Daily., Disp: , Rfl:     docusate sodium (COLACE) 250 MG capsule, Take 250 mg by mouth 2 (two) times a day., Disp: , Rfl:     DULoxetine (CYMBALTA) 60 MG capsule, Take 1 capsule (60 mg total) by mouth 2 (two) times daily., Disp: 180 capsule, Rfl: 3    ferrous sulfate (FEOSOL) 325 mg (65 mg iron) Tab tablet, Take 1 tablet by mouth Daily., Disp: , Rfl:     furosemide (LASIX) 20 MG tablet, Take 20 mg by mouth 2 (two) times daily., Disp: , Rfl:     gabapentin (NEURONTIN) 600 MG tablet, Take 1 tablet by mouth 4 (four) times daily., Disp: , Rfl:     levothyroxine (SYNTHROID) 200 MCG tablet, Take 200 mcg by mouth once daily., Disp: , Rfl:     losartan (COZAAR) 100 MG tablet, Take 1 tablet by mouth once daily., Disp: , Rfl:     magnesium oxide (MAG-OX) 400 mg (241.3 mg magnesium) tablet, Take 1 tablet (400 mg total) by mouth nightly., Disp: 30 tablet, Rfl: 5    omega-3 fatty acids/fish oil (FISH OIL OMEGA 3-6-9 ORAL), , Disp: , Rfl:     ondansetron (ZOFRAN) 8 MG tablet, Take 8 mg by mouth every 6 (six) hours as needed for Nausea., Disp: , Rfl:     oxyCODONE (ROXICODONE) 20 mg Tab immediate release tablet, Take 1 tablet by mouth every 4 (four) hours as needed., Disp: , Rfl:     pantoprazole (PROTONIX) 40 MG tablet, Take 40 mg by mouth 2 (two) times daily., Disp: , Rfl:     promethazine (PHENERGAN) 12.5 MG Tab, Take 12.5 mg by mouth 2 (two) times a day., Disp: , Rfl:     QUEtiapine (SEROQUEL) 300 MG Tab, Take 1 tablet by mouth every evening., Disp: , Rfl:     VITAMIN K2 ORAL, Take 150 mcg by mouth Daily., Disp: , Rfl:       Scheduled Meds:    Continuous Infusions:  PRN Meds:.  Current Facility-Administered Medications:     bisacodyL, 10 mg, Rectal, Daily PRN    labetalol, 10 mg, Intravenous, Q6H PRN      VTE Prophylaxis: will be placed on appropriate DVT prophylaxis and will be advised to be as mobile as possible and  sit in a chair as tolerated  _____________________________________    ASSESSMENT & PLAN:   CVA, right frontal and parietal lobes  Ischemic encephalopathy   Mild metabolic acidosis  Iron-deficiency anemia   Hypothyroidism   Essential hypertension   Chronic pain syndrome  Multiple psychiatric conditions including depression and anxiety    Patient was acute encephalopathy of unknown origin.  She does have remote infarcts noted on CT.  Planning on MRI for better delineation.  May have a better idea of timing as well.  Patient was on multiple medications that could be affecting her mental status as well.  Chronic opioids and multiple psychiatric meds.  Also on gabapentin.  Labs appear to be stable.  She does have some chronic peripheral edema.  Unknown heart failure status.  Will check an echocardiogram.  She was on Lasix as an outpatient.  She is currently NPO until she passes Perez or speech therapy evaluation.  With her mental status she was difficulty following commands.  Neurology has already been consulted and has a evaluated the patient.  Recommending aspirin and statin.  Patient was on no antiplatelet therapy prior to arrival  Will start her on some gentle fluids for metabolic acidosis.    IV antihypertensives until cleared for oral.    Critical care diagnosis:  Acute encephalopathy requiring stroke workup  Critical care interventions: hands on evaluation, review of labs/radiographs/records and discussions with family  Critical care time spent: >32 minutes        Manolo Matamoros MD  4:39 PM 10/30/2024    Screening for Social Drivers for health:  Patient screened for food insecurity, housing instability, transportation needs, utility difficulties, and interpersonal safety (select all that apply as identified as concern)  []Housing or Food  []Transportation Needs  []Utility Difficulties  []Interpersonal safety  [x]None

## 2024-10-30 NOTE — CONSULTS
[facility] - Emergency Dept  Neurology  Consult Note    Patient Name: Kasandra Cheek  MRN: [id number]  Admission Date: 10/30/2024  Hospital Length of Stay: 0 days  Code Status: Full Code   Attending Provider: Manolo Matamoros MD   Consulting Provider: Shirley Hernandez NP  Primary Care Physician: Ravinder Mazariegos DO  Principal Problem:<principal problem not specified>    Inpatient consult to Vascular (Stroke) Neurology  Consult performed by: Shirley Hernandez NP  Consult ordered by: Yulissa Velazquez [facility]         Subjective:     Chief Complaint:  altered mental status      HPI:   64 year old female with a past medical history of HTN, obesity, migraines, TIA, hypothyroidism, anxiety, depression, fatty liver disease, and spinal surgeries presented to ED on 10/30 for altered mental status.   reports about 1 year ago she had a gradual decline, she requires assistance to go to the bathroom and transfer to wheelchair.  She has had a few episodes of waxing and waning mental status with UTI. He reports on Sunday, she was sitting in her wheel chair in the office was going to purchase a couple of things. He stopped her from purchasing the things, walked out of the room, when he came back, she was slumped over, not really responding.  He reports he EMS brought her to ED in [place],  reports they took her vital signs and discharged her home.  He reports it took 4 people to get her out of the car because he was so weak.  He brought her to ED at [facility] for further evaluation.  CT head was significant for right frontal and right parietal infarcts which are new from 7/2024 but appear chronic.  Neurology was consulted for stroke workup.       Past Medical History:   Diagnosis Date    Anemia     Anxiety     Depression     Dyspareunia     Encounter for blood transfusion     HTN (hypertension)     Hypothyroidism, unspecified     Liver disease     fatty liver    Lumbar radiculopathy     Menopause     Migraine      Obesity     Osteoporosis     Other spondylosis with radiculopathy, lumbar region     Personal history of fall     Spinal stenosis, lumbar region with neurogenic claudication     TIA (transient ischemic attack) 1994    Unspecified osteoarthritis, unspecified site     Urinary incontinence        Past Surgical History:   Procedure Laterality Date    ABDOMINAL SURGERY      Gastric Bypass    CARPAL TUNNEL RELEASE Bilateral     CERVICAL FUSION  12/28/2021    CHOLECYSTECTOMY  2015    COLONOSCOPY      COLPOSCOPY      FOOT SURGERY Left     FRACTURE SURGERY  2001    GASTRIC BYPASS  2011    HAND SURGERY Left     thumb    HERNIA REPAIR  2014    JOINT REPLACEMENT  Knee 2015, Hip 2016    LAMINECTOMY OF THORACIC SPINE BY POSTERIOR APPROACH USING COMPUTER-ASSISTED NAVIGATION N/A 10/11/2024    Procedure: LAMINECTOMY, SPINE, THORACIC, POSTERIOR APPROACH, USING COMPUTER-ASSISTED NAVIGATION;  Surgeon: Andra Mejia MD;  Location: [facility];  Service: Neurosurgery;  Laterality: N/A;  T9-L3 DECOMPRESSION / REMOVAL OF RIGHT ILIAC BOLT AND S1 SCREW / EXTENSION OF FUSION  T9-S2 ILIAC BOLT //  PRONE ADENIKE // DRILL // SCOPE // O-ARM //  DIRECT SPINE // NDM //       LUMBAR FUSION  05/28/2021    LUMBAR FUSION  02/21/2023    L3-S1    POSTERIOR LUMBAR INTERBODY FUSION (PLIF) WITH COMPUTER-ASSISTED NAVIGATION N/A 10/11/2024    Procedure: FUSION, SPINE, LUMBAR, PLIF, USING COMPUTER-ASSISTED NAVIGATION;  Surgeon: Andra Mejia MD;  Location: [facility];  Service: Neurosurgery;  Laterality: N/A;    ROTATOR CUFF REPAIR Bilateral 04/12/2022    SMALL INTESTINE SURGERY  2015    SPINE SURGERY  2021 & 2023    TONSILLECTOMY  2017       Review of patient's allergies indicates:   Allergen Reactions    Sucralfate Nausea Only       Current Neurological Medications:     No current facility-administered medications on file prior to encounter.     Current Outpatient Medications on File Prior to Encounter   Medication Sig    acetaminophen (TYLENOL) 325 MG  tablet Take 325 mg by mouth as needed. Pt stated takes w/Oxycodone    APPLE CIDER VINEGAR ORAL Take 1 capsule by mouth once daily.    buPROPion (WELLBUTRIN XL) 150 MG TB24 tablet Take 150 mg by mouth once daily.    buPROPion (WELLBUTRIN XL) 300 MG 24 hr tablet Take 1 tablet by mouth once daily.    busPIRone (BUSPAR) 30 MG Tab Take 1 tablet by mouth 2 (two) times daily.    celecoxib (CELEBREX) 100 MG capsule Take 1 capsule (100 mg total) by mouth 2 (two) times daily as needed for Pain.    cholecalciferol, vitamin D3, (VITAMIN D3) 250 mcg (10,000 unit) Cap capsule Take 1 tablet by mouth Daily.    cyanocobalamin, vitamin B-12, 2,500 mcg Tab Take 2,500 mcg by mouth Daily.    docusate sodium (COLACE) 250 MG capsule Take 250 mg by mouth 2 (two) times a day.    DULoxetine (CYMBALTA) 60 MG capsule Take 1 capsule (60 mg total) by mouth 2 (two) times daily.    ferrous sulfate (FEOSOL) 325 mg (65 mg iron) Tab tablet Take 1 tablet by mouth Daily.    furosemide (LASIX) 20 MG tablet Take 20 mg by mouth 2 (two) times daily.    gabapentin (NEURONTIN) 600 MG tablet Take 1 tablet by mouth 4 (four) times daily.    levothyroxine (SYNTHROID) 200 MCG tablet Take 200 mcg by mouth once daily.    losartan (COZAAR) 100 MG tablet Take 1 tablet by mouth once daily.    magnesium oxide (MAG-OX) 400 mg (241.3 mg magnesium) tablet Take 1 tablet (400 mg total) by mouth nightly.    omega-3 fatty acids/fish oil (FISH OIL OMEGA 3-6-9 ORAL)     ondansetron (ZOFRAN) 8 MG tablet Take 8 mg by mouth every 6 (six) hours as needed for Nausea.    oxyCODONE (ROXICODONE) 20 mg Tab immediate release tablet Take 1 tablet by mouth every 4 (four) hours as needed.    pantoprazole (PROTONIX) 40 MG tablet Take 40 mg by mouth 2 (two) times daily.    promethazine (PHENERGAN) 12.5 MG Tab Take 12.5 mg by mouth 2 (two) times a day.    QUEtiapine (SEROQUEL) 300 MG Tab Take 1 tablet by mouth every evening.    VITAMIN K2 ORAL Take 150 mcg by mouth Daily.    [DISCONTINUED]  ARIPiprazole (ABILIFY) 2 MG Tab Take 2 mg by mouth every morning.    [DISCONTINUED] diazePAM (VALIUM) 5 MG tablet Take by mouth.     Family History       Problem Relation (Age of Onset)    Arthritis Sister, Father    Cancer Mother, Father, Maternal Aunt, Maternal Aunt, Maternal Aunt, Paternal Aunt, Paternal Aunt    Cystic fibrosis Sister, Brother    Depression Sister, Mother    Diabetes Father    Early death Brother, Sister    Heart failure Maternal Grandmother    Hypertension Sister, Father    Mental illness Mother    Vision loss Sister, Father          Tobacco Use    Smoking status: Former     Current packs/day: 0.00     Average packs/day: 1 pack/day for 27.0 years (27.0 ttl pk-yrs)     Types: Cigarettes     Start date: 1975     Quit date: 2002     Years since quittin.8    Smokeless tobacco: Never    Tobacco comments:     off and on when smoking   Substance and Sexual Activity    Alcohol use: Not Currently     Comment: I do not drink    Drug use: Never    Sexual activity: Not Currently     Partners: Male     Birth control/protection: Post-menopausal     Review of Systems   Unable to perform ROS: Acuity of condition     Objective:     Vital Signs (Most Recent):  Temp: 97 °F (36.1 °C) (10/30/24 1059)  Pulse: 101 (10/30/24 1332)  Resp: 18 (10/30/24 1059)  BP: (!) 174/94 (10/30/24 1332)  SpO2: 96 % (10/30/24 1332) Vital Signs (24h Range):  Temp:  [97 °F (36.1 °C)] 97 °F (36.1 °C)  Pulse:  [] 101  Resp:  [18] 18  SpO2:  [96 %-97 %] 96 %  BP: (155-174)/(93-94) 174/94     Weight: 136.1 kg (300 lb)  Body mass index is 46.99 kg/m².     Physical Exam  Vitals and nursing note reviewed.   HENT:      Head: Normocephalic.   Eyes:      General: No visual field deficit.     Pupils: Pupils are equal, round, and reactive to light.   Cardiovascular:      Rate and Rhythm: Normal rate.   Pulmonary:      Effort: Pulmonary effort is normal.   Musculoskeletal:      Cervical back: Normal range of motion.      Right  lower leg: Edema present.      Left lower leg: Edema present.   Skin:     General: Skin is warm and dry.      Capillary Refill: Capillary refill takes less than 2 seconds.   Neurological:      Mental Status: She is lethargic.      Cranial Nerves: No facial asymmetry.      Motor: Weakness (generalized weakness, 3/5 BUE, 3/5 RLE, 1/5 LLE) present. No tremor, atrophy, abnormal muscle tone or seizure activity.      Comments: Limited participation in exam  Answers no, unable to answer name, age, where she is   Says her name is Humberto Garner  Limited tracking but does blink to threat from both sides  Appears encephalopathic      Psychiatric:         Mood and Affect: Affect is blunt.         Behavior: Behavior is withdrawn.          NEUROLOGICAL EXAMINATION:     CRANIAL NERVES     CN III, IV, VI   Pupils are equal, round, and reactive to light.      Significant Labs: All pertinent lab results from the past 24 hours have been reviewed.    Significant Imaging: I have reviewed all pertinent imaging results/findings within the past 24 hours.  Assessment and Plan:     Altered mental status  - presented with altered mental status x3 days (ongoing waxing and waning episodes over the course of 1 year per )  - Stroke RF: HTN  - Intervention: none, OOW symptom onset 10/27  - Etiology: TBD    Stroke workup:  -CTh: Remote appearing infarcts right frontal and parietal lobes though these are new compared to July 2024. No acute hemorrhage    -MRI brain: ordered    -TSH: 4.362   -home medications include: no aspirin, plavix or anticoagulants noted     NIH Stroke Scale      Interval: Baseline  Time: 4:28 PM      1a  Level of consciousness: 1=not alert but arousable by minor stimulation to obey, answer or respond   1b. LOC questions:  2=Answers neither task correctly   1c. LOC commands: 2=Answers neither task correctly   2.  Best Gaze: 0=normal   3.  Visual: 0=No visual loss   4. Facial Palsy: 0=Normal symmetric movement   5a.  Motor  left arm: 2=Some effort against gravity, limb cannot get to or maintain (if cured) 90 (or 45) degrees, drifts down to bed, but has some effort against gravity   5b.  Motor right arm: 2=Some effort against gravity, limb cannot get to or maintain (if cured) 90 (or 45) degrees, drifts down to bed, but has some effort against gravity   6a. motor left leg: 3=No effort against gravity, limb falls   6b  Motor right le=Some effort against gravity, limb cannot get to or maintain (if cured) 90 (or 45) degrees, drifts down to bed, but has some effort against gravity   7. Limb Ataxia: 0=Absent   8.  Sensory: 1=Mild to moderate sensory loss; patient feels pinprick is less sharp or is dull on the affected side; there is a loss of superficial pain with pinprick but patient is aware She is being touched   9. Best Language:  2=Severe aphasia; all communication is through fragmentary expression; great need for inference, questioning, and guessing by the listener. Range of information that can be exchanged is limited; listener carries burden of communication. Examiner cannot identify materials provided from patient response.    10. Dysarthria: 1=Mild to moderate, patient slurs at least some words and at worst, can be understood with some difficulty   11. Extinction and Inattention: 0=No abnormality   12. Distal motor function: 0=Normal    Total:   18         Plan:  -admit for stroke workup (including MRI brain, ECHO, CUS, lipid panel)    -Add Aspirin 81mg daily  -Add Atorvastatin 40mg daily  - ok to normalize BP, symptoms started on 10/27  - Neuro checks q4hr ... stat CTh if any neuro change   - Begin ASA 325mg daily .... if failed Perez, then ASA 300mg CT daily  - DVT ppx with SCD or lovenox 40 s/c daily  - Continuous telemetry monitoring  - NPO until passes Perez or cleared by SLP  - No bedrest, ok for patient to ambulate, RN to observe first ambulation for safety  - PT/OT/SLP to evaluate          Further recommendations to  follow from MD     VTE Risk Mitigation (From admission, onward)           Ordered     IP VTE HIGH RISK PATIENT  Once         10/30/24 1352     Place sequential compression device  Until discontinued         10/30/24 1352                      Shirley Hernandez NP  Neurology  [facility] - Emergency Dept

## 2024-10-30 NOTE — SUBJECTIVE & OBJECTIVE
Past Medical History:   Diagnosis Date    Anemia     Anxiety     Depression     Dyspareunia     Encounter for blood transfusion     HTN (hypertension)     Hypothyroidism, unspecified     Liver disease     fatty liver    Lumbar radiculopathy     Menopause     Migraine     Obesity     Osteoporosis     Other spondylosis with radiculopathy, lumbar region     Personal history of fall     Spinal stenosis, lumbar region with neurogenic claudication     TIA (transient ischemic attack) 1994    Unspecified osteoarthritis, unspecified site     Urinary incontinence        Past Surgical History:   Procedure Laterality Date    ABDOMINAL SURGERY      Gastric Bypass    CARPAL TUNNEL RELEASE Bilateral     CERVICAL FUSION  12/28/2021    CHOLECYSTECTOMY  2015    COLONOSCOPY      COLPOSCOPY      FOOT SURGERY Left     FRACTURE SURGERY  2001    GASTRIC BYPASS  2011    HAND SURGERY Left     thumb    HERNIA REPAIR  2014    JOINT REPLACEMENT  Knee 2015, Hip 2016    LAMINECTOMY OF THORACIC SPINE BY POSTERIOR APPROACH USING COMPUTER-ASSISTED NAVIGATION N/A 10/11/2024    Procedure: LAMINECTOMY, SPINE, THORACIC, POSTERIOR APPROACH, USING COMPUTER-ASSISTED NAVIGATION;  Surgeon: Andra Mejia MD;  Location: [facility];  Service: Neurosurgery;  Laterality: N/A;  T9-L3 DECOMPRESSION / REMOVAL OF RIGHT ILIAC BOLT AND S1 SCREW / EXTENSION OF FUSION  T9-S2 ILIAC BOLT //  PRONE ADENIKE // DRILL // SCOPE // O-ARM //  DIRECT SPINE // NDM //       LUMBAR FUSION  05/28/2021    LUMBAR FUSION  02/21/2023    L3-S1    POSTERIOR LUMBAR INTERBODY FUSION (PLIF) WITH COMPUTER-ASSISTED NAVIGATION N/A 10/11/2024    Procedure: FUSION, SPINE, LUMBAR, PLIF, USING COMPUTER-ASSISTED NAVIGATION;  Surgeon: Andra Mejia MD;  Location: [facility];  Service: Neurosurgery;  Laterality: N/A;    ROTATOR CUFF REPAIR Bilateral 04/12/2022    SMALL INTESTINE SURGERY  2015    SPINE SURGERY  2021 & 2023    TONSILLECTOMY  2017       Review of patient's allergies indicates:    Allergen Reactions    Sucralfate Nausea Only       Current Neurological Medications:     No current facility-administered medications on file prior to encounter.     Current Outpatient Medications on File Prior to Encounter   Medication Sig    acetaminophen (TYLENOL) 325 MG tablet Take 325 mg by mouth as needed. Pt stated takes w/Oxycodone    APPLE CIDER VINEGAR ORAL Take 1 capsule by mouth once daily.    buPROPion (WELLBUTRIN XL) 150 MG TB24 tablet Take 150 mg by mouth once daily.    buPROPion (WELLBUTRIN XL) 300 MG 24 hr tablet Take 1 tablet by mouth once daily.    busPIRone (BUSPAR) 30 MG Tab Take 1 tablet by mouth 2 (two) times daily.    celecoxib (CELEBREX) 100 MG capsule Take 1 capsule (100 mg total) by mouth 2 (two) times daily as needed for Pain.    cholecalciferol, vitamin D3, (VITAMIN D3) 250 mcg (10,000 unit) Cap capsule Take 1 tablet by mouth Daily.    cyanocobalamin, vitamin B-12, 2,500 mcg Tab Take 2,500 mcg by mouth Daily.    docusate sodium (COLACE) 250 MG capsule Take 250 mg by mouth 2 (two) times a day.    DULoxetine (CYMBALTA) 60 MG capsule Take 1 capsule (60 mg total) by mouth 2 (two) times daily.    ferrous sulfate (FEOSOL) 325 mg (65 mg iron) Tab tablet Take 1 tablet by mouth Daily.    furosemide (LASIX) 20 MG tablet Take 20 mg by mouth 2 (two) times daily.    gabapentin (NEURONTIN) 600 MG tablet Take 1 tablet by mouth 4 (four) times daily.    levothyroxine (SYNTHROID) 200 MCG tablet Take 200 mcg by mouth once daily.    losartan (COZAAR) 100 MG tablet Take 1 tablet by mouth once daily.    magnesium oxide (MAG-OX) 400 mg (241.3 mg magnesium) tablet Take 1 tablet (400 mg total) by mouth nightly.    omega-3 fatty acids/fish oil (FISH OIL OMEGA 3-6-9 ORAL)     ondansetron (ZOFRAN) 8 MG tablet Take 8 mg by mouth every 6 (six) hours as needed for Nausea.    oxyCODONE (ROXICODONE) 20 mg Tab immediate release tablet Take 1 tablet by mouth every 4 (four) hours as needed.    pantoprazole (PROTONIX) 40  MG tablet Take 40 mg by mouth 2 (two) times daily.    promethazine (PHENERGAN) 12.5 MG Tab Take 12.5 mg by mouth 2 (two) times a day.    QUEtiapine (SEROQUEL) 300 MG Tab Take 1 tablet by mouth every evening.    VITAMIN K2 ORAL Take 150 mcg by mouth Daily.    [DISCONTINUED] ARIPiprazole (ABILIFY) 2 MG Tab Take 2 mg by mouth every morning.    [DISCONTINUED] diazePAM (VALIUM) 5 MG tablet Take by mouth.     Family History       Problem Relation (Age of Onset)    Arthritis Sister, Father    Cancer Mother, Father, Maternal Aunt, Maternal Aunt, Maternal Aunt, Paternal Aunt, Paternal Aunt    Cystic fibrosis Sister, Brother    Depression Sister, Mother    Diabetes Father    Early death Brother, Sister    Heart failure Maternal Grandmother    Hypertension Sister, Father    Mental illness Mother    Vision loss Sister, Father          Tobacco Use    Smoking status: Former     Current packs/day: 0.00     Average packs/day: 1 pack/day for 27.0 years (27.0 ttl pk-yrs)     Types: Cigarettes     Start date: 1975     Quit date: 2002     Years since quittin.8    Smokeless tobacco: Never    Tobacco comments:     off and on when smoking   Substance and Sexual Activity    Alcohol use: Not Currently     Comment: I do not drink    Drug use: Never    Sexual activity: Not Currently     Partners: Male     Birth control/protection: Post-menopausal     Review of Systems   Unable to perform ROS: Acuity of condition     Objective:     Vital Signs (Most Recent):  Temp: 97 °F (36.1 °C) (10/30/24 1059)  Pulse: 101 (10/30/24 1332)  Resp: 18 (10/30/24 1059)  BP: (!) 174/94 (10/30/24 1332)  SpO2: 96 % (10/30/24 1332) Vital Signs (24h Range):  Temp:  [97 °F (36.1 °C)] 97 °F (36.1 °C)  Pulse:  [] 101  Resp:  [18] 18  SpO2:  [96 %-97 %] 96 %  BP: (155-174)/(93-94) 174/94     Weight: 136.1 kg (300 lb)  Body mass index is 46.99 kg/m².     Physical Exam  Vitals and nursing note reviewed.   HENT:      Head: Normocephalic.   Eyes:       General: No visual field deficit.     Pupils: Pupils are equal, round, and reactive to light.   Cardiovascular:      Rate and Rhythm: Normal rate.   Pulmonary:      Effort: Pulmonary effort is normal.   Musculoskeletal:      Cervical back: Normal range of motion.      Right lower leg: Edema present.      Left lower leg: Edema present.   Skin:     General: Skin is warm and dry.      Capillary Refill: Capillary refill takes less than 2 seconds.   Neurological:      Mental Status: She is lethargic.      Cranial Nerves: No facial asymmetry.      Motor: Weakness (generalized weakness, 3/5 BUE, 3/5 RLE, 1/5 LLE) present. No tremor, atrophy, abnormal muscle tone or seizure activity.      Comments: Limited participation in exam  Answers no, unable to answer name, age, where she is   Says her name is Humberto Garner  Limited tracking but does blink to threat from both sides  Appears encephalopathic      Psychiatric:         Mood and Affect: Affect is blunt.         Behavior: Behavior is withdrawn.          NEUROLOGICAL EXAMINATION:     CRANIAL NERVES     CN III, IV, VI   Pupils are equal, round, and reactive to light.      Significant Labs: All pertinent lab results from the past 24 hours have been reviewed.    Significant Imaging: I have reviewed all pertinent imaging results/findings within the past 24 hours.

## 2024-10-30 NOTE — ASSESSMENT & PLAN NOTE
- presented with altered mental status x3 days (ongoing waxing and waning episodes over the course of 1 year per )  - Stroke RF: HTN  - Intervention: none, OOW symptom onset 10/27  - Etiology: TBD    Stroke workup:  -CTh: Remote appearing infarcts right frontal and parietal lobes though these are new compared to 2024. No acute hemorrhage    -MRI brain: ordered    -TSH: 4.362   -home medications include: no aspirin, plavix or anticoagulants noted     NIH Stroke Scale      Interval: Baseline  Time: 4:28 PM      1a  Level of consciousness: 1=not alert but arousable by minor stimulation to obey, answer or respond   1b. LOC questions:  2=Answers neither task correctly   1c. LOC commands: 2=Answers neither task correctly   2.  Best Gaze: 0=normal   3.  Visual: 0=No visual loss   4. Facial Palsy: 0=Normal symmetric movement   5a.  Motor left arm: 2=Some effort against gravity, limb cannot get to or maintain (if cured) 90 (or 45) degrees, drifts down to bed, but has some effort against gravity   5b.  Motor right arm: 2=Some effort against gravity, limb cannot get to or maintain (if cured) 90 (or 45) degrees, drifts down to bed, but has some effort against gravity   6a. motor left leg: 3=No effort against gravity, limb falls   6b  Motor right le=Some effort against gravity, limb cannot get to or maintain (if cured) 90 (or 45) degrees, drifts down to bed, but has some effort against gravity   7. Limb Ataxia: 0=Absent   8.  Sensory: 1=Mild to moderate sensory loss; patient feels pinprick is less sharp or is dull on the affected side; there is a loss of superficial pain with pinprick but patient is aware She is being touched   9. Best Language:  2=Severe aphasia; all communication is through fragmentary expression; great need for inference, questioning, and guessing by the listener. Range of information that can be exchanged is limited; listener carries burden of communication. Examiner cannot identify  materials provided from patient response.    10. Dysarthria: 1=Mild to moderate, patient slurs at least some words and at worst, can be understood with some difficulty   11. Extinction and Inattention: 0=No abnormality   12. Distal motor function: 0=Normal    Total:   18         Plan:  -admit for stroke workup (including MRI brain, ECHO, CUS, lipid panel)    -Add Aspirin 81mg daily  -Add Atorvastatin 40mg daily  - ok to normalize BP, symptoms started on 10/27  - Neuro checks q4hr ... stat CTh if any neuro change   - Begin ASA 325mg daily .... if failed Perez, then ASA 300mg PA daily  - DVT ppx with SCD or lovenox 40 s/c daily  - Continuous telemetry monitoring  - NPO until passes Preez or cleared by SLP  - No bedrest, ok for patient to ambulate, RN to observe first ambulation for safety  - PT/OT/SLP to evaluate

## 2024-10-30 NOTE — ED PROVIDER NOTES
Encounter Date: 10/30/2024       History     Chief Complaint   Patient presents with    Altered Mental Status      reports AMS x 3 days.  states pt usually presents this way when she has a UTI. Pt arrives GCS 12.     See MDM for details     The history is provided by the patient.     Review of patient's allergies indicates:   Allergen Reactions    Sucralfate Nausea Only     Past Medical History:   Diagnosis Date    Anemia     Anxiety     Depression     Dyspareunia     Encounter for blood transfusion     HTN (hypertension)     Hypothyroidism, unspecified     Liver disease     fatty liver    Lumbar radiculopathy     Menopause     Migraine     Obesity     Osteoporosis     Other spondylosis with radiculopathy, lumbar region     Personal history of fall     Spinal stenosis, lumbar region with neurogenic claudication     TIA (transient ischemic attack) 1994    Unspecified osteoarthritis, unspecified site     Urinary incontinence      Past Surgical History:   Procedure Laterality Date    ABDOMINAL SURGERY      Gastric Bypass    CARPAL TUNNEL RELEASE Bilateral     CERVICAL FUSION  12/28/2021    CHOLECYSTECTOMY  2015    COLONOSCOPY      COLPOSCOPY      FOOT SURGERY Left     FRACTURE SURGERY  2001    GASTRIC BYPASS  2011    HAND SURGERY Left     thumb    HERNIA REPAIR  2014    JOINT REPLACEMENT  Knee 2015, Hip 2016    LAMINECTOMY OF THORACIC SPINE BY POSTERIOR APPROACH USING COMPUTER-ASSISTED NAVIGATION N/A 10/11/2024    Procedure: LAMINECTOMY, SPINE, THORACIC, POSTERIOR APPROACH, USING COMPUTER-ASSISTED NAVIGATION;  Surgeon: Andra Mejia MD;  Location: [facility];  Service: Neurosurgery;  Laterality: N/A;  T9-L3 DECOMPRESSION / REMOVAL OF RIGHT ILIAC BOLT AND S1 SCREW / EXTENSION OF FUSION  T9-S2 ILIAC BOLT //  PRONE ADENIKE // DRILL // SCOPE // O-ARM //  DIRECT SPINE // NDM //       LUMBAR FUSION  05/28/2021    LUMBAR FUSION  02/21/2023    L3-S1    POSTERIOR LUMBAR INTERBODY FUSION (PLIF) WITH  COMPUTER-ASSISTED NAVIGATION N/A 10/11/2024    Procedure: FUSION, SPINE, LUMBAR, PLIF, USING COMPUTER-ASSISTED NAVIGATION;  Surgeon: Andra Mejia MD;  Location: [facility];  Service: Neurosurgery;  Laterality: N/A;    ROTATOR CUFF REPAIR Bilateral 2022    SMALL INTESTINE SURGERY      SPINE SURGERY   &     TONSILLECTOMY       Family History   Problem Relation Name Age of Onset    Cystic fibrosis Sister Florence Kennedy     Arthritis Sister Florence Kennedy         Ankle,  hands    Depression Sister Florence Kennedy     Hypertension Sister Florence Kennedy         Me, Kasandra Ham    Vision loss Sister Florence Kennedy     Cystic fibrosis Brother Adriano Kennedy     Early death Brother Adriano Kennedy         CF when 28    Cancer Mother Latoya Kennedy         Breast    Depression Mother Latoya Kennedy     Mental illness Mother Latoya eKnnedy     Arthritis Father Eduard Kennedy         Back, knees,  hands    Cancer Father Eduard Kennedy         Lung    Diabetes Father Eduard Kennedy     Hypertension Father Eduard Kennedy     Vision loss Father Eduard Kennedy     Early death Sister Sabina Kennedy         CF when 5    Heart failure Maternal Grandmother Letty Willett         Heart Attack    Cancer Maternal Aunt Gema Smith         Breast Cancer    Cancer Maternal Aunt Aparna Manrique         Lung Cancer    Cancer Maternal Aunt Jeanie Stacy         Breast Cancer    Cancer Paternal Aunt Hilary Matthews         Skin Cancer    Cancer Paternal Aunt Enma Alford         Skin Cancer     Social History     Tobacco Use    Smoking status: Former     Current packs/day: 0.00     Average packs/day: 1 pack/day for 27.0 years (27.0 ttl pk-yrs)     Types: Cigarettes     Start date: 1975     Quit date: 2002     Years since quittin.8    Smokeless tobacco: Never    Tobacco comments:     off and on when smoking   Substance Use Topics    Alcohol use: Not Currently     Comment: I do not drink    Drug use: Never     Review of Systems    Unable to perform ROS: Mental status change       Physical Exam     Initial Vitals [10/30/24 1059]   BP Pulse Resp Temp SpO2   (!) 156/93 86 18 97 °F (36.1 °C) 97 %      MAP       --         Physical Exam    Nursing note and vitals reviewed.  Constitutional: No distress.   Obese   HENT:   Head: Normocephalic and atraumatic.   Eyes: Pupils are equal, round, and reactive to light.   Patient moving eyes around room spontaneously. Not following commands.   Cardiovascular:  Normal rate and regular rhythm.           Pulmonary/Chest: Breath sounds normal. No respiratory distress.     Neurological: She is alert. GCS eye subscore is 4. GCS verbal subscore is 1. GCS motor subscore is 4.   Not oriented to person, place, or situation. GCS: 9   Skin: Skin is warm and dry.   Thoracic laminectomy incision CDI. No erythema, drainage, or warmth.   Psychiatric:   Patient not answering questions or speaking         ED Course   Procedures  Labs Reviewed   COMPREHENSIVE METABOLIC PANEL - Abnormal       Result Value    Sodium 142      Potassium 3.7      Chloride 110 (*)     CO2 20 (*)     Glucose 126 (*)     Blood Urea Nitrogen 14.9      Creatinine 0.71      Calcium 9.0      Protein Total 6.9      Albumin 3.4      Globulin 3.5      Albumin/Globulin Ratio 1.0 (*)     Bilirubin Total 0.7       (*)     ALT 26      AST 42 (*)     eGFR >60      Anion Gap 12.0      BUN/Creatinine Ratio 21     URINALYSIS, REFLEX TO URINE CULTURE - Abnormal    Color, UA Yellow      Appearance, UA Clear      Specific Gravity, UA 1.020      pH, UA 6.5      Protein, UA Trace (*)     Glucose, UA Normal      Ketones, UA 2+ (*)     Blood, UA Negative      Bilirubin, UA Negative      Urobilinogen, UA 2.0 (*)     Nitrites, UA Negative      Leukocyte Esterase, UA Negative      RBC, UA 0-5      WBC, UA 0-5      Bacteria, UA None Seen      Squamous Epithelial Cells, UA Trace      Mucous, UA Trace (*)     Hyaline Casts, UA 0-2 (*)    CBC WITH DIFFERENTIAL -  Abnormal    WBC 9.12      RBC 4.13 (*)     Hgb 12.9      Hct 37.7      MCV 91.3      MCH 31.2 (*)     MCHC 34.2      RDW 13.3      Platelet 376      MPV 8.9      Neut % 70.8      Lymph % 19.6      Mono % 7.0      Eos % 0.9      Basophil % 0.8      Lymph # 1.79      Neut # 6.46      Mono # 0.64      Eos # 0.08      Baso # 0.07      IG# 0.08 (*)     IG% 0.9      NRBC% 0.0     DRUG SCREEN, URINE (BEAKER) - Abnormal    Amphetamines, Urine Negative      Barbiturates, Urine Negative      Benzodiazepine, Urine Negative      Cannabinoids, Urine Negative      Cocaine, Urine Negative      Fentanyl, Urine Negative      MDMA, Urine Negative      Opiates, Urine Positive (*)     Phencyclidine, Urine Negative      pH, Urine 6.5      Specific Gravity, Urine Auto 1.020      Narrative:     Cut off concentrations:    Amphetamines - 1000 ng/ml  Barbiturates - 200 ng/ml  Benzodiazepine - 200 ng/ml  Cannabinoids (THC) - 50 ng/ml  Cocaine - 300 ng/ml  Fentanyl - 1.0 ng/ml  MDMA - 500 ng/ml  Opiates - 300 ng/ml   Phencyclidine (PCP) - 25 ng/ml    Specimen submitted for drug analysis and tested for pH and specific gravity in order to evaluate sample integrity. Suspect tampering if specific gravity is <1.003 and/or pH is not within the range of 4.5 - 8.0  False negatives may result form substances such as bleach added to urine.  False positives may result for the presence of a substance with similar chemical structure to the drug or its metabolite.    This test provides only a PRELIMINARY analytical test result. A more specific alternate chemical method must be used in order to obtain a confirmed analytical result. Gas chromatography/mass spectrometry (GC/MS) is the preferred confirmatory method. Other chemical confirmation methods are available. Clinical consideration and professional judgement should be applied to any drug of abuse test result, particularly when preliminary positive results are used.    Positive results will be confirmed  only at the physicians request. Unconfirmed screening results are to be used only for medical purposes (treatment).        LACTIC ACID, PLASMA - Normal    Lactic Acid Level 1.2     ALCOHOL,MEDICAL (ETHANOL) - Normal    Ethanol Level <10.0     AMMONIA - Normal    Ammonia Level 27.8     TSH - Normal    TSH 4.362     MAGNESIUM - Normal    Magnesium Level 1.90     CBC W/ AUTO DIFFERENTIAL    Narrative:     The following orders were created for panel order CBC Auto Differential.  Procedure                               Abnormality         Status                     ---------                               -----------         ------                     CBC with Differential[[id number]]       Abnormal            Final result                 Please view results for these tests on the individual orders.   LIPID PANEL   POCT GLUCOSE, HAND-HELD DEVICE   POCT GLUCOSE    POCT Glucose 100            Imaging Results              CT Head Without Contrast (Final result)  Result time 10/30/24 12:41:09      Final result by Milo Bravo MD (10/30/24 12:41:09)                   Impression:      Remote appearing infarcts right frontal and parietal lobes though these are new compared to July 2024. No acute hemorrhage.      Electronically signed by: Milo Bravo  Date:    10/30/2024  Time:    12:41               Narrative:    EXAMINATION:  CT HEAD WITHOUT CONTRAST    CLINICAL HISTORY:  Mental status change, unknown cause;    TECHNIQUE:  CT imaging of the head performed from the skull base to the vertex without intravenous contrast.  DLP 1095 mGycm. Automatic exposure control, adjustment of mA/kV or iterative reconstruction technique was used to reduce radiation.    COMPARISON:  18 July 2024    FINDINGS:  There are areas of hypoattenuation in the right frontal and parietal lobes images 22 and 31 series 3.  These have the appearance of chronic infarcts but they are new compared to July 2024.  There is no acute hemorrhage.  The ventricles  "are not enlarged.    Visualized paranasal sinuses and mastoid air cells are clear.                                       Medications   labetaloL injection 10 mg (has no administration in time range)   bisacodyL suppository 10 mg (has no administration in time range)   hydrALAZINE injection 10 mg (10 mg Intravenous Given 10/30/24 1341)     Medical Decision Making  64 year old female presents to the ER via EMS for evaluation of altered mental status x 3 days. Initially, patient did not have any family present and history was limited as the patient was not responding to questions or following commands. She was spontaneously moving her eyes and body. She was alert.  Initial lab work CT imaging were ordered.  Lab work grossly unremarkable.  CT head does show appearing partially right frontal and parietal lobe, however, do not appear acute.  There are new compared to her previous MRI July of 2024.  Eventually, the patient has been did arrive and was able to give a history.  He shares that for the last 3 days she has been altered.  He reports a history altered mental for many years.  Unsure of to why this happened.  Per chart review, this is secondary to conversion disorder secondary to polypharmacy.  Patient's  reports that he was seen at [facility] in the emergency room 2 days ago and "nothing was done and she was discharged home." Patient's  reports that since their discharge home the patient's symptoms have been unchanged. He shares concern for possible UTI.    Lab work and UA unremarkable today for any acute process which could be contributing to patient's presentation. CT head shows remote infarcts to R frontal and parietal lobe. I did review this imaging with Dr. Alcantar and this does not appear to be an acute finding however believes this could benefit from a inpatient stroke work up to rule out involvement contributing to acute presentation. On re-assessment, patient is answering some questions, " not consistently. I did discuss inpatient stroke work up vs. Outpatient follow up with Dr. Downs, neurology, and family is in agreement for inpatient work up.  paged for admission. Dr. Matamoros accepted admission for continued AMS work up and neurology consult. I did place a consult with Dr. Nino, neurology. Dr. Tang, emergency medicine, reviewed the patient's work up and PMH. He also had face-to-face interaction and assessment of the patient. He agrees with plan for admission to rule out stroke.     Amount and/or Complexity of Data Reviewed  Independent Historian: spouse  Labs: ordered. Decision-making details documented in ED Course.  Radiology: ordered.  Discussion of management or test interpretation with external provider(s):  paged for admission. Dr. Matamoros accepted admission for continued AMS work up and neurology consult. I did place a consult with Dr. Nino, neurology. Dr. Tang, emergency medicine, reviewed the patient's work up and PMH. He also had face-to-face interaction and assessment of the patient. He agrees with plan for admission to rule out stroke.     Risk  Prescription drug management.  Decision regarding hospitalization.      Additional MDM:   Differential Diagnosis:   Other: The following diagnoses were also considered and will be evaluated: conversion disorder, polypharmacy and stroke.            ED Course as of 10/30/24 1400   Wed Oct 30, 2024   1236 CBC unremarkable [LM]   1248 CT Head: Impression:     Remote appearing infarcts right frontal and parietal lobes though these are new compared to July 2024. No acute hemorrhage.   [LM]   1254 Cmp grossly unremarkable [LM]   1255 Ammonia: 27.8 [LM]   1255 Lactic Acid Level: 1.2 [LM]   1255 Alcohol, Serum: <10.0 [LM]   1255 Magnesium : 1.90 [LM]   1255 Repeat pressure 154/90 will hold off on IV hydralazine at this time [LM]   1255 Opiates, Urine(!): Positive  Chronic opioid use with hx of conversion 2/2 polypharmacy [LM]   1315 TSH: 4.362  [LM]   1336 HM paged for admission for stroke work up [LM]      ED Course User Index  [LM] Mickie Munoz PA                           Clinical Impression:  Final diagnoses:  [R41.82] Altered mental status          ED Disposition Condition    Admit                 Munoz, Mickie, PA  10/30/24 1400

## 2024-10-31 PROBLEM — I63.9 ACUTE CVA (CEREBROVASCULAR ACCIDENT): Status: ACTIVE | Noted: 2024-10-31

## 2024-10-31 LAB
ALBUMIN SERPL-MCNC: 3.3 G/DL (ref 3.4–4.8)
ALBUMIN/GLOB SERPL: 1 RATIO (ref 1.1–2)
ALP SERPL-CCNC: 158 UNIT/L (ref 40–150)
ALT SERPL-CCNC: 25 UNIT/L (ref 0–55)
ANION GAP SERPL CALC-SCNC: 10 MEQ/L
APICAL FOUR CHAMBER EJECTION FRACTION: 60 %
APICAL TWO CHAMBER EJECTION FRACTION: 50 %
APTT PPP: 23.5 SECONDS (ref 23.2–33.7)
AST SERPL-CCNC: 30 UNIT/L (ref 5–34)
AV INDEX (PROSTH): 0.65
AV MEAN GRADIENT: 11 MMHG
AV PEAK GRADIENT: 19.4 MMHG
AV VALVE AREA BY VELOCITY RATIO: 2.4 CM²
AV VALVE AREA: 2 CM²
AV VELOCITY RATIO: 0.77
BASOPHILS # BLD AUTO: 0.08 X10(3)/MCL
BASOPHILS NFR BLD AUTO: 0.9 %
BILIRUB SERPL-MCNC: 0.5 MG/DL
BSA FOR ECHO PROCEDURE: 2.54 M2
BUN SERPL-MCNC: 20.8 MG/DL (ref 9.8–20.1)
CALCIUM SERPL-MCNC: 9.4 MG/DL (ref 8.4–10.2)
CHLORIDE SERPL-SCNC: 110 MMOL/L (ref 98–107)
CHOLEST SERPL-MCNC: 217 MG/DL
CHOLEST/HDLC SERPL: 3 {RATIO} (ref 0–5)
CK MB SERPL-MCNC: 3.2 NG/ML
CO2 SERPL-SCNC: 23 MMOL/L (ref 23–31)
CREAT SERPL-MCNC: 0.75 MG/DL (ref 0.55–1.02)
CREAT/UREA NIT SERPL: 28
CV ECHO LV RWT: 0.59 CM
DOP CALC AO PEAK VEL: 2.2 M/S
DOP CALC AO VTI: 29.7 CM
DOP CALC LVOT AREA: 3.1 CM2
DOP CALC LVOT DIAMETER: 2 CM
DOP CALC LVOT PEAK VEL: 1.7 M/S
DOP CALC LVOT STROKE VOLUME: 60.6 CM3
DOP CALC MV VTI: 23.2 CM
DOP CALCLVOT PEAK VEL VTI: 19.3 CM
E WAVE DECELERATION TIME: 151 MSEC
E/A RATIO: 0.49
E/E' RATIO: 7 M/S
ECHO LV POSTERIOR WALL: 1 CM (ref 0.6–1.1)
EOSINOPHIL # BLD AUTO: 0.12 X10(3)/MCL (ref 0–0.9)
EOSINOPHIL NFR BLD AUTO: 1.4 %
ERYTHROCYTE [DISTWIDTH] IN BLOOD BY AUTOMATED COUNT: 13.4 % (ref 11.5–17)
EST. AVERAGE GLUCOSE BLD GHB EST-MCNC: 102.5 MG/DL
FRACTIONAL SHORTENING: 32.4 % (ref 28–44)
GFR SERPLBLD CREATININE-BSD FMLA CKD-EPI: >60 ML/MIN/1.73/M2
GLOBULIN SER-MCNC: 3.3 GM/DL (ref 2.4–3.5)
GLUCOSE SERPL-MCNC: 119 MG/DL (ref 82–115)
HBA1C MFR BLD: 5.2 %
HCT VFR BLD AUTO: 37.7 % (ref 37–47)
HDLC SERPL-MCNC: 65 MG/DL (ref 35–60)
HGB BLD-MCNC: 12.5 G/DL (ref 12–16)
IMM GRANULOCYTES # BLD AUTO: 0.07 X10(3)/MCL (ref 0–0.04)
IMM GRANULOCYTES NFR BLD AUTO: 0.8 %
INR PPP: 1.1
INTERVENTRICULAR SEPTUM: 1.5 CM (ref 0.6–1.1)
LDLC SERPL CALC-MCNC: 121 MG/DL (ref 50–140)
LEFT ATRIUM AREA SYSTOLIC (APICAL 2 CHAMBER): 15.8 CM2
LEFT ATRIUM AREA SYSTOLIC (APICAL 4 CHAMBER): 16 CM2
LEFT ATRIUM SIZE: 4.3 CM
LEFT ATRIUM VOLUME INDEX MOD: 14.8 ML/M2
LEFT ATRIUM VOLUME MOD: 35.5 ML
LEFT INTERNAL DIMENSION IN SYSTOLE: 2.3 CM (ref 2.1–4)
LEFT VENTRICLE DIASTOLIC VOLUME INDEX: 20.04 ML/M2
LEFT VENTRICLE DIASTOLIC VOLUME: 48.1 ML
LEFT VENTRICLE END DIASTOLIC VOLUME APICAL 2 CHAMBER: 42.6 ML
LEFT VENTRICLE END DIASTOLIC VOLUME APICAL 4 CHAMBER: 41.3 ML
LEFT VENTRICLE END SYSTOLIC VOLUME APICAL 2 CHAMBER: 33.6 ML
LEFT VENTRICLE END SYSTOLIC VOLUME APICAL 4 CHAMBER: 35.6 ML
LEFT VENTRICLE MASS INDEX: 57.8 G/M2
LEFT VENTRICLE SYSTOLIC VOLUME INDEX: 7.4 ML/M2
LEFT VENTRICLE SYSTOLIC VOLUME: 17.7 ML
LEFT VENTRICULAR INTERNAL DIMENSION IN DIASTOLE: 3.4 CM (ref 3.5–6)
LEFT VENTRICULAR MASS: 138.8 G
LV LATERAL E/E' RATIO: 7 M/S
LV SEPTAL E/E' RATIO: 7 M/S
LVED V (TEICH): 48.1 ML
LVES V (TEICH): 17.7 ML
LVOT MG: 6 MMHG
LVOT MV: 1.08 CM/S
LYMPHOCYTES # BLD AUTO: 1.53 X10(3)/MCL (ref 0.6–4.6)
LYMPHOCYTES NFR BLD AUTO: 17.9 %
MAGNESIUM SERPL-MCNC: 1.9 MG/DL (ref 1.6–2.6)
MCH RBC QN AUTO: 31.5 PG (ref 27–31)
MCHC RBC AUTO-ENTMCNC: 33.2 G/DL (ref 33–36)
MCV RBC AUTO: 95 FL (ref 80–94)
MONOCYTES # BLD AUTO: 0.59 X10(3)/MCL (ref 0.1–1.3)
MONOCYTES NFR BLD AUTO: 6.9 %
MV MEAN GRADIENT: 4 MMHG
MV PEAK A VEL: 1.15 M/S
MV PEAK E VEL: 0.56 M/S
MV PEAK GRADIENT: 10 MMHG
MV STENOSIS PRESSURE HALF TIME: 93 MS
MV VALVE AREA BY CONTINUITY EQUATION: 2.61 CM2
MV VALVE AREA P 1/2 METHOD: 2.37 CM2
NEUTROPHILS # BLD AUTO: 6.18 X10(3)/MCL (ref 2.1–9.2)
NEUTROPHILS NFR BLD AUTO: 72.1 %
NRBC BLD AUTO-RTO: 0 %
OHS LV EJECTION FRACTION SIMPSONS BIPLANE MOD: 56 %
PHOSPHATE SERPL-MCNC: 3.2 MG/DL (ref 2.3–4.7)
PISA TR MAX VEL: 1.95 M/S
PLATELET # BLD AUTO: 342 X10(3)/MCL (ref 130–400)
PMV BLD AUTO: 9 FL (ref 7.4–10.4)
POCT GLUCOSE: 117 MG/DL (ref 70–110)
POTASSIUM SERPL-SCNC: 3.5 MMOL/L (ref 3.5–5.1)
PROT SERPL-MCNC: 6.6 GM/DL (ref 5.8–7.6)
PROTHROMBIN TIME: 13.8 SECONDS (ref 12.5–14.5)
PV PEAK GRADIENT: 4 MMHG
PV PEAK VELOCITY: 0.96 M/S
RBC # BLD AUTO: 3.97 X10(6)/MCL (ref 4.2–5.4)
RIGHT VENTRICULAR END-DIASTOLIC DIMENSION: 2.32 CM
SODIUM SERPL-SCNC: 143 MMOL/L (ref 136–145)
TDI LATERAL: 0.08 M/S
TDI SEPTAL: 0.08 M/S
TDI: 0.08 M/S
TR MAX PG: 15 MMHG
TRIGL SERPL-MCNC: 155 MG/DL (ref 37–140)
TROPONIN I SERPL-MCNC: 0.03 NG/ML (ref 0–0.04)
VLDLC SERPL CALC-MCNC: 31 MG/DL
WBC # BLD AUTO: 8.57 X10(3)/MCL (ref 4.5–11.5)
Z-SCORE OF LEFT VENTRICULAR DIMENSION IN END DIASTOLE: -11.68
Z-SCORE OF LEFT VENTRICULAR DIMENSION IN END SYSTOLE: -8.27

## 2024-10-31 PROCEDURE — 83036 HEMOGLOBIN GLYCOSYLATED A1C: CPT | Performed by: NURSE PRACTITIONER

## 2024-10-31 PROCEDURE — 25000003 PHARM REV CODE 250: Performed by: INTERNAL MEDICINE

## 2024-10-31 PROCEDURE — 80061 LIPID PANEL: CPT | Performed by: NURSE PRACTITIONER

## 2024-10-31 PROCEDURE — 83735 ASSAY OF MAGNESIUM: CPT | Performed by: NURSE PRACTITIONER

## 2024-10-31 PROCEDURE — 85730 THROMBOPLASTIN TIME PARTIAL: CPT | Performed by: NURSE PRACTITIONER

## 2024-10-31 PROCEDURE — 82553 CREATINE MB FRACTION: CPT | Performed by: NURSE PRACTITIONER

## 2024-10-31 PROCEDURE — 84484 ASSAY OF TROPONIN QUANT: CPT | Performed by: NURSE PRACTITIONER

## 2024-10-31 PROCEDURE — 85610 PROTHROMBIN TIME: CPT | Performed by: NURSE PRACTITIONER

## 2024-10-31 PROCEDURE — 25000003 PHARM REV CODE 250: Performed by: HOSPITALIST

## 2024-10-31 PROCEDURE — 99233 SBSQ HOSP IP/OBS HIGH 50: CPT | Mod: FS,,, | Performed by: PSYCHIATRY & NEUROLOGY

## 2024-10-31 PROCEDURE — 95819 EEG AWAKE AND ASLEEP: CPT

## 2024-10-31 PROCEDURE — S5010 5% DEXTROSE AND 0.45% SALINE: HCPCS | Performed by: INTERNAL MEDICINE

## 2024-10-31 PROCEDURE — 63600175 PHARM REV CODE 636 W HCPCS: Performed by: HOSPITALIST

## 2024-10-31 PROCEDURE — 36415 COLL VENOUS BLD VENIPUNCTURE: CPT | Performed by: NURSE PRACTITIONER

## 2024-10-31 PROCEDURE — 92610 EVALUATE SWALLOWING FUNCTION: CPT

## 2024-10-31 PROCEDURE — 21400001 HC TELEMETRY ROOM

## 2024-10-31 PROCEDURE — 25000003 PHARM REV CODE 250: Performed by: NURSE PRACTITIONER

## 2024-10-31 PROCEDURE — 85025 COMPLETE CBC W/AUTO DIFF WBC: CPT | Performed by: NURSE PRACTITIONER

## 2024-10-31 PROCEDURE — 80053 COMPREHEN METABOLIC PANEL: CPT | Performed by: NURSE PRACTITIONER

## 2024-10-31 PROCEDURE — 84100 ASSAY OF PHOSPHORUS: CPT | Performed by: NURSE PRACTITIONER

## 2024-10-31 RX ORDER — OXYCODONE AND ACETAMINOPHEN 5; 325 MG/1; MG/1
1 TABLET ORAL EVERY 6 HOURS PRN
Status: DISCONTINUED | OUTPATIENT
Start: 2024-10-31 | End: 2024-11-11 | Stop reason: HOSPADM

## 2024-10-31 RX ORDER — DEXTROSE MONOHYDRATE AND SODIUM CHLORIDE 5; .45 G/100ML; G/100ML
INJECTION, SOLUTION INTRAVENOUS CONTINUOUS
Status: DISCONTINUED | OUTPATIENT
Start: 2024-10-31 | End: 2024-11-01

## 2024-10-31 RX ORDER — ENOXAPARIN SODIUM 100 MG/ML
40 INJECTION SUBCUTANEOUS EVERY 24 HOURS
Status: DISCONTINUED | OUTPATIENT
Start: 2024-11-01 | End: 2024-11-06

## 2024-10-31 RX ORDER — NAPROXEN SODIUM 220 MG/1
81 TABLET, FILM COATED ORAL DAILY
Status: DISCONTINUED | OUTPATIENT
Start: 2024-10-31 | End: 2024-11-01

## 2024-10-31 RX ORDER — ASPIRIN 300 MG/1
300 SUPPOSITORY RECTAL DAILY
Status: DISCONTINUED | OUTPATIENT
Start: 2024-10-31 | End: 2024-11-01

## 2024-10-31 RX ORDER — LEVETIRACETAM 500 MG/1
500 TABLET ORAL 2 TIMES DAILY
Status: DISCONTINUED | OUTPATIENT
Start: 2024-10-31 | End: 2024-11-11 | Stop reason: HOSPADM

## 2024-10-31 RX ORDER — OXYCODONE AND ACETAMINOPHEN 10; 325 MG/1; MG/1
1 TABLET ORAL EVERY 6 HOURS PRN
Status: DISCONTINUED | OUTPATIENT
Start: 2024-10-31 | End: 2024-10-31

## 2024-10-31 RX ADMIN — LEVETIRACETAM 500 MG: 500 TABLET, FILM COATED ORAL at 10:10

## 2024-10-31 RX ADMIN — OXYCODONE HYDROCHLORIDE AND ACETAMINOPHEN 1 TABLET: 5; 325 TABLET ORAL at 06:10

## 2024-10-31 RX ADMIN — DEXTROSE AND SODIUM CHLORIDE: 5; 450 INJECTION, SOLUTION INTRAVENOUS at 10:10

## 2024-10-31 RX ADMIN — MUPIROCIN: 20 OINTMENT TOPICAL at 10:10

## 2024-10-31 RX ADMIN — OXYCODONE HYDROCHLORIDE AND ACETAMINOPHEN 1 TABLET: 5; 325 TABLET ORAL at 11:10

## 2024-10-31 RX ADMIN — MUPIROCIN: 20 OINTMENT TOPICAL at 09:10

## 2024-10-31 RX ADMIN — ASPIRIN 81 MG CHEWABLE TABLET 81 MG: 81 TABLET CHEWABLE at 11:10

## 2024-10-31 RX ADMIN — HYDRALAZINE HYDROCHLORIDE 10 MG: 20 INJECTION INTRAMUSCULAR; INTRAVENOUS at 11:10

## 2024-10-31 NOTE — PROGRESS NOTES
[facility] - 4th Floor Medical Telemetry  Neurology  Progress Note    Patient Name: Kasandra Cheek  MRN: [id number]  Admission Date: 10/30/2024  Hospital Length of Stay: 1 days  Code Status: Full Code   Attending Provider: Rose Benitez MD  Primary Care Physician: Ravinder Mazariegos DO   Principal Problem:Altered mental status    HPI:   64 year old female with a past medical history of HTN, obesity, migraines, TIA, hypothyroidism, anxiety, depression, fatty liver disease, and spinal surgeries presented to ED on 10/30 for altered mental status.   reports about 1 year ago she had a gradual decline, she requires assistance to go to the bathroom and transfer to wheelchair.  She has had a few episodes of waxing and waning mental status with UTI. He reports on Sunday, she was sitting in her wheel chair in the office was going to purchase a couple of things. He stopped her from purchasing the things, walked out of the room, when he came back, she was slumped over, not really responding.  He reports he EMS brought her to ED in [place],  reports they took her vital signs and discharged her home.  He reports it took 4 people to get her out of the car because he was so weak.  He brought her to ED at [facility] for further evaluation.  CT head was significant for right frontal and right parietal infarcts which are new from 7/2024 but appear chronic.  Neurology was consulted for stroke workup.      Overview/Hospital Course:  No notes on file        Subjective:     Interval History: no acute events over night. Awaiting MRI brain. Patient remains confused with expressive aphasia     Current Facility-Administered Medications   Medication Dose Route Frequency Provider Last Rate Last Admin    aspirin suppository 300 mg  300 mg Rectal Q6H PRN Manolo Matamoros MD        bisacodyL suppository 10 mg  10 mg Rectal Daily PRN Mickie Zaragoza, [facility] Center[facility]NP-BC        hydrALAZINE injection 10 mg  10 mg Intravenous Q4H PRN Saturnino  Manolo EDGAR MD        labetaloL injection 10 mg  10 mg Intravenous Q6H PRN Mickie Zaragoza, AGACNP-BC        mupirocin 2 % ointment   Nasal BID Manolo Matamoros MD           Review of Systems   Unable to perform ROS: Dementia     Objective:     Vital Signs (Most Recent):  Temp: 98.9 °F (37.2 °C) (10/31/24 0435)  Pulse: 107 (10/31/24 0435)  Resp: 14 (10/30/24 1910)  BP: (!) 168/81 (10/31/24 0435)  SpO2: (!) 93 % (10/31/24 0435) Vital Signs (24h Range):  Temp:  [97 °F (36.1 °C)-98.9 °F (37.2 °C)] 98.9 °F (37.2 °C)  Pulse:  [] 107  Resp:  [14-18] 14  SpO2:  [93 %-98 %] 93 %  BP: (155-175)/() 168/81     Weight: (!) 136.1 kg (300 lb 0.7 oz)  Body mass index is 46.99 kg/m².     Physical Exam  Vitals and nursing note reviewed.   Constitutional:       General: She is not in acute distress.     Appearance: Normal appearance. She is well-developed. She is not ill-appearing or toxic-appearing.   HENT:      Head: Normocephalic.      Right Ear: External ear normal.      Nose: Nose normal.   Eyes:      Conjunctiva/sclera: Conjunctivae normal.   Cardiovascular:      Rate and Rhythm: Normal rate.   Pulmonary:      Effort: Pulmonary effort is normal.   Abdominal:      General: Abdomen is flat.   Lymphadenopathy:      Cervical: No cervical adenopathy.   Skin:     General: Skin is warm and dry.   Neurological:      Mental Status: She is alert.   Psychiatric:         Speech: Speech normal.         Behavior: Behavior is cooperative.          NEUROLOGICAL EXAMINATION:     MENTAL STATUS   Disoriented to person.   Disoriented to place.   Disoriented to time.   Attention: decreased. Concentration: decreased.   Speech: speech is normal   Level of consciousness: alert       Unable to identify pen or fingers correctly. Unware of her location.      CRANIAL NERVES     CN II   Visual fields full to confrontation.     CN VII   Facial expression full, symmetric.     CN XII   Tongue deviation: none    MOTOR EXAM   Muscle bulk: normal  Overall  muscle tone: normal       No drift UE or LE. strength UE 5/5. BLE 4/5.      SENSORY EXAM        Exam limited with inconsistent understanding and expressive aphasia        Significant Labs: All pertinent lab results from the past 24 hours have been reviewed.    Significant Imaging: I have reviewed all pertinent imaging results/findings within the past 24 hours.  Assessment and Plan:     * Altered mental status  - presented with altered mental status x3 days (ongoing waxing and waning episodes over the course of 1 year per )  - Stroke RF: HTN  - Intervention: none, OOW symptom onset 10/27  - Etiology: TBD    Stroke workup:  -CTh: Remote appearing infarcts right frontal and parietal lobes though these are new compared to July 2024. No acute hemorrhage   -CTA h/n: Impression: No large vessel occlusion or flow-limiting stenosis.  -MRI brain:    -ECHO:  - CUS: The right internal carotid artery is patent with no evidence of stenosis.  The left internal carotid artery is patent with less than 50% stenosis.   Bilateral vertebral arteries are patent with antegrade flow.  - LDL: 121  -A1C: 5.2  -TSH: 4.362 Normal  -home medications include: no aspirin, plavix or anticoagulants noted     NIH Stroke Scale  Current NIH Stroke Score Values      Flowsheet Row Most Recent Value   Interval shift assessment   1a. Level of Consciousness 0-->Alert, keenly responsive   1b. LOC Questions 2-->Answers neither question correctly   1c. LOC Commands 1-->Performs one task correctly   2. Best Gaze 0-->Normal   3. Visual 0-->No visual loss   4. Facial Palsy 0-->Normal symmetrical movements   5a. Motor Arm, Left 0-->No drift, limb holds 90 (or 45) degrees for full 10 secs   5b. Motor Arm, Right 0-->No drift, limb holds 90 (or 45) degrees for full 10 secs   6a. Motor Leg, Left 0-->No drift, leg holds 30 degree position for full 5 secs   6b. Motor Leg, Right 0-->No drift, leg holds 30 degree position for full 5 secs   7. Limb Ataxia  0-->Absent   8. Sensory 0-->Normal, no sensory loss   9. Best Language 1-->Mild-to-moderate aphasia, some obvious loss of fluency or facility of comprehension, without significant limitation on ideas expressed or form of expression. Reduction of speech and/or comprehension, however, makes conversation. . . (see row details)   10. Dysarthria 1-->Mild-to-moderate dysarthria, patient slurs at least some words and, at worst, can be understood with some difficulty   11. Extinction and Inattention (formerly Neglect) 0-->No abnormality   Total (NIH Stroke Scale) 5              Plan:  -continue stroke workup (pending MRI brain)  -Continue Aspirin 81mg daily  -Continue Atorvastatin 40mg daily  - ok to normalize BP, symptoms started on 10/27  - Neuro checks q4hr ... stat CTh if any neuro change   - DVT ppx with SCD or lovenox 40 s/c daily  - Continuous telemetry monitoring  - NPO until passes Perez or cleared by SLP  - No bedrest, ok for patient to ambulate, RN to observe first ambulation for safety  - PT/OT/SLP to evaluate   - Other recommendations may follow from MD               VTE Risk Mitigation (From admission, onward)           Ordered     IP VTE HIGH RISK PATIENT  Once         10/30/24 1352     Place sequential compression device  Until discontinued         10/30/24 1352                    Aleida Price NP  Neurology  [facility] - 4th Floor Medical Telemetry

## 2024-10-31 NOTE — SUBJECTIVE & OBJECTIVE
Subjective:     Interval History: no acute events over night. Awaiting MRI brain. Patient remains confused with expressive aphasia     Current Facility-Administered Medications   Medication Dose Route Frequency Provider Last Rate Last Admin    aspirin suppository 300 mg  300 mg Rectal Q6H PRN Manolo Matamoros MD        bisacodyL suppository 10 mg  10 mg Rectal Daily PRN Mickie Zaragoza AGACNP-BC        hydrALAZINE injection 10 mg  10 mg Intravenous Q4H PRN Manolo Matamoros MD        labetaloL injection 10 mg  10 mg Intravenous Q6H PRN Mickie Zaragoza AGACNP-BC        mupirocin 2 % ointment   Nasal BID Manolo Matamoros MD           Review of Systems   Unable to perform ROS: Dementia     Objective:     Vital Signs (Most Recent):  Temp: 98.9 °F (37.2 °C) (10/31/24 0435)  Pulse: 107 (10/31/24 0435)  Resp: 14 (10/30/24 1910)  BP: (!) 168/81 (10/31/24 0435)  SpO2: (!) 93 % (10/31/24 0435) Vital Signs (24h Range):  Temp:  [97 °F (36.1 °C)-98.9 °F (37.2 °C)] 98.9 °F (37.2 °C)  Pulse:  [] 107  Resp:  [14-18] 14  SpO2:  [93 %-98 %] 93 %  BP: (155-175)/() 168/81     Weight: (!) 136.1 kg (300 lb 0.7 oz)  Body mass index is 46.99 kg/m².     Physical Exam  Vitals and nursing note reviewed.   Constitutional:       General: She is not in acute distress.     Appearance: Normal appearance. She is well-developed. She is not ill-appearing or toxic-appearing.   HENT:      Head: Normocephalic.      Right Ear: External ear normal.      Nose: Nose normal.   Eyes:      Conjunctiva/sclera: Conjunctivae normal.   Cardiovascular:      Rate and Rhythm: Normal rate.   Pulmonary:      Effort: Pulmonary effort is normal.   Abdominal:      General: Abdomen is flat.   Lymphadenopathy:      Cervical: No cervical adenopathy.   Skin:     General: Skin is warm and dry.   Neurological:      Mental Status: She is alert.   Psychiatric:         Speech: Speech normal.         Behavior: Behavior is cooperative.          NEUROLOGICAL EXAMINATION:      MENTAL STATUS   Disoriented to person.   Disoriented to place.   Disoriented to time.   Attention: decreased. Concentration: decreased.   Speech: speech is normal   Level of consciousness: alert       Unable to identify pen or fingers correctly. Unware of her location.      CRANIAL NERVES     CN II   Visual fields full to confrontation.     CN VII   Facial expression full, symmetric.     CN XII   Tongue deviation: none    MOTOR EXAM   Muscle bulk: normal  Overall muscle tone: normal       No drift UE or LE. strength UE 5/5. BLE 4/5.      SENSORY EXAM        Exam limited with inconsistent understanding and expressive aphasia        Significant Labs: All pertinent lab results from the past 24 hours have been reviewed.    Significant Imaging: I have reviewed all pertinent imaging results/findings within the past 24 hours.

## 2024-10-31 NOTE — PT/OT/SLP PROGRESS
"Occupational Therapy      Patient Name:  Kasandra Cheek   MRN:  [id number]    Initial OT eval and treat orders received. Attempted to see pt this date, but pt reports she had back surgery "two weeks ago" and has been "wearing a back brace" since then. Notified RN who denied knowledge of pt having a TLSO. Pt's  in the room and verbalized he would bring it from home. Per medical chart review, pt underwent a thoracic laminectomy and PLIF on 10/11/2024. Will hold therapy and follow up as able, pending arrival of TLSO. RN verbally agreed.     10/31/2024  "

## 2024-10-31 NOTE — PT/OT/SLP EVAL
"[facility]  Speech Language Pathology Department  Clinical Swallow Evaluation    Patient Name:  Kasandra Cheek   MRN:  [id number]    Recommendations     General recommendations:  SLP follow up x1 regarding diet tolerance and Speech/Language and Cognitive Evaluation pending neuro recs  Solid texture recommendation:  Easy to Chew Diet - IDDSI Level 7  Liquid consistency recommendation: Thin liquids - IDDSI Level 0   Medications: per patient preference  Swallow strategies/precautions: assist with feeding as needed  Precautions:      History     Kasandra Cheek is a/n 64 y.o. female admitted due to weakness and altered mental status.  Hc frontal TBI/CVA.  Patient's  reported gradual decline in the last year.  Per chart review, patient with admit at [facility] in July of 2024 with suspicion of "psychogenic cause versus polypharmacy."  Neuro note stated, "The patient episodes of altered mental status likely seem to be multifactorial from severe depression with pseudobulbar affect, as well as additional element of polypharmacy"  with recommendations of outpatient psychiatry evaluation and neuropsych evaluation for cognition.    Past Medical History:   Diagnosis Date    Anemia     Anxiety     Depression     Dyspareunia     Encounter for blood transfusion     HTN (hypertension)     Hypothyroidism, unspecified     Liver disease     fatty liver    Lumbar radiculopathy     Menopause     Migraine     Obesity     Osteoporosis     Other spondylosis with radiculopathy, lumbar region     Personal history of fall     Spinal stenosis, lumbar region with neurogenic claudication     TIA (transient ischemic attack) 1994    Unspecified osteoarthritis, unspecified site     Urinary incontinence      Past Surgical History:   Procedure Laterality Date    ABDOMINAL SURGERY      Gastric Bypass    CARPAL TUNNEL RELEASE Bilateral     CERVICAL FUSION  12/28/2021    CHOLECYSTECTOMY  2015    COLONOSCOPY      COLPOSCOPY      " "FOOT SURGERY Left     FRACTURE SURGERY  2001    GASTRIC BYPASS  2011    HAND SURGERY Left     thumb    HERNIA REPAIR  2014    JOINT REPLACEMENT  Knee 2015, Hip 2016    LAMINECTOMY OF THORACIC SPINE BY POSTERIOR APPROACH USING COMPUTER-ASSISTED NAVIGATION N/A 10/11/2024    Procedure: LAMINECTOMY, SPINE, THORACIC, POSTERIOR APPROACH, USING COMPUTER-ASSISTED NAVIGATION;  Surgeon: Andra Mejia MD;  Location: [facility] OR;  Service: Neurosurgery;  Laterality: N/A;  T9-L3 DECOMPRESSION / REMOVAL OF RIGHT ILIAC BOLT AND S1 SCREW / EXTENSION OF FUSION  T9-S2 ILIAC BOLT //  PRONE ADENIKE // DRILL // SCOPE // O-ARM //  DIRECT SPINE // NDM //       LUMBAR FUSION  05/28/2021    LUMBAR FUSION  02/21/2023    L3-S1    POSTERIOR LUMBAR INTERBODY FUSION (PLIF) WITH COMPUTER-ASSISTED NAVIGATION N/A 10/11/2024    Procedure: FUSION, SPINE, LUMBAR, PLIF, USING COMPUTER-ASSISTED NAVIGATION;  Surgeon: Andra Mejia MD;  Location: [facility] OR;  Service: Neurosurgery;  Laterality: N/A;    ROTATOR CUFF REPAIR Bilateral 04/12/2022    SMALL INTESTINE SURGERY  2015    SPINE SURGERY  2021 & 2023    TONSILLECTOMY  2017     Current method of nutrition: NPO    Patient complaint: "thirsty"    Imaging   No results found for this or any previous visit.    No results found for this or any previous visit.    Results for orders placed in visit on 07/01/22    MRI Brain Without Contrast    Narrative  EXAMINATION:  MRI BRAIN WITHOUT CONTRAST    CLINICAL HISTORY:  62-year-old woman with syncopal episode and frequent headaches.    TECHNIQUE:  Diffusion, sagittal and axial T1, axial and coronal FLAIR, axial T2 DOE, and axial T2 GRE non-contrast 1.5 T magnetic resonance imaging was performed through the brain per routine protocol.    COMPARISON:  Head CT without contrast 06/06/2022.    FINDINGS:  Diffusion-weighted imaging through the brain is normal with no area of restricted diffusion or abnormal T2 shine through.  The brain is normal in morphology with mild " "global age appropriate cerebral atrophy.  There is a moderate size area chronic encephalomalacia and subcortical gliosis at the right superolateral frontal lobe consistent with an area of chronic infarction in the right MCA vascular territory.  No additional chronic infarct.  There is mild chronic microangiopathic change involving the bilateral frontal and parietal cerebral white matter.  There is no intracranial mass, mass effect, or midline shift.  The ventricles are normal in size and configuration without hydrocephalus.  No brainstem or posterior fossa pathology is identified.  There is no intracranial hemorrhage, abnormal blood products, or abnormal intracranial fluid collection.  Normal intracranial vascular flow voids are demonstrated on T2 weighted imaging.  There is no osseous lesion or extracranial soft tissue  abnormality.  The paranasal sinuses are well developed and clear.  The mastoid air cell regions are normal.  The midline intracranial structures are normal.  There is mild degenerative facet arthrosis and hypertrophy at C2-C4 with the craniocervical junction otherwise normal.  There is no abnormality at the temporomandibular joints.    Impression  No acute pathology identified at the head/brain.    Mild chronic microangiopathic change involving the frontal and parietal lobe cerebral white matter and moderate size area of chronic infarction at the right frontal lobe in the MCA territory.      Electronically signed by: Femi Iraheta  Date:    07/01/2022  Time:    17:08    Subjective     Patient awake, alert, and calm.    Patient goals: "thirsty"   Spiritual/Cultural/Gnosticism Beliefs/Practices that affect care: no    Pain/Comfort: Pain Rating 1: 0/10    Respiratory Status:  room air      Objective     ORAL MUSCULATURE  Dentition: missing teeth  Secretion Management: adequate  Mucosal Quality: good  Facial Movement: WFL  Buccal Strength & Mobility: WFL  Mandibular Strength & Mobility: WFL  Oral Labial " Strength & Mobility: WFL  Lingual Strength & Mobility: WFL  Vocal Quality: adequate      PO TRIALS  Consistency Fed By Oral Symptoms Pharyngeal Symptoms   Ice chips SLP None None   Puree SLP None None   Thin liquid by cup Self None None   Chewable solid Self Prolonged bolus formation/mastication None   Thin liquid by straw Self None None     Assessment     Patient demonstrates prolonged mastication negatively impacted by lack of dentition.  Rec: easy to chew solids, assist feed as needed.    Outcome Measures     Functional Oral Intake Scale: 6 - Total oral diet with multiple consistencies without special preparation, but with specific food limitation    Goals     Multidisciplinary Problems       SLP Goals       Not on file                  Education     Patient provided with verbal education regarding results.  Additional teaching is warranted.    Plan     SLP Follow-Up:  Yes   Patient to be seen:      Plan of Care expires:     Plan of Care reviewed with:  patient     Time Tracking     SLP Treatment Date:   10/31/24  Speech Start Time:  0905  Speech Stop Time:  0920     Speech Total Time (min):  15 min    Billable minutes:  Swallow and Oral Function Evaluation, 15 minutes     10/31/2024

## 2024-10-31 NOTE — PLAN OF CARE
10/31/24 1300   Discharge Assessment   Assessment Type Discharge Planning Assessment   Confirmed/corrected address, phone number and insurance Yes   Confirmed Demographics Correct on Facesheet   Source of Information patient;family   Communicated CHUCK with patient/caregiver Date not available/Unable to determine   Reason For Admission Altered Mental Status   People in Home spouse   Do you expect to return to your current living situation? Other (see comments)  (To be determined)   Do you have help at home or someone to help you manage your care at home? Yes   Who are your caregiver(s) and their phone number(s)? Gama (spouse) [phone]   Prior to hospitilization cognitive status: Unable to Assess   Current cognitive status: Alert/Oriented   Walking or Climbing Stairs Difficulty yes   Walking or Climbing Stairs ambulation difficulty, requires equipment   Mobility Management Rollator   Dressing/Bathing Difficulty yes   Dressing/Bathing bathing difficulty, requires equipment;bathing difficulty, assistance 1 person;dressing difficulty, assistance 1 person   Dressing/Bathing Management Shower chair   Equipment Currently Used at Home rollator;shower chair;walker, rolling;wheelchair;other (see comments)  (Back brace)   Patient currently being followed by outpatient case management? No   Do you currently have service(s) that help you manage your care at home? Yes   Name and Contact number of agency [facility] [phone]   Is the pt/caregiver preference to resume services with current agency Yes   Do you take prescription medications? Yes   Do you have prescription coverage? Yes   Coverage [employer]   Do you have any problems affording any of your prescribed medications? No   Is the patient taking medications as prescribed? yes   Who is going to help you get home at discharge? Spouse   How do you get to doctors appointments? family or friend will provide   Are you on dialysis? No   Do you take coumadin? No   Discharge Plan  A Home Health   Discharge Plan B Home Health   Discharge Plan discussed with: Patient;Spouse/sig other   OTHER   Name(s) of People in Home Spouse Gama

## 2024-10-31 NOTE — PLAN OF CARE
Problem: Infection  Goal: Absence of Infection Signs and Symptoms  Outcome: Progressing     Problem: Stroke, Ischemic (Includes Transient Ischemic Attack)  Goal: Optimal Coping  Outcome: Progressing  Goal: Effective Bowel Elimination  Outcome: Progressing  Goal: Optimal Cerebral Tissue Perfusion  Outcome: Progressing  Goal: Optimal Cognitive Function  Outcome: Progressing  Goal: Improved Communication Skills  Outcome: Progressing  Goal: Optimal Functional Ability  Outcome: Progressing  Goal: Optimal Nutrition Intake  Outcome: Progressing  Goal: Effective Oxygenation and Ventilation  Outcome: Progressing  Goal: Improved Sensorimotor Function  Outcome: Progressing  Goal: Safe and Effective Swallow  Outcome: Progressing  Goal: Effective Urinary Elimination  Outcome: Progressing     Problem: Adult Inpatient Plan of Care  Goal: Plan of Care Review  Outcome: Progressing  Goal: Patient-Specific Goal (Individualized)  Outcome: Progressing  Goal: Absence of Hospital-Acquired Illness or Injury  Outcome: Progressing  Goal: Optimal Comfort and Wellbeing  Outcome: Progressing  Goal: Readiness for Transition of Care  Outcome: Progressing     Problem: Bariatric Environmental Safety  Goal: Safety Maintained with Care  Outcome: Progressing     Problem: Wound  Goal: Optimal Coping  Outcome: Progressing  Goal: Optimal Functional Ability  Outcome: Progressing  Goal: Absence of Infection Signs and Symptoms  Outcome: Progressing  Goal: Improved Oral Intake  Outcome: Progressing  Goal: Optimal Pain Control and Function  Outcome: Progressing  Goal: Skin Health and Integrity  Outcome: Progressing  Goal: Optimal Wound Healing  Outcome: Progressing     Problem: Skin Injury Risk Increased  Goal: Skin Health and Integrity  Outcome: Progressing

## 2024-10-31 NOTE — PT/OT/SLP PROGRESS
"Physical Therapy      Patient Name:  Kasandra Cheek   MRN:  [id number]    PT eval orders received. Attempted eval at 1200 today, however, patient reporting having back surgery "two weeks ago" and wearing a back brace since the surgery. Pt's  present and appeared to be describing TLSO brace. Notified RN who denied knowledge of pt requiring TLSO. Per medical chart review, pt underwent a thoracic laminectomy and PLIF on 10/11/2024. Patient's  reports that he will bring the brace from home tomorrow. Will hold therapy and follow up as able, pending arrival of TLSO. RN verbally agreed     10/31/2024  "

## 2024-11-01 LAB
ALBUMIN SERPL-MCNC: 3.2 G/DL (ref 3.4–4.8)
ALBUMIN/GLOB SERPL: 1.2 RATIO (ref 1.1–2)
ALP SERPL-CCNC: 145 UNIT/L (ref 40–150)
ALT SERPL-CCNC: 21 UNIT/L (ref 0–55)
ANION GAP SERPL CALC-SCNC: 10 MEQ/L
AST SERPL-CCNC: 26 UNIT/L (ref 5–34)
BASOPHILS # BLD AUTO: 0.11 X10(3)/MCL
BASOPHILS NFR BLD AUTO: 1.2 %
BILIRUB SERPL-MCNC: 0.5 MG/DL
BUN SERPL-MCNC: 18.1 MG/DL (ref 9.8–20.1)
CALCIUM SERPL-MCNC: 8.6 MG/DL (ref 8.4–10.2)
CHLORIDE SERPL-SCNC: 111 MMOL/L (ref 98–107)
CO2 SERPL-SCNC: 23 MMOL/L (ref 23–31)
CREAT SERPL-MCNC: 0.63 MG/DL (ref 0.55–1.02)
CREAT/UREA NIT SERPL: 29
CRP SERPL-MCNC: 2.1 MG/L
EOSINOPHIL # BLD AUTO: 0.3 X10(3)/MCL (ref 0–0.9)
EOSINOPHIL NFR BLD AUTO: 3.3 %
ERYTHROCYTE [DISTWIDTH] IN BLOOD BY AUTOMATED COUNT: 13.2 % (ref 11.5–17)
ERYTHROCYTE [SEDIMENTATION RATE] IN BLOOD: 4 MM/HR (ref 0–20)
GFR SERPLBLD CREATININE-BSD FMLA CKD-EPI: >60 ML/MIN/1.73/M2
GLOBULIN SER-MCNC: 2.7 GM/DL (ref 2.4–3.5)
GLUCOSE SERPL-MCNC: 112 MG/DL (ref 82–115)
HCT VFR BLD AUTO: 38.7 % (ref 37–47)
HGB BLD-MCNC: 12.7 G/DL (ref 12–16)
IMM GRANULOCYTES # BLD AUTO: 0.07 X10(3)/MCL (ref 0–0.04)
IMM GRANULOCYTES NFR BLD AUTO: 0.8 %
LEFT CCA DIST DIAS: 7 CM/S
LEFT CCA DIST SYS: 79 CM/S
LEFT CCA PROX DIAS: 8 CM/S
LEFT CCA PROX SYS: 141 CM/S
LEFT ECA DIAS: 8 CM/S
LEFT ECA SYS: 57 CM/S
LEFT ICA DIST DIAS: 9 CM/S
LEFT ICA DIST SYS: 68 CM/S
LEFT ICA MID DIAS: 10 CM/S
LEFT ICA MID SYS: 56 CM/S
LEFT ICA PROX DIAS: 4 CM/S
LEFT ICA PROX SYS: 34 CM/S
LEFT VERTEBRAL DIAS: 2 CM/S
LEFT VERTEBRAL SYS: 66 CM/S
LYMPHOCYTES # BLD AUTO: 1.99 X10(3)/MCL (ref 0.6–4.6)
LYMPHOCYTES NFR BLD AUTO: 21.8 %
MCH RBC QN AUTO: 30.9 PG (ref 27–31)
MCHC RBC AUTO-ENTMCNC: 32.8 G/DL (ref 33–36)
MCV RBC AUTO: 94.2 FL (ref 80–94)
MONOCYTES # BLD AUTO: 0.72 X10(3)/MCL (ref 0.1–1.3)
MONOCYTES NFR BLD AUTO: 7.9 %
NEUTROPHILS # BLD AUTO: 5.92 X10(3)/MCL (ref 2.1–9.2)
NEUTROPHILS NFR BLD AUTO: 65 %
NRBC BLD AUTO-RTO: 0 %
OHS CV CAROTID RIGHT ICA EDV HIGHEST: 13
OHS CV CAROTID ULTRASOUND LEFT ICA/CCA RATIO: 0.86
OHS CV CAROTID ULTRASOUND RIGHT ICA/CCA RATIO: 1.28
OHS CV PV CAROTID LEFT HIGHEST CCA: 141
OHS CV PV CAROTID LEFT HIGHEST ICA: 68
OHS CV PV CAROTID RIGHT HIGHEST CCA: 99
OHS CV PV CAROTID RIGHT HIGHEST ICA: 83
OHS CV US CAROTID LEFT HIGHEST EDV: 10
PLATELET # BLD AUTO: 278 X10(3)/MCL (ref 130–400)
PMV BLD AUTO: 9.1 FL (ref 7.4–10.4)
POTASSIUM SERPL-SCNC: 3.7 MMOL/L (ref 3.5–5.1)
PROT SERPL-MCNC: 5.9 GM/DL (ref 5.8–7.6)
RBC # BLD AUTO: 4.11 X10(6)/MCL (ref 4.2–5.4)
RIGHT CCA DIST DIAS: 7 CM/S
RIGHT CCA DIST SYS: 65 CM/S
RIGHT CCA PROX DIAS: 6 CM/S
RIGHT CCA PROX SYS: 99 CM/S
RIGHT ECA DIAS: 3 CM/S
RIGHT ECA SYS: 94 CM/S
RIGHT ICA DIST DIAS: 13 CM/S
RIGHT ICA DIST SYS: 83 CM/S
RIGHT ICA MID DIAS: 8 CM/S
RIGHT ICA MID SYS: 51 CM/S
RIGHT ICA PROX DIAS: 8 CM/S
RIGHT ICA PROX SYS: 45 CM/S
RIGHT VERTEBRAL DIAS: 10 CM/S
RIGHT VERTEBRAL SYS: 74 CM/S
SODIUM SERPL-SCNC: 144 MMOL/L (ref 136–145)
WBC # BLD AUTO: 9.11 X10(3)/MCL (ref 4.5–11.5)

## 2024-11-01 PROCEDURE — 95816 EEG AWAKE AND DROWSY: CPT | Mod: 26,,, | Performed by: PSYCHIATRY & NEUROLOGY

## 2024-11-01 PROCEDURE — 86140 C-REACTIVE PROTEIN: CPT | Performed by: INTERNAL MEDICINE

## 2024-11-01 PROCEDURE — 85652 RBC SED RATE AUTOMATED: CPT | Performed by: INTERNAL MEDICINE

## 2024-11-01 PROCEDURE — 25000003 PHARM REV CODE 250: Performed by: NURSE PRACTITIONER

## 2024-11-01 PROCEDURE — 21400001 HC TELEMETRY ROOM

## 2024-11-01 PROCEDURE — 25000003 PHARM REV CODE 250: Performed by: INTERNAL MEDICINE

## 2024-11-01 PROCEDURE — 99232 SBSQ HOSP IP/OBS MODERATE 35: CPT | Mod: ,,, | Performed by: PSYCHIATRY & NEUROLOGY

## 2024-11-01 PROCEDURE — S5010 5% DEXTROSE AND 0.45% SALINE: HCPCS | Performed by: INTERNAL MEDICINE

## 2024-11-01 PROCEDURE — 80053 COMPREHEN METABOLIC PANEL: CPT | Performed by: NURSE PRACTITIONER

## 2024-11-01 PROCEDURE — 85025 COMPLETE CBC W/AUTO DIFF WBC: CPT | Performed by: NURSE PRACTITIONER

## 2024-11-01 PROCEDURE — 63600175 PHARM REV CODE 636 W HCPCS: Performed by: HOSPITALIST

## 2024-11-01 PROCEDURE — 97535 SELF CARE MNGMENT TRAINING: CPT

## 2024-11-01 PROCEDURE — 25000003 PHARM REV CODE 250: Performed by: PSYCHIATRY & NEUROLOGY

## 2024-11-01 PROCEDURE — 97163 PT EVAL HIGH COMPLEX 45 MIN: CPT

## 2024-11-01 PROCEDURE — 97167 OT EVAL HIGH COMPLEX 60 MIN: CPT

## 2024-11-01 PROCEDURE — 63600175 PHARM REV CODE 636 W HCPCS: Performed by: INTERNAL MEDICINE

## 2024-11-01 PROCEDURE — 36415 COLL VENOUS BLD VENIPUNCTURE: CPT | Performed by: INTERNAL MEDICINE

## 2024-11-01 PROCEDURE — 36415 COLL VENOUS BLD VENIPUNCTURE: CPT | Performed by: NURSE PRACTITIONER

## 2024-11-01 RX ORDER — LEVOTHYROXINE SODIUM 100 UG/1
200 TABLET ORAL DAILY
Status: DISCONTINUED | OUTPATIENT
Start: 2024-11-02 | End: 2024-11-03

## 2024-11-01 RX ORDER — QUETIAPINE FUMARATE 100 MG/1
100 TABLET, FILM COATED ORAL ONCE
Status: COMPLETED | OUTPATIENT
Start: 2024-11-01 | End: 2024-11-01

## 2024-11-01 RX ORDER — ATORVASTATIN CALCIUM 40 MG/1
80 TABLET, FILM COATED ORAL DAILY
Status: DISCONTINUED | OUTPATIENT
Start: 2024-11-01 | End: 2024-11-11 | Stop reason: HOSPADM

## 2024-11-01 RX ORDER — LOSARTAN POTASSIUM 50 MG/1
100 TABLET ORAL DAILY
Status: DISCONTINUED | OUTPATIENT
Start: 2024-11-02 | End: 2024-11-11 | Stop reason: HOSPADM

## 2024-11-01 RX ORDER — HYDROMORPHONE HYDROCHLORIDE 2 MG/ML
0.5 INJECTION, SOLUTION INTRAMUSCULAR; INTRAVENOUS; SUBCUTANEOUS ONCE
Status: CANCELLED | OUTPATIENT
Start: 2024-11-01

## 2024-11-01 RX ORDER — HYDROMORPHONE HYDROCHLORIDE 2 MG/ML
0.5 INJECTION, SOLUTION INTRAMUSCULAR; INTRAVENOUS; SUBCUTANEOUS ONCE
Status: COMPLETED | OUTPATIENT
Start: 2024-11-01 | End: 2024-11-01

## 2024-11-01 RX ORDER — PANTOPRAZOLE SODIUM 40 MG/1
40 TABLET, DELAYED RELEASE ORAL 2 TIMES DAILY
Status: DISCONTINUED | OUTPATIENT
Start: 2024-11-01 | End: 2024-11-11 | Stop reason: HOSPADM

## 2024-11-01 RX ORDER — ASPIRIN 325 MG
325 TABLET ORAL ONCE
Status: COMPLETED | OUTPATIENT
Start: 2024-11-01 | End: 2024-11-01

## 2024-11-01 RX ADMIN — ASPIRIN 81 MG CHEWABLE TABLET 81 MG: 81 TABLET CHEWABLE at 08:11

## 2024-11-01 RX ADMIN — OXYCODONE HYDROCHLORIDE AND ACETAMINOPHEN 1 TABLET: 5; 325 TABLET ORAL at 12:11

## 2024-11-01 RX ADMIN — QUETIAPINE FUMARATE 100 MG: 100 TABLET ORAL at 08:11

## 2024-11-01 RX ADMIN — HYDROMORPHONE HYDROCHLORIDE 0.5 MG: 2 INJECTION INTRAMUSCULAR; INTRAVENOUS; SUBCUTANEOUS at 10:11

## 2024-11-01 RX ADMIN — DEXTROSE AND SODIUM CHLORIDE: 5; 450 INJECTION, SOLUTION INTRAVENOUS at 08:11

## 2024-11-01 RX ADMIN — LEVETIRACETAM 500 MG: 500 TABLET, FILM COATED ORAL at 08:11

## 2024-11-01 RX ADMIN — ATORVASTATIN CALCIUM 80 MG: 40 TABLET, FILM COATED ORAL at 12:11

## 2024-11-01 RX ADMIN — HYDRALAZINE HYDROCHLORIDE 10 MG: 20 INJECTION INTRAMUSCULAR; INTRAVENOUS at 08:11

## 2024-11-01 RX ADMIN — ASPIRIN 325 MG ORAL TABLET 325 MG: 325 PILL ORAL at 05:11

## 2024-11-01 RX ADMIN — OXYCODONE HYDROCHLORIDE AND ACETAMINOPHEN 1 TABLET: 5; 325 TABLET ORAL at 04:11

## 2024-11-01 RX ADMIN — MUPIROCIN: 20 OINTMENT TOPICAL at 09:11

## 2024-11-01 RX ADMIN — PANTOPRAZOLE SODIUM 40 MG: 40 TABLET, DELAYED RELEASE ORAL at 08:11

## 2024-11-01 RX ADMIN — OXYCODONE HYDROCHLORIDE AND ACETAMINOPHEN 1 TABLET: 5; 325 TABLET ORAL at 08:11

## 2024-11-01 RX ADMIN — MUPIROCIN: 20 OINTMENT TOPICAL at 08:11

## 2024-11-01 NOTE — PROGRESS NOTES
[facility]  Hospital Medicine Progress Note        Chief Complaint: Inpatient Follow-up for Altered Mental Status ( reports AMS x 3 days.  states pt usually presents this way when she has a UTI. Pt arrives GCS 12.)    HPI: Kasandra Cheek is a 64 y.o. female who  has a past medical history of Anemia, Anxiety, Depression, Dyspareunia, blood transfusions, HTN , Hypothyroidism, unspecified, Liver disease, Lumbar radiculopathy, Menopause, Migraine, Obesity, Osteoporosis, Other spondylosis with radiculopathy, lumbar region, Personal history of fall, Spinal stenosis, lumbar region with neurogenic claudication, TIA (transient ischemic attack) (1994), Unspecified osteoarthritis, unspecified site, and Urinary incontinence.. The patient presented to [facility] on 10/30/2024      64-year-old female with past medical history of anxiety/depression, hypertension, hypothyroidism, fatty liver disease, chronic pain syndrome presents to the emergency room with confusion for the last 72 hours.   is at the bedside helping give history.  Reports getting confused at home.  Some imbalance.  Blood pressure has been elevated.  On arrival to the emergency room she was not oriented.  GCS of 9.  Not following commands.  Vital signs show some mild hypertension but otherwise stable.  No fever.  She has no leukocytosis.  Mild metabolic acidosis appears to be chronic.  UDS was positive for opiates which she was prescribed.  Urinalysis was bland.  CT of the head performed in the ER showed remote infarcts involving the right frontal and parietal lobes which are new compared to scan performed 3 months ago.  Carotid ultrasound showed minimal stenosis of the left carotid and no stenosis of the right.  Echocardiogram is pending.  Neurology was consulted from the ER.  Recommending full stroke workup.  Added aspirin and statin.  Okay to normalize blood pressure.  She was being admitted to the hospitalist  group    Interval Hx:   10-31-24 Dr. English-reviewing Neurology notes there is more history that is important for the case.  Has been refers that around a year ago she had a gradual decline where she requires assistance to the bathroom and transferred to wheelchair.  Has been refers that her mental status waxes and wanes.  Refers that on Sunday she suddenly slumped over and was not really responding.  This prompted him to call EMS and was taken to the emergency room in [place].  In [place] did not much happened other than checking vital signs.  Once back home he took 4 people to get her out of the car because she was weak prompting him to take her to us [facility] for further evaluation where CT of the head was significant for right frontal and right parietal infarcts which are new from 07/24.    On physical examination she remains confused.  She knows-she is in [facility] but unable to say why, can recall present president, month or day.  Her strength is equally distributed over upper and lower extremities but very difficult for her to follow commands.  She is on aspirin and Keppra, her antidepressant has been placed on hold as well as her opiates and gabapentin.  EEG and MRI of the brain are pending      11/01/2024 Dr. English-chart reviewed patient examined.  MRI brain negative for acute CVA but as per Neurology recommendations we need to rule out recurrent right middle cerebral artery cardioembolic stroke with recommendations to start patient on aspirin 325 mg p.o. daily, discontinue Celebrex, start the patient on Keppra 500 mg p.o. b.i.d., issue with polypharmacy including multiple antidepressants/opioids/gabapentin.  Patient will need Holter times 2 weeks on discharge with follow-up with Rabia Bell upon discharge and if Holter negative patient will need loop recorder due to high suspicion for cardioembolic etiology.  The patient cleared for discharge once new findings of suspected  thoracic soft tissue fluid collection findings post thoracic surgery by Neurosurgery.  We will go ahead and consult Neurosurgery Dr. Hawthorne    Her LOC is better today, much more alert but still with periods of confusion.  She is still thinks that misha Dixon is the president.    We will feed follow recommendations by Neurosurgery.  Sed rate/C-reactive protein to follow.  Blood cultures drawn on admission negative.  CT a neck negative for carotid stenosis/EEG negative/echocardiogram with left atrium dilated and bubble study could not be assessed.                  Case was discussed with patient's nurse and  on the floor.  Objective/physical exam:  General: In no acute distress, afebrile. More alert but confused   Chest: Clear to auscultation bilaterally  Heart: RRR, +S1, S2, no appreciable murmur  Abdomen: Soft, nontender, BS +  Back-mid thoracic stuff tissue swelling suspicious for fluid collection  MSK: Warm, no lower extremity edema, no clubbing or cyanosis  Neurologic: Alert but disoriented to time/month/day of the week/president.  Having trouble following commands.  Cranial nerve II-XII intact, strength is decrease but appears to be equally distributed distributed over upper and lower extremities    VITAL SIGNS: 24 HRS MIN & MAX LAST   Temp  Min: 97.8 °F (36.6 °C)  Max: 98.8 °F (37.1 °C) 98.2 °F (36.8 °C)   BP  Min: 123/72  Max: 177/83 (!) 155/88   Pulse  Min: 73  Max: 101  101   Resp  Min: 18  Max: 20 18   SpO2  Min: 92 %  Max: 97 % 97 %     I have reviewed the following labs:  Recent Labs   Lab 10/30/24  1219 10/31/24  0443 11/01/24  0619   WBC 9.12 8.57 9.11   RBC 4.13* 3.97* 4.11*   HGB 12.9 12.5 12.7   HCT 37.7 37.7 38.7   MCV 91.3 95.0* 94.2*   MCH 31.2* 31.5* 30.9   MCHC 34.2 33.2 32.8*   RDW 13.3 13.4 13.2    342 278   MPV 8.9 9.0 9.1     Recent Labs   Lab 10/28/24  1947 10/30/24  1219 10/31/24  0443 11/01/24  0619   NA  --  142 143 144   K  --  3.7 3.5 3.7   CL  --  110* 110* 111*    CO2  --  20* 23 23   BUN  --  14.9 20.8* 18.1   CREATININE  --  0.71 0.75 0.63   CALCIUM  --  9.0 9.4 8.6   PH 7.571*  --   --   --    MG  --  1.90 1.90  --    ALBUMIN  --  3.4 3.3* 3.2*   ALKPHOS  --  161* 158* 145   ALT  --  26 25 21   AST  --  42* 30 26   BILITOT  --  0.7 0.5 0.5     Microbiology Results (last 7 days)       ** No results found for the last 168 hours. **             See below for Radiology    Assessment/Plan:  Remote appearing CVA, right frontal and parietal lobes by ct  Metabolic encephalopathy   polypharmacy-opioid/antidepressants/gabapentin/anxiolytics. .  Mild metabolic acidosis  Iron-deficiency anemia   Hypothyroidism -TSH 4.3  Essential hypertension   Chronic pain syndrome  Multiple psychiatric conditions including depression and anxiety-we will consult Psychiatry to go over anxiolytics/antidepressants in attempts to decrease as many as possible  Recent thoracic vertebra surgery by Neurosurgery 3 weeks ago by Dr. Rosario with the development of soft tissue swelling highly suspicious for fluid collection.-Dr. Rosario consulted, Dr. Hyde on-call        Stroke workup:  -MRI brain (7/24 )with chronic right MCA infarction/chronic small-vessel ischemic changes  -MRI brain without contrast 11/01/2024 with no acute intracranial finding/chronic ischemic changes with encephalomalacia in the right cerebral hemisphere/right MCA territory suspicious for cardioembolic process  -CT head-remote appearing infarcts right frontal and parietal lobes new compared to July 20, 2024  -CTA head/neck no large vessel occlusion or flow-limiting stenosis  -Echocardiogram with ejection fraction 55-60% with grade 1 diastolic dysfunction.  Unable to determine where the bubble study was positive  -Carotid ultrasound with right internal carotid patent with no evidence stenosis.  Left internal carotid with less than 50% stenosis.  Bilateral vertebral arteries are patent with antegrade flow  -hemoglobin A1c 5.2   -TSH  4.3  -cholesterol 217/  -EEG 11/01/2024 negative    Inpatient medications aspirin 325/Keppra/statin    Recs by neurology:  - start aspirin 325 mg, lipitor 80 mg  - discontinue home celecoxib  - discontinue gabapentin  - start keppra 500 mg bid   - holter monitor x 2 weeks on discharge  - follow up in clinic with Rabia Cosme NP in 2 months with holter results. If Holter is negative, she will need a loop recorder due to high suspicion for cardioembolic etiology.   - PT/OT            VTE prophylaxis:  Lovenox    Patient condition: Fair    Anticipated discharge and Disposition:   To be determined      All diagnosis and differential diagnosis have been reviewed; assessment and plan has been documented; I have personally reviewed the labs and test results that are presently available; I have reviewed the patients medication list; I have reviewed the consulting providers response and recommendations. I have reviewed or attempted to review medical records based upon their availability    All of the patient's questions have been  addressed and answered. Patient's is agreeable to the above stated plan. I will continue to monitor closely and make adjustments to medical management as needed.    Portions of this note dictated using EMR integrated voice recognition software, and may be subject to voice recognition errors not corrected at proofreading. Please contact writer for clarification if needed.   _____________________________________________________________________    Malnutrition Status:    Scheduled Med:   aspirin  325 mg Oral Once    atorvastatin  80 mg Oral Daily    enoxparin  40 mg Subcutaneous Q24H (prophylaxis, 1700)    levETIRAcetam  500 mg Oral BID    mupirocin   Nasal BID      Continuous Infusions:   D5 and 0.45% NaCl   Intravenous Continuous 50 mL/hr at 11/01/24 0813 New Bag at 11/01/24 0813      PRN Meds:    Current Facility-Administered Medications:     bisacodyL, 10 mg, Rectal, Daily PRN     hydrALAZINE, 10 mg, Intravenous, Q4H PRN    labetalol, 10 mg, Intravenous, Q6H PRN    oxyCODONE-acetaminophen, 1 tablet, Oral, Q6H PRN     Radiology:  I have personally reviewed the following imaging and agree with the radiologist.     MRI Brain Without Contrast  Narrative: EXAMINATION:  MRI BRAIN WITHOUT CONTRAST    CLINICAL HISTORY:  Stroke, follow up;    TECHNIQUE:  Multiplanar, multisequence MR images of the brain were obtained without the administration of intravenous contrast.    COMPARISON:  CT head dated 10/30/2024    FINDINGS:  There is no restricted diffusion, hemorrhage or edema.  There is encephalomalacia in the right frontal lobe and right occipital parietal lobe.  Patchy hypodensities in the subcortical and periventricular white matter likely represent chronic microvascular ischemic changes.    There is no mass effect or midline shift.  The basal cisterns are patent.  The ventricles are normal in size.  There is no abnormal extra-axial fluid collection.  The major skull is are patent.  The paranasal sinuses are clear.  Impression: 1. No acute intracranial abnormality.  2. Chronic ischemic changes with encephalomalacia in the right cerebral hemisphere.    Electronically signed by: Floridalma Escobedo  Date:    11/01/2024  Time:    11:50  X-Ray Abdomen AP 1 View  EXAMINATION  XR ABDOMEN AP 1 VIEW    CLINICAL HISTORY  magnetic resonance imaging;mri screening; Encounter for other specified special examinations    TECHNIQUE  A total of 2 AP image(s) of the abdomen.    COMPARISON  None available at the time of initial interpretation.    FINDINGS  Lines/tubes/devices: none present    A non-obstructed bowel gas pattern is present. No intra-abdominal mass effect is appreciated. Detection of air-fluid levels and low-volume pneumoperitoneum is limited due to supine technique.    Included lower thoracic cavity and osseous structures are without acute abnormality.  Regional osseous degenerative changes are noted.   There is incidental bilateral posterior thoracolumbar and SI joint fusion.  Right hip arthroplasty components also noted.    IMPRESSION  1. No acute radiographic process.  2. Chronic secondary details discussed above.    Electronically signed by: Tirso Decker  Date:    11/01/2024  Time:    10:38  CV Ultrasound Bilateral Doppler Carotid  The right internal carotid artery is patent with no evidence of stenosis.  The left internal carotid artery is patent with less than 50% stenosis.   Bilateral vertebral arteries are patent with antegrade flow.      Lennox English MD  Department of Hospital Medicine   [facility]   11/01/2024

## 2024-11-01 NOTE — PLAN OF CARE
Problem: Physical Therapy  Goal: Physical Therapy Goal  Description: Goals to be met by: 2024     Patient will increase functional independence with mobility by performin. Supine to sit with Stand-by Assistance  2. Sit to stand transfer with Stand-by Assistance  3. Bed to chair transfer with Stand-by Assistance using Rolling Walker  4. Gait  x 150 feet with Stand-by Assistance using Rolling Walker.   5. Stand for 10 minutes with Stand-by Assistance using Rolling Walker    Outcome: Progressing

## 2024-11-01 NOTE — PROGRESS NOTES
Neurology progress note:    Subjective: no acute events overnight. Stable exam. MRI neg for acute stroke but showed old strokes in the right MCA territory.     Exam:  Vitals:    11/01/24 1123   BP: 123/72   Pulse: 93   Resp: 18   Temp: 98.2 °F (36.8 °C)       Alert & oriented x 3  EOMI, PERRLA, visual field intact in all 4 quadrants  Face symmetric, speech clear and fluent  Tongue midline, facial sensation equal bilaterally  Strength 5/5 in bilateral upper and lower extremities   Sensation intact and equal bilaterally    Imaging: MRI chronic infarcts in right MCA territory concerning for embolic etiology. CTA neg for carotid stenosis.     A/P:   64 F with intermittent confusion and AMS with speech difficulty. MRI showed chronic embolic infarct in right MCA territory (new since June 2024). EEG normal. On multiple neurotropic medications including anti-depressants and gabapentin as well as narcotic pain meds.     Suspect her intermittent AMS is likely due to polypharmacy. However, needs stroke work up for old infarcts seen in right MCA territory.     Echo LA mildly dilated. Normal EF. Unable to determine bubble study due to size.     Plan:  - start aspirin 325 mg, lipitor 80 mg  - discontinue home celecoxib  - discontinue gabapentin  - start keppra 500 mg bid   - holter monitor x 2 weeks on discharge  - follow up in clinic with Rbaia Cosme NP in 2 months with holter results. If Holter is negative, she will need a loop recorder due to high suspicion for cardioembolic etiology.   - PT/OT    Ok to discharge if cleared by therapy.     Will sign off.     Nando De Luna MD  Vascular and Interventional Neurology

## 2024-11-01 NOTE — PLAN OF CARE
Problem: Occupational Therapy  Goal: Occupational Therapy Goal  Description: Goals to be met by 12/1/2024    Pt will complete feeding tasks with modified independence  Pt will complete grooming sitting in chair with SBA.  Pt will complete UB dressing with mod A.  Pt will complete LB dressing with max A using LRAD.  Pt will complete toileting with max A using LRAD.    Outcome: Progressing

## 2024-11-01 NOTE — PT/OT/SLP PROGRESS
Occupational Therapy      Patient Name:  Kasandra Cheek   MRN:  [id number]    Patient not seen this AM secondary to off of floor for MRI. OT checked in room for TLSO and it was not there. Will follow-up this PM.    11/1/2024

## 2024-11-01 NOTE — PROCEDURES
"Kasandra Cheek is a 64 y.o. female patient.    Temp: 98 °F (36.7 °C) (11/01/24 0734)  Pulse: 93 (11/01/24 0734)  Resp: 18 (11/01/24 0734)  BP: (!) 177/83 (11/01/24 0734)  SpO2: 97 % (11/01/24 0734)  Weight: (!) 136.1 kg (300 lb 0.7 oz) (10/30/24 2200)  Height: 5' 7" (170.2 cm) (10/30/24 2200)       EEG    Date/Time: 11/1/2024 8:32 AM    Performed by: Milagro Zhong MD  Authorized by: Aleida Price NP          11/1/2024    Reason for exam: altered mental status      Technical description:  A standard digital EEG was performed at [facility].  The 10 20 international system of electrode placement is used.  Standard montages were recorded.  Activation procedures were performed.    Description:  The record was dominated by a 9 hertz posterior dominant rhythm which attenuated with eye opening activity.  During drowsiness, identified by ocular signs and alpha attenuation, there is diffuse asynchronous theta activity.  Stage 2 sleep was not identified.  Photic stimulation elicited no abnormalities.  There were no epileptiform discharges or electrographic seizures    Impression:  This is a normal awake and drowsy EEG.   "

## 2024-11-01 NOTE — PT/OT/SLP EVAL
Physical Therapy Evaluation    Patient Name:  Kasandra Cheek   MRN:  [id number]    Recommendations:     Discharge therapy intensity: Moderate Intensity Therapy   Discharge Equipment Recommendations: to be determined by next level of care   Barriers to discharge: Impaired mobility and Ongoing medical needs    Assessment:     Kasandra Cheek is a 64 y.o. female admitted with a medical diagnosis of metabolic encephalopathy, chronic R MCA infarction/chronic small vessel ischemic changes, polypharmacy, mild metabolic acidosis, depression, and anxiety.  She presents with the following impairments/functional limitations: weakness, impaired endurance, impaired self care skills, impaired functional mobility, gait instability, impaired balance, impaired cognition, decreased lower extremity function, decreased safety awareness, pain. Pt with fair response to PT evaluation. Cognitive deficits and generalized weakness significantly impact mobility. She required mod A x2 for bed mobility and transfers to stand. Limited tolerance to standing with only 3 lateral steps at HOB achieved. Pt required constant cues to redirect to task and assistance for motor planning of all activities. Upon reaching EOB, pt noted to have large area of fluid collection near surgical incision, MD notified, as  states this is a recent development. MD also updated on significant decline in mobility in time since previous discharge.  At this time PT recommending moderate intensity placement.    Pt medical history also significant for recent spinal surgery with Dr. Mejia, extended chart review completed following evaluation. Pt had  T10-L3 decompression, removal of right S1 screw, replacement of right S2 iliac bolt, and T10-S2 fusion. She was discharged 10/16/24 and was mobilizing with min A for bed mobility, CGA/SBA for transfers and ambulation with RW. She ambulated 80ft x 2 trials and  was able to adequately assist pt.  reports that within  "a few days of returning home pt with significant weakness often times requiring max A to stand from commode and use of WC for mobility. He states home health evaluated approximately 1 week after discharge but had been limited in mobility due to severe LE weakness.     Rehab Prognosis: Good; patient would benefit from acute skilled PT services to address these deficits and reach maximum level of function.    Recent Surgery: * No surgery found *      Plan:     During this hospitalization, patient would benefit from acute PT services 5 x/week to address the identified rehab impairments via gait training, therapeutic activities, therapeutic exercises, neuromuscular re-education and progress toward the following goals:    Plan of Care Expires:  11/29/24    Subjective     Chief Complaint: "the TV says decision 2024", "I've lost so much time, I came here for back surgery"  Patient/Family Comments/goals:  states he needs pt stronger before returning home  Pain/Comfort:  Pain Rating 1:  (Pt inconsistent with pain reports, unable to understand objective rating scale)    Patients cultural, spiritual, Zoroastrian conflicts given the current situation: no    Living Environment:  Pt lives with  in single level home with 3 steps to enter, no railing. Prior to surgery earlier this month pt mod Ind. Since return home pt has required increased physical assistance with use of WC for mobility due to limited tolerance to standing.  Equipment used at home: rollator, wheelchair, shower chair, walker, rolling.  DME owned (not currently used): none.  Upon discharge, patient will have assistance from TBD.    Objective:     Communicated with nurse prior to session.  Patient found right sidelying with march catheter, peripheral IV, pulse ox (continuous), telemetry  upon PT entry to room. Pt soiled with BM but unaware    General Precautions: Standard, fall  Orthopedic Precautions:spinal precautions   Braces: TLSO-  brought " "from home  Respiratory Status: Room air    Exams:  Cognitive Exam:  Patient is oriented to Person  Pt able to read from TV "2024", unable to understanding when asked regarding month despite giving multiple choice options  Perseverated on "decision 2024" from TV screen despite turning off  Gross Motor Coordination:  impaired motor planning, difficulty following commands, unable to transition to new tasks  RLE ROM: WFL  RLE Strength: pt unable to formally assess due to cognition, grossly 3+/5 with functional mobility  LLE ROM: WFL  LLE Strength: pt unable to formally assess due to cognition, grossly 3+/5 with functional mobility  Skin integrity:  surgical incision to posterior thoracic spine, large fluid collection noted, more significant in sitting position. Dressing in place.      Functional Mobility:  Bed Mobility:     Rolling Left:  moderate assistance and of 2 persons  Rolling Right: moderate assistance and of 2 persons  Scooting: moderate assistance  Supine to Sit: moderate assistance and of 2 persons  Sit to Supine: moderate assistance and of 2 persons  Pt with poor tolerance with return to supine, maintaining self in flexed position  Transfers:     Sit to Stand:  moderate assistance and of 2 persons with rolling walker. 2 trials completed  Gait: 3 lateral steps to HOB with max A for sequencing and advancement of AD  Balance:   static sitting- CGA  Static standing- flexed posture, unable to correct to upright despite max cues      AM-PAC 6 CLICK MOBILITY  Total Score:9       Treatment & Education:  Pt soiled with BM upon entry, unaware. Rolling multiple trials performed with total A for pericare.    Patient provided with verbal education and demonstrations education regarding PT role/goals/POC, fall prevention, safety awareness, and discharge/DME recommendations.  Additional teaching is warranted.     Patient left  HOB <20degrees due to poor pt tolerance to upright  with all lines intact, call button in reach, " nurse notified, and spouse present.    GOALS:   Multidisciplinary Problems       Physical Therapy Goals          Problem: Physical Therapy    Goal Priority Disciplines Outcome Interventions   Physical Therapy Goal     PT, PT/OT Progressing    Description: Goals to be met by: 2024     Patient will increase functional independence with mobility by performin. Supine to sit with Stand-by Assistance  2. Sit to stand transfer with Stand-by Assistance  3. Bed to chair transfer with Stand-by Assistance using Rolling Walker  4. Gait  x 150 feet with Stand-by Assistance using Rolling Walker.   5. Stand for 10 minutes with Stand-by Assistance using Rolling Walker                         History:     Past Medical History:   Diagnosis Date    Anemia     Anxiety     Depression     Dyspareunia     Encounter for blood transfusion     HTN (hypertension)     Hypothyroidism, unspecified     Liver disease     fatty liver    Lumbar radiculopathy     Menopause     Migraine     Obesity     Osteoporosis     Other spondylosis with radiculopathy, lumbar region     Personal history of fall     Spinal stenosis, lumbar region with neurogenic claudication     TIA (transient ischemic attack)     Unspecified osteoarthritis, unspecified site     Urinary incontinence        Past Surgical History:   Procedure Laterality Date    ABDOMINAL SURGERY      Gastric Bypass    CARPAL TUNNEL RELEASE Bilateral     CERVICAL FUSION  2021    CHOLECYSTECTOMY  2015    COLONOSCOPY      COLPOSCOPY      FOOT SURGERY Left     FRACTURE SURGERY      GASTRIC BYPASS  2011    HAND SURGERY Left     thumb    HERNIA REPAIR  2014    JOINT REPLACEMENT  Knee 2015, Hip 2016    LAMINECTOMY OF THORACIC SPINE BY POSTERIOR APPROACH USING COMPUTER-ASSISTED NAVIGATION N/A 10/11/2024    Procedure: LAMINECTOMY, SPINE, THORACIC, POSTERIOR APPROACH, USING COMPUTER-ASSISTED NAVIGATION;  Surgeon: Andra Mejia MD;  Location: [facility];  Service: Neurosurgery;   Laterality: N/A;  T9-L3 DECOMPRESSION / REMOVAL OF RIGHT ILIAC BOLT AND S1 SCREW / EXTENSION OF FUSION  T9-S2 ILIAC BOLT //  PRONE ADENIKE // DRILL // SCOPE // O-ARM //  DIRECT SPINE // NDM //       LUMBAR FUSION  05/28/2021    LUMBAR FUSION  02/21/2023    L3-S1    POSTERIOR LUMBAR INTERBODY FUSION (PLIF) WITH COMPUTER-ASSISTED NAVIGATION N/A 10/11/2024    Procedure: FUSION, SPINE, LUMBAR, PLIF, USING COMPUTER-ASSISTED NAVIGATION;  Surgeon: Andra Mejia MD;  [facility];  Service: Neurosurgery;  Laterality: N/A;    ROTATOR CUFF REPAIR Bilateral 04/12/2022    SMALL INTESTINE SURGERY  2015    SPINE SURGERY  2021 & 2023    TONSILLECTOMY  2017       Time Tracking:     PT Received On: 11/01/24  PT Start Time: 1207     PT Stop Time: 1243  PT Total Time (min): 36 min     Billable Minutes: Evaluation high      11/01/2024

## 2024-11-01 NOTE — PROGRESS NOTES
[facility]  Hospital Medicine Progress Note        Chief Complaint: Inpatient Follow-up for Altered Mental Status ( reports AMS x 3 days.  states pt usually presents this way when she has a UTI. Pt arrives GCS 12.)    HPI: Kasandra Cheek is a 64 y.o. female who  has a past medical history of Anemia, Anxiety, Depression, Dyspareunia, blood transfusions, HTN , Hypothyroidism, unspecified, Liver disease, Lumbar radiculopathy, Menopause, Migraine, Obesity, Osteoporosis, Other spondylosis with radiculopathy, lumbar region, Personal history of fall, Spinal stenosis, lumbar region with neurogenic claudication, TIA (transient ischemic attack) (1994), Unspecified osteoarthritis, unspecified site, and Urinary incontinence.. The patient presented to [facility] on 10/30/2024      64-year-old female with past medical history of anxiety/depression, hypertension, hypothyroidism, fatty liver disease, chronic pain syndrome presents to the emergency room with confusion for the last 72 hours.   is at the bedside helping give history.  Reports getting confused at home.  Some imbalance.  Blood pressure has been elevated.  On arrival to the emergency room she was not oriented.  GCS of 9.  Not following commands.  Vital signs show some mild hypertension but otherwise stable.  No fever.  She has no leukocytosis.  Mild metabolic acidosis appears to be chronic.  UDS was positive for opiates which she was prescribed.  Urinalysis was bland.  CT of the head performed in the ER showed remote infarcts involving the right frontal and parietal lobes which are new compared to scan performed 3 months ago.  Carotid ultrasound showed minimal stenosis of the left carotid and no stenosis of the right.  Echocardiogram is pending.  Neurology was consulted from the ER.  Recommending full stroke workup.  Added aspirin and statin.  Okay to normalize blood pressure.  She was being admitted to the hospitalist  group    Interval Hx:   10-31-24 Dr. English-reviewing Neurology notes there is more history that is important for the case.  Has been refers that around a year ago she had a gradual decline where she requires assistance to the bathroom and transferred to wheelchair.  Has been refers that her mental status waxes and wanes.  Refers that on Sunday she suddenly slumped over and was not really responding.  This prompted him to call EMS and was taken to the emergency room in [place].  In [place] did not much happened other than checking vital signs.  Once back home he took 4 people to get her out of the car because she was weak prompting him to take her to us [facility] for further evaluation where CT of the head was significant for right frontal and right parietal infarcts which are new from 07/24.    On physical examination she remains confused.  She knows-she is in [facility] but unable to say why, can recall present president, month or day.  Her strength is equally distributed over upper and lower extremities but very difficult for her to follow commands.  She is on aspirin and Keppra, her antidepressant has been placed on hold as well as her opiates and gabapentin.  EEG and MRI of the brain are pending                  Case was discussed with patient's nurse and  on the floor.  Objective/physical exam:  General: In no acute distress, afebrile  Chest: Clear to auscultation bilaterally  Heart: RRR, +S1, S2, no appreciable murmur  Abdomen: Soft, nontender, BS +  MSK: Warm, no lower extremity edema, no clubbing or cyanosis  Neurologic: Alert but disoriented to time/month/day of the week/president.  Having trouble following commands.  Cranial nerve II-XII intact, strength is decrease but appears to be equally distributed distributed over upper and lower extremities    VITAL SIGNS: 24 HRS MIN & MAX LAST   Temp  Min: 98.2 °F (36.8 °C)  Max: 98.9 °F (37.2 °C) 98.2 °F (36.8 °C)   BP   Min: 161/87  Max: 181/94 (!) 161/87   Pulse  Min: 93  Max: 115  93   Resp  Min: 18  Max: 18 18   SpO2  Min: 92 %  Max: 97 % 95 %     I have reviewed the following labs:  Recent Labs   Lab 10/28/24  1832 10/30/24  1219 10/31/24  0443   WBC 7.19 9.12 8.57   RBC 3.55* 4.13* 3.97*   HGB 11.0* 12.9 12.5   HCT 33.5* 37.7 37.7   MCV 94.4* 91.3 95.0*   MCH 31.0 31.2* 31.5*   MCHC 32.8* 34.2 33.2   RDW 13.0 13.3 13.4    376 342   MPV 9.1 8.9 9.0     Recent Labs   Lab 10/28/24  1832 10/28/24  1947 10/30/24  1219 10/31/24  0443     --  142 143   K 3.6  --  3.7 3.5   *  --  110* 110*   CO2 22*  --  20* 23   BUN 21.0*  --  14.9 20.8*   CREATININE 0.77  --  0.71 0.75   CALCIUM 9.3  --  9.0 9.4   PH  --  7.571*  --   --    MG  --   --  1.90 1.90   ALBUMIN 3.2*  --  3.4 3.3*   ALKPHOS 134  --  161* 158*   ALT 21  --  26 25   AST 30  --  42* 30   BILITOT 0.4  --  0.7 0.5     Microbiology Results (last 7 days)       ** No results found for the last 168 hours. **             See below for Radiology    Assessment/Plan:  Remote appearing CVA, right frontal and parietal lobes by ct  Ischemic encephalopathy   Mild metabolic acidosis  Iron-deficiency anemia   Hypothyroidism   Essential hypertension   Chronic pain syndrome  Multiple psychiatric conditions including depression and anxiety        Stroke workup:  -MRI brain (7/24 )with chronic right MCA infarction/chronic small-vessel ischemic changes  -CT head-remote appearing infarcts right frontal and parietal lobes new compared to July 20, 2024  -CTA head/neck no large vessel occlusion or flow-limiting stenosis  -Echocardiogram with ejection fraction 55-60% with grade 1 diastolic dysfunction.  Unable to determine where the bubble study was positive  -Carotid ultrasound with right internal carotid patent with no evidence stenosis.  Left internal carotid with less than 50% stenosis.  Bilateral vertebral arteries are patent with antegrade flow  -hemoglobin A1c 5.2   -TSH  4.3  -cholesterol 217/  -MRI brain pending  -EEG pending    Inpatient medications aspirin/Keppra/statin            VTE prophylaxis:  Lovenox    Patient condition: Fair    Anticipated discharge and Disposition:   To be determined      All diagnosis and differential diagnosis have been reviewed; assessment and plan has been documented; I have personally reviewed the labs and test results that are presently available; I have reviewed the patients medication list; I have reviewed the consulting providers response and recommendations. I have reviewed or attempted to review medical records based upon their availability    All of the patient's questions have been  addressed and answered. Patient's is agreeable to the above stated plan. I will continue to monitor closely and make adjustments to medical management as needed.    Portions of this note dictated using EMR integrated voice recognition software, and may be subject to voice recognition errors not corrected at proofreading. Please contact writer for clarification if needed.   _____________________________________________________________________    Malnutrition Status:    Scheduled Med:   aspirin  81 mg Oral Daily    aspirin  300 mg Rectal Daily    levETIRAcetam  500 mg Oral BID    mupirocin   Nasal BID      Continuous Infusions:     PRN Meds:    Current Facility-Administered Medications:     bisacodyL, 10 mg, Rectal, Daily PRN    hydrALAZINE, 10 mg, Intravenous, Q4H PRN    labetalol, 10 mg, Intravenous, Q6H PRN    oxyCODONE-acetaminophen, 1 tablet, Oral, Q6H PRN     Radiology:  I have personally reviewed the following imaging and agree with the radiologist.     CTA Head and Neck (xpd)  Narrative: EXAMINATION:  CTA HEAD AND NECK (XPD)    CLINICAL HISTORY:  Stroke/TIA, determine embolic source;    TECHNIQUE:  Axial images obtained through the cervical region and [place] before and after the administration of intravenous contrast.    Coronal, sagittal,  MIP and 3D reconstructions were obtained from the axial data.    Automatic exposure control was utilized to limit radiation dose.    Radiation Dose:    Total DLP: 2658 mGy*cm    COMPARISON:  CT head dated 10/30/2024    FINDINGS:  Head CT with contrast:    No interval changes when compared to the previous CT.    No enhancing abnormalities.    If present, stenosis of the carotid bulbs is measured based on NASCET criteria,    i.e. area of maximal stenosis compared to the cervical ICA distal to the bulb.    Cervical CTA:    The origins of the great vessels are patent.    The common carotid arteries, carotid bulbs and intracranial arteries are patent.    The vertebral arteries are patent.    Intracranial CTA:    There are mild calcifications on the course carotid siphons without hemodynamically significant stenosis.  The middle cerebral arteries and anterior cerebral arteries are patent    The vertebral arteries, basilar artery and posterior cerebral arteries are patent.    The dural venous sinuses are patent.    Additional findings:    Soft tissue swelling in the left shoulder.    Postoperative changes of the cervical spine with anterior fusion hardware at C5-C7.  Impression: No large vessel occlusion or flow-limiting stenosis.    Electronically signed by: Floridalma Escobedo  Date:    10/30/2024  Time:    17:28  CT Head Without Contrast  Narrative: EXAMINATION:  CT HEAD WITHOUT CONTRAST    CLINICAL HISTORY:  Mental status change, unknown cause;    TECHNIQUE:  CT imaging of the head performed from the skull base to the vertex without intravenous contrast.  DLP 1095 mGycm. Automatic exposure control, adjustment of mA/kV or iterative reconstruction technique was used to reduce radiation.    COMPARISON:  18 July 2024    FINDINGS:  There are areas of hypoattenuation in the right frontal and parietal lobes images 22 and 31 series 3.  These have the appearance of chronic infarcts but they are new compared to July 2024.  There is  no acute hemorrhage.  The ventricles are not enlarged.    Visualized paranasal sinuses and mastoid air cells are clear.  Impression: Remote appearing infarcts right frontal and parietal lobes though these are new compared to July 2024. No acute hemorrhage.    Electronically signed by: Milo Bravo  Date:    10/30/2024  Time:    12:41      Lennox English MD  Department of Hospital Medicine   [facility]   10/31/2024

## 2024-11-01 NOTE — PT/OT/SLP DISCHARGE
"[facility]  Speech Language Pathology Department  Diet Tolerance Follow-up    Patient Name:  Kasandra Cheek   MRN:  [id number]  Admitting Diagnosis: weakness and altered mental status. Hc frontal TBI/CVA    Recommendations     General recommendations:  SLP intervention not indicated  Solid texture recommendation:  Easy to Chew Diet - IDDSI Level 7  Liquid consistency recommendation: Thin liquids - IDDSI Level 0   Medications: per patient preference  Swallow strategies/precautions: assist with feeding as needed  Precautions:      Diet Tolerance     Nursing reports no difficulty regarding diet tolerance.    Outcome Measures     Functional Oral Intake Scale: 6 - Total oral diet with multiple consistencies without special preparation, but with specific food limitation    Plan     Per chart review, patient with admit at [facility] in July of 2024 with suspicion of "psychogenic cause versus polypharmacy."  Neuro note stated, "The patient episodes of altered mental status likely seem to be multifactorial from severe depression with pseudobulbar affect, as well as additional element of polypharmacy"  with recommendations of outpatient psychiatry evaluation and neuropsych evaluation for cognition.     MRI 11/1/24 with no acute abnormality.  Neuro note today "Suspect her intermittent AMS is likely due to polypharmacy."    SLP services not warranted, please reconsult as needed.    11/01/2024           "

## 2024-11-01 NOTE — PT/OT/SLP EVAL
"Occupational Therapy  Evaluation    Name: Kasandra Cheek  MRN: [id number]    Recommendations:     Discharge therapy intensity: Moderate Intensity Therapy   Discharge Equipment Recommendations:  to be determined by next level of care  Barriers to discharge:   (ongoing medical needs; placement)    Assessment:     Kasandra Cheek is a 64 y.o. female with a medical diagnosis of metabolic encephalopathy, chronic R MCA infarction/chronic small vessel ischemic changes, polypharmacy, mild metabolic acidosis, depression, and anxiety. Recent spinal surgery with Dr. Mejia, extended chart review completed following evaluation. Pt had T10-L3 decompression, removal of right S1 screw, replacement of right S2 iliac bolt, and T10-S2 fusion.     She presents with the following performance deficits affecting function: weakness, impaired endurance, impaired self care skills, impaired functional mobility, impaired balance, impaired cognition, decreased safety awareness, pain.     Patient was discharged home with  services with her last stay after back surgery. HH services did not evaluate patient until a week after discharge per . Patient's  noted a quick decline in function while at home. Patient is requiring modx2/max A for all mobility and max/total A for ADLs this session. Patient with cognitive deficits noted as she was perseverating on "decision 2024" that she read on TV when asked the year and month as well as repeating a few other phrases during session. Therefore, patient would benefit from moderate intensity therapy upon discharge for an increase of functional mobility and independence.     Rehab Prognosis: Good; patient would benefit from acute skilled OT services to address these deficits and reach maximum level of function.       Plan:     Patient to be seen 5 x/week to address the above listed problems via self-care/home management, therapeutic activities, therapeutic exercises  Plan of Care Expires: " 12/01/24  Plan of Care Reviewed with: patient, spouse    Subjective     Chief Complaint: back pain  Patient/Family Comments/goals: increase indep to decrease caregiver burden    Occupational Profile:  Living Environment: lives with  with 3 steps to enter; walk in shower with bench  Previous level of function: prior to surgery: indep with ADLs except for washing hair and LB dressing  Roles and Routines: wife, mother  Equipment Used at Home: hip kit, wheelchair, walker, rolling, rollator, shower chair  Assistance upon Discharge:     Pain/Comfort:  Location - Orientation 1: midline  Location 1: back  Pain Addressed 1: Reposition, Distraction, Cessation of Activity, Nurse notified    Patients cultural, spiritual, Hinduism conflicts given the current situation: no    Objective:     OT communicated with nurse and PT prior to session.      Patient was found HOB elevated with peripheral IV, telemetry, pulse ox (continuous), march catheter upon OT entry to room.    General Precautions: Standard, fall  Orthopedic Precautions: spinal precautions  Braces: TLSO    Vital Signs: Respiratory Status: on room air    Bed Mobility:    Patient completed Rolling/Turning to Left with  moderate assistance and 2 persons  Patient completed Rolling/Turning to Right with moderate assistance and 2 persons  Patient completed Supine to Sit with moderate assistance and 2 persons  Patient completed Sit to Supine with moderate assistance and 2 persons    Functional Mobility/Transfers:  Patient completed Sit <> Stand Transfer with moderate assistance and of 2 persons  with  rolling walker   Functional Mobility: able to take a few steps to HOB with verbal cues for sequencing    Activities of Daily Living:  Upper Body Dressing: total assistance lamar/doff TLSO  Lower Body Dressing: total assistance lamar socks  Toileting: total assistance and dependence catheter in place; + large BM    AMPAC Total Score: 13    Functional  "Cognition:  Affect: Cooperative and Anxious  Orientation: oriented to Person  Command Following: Follows simple one-step commands with 50% accuracy  Insight into Deficits: Impaired.    Perseverating on multiple phrases throughout session; pulling on catheter    Visual Perceptual Skills:  Able to track but slowly    Upper Extremity Function:  BUE are globally weak with fair coordination  Slow finger to nose  Attempted to assess light touch but patient would only respond "left arm, right arm"; did respond to pinch    Balance:   Dynamic sitting balance:WFL  Dynamic standing balance:CGA with RW    Therapeutic Positioning  Risk for acquired pressure injuries is increased due to relative decrease in mobility d/t hospitalization .    OT interventions performed during the course of today's session:   Education was provided on benefits of and recommendations for therapeutic positioning  Therapeutic positioning was provided at the conclusion of session to offload all bony prominences for the prevention and/or reduction of pressure injuries  Positioning recommendations were communicated to care team     Skin assessment: all bony prominences were assessed    Findings: no redness or breakdown noted  large softball size area of swelling to upper incision site --MD and nurse notified    OT recommendations for therapeutic positioning throughout hospitalization:   Follow [facility] Pressure Injury Prevention Protocol  Geomat recommended for sacral protection while [facility]  [employer]    Patient Education:  Patient and spouse were provided with verbal education education regarding OT role/goals/POC, post op precautions, fall prevention, safety awareness, Discharge/DME recommendations, and pressure ulcer prevention.  Additional teaching is warranted.     Patient left HOB elevated with all lines intact, call button in reach, pressure relief boots, nurse notified, and  present.    GOALS:   Multidisciplinary Problems       " Occupational Therapy Goals          Problem: Occupational Therapy    Goal Priority Disciplines Outcome Interventions   Occupational Therapy Goal     OT, PT/OT Progressing    Description: Goals to be met by 12/1/2024    Pt will complete feeding tasks with modified independence  Pt will complete grooming sitting in chair with SBA.  Pt will complete UB dressing with mod A.  Pt will complete LB dressing with max A using LRAD.  Pt will complete toileting with max A using LRAD.                         History:     Past Medical History:   Diagnosis Date    Anemia     Anxiety     Depression     Dyspareunia     Encounter for blood transfusion     HTN (hypertension)     Hypothyroidism, unspecified     Liver disease     fatty liver    Lumbar radiculopathy     Menopause     Migraine     Obesity     Osteoporosis     Other spondylosis with radiculopathy, lumbar region     Personal history of fall     Spinal stenosis, lumbar region with neurogenic claudication     TIA (transient ischemic attack) 1994    Unspecified osteoarthritis, unspecified site     Urinary incontinence          Past Surgical History:   Procedure Laterality Date    ABDOMINAL SURGERY      Gastric Bypass    CARPAL TUNNEL RELEASE Bilateral     CERVICAL FUSION  12/28/2021    CHOLECYSTECTOMY  2015    COLONOSCOPY      COLPOSCOPY      FOOT SURGERY Left     FRACTURE SURGERY  2001    GASTRIC BYPASS  2011    HAND SURGERY Left     thumb    HERNIA REPAIR  2014    JOINT REPLACEMENT  Knee 2015, Hip 2016    LAMINECTOMY OF THORACIC SPINE BY POSTERIOR APPROACH USING COMPUTER-ASSISTED NAVIGATION N/A 10/11/2024    Procedure: LAMINECTOMY, SPINE, THORACIC, POSTERIOR APPROACH, USING COMPUTER-ASSISTED NAVIGATION;  Surgeon: Andra Mejia MD;  Location: [facility];  Service: Neurosurgery;  Laterality: N/A;  T9-L3 DECOMPRESSION / REMOVAL OF RIGHT ILIAC BOLT AND S1 SCREW / EXTENSION OF FUSION  T9-S2 ILIAC BOLT //  PRONE ADENIKE // DRILL // SCOPE // O-ARM //  DIRECT SPINE // NDM //        LUMBAR FUSION  05/28/2021    LUMBAR FUSION  02/21/2023    L3-S1    POSTERIOR LUMBAR INTERBODY FUSION (PLIF) WITH COMPUTER-ASSISTED NAVIGATION N/A 10/11/2024    Procedure: FUSION, SPINE, LUMBAR, PLIF, USING COMPUTER-ASSISTED NAVIGATION;  Surgeon: Andra Mejia MD;  Location: [facility];  Service: Neurosurgery;  Laterality: N/A;    ROTATOR CUFF REPAIR Bilateral 04/12/2022    SMALL INTESTINE SURGERY  2015    SPINE SURGERY  2021 & 2023    TONSILLECTOMY  2017       Time Tracking:     OT Date of Treatment:    OT Start Time: 1207  OT Stop Time: 1245  OT Total Time (min): 38 min    Billable Minutes:Evaluation high  Self Care/Home Management 15    11/1/2024

## 2024-11-02 PROCEDURE — 21400001 HC TELEMETRY ROOM

## 2024-11-02 PROCEDURE — 25000003 PHARM REV CODE 250: Performed by: PSYCHIATRY & NEUROLOGY

## 2024-11-02 PROCEDURE — 63600175 PHARM REV CODE 636 W HCPCS: Performed by: HOSPITALIST

## 2024-11-02 PROCEDURE — 25000003 PHARM REV CODE 250: Performed by: NURSE PRACTITIONER

## 2024-11-02 PROCEDURE — 25000003 PHARM REV CODE 250: Performed by: INTERNAL MEDICINE

## 2024-11-02 PROCEDURE — 25500020 PHARM REV CODE 255: Performed by: INTERNAL MEDICINE

## 2024-11-02 PROCEDURE — 63600175 PHARM REV CODE 636 W HCPCS: Performed by: INTERNAL MEDICINE

## 2024-11-02 RX ORDER — HALOPERIDOL 5 MG/ML
10 INJECTION INTRAMUSCULAR ONCE
Status: DISCONTINUED | OUTPATIENT
Start: 2024-11-02 | End: 2024-11-02

## 2024-11-02 RX ORDER — HYDROMORPHONE HYDROCHLORIDE 2 MG/ML
0.5 INJECTION, SOLUTION INTRAMUSCULAR; INTRAVENOUS; SUBCUTANEOUS ONCE
Status: COMPLETED | OUTPATIENT
Start: 2024-11-02 | End: 2024-11-02

## 2024-11-02 RX ADMIN — IOHEXOL 100 ML: 350 INJECTION, SOLUTION INTRAVENOUS at 04:11

## 2024-11-02 RX ADMIN — LEVOTHYROXINE SODIUM 200 MCG: 100 TABLET ORAL at 09:11

## 2024-11-02 RX ADMIN — LOSARTAN POTASSIUM 100 MG: 50 TABLET, FILM COATED ORAL at 09:11

## 2024-11-02 RX ADMIN — LEVETIRACETAM 500 MG: 500 TABLET, FILM COATED ORAL at 09:11

## 2024-11-02 RX ADMIN — PANTOPRAZOLE SODIUM 40 MG: 40 TABLET, DELAYED RELEASE ORAL at 09:11

## 2024-11-02 RX ADMIN — ATORVASTATIN CALCIUM 80 MG: 40 TABLET, FILM COATED ORAL at 09:11

## 2024-11-02 RX ADMIN — ENOXAPARIN SODIUM 40 MG: 40 INJECTION SUBCUTANEOUS at 04:11

## 2024-11-02 RX ADMIN — HYDROMORPHONE HYDROCHLORIDE 0.5 MG: 2 INJECTION INTRAMUSCULAR; INTRAVENOUS; SUBCUTANEOUS at 02:11

## 2024-11-02 RX ADMIN — LEVETIRACETAM 500 MG: 500 TABLET, FILM COATED ORAL at 08:11

## 2024-11-02 RX ADMIN — PANTOPRAZOLE SODIUM 40 MG: 40 TABLET, DELAYED RELEASE ORAL at 08:11

## 2024-11-02 RX ADMIN — HYDRALAZINE HYDROCHLORIDE 10 MG: 20 INJECTION INTRAMUSCULAR; INTRAVENOUS at 06:11

## 2024-11-02 NOTE — CONSULTS
[facility]  [facility] Consultation      Reason for Consultation: S/p thoracic-lumbar fusion, rule out fluid collection with decline, AMS and not able to tolerated PT    Consulted by: Lennox English MD    Date of Consultation: 11/2/2024  Date of Admission: 10/30/2024     SUBJECTIVE:     Chief Complaint: s/p thoracic-lumbar fusion, rule out fluid collection with decline, AMS and not able to tolerated PT    History of Present Illness:   Ms. Cheek is a 64 y.o. female who has a past medical history significant for psychiatric history, history of TIA, anxiety and depression.  Presented to the ED with AMS on 10/30/2024.   reported that she gets this way when she has a UTI.  She has had intermittent altered mental status for the last year, but was worse over the 3 days prior to admission.   expressed that she had difficulty talking, decreased oral intake.  Has had psychiatric evaluations in the past.  Has been brought her here for further evaluation.    Patient had negative stroke workup while here in the hospital.  Has been seen by Neurology.  Recommendations in the chart.    Evaluated the patient with her  at the bedside.  Patient is oriented x3 though easily distracted.  Attempting to discuss multiple things at 1 time.  Flighty thoughts.  Urinalysis was negative.  Sed rate and CRP are within acceptable limits.  Patient has a normal motor and sensory neurological exam.    Patient Active Problem List    Diagnosis Date Noted    Acute CVA (cerebrovascular accident) 10/31/2024    Altered mental status 10/30/2024    Other spondylosis with radiculopathy, lumbar region 10/11/2024    Fibromyalgia syndrome 08/03/2023    Osteoporosis with current pathological fracture 04/24/2023    Psoriatic arthritis 04/24/2023    Psoriasis of nail 04/24/2023    Encephalopathy, toxic 06/08/2022     Past Medical History:   Diagnosis Date    Anemia     Anxiety     Depression     Dyspareunia      Encounter for blood transfusion     HTN (hypertension)     Hypothyroidism, unspecified     Liver disease     fatty liver    Lumbar radiculopathy     Menopause     Migraine     Obesity     Osteoporosis     Other spondylosis with radiculopathy, lumbar region     Personal history of fall     Spinal stenosis, lumbar region with neurogenic claudication     TIA (transient ischemic attack) 1994    Unspecified osteoarthritis, unspecified site     Urinary incontinence      Past Surgical History:   Procedure Laterality Date    ABDOMINAL SURGERY      Gastric Bypass    CARPAL TUNNEL RELEASE Bilateral     CERVICAL FUSION  12/28/2021    CHOLECYSTECTOMY  2015    COLONOSCOPY      COLPOSCOPY      FOOT SURGERY Left     FRACTURE SURGERY  2001    GASTRIC BYPASS  2011    HAND SURGERY Left     thumb    HERNIA REPAIR  2014    JOINT REPLACEMENT  Knee 2015, Hip 2016    LAMINECTOMY OF THORACIC SPINE BY POSTERIOR APPROACH USING COMPUTER-ASSISTED NAVIGATION N/A 10/11/2024    Procedure: LAMINECTOMY, SPINE, THORACIC, POSTERIOR APPROACH, USING COMPUTER-ASSISTED NAVIGATION;  Surgeon: Andra Mejia MD;  Location: [facility];  Service: Neurosurgery;  Laterality: N/A;  T9-L3 DECOMPRESSION / REMOVAL OF RIGHT ILIAC BOLT AND S1 SCREW / EXTENSION OF FUSION  T9-S2 ILIAC BOLT //  PRONE ADENIKE // DRILL // SCOPE // O-ARM //  DIRECT SPINE // NDM //       LUMBAR FUSION  05/28/2021    LUMBAR FUSION  02/21/2023    L3-S1    POSTERIOR LUMBAR INTERBODY FUSION (PLIF) WITH COMPUTER-ASSISTED NAVIGATION N/A 10/11/2024    Procedure: FUSION, SPINE, LUMBAR, PLIF, USING COMPUTER-ASSISTED NAVIGATION;  Surgeon: Andra Mejia MD;  Location: [facility];  Service: Neurosurgery;  Laterality: N/A;    ROTATOR CUFF REPAIR Bilateral 04/12/2022    SMALL INTESTINE SURGERY  2015    SPINE SURGERY  2021 & 2023    TONSILLECTOMY  2017       Current Facility-Administered Medications:     atorvastatin tablet 80 mg, 80 mg, Oral, Daily, Nando De Luna MD, 80 mg at 11/02/24 0978     bisacodyL suppository 10 mg, 10 mg, Rectal, Daily PRN, Mickie Zaragoza AGACNP-BC    enoxaparin injection 40 mg, 40 mg, Subcutaneous, Q24H (prophylaxis, 1700), Lennox English MD    haloperidol lactate injection 10 mg, 10 mg, Intramuscular, Once, Lennox English MD    hydrALAZINE injection 10 mg, 10 mg, Intravenous, Q4H PRN, Manolo Matamoros MD, 10 mg at 24 0648    labetaloL injection 10 mg, 10 mg, Intravenous, Q6H PRN, Mickie Zaragoza AGACNP-BC    levETIRAcetam tablet 500 mg, 500 mg, Oral, BID, Aleida Price NP, 500 mg at 24 0907    levothyroxine tablet 200 mcg, 200 mcg, Oral, Daily, Lennox English MD, 200 mcg at 24 0907    losartan tablet 100 mg, 100 mg, Oral, Daily, Lennox English MD, 100 mg at 24    mupirocin 2 % ointment, , Nasal, BID, Manolo Matamoros MD, Given at 24    oxyCODONE-acetaminophen 5-325 mg per tablet 1 tablet, 1 tablet, Oral, Q6H PRN, Lennox English MD, 1 tablet at 24    pantoprazole EC tablet 40 mg, 40 mg, Oral, BID, Lennox English MD, 40 mg at 24 09    Review of patient's allergies indicates:   Allergen Reactions    Sucralfate Nausea Only       Social History     Tobacco Use    Smoking status: Former     Current packs/day: 0.00     Average packs/day: 1 pack/day for 27.0 years (27.0 ttl pk-yrs)     Types: Cigarettes     Start date: 1975     Quit date: 2002     Years since quittin.8    Smokeless tobacco: Never    Tobacco comments:     off and on when smoking   Substance Use Topics    Alcohol use: Not Currently     Comment: I do not drink     Occupation:    Family History   Problem Relation Name Age of Onset    Cystic fibrosis Sister Florence Kennedy     Arthritis Sister Florence Kennedy         Ankle,  hands    Depression Sister Florence Kennedy     Hypertension Sister Florence Awad, Kasandra Cheek    Vision loss Sister Florence Kennedy     Cystic fibrosis Brother Adriano Kennedy     Early death Brother Adriano Kennedy          CF when 28    Cancer Mother Latoya Kennedy         Breast    Depression Mother Latoya Kennedy     Mental illness Mother Latoya Kennedy     Arthritis Father Eduard Kennedy         Back, knees,  hands    Cancer Father Eduard Kennedy         Lung    Diabetes Father Eduard Kennedy     Hypertension Father Eduard Kennedy     Vision loss Father Eduard Kennedy     Early death Sister Sabina Kennedy         CF when 5    Heart failure Maternal Grandmother Letty Willett         Heart Attack    Cancer Maternal Aunt Gema Smith         Breast Cancer    Cancer Maternal Aunt Aparna Manrique         Lung Cancer    Cancer Maternal Aunt Jeanie Stacy         Breast Cancer    Cancer Paternal Aunt Hilary Matthews         Skin Cancer    Cancer Paternal Aunt Enma Alford         Skin Cancer       ROS:  Constitutional:  Negative for chills and fever.   HENT:  Negative for congestion and sore throat.    Eyes:  Negative for blurred vision and double vision.   Respiratory:  Negative for cough and shortness of breath.    Cardiovascular:  Negative for chest pain and palpitations.   Gastrointestinal:  Negative for constipation, diarrhea, nausea and vomiting.   Musculoskeletal:  Positive for back pain. Negative for neck pain.   Neurological:  Negative for sensory change, focal weakness and headaches.   Endo/Heme/Allergies:  Does not bruise/bleed easily.   Psychiatric/Behavioral:  Positive for anxiety and for depression. The patient is not nervous/anxious.        OBJECTIVE:     Vital Signs (Most Recent)  Temp: 98.1 °F (36.7 °C) (11/02/24 1132)  Pulse: 96 (11/02/24 1132)  Resp: 18 (11/01/24 2325)  BP: (!) 153/77 (11/02/24 1132)  SpO2: 95 % (11/02/24 1132)  Body mass index is 46.99 kg/m².      Constitutional:   Awake, alert, conversant.   Oriented to person, place, time and situation    Extremities:  Blisters present where previous IV dressing have been    Body habitus:  Significantly obese    Mental Status:   GCS- 15  E-4, V-5, M-6    Opens eyes  "spontaneously  Oriented x 3  Normal speech  Follows commands in all extremities      Cranial nerves:  CN II: PERRL,  brisk bilaterally  CN III, IV, and VI: extraocular movements normal, no ptosis  CN V: normal facial sensation and masseter function  CN VII: facial strength normal and symmetrical  CN VIII: hearing normal bilaterally  CN IX and X: swallowing and phonation normal  CN XI: shoulder shrug intact bilaterally  CN XII: tongue protrusion midline    Motor:  Muscle bulk: normal in all extremities  Tone: normal in all extremities  No pronator drift    Upper extremities:  Deltoid: right 5/5; left 5/5  Biceps: right 5/5; left 5/5  Triceps: right 5/5; left 5/5  : right 5/5; left 5/5  Interosseous muscles: right 5/5; left 5/5    Lower extremities:  Hip flexors: right 5/5; left 5/5  Knee extensors: right 5/5; left 5/5  Knee flexors: right 5/5; left 5/5  Foot dorsiflexors: right 5/5; left 5/5  Foot plantar flexors: right 5/5; left 5/5  Extensor hallucis longus: right 5/5; left 5/5    Sensation:  Normal to light touch x 4 extremities    Reflexes:  Dumass sign: right negative; left negative  Babinski: right downgoing; left downgoing  Clonus: right negative; left negative    Coordination:  Finger to nose: right normal; left normal    Musculoskeletal:  Cervical: No pain with palpation,  Full ROM  Upper back: No pain with palpation  Lower back: No pain with palpation. Incision is well approximated. No redness, warmth or drainage. Dressing C/D/I.        Data Review:    Laboratory Review:  Blood Gases:  No results found for: "PHART", "OEO0MLH", "PO2ART", "LAH5VSW", "C4TTUYIB", "BEART"    CBC without Differential:  Lab Results   Component Value Date    WBC 9.11 11/01/2024    HGB 12.7 11/01/2024    HCT 38.7 11/01/2024     11/01/2024    MCV 94.2 (H) 11/01/2024     Differential:   Lab Results   Component Value Date    NEUTROABS 3.0 01/31/2024    LYMPHSABS 1.6 01/31/2024       Basic Metabolic Panel:  [facility]  Lab " "Results   Component Value Date     11/01/2024    K 3.7 11/01/2024     (H) 11/01/2024    CO2 23 11/01/2024    BUN 18.1 11/01/2024    CREATININE 0.63 11/01/2024    CALCIUM 8.6 11/01/2024    ANIONGAP 10 07/04/2024    EGFRNORACEVR >60 11/01/2024     Coagulation Studies:  Lab Results   Component Value Date    INR 1.1 10/31/2024    INR 0.9 12/22/2023    INR 1.0 04/16/2022     No results found for: "PTT"  Urinalysis:  Lab Results   Component Value Date    PHURINE 6.5 10/30/2024    LEUKOCYTESUR Negative 10/30/2024    UROBILINOGEN 2.0 (A) 10/30/2024          Radiology:  I have personally reviewed and evaluated the following reports as well as radiographic studies:    CT head without contrast completed 10/30/2024 reveals remote appearing infarcts right frontal and parietal lobes. New compared to 07/2024. No acute hemorrhage     CTA head and neck completed 10/30/2024 reveals no large vessel occlusion or flow-limiting stenosis.     MRI brain without contrast completed 11/01/2024 reveals chronic embolic infarct in right MCA territory. New when compared to MRI 06/20224.       ASSESSMENT/PLAN:     Ms. Cheek is a 64 year old femals present with intermittent confusion and AMS with speech difficulty. She is status post lumbar fusion with Dr. Mejia on 10/11/2024. MRI brain without contrast showed chronic embolic infarct in right MCA territory. New when compared to MRI 07/20224.     PLAN:  Floor status, hospital service  Neuro checks Q4  MM pain control  Encouraged PT/OT  CT T/L with and without contrast ordered  TLSO brace to be worn if HOB greater than 30 degrees or OOB  Psych eval pending  SCDs and Lovenox for DVT,   Fall precautions  Keep scheduled outpatient follow up with Dr. Mejia     Neurosurgery will continue to follow the patient on an as needed basis.  Please do not hesitate to contact neurosurgery for any additional questions or concerns.    Thank you for referring this very pleasant patient to the " neurosurgery service.         LISSETTE Isidro  Neurosurgery

## 2024-11-02 NOTE — CONSULTS
"11/2/2024  Kasandra Cheek   1960   [id number]            Psychiatry Inpatient Consult Note    Date of Admission: 10/30/2024 11:01 AM        Chief Complaint: "I'm better"    SUBJECTIVE:   History of Present Illness:   Kasandra Cheek is a 64 y.o. female  who  has a past medical history of Anemia, Anxiety, Depression, Dyspareunia, blood transfusions, HTN , Hypothyroidism, unspecified, Liver disease, Lumbar radiculopathy, Menopause, Migraine, Obesity, Osteoporosis, Other spondylosis with radiculopathy, lumbar region, Personal history of fall, Spinal stenosis, lumbar region with neurogenic claudication, TIA (transient ischemic attack) (1994), Unspecified osteoarthritis, unspecified site, and Urinary incontinence. The patient presented to [facility] ED on 10/30/2024 with confusion for the last 72 hours. CT of the head performed in the ER showed remote infarcts involving the right frontal and parietal lobes which are new compared to scan performed 3 months ago. Carotid ultrasound showed minimal stenosis of the left carotid and no stenosis of the right. MRI brain negative for acute CVA but as per Neurology recommendations we need to rule out recurrent right middle cerebral artery cardioembolic stroke with recommendations to start patient on aspirin 325 mg p.o. daily, discontinue Celebrex, start the patient on Keppra 500 mg p.o. b.i.d., issue with polypharmacy including multiple antidepressants/opioids/gabapentin.  Psych was consulted for possible polypharmacy of psychotropic medications.     Today, attending reports that she is more alert, but still with periods of confusion. This was evident during the assessment. She was able to state that she was in an [facility] facility, year 2024, president Qing, and accurate time of the day. There were parts of the assessment where she would re-rail on the mentioned topic, but she was able to re-directed. She did not present with any overt behavioral concerns. She reports that her mood today " "is "so-so". She states, "It can be a little depressing being in a hospital, especially since I just had my 3rd back surgery with Dr. Mejia". She denies any concerns with her sleep. She does report a decreased appetite - "My  can cook way better than this". She denies any suicidal or homicidal ideations at present. She also denies auditory and visual hallucinations as well as paranoid ideations; there is no evidence of psychosis present.             Past Psychiatric History:   Previous Psychiatric Hospitalizations: Yes - [facility] in [place], [place] 3 months ago s/t AMS   Previous Medication Trials: Wellbutrin XL, Cymbalta, Seroquel  Previous Suicide Attempts: Denies   Outpatient psychiatrist: "I see a psychiatrist online". Unable to recall name or company.    Current Medications:   Home Psychiatric Meds: Wellbutrin XL 450mg PO QD, Cymbalta 60mg PO BID, Seroquel 100mg PO QHS    Past Medical/Surgical History:   Past Medical History:   Diagnosis Date    Anemia     Anxiety     Depression     Dyspareunia     Encounter for blood transfusion     HTN (hypertension)     Hypothyroidism, unspecified     Liver disease     fatty liver    Lumbar radiculopathy     Menopause     Migraine     Obesity     Osteoporosis     Other spondylosis with radiculopathy, lumbar region     Personal history of fall     Spinal stenosis, lumbar region with neurogenic claudication     TIA (transient ischemic attack) 1994    Unspecified osteoarthritis, unspecified site     Urinary incontinence      Past Surgical History:   Procedure Laterality Date    ABDOMINAL SURGERY      Gastric Bypass    CARPAL TUNNEL RELEASE Bilateral     CERVICAL FUSION  12/28/2021    CHOLECYSTECTOMY  2015    COLONOSCOPY      COLPOSCOPY      FOOT SURGERY Left     FRACTURE SURGERY  2001    GASTRIC BYPASS  2011    HAND SURGERY Left     thumb    HERNIA REPAIR  2014    JOINT REPLACEMENT  Knee 2015, Hip 2016    LAMINECTOMY OF THORACIC SPINE BY POSTERIOR APPROACH USING " COMPUTER-ASSISTED NAVIGATION N/A 10/11/2024    Procedure: LAMINECTOMY, SPINE, THORACIC, POSTERIOR APPROACH, USING COMPUTER-ASSISTED NAVIGATION;  Surgeon: Andra Mejia MD;  Location: [facility] OR;  Service: Neurosurgery;  Laterality: N/A;  T9-L3 DECOMPRESSION / REMOVAL OF RIGHT ILIAC BOLT AND S1 SCREW / EXTENSION OF FUSION  T9-S2 ILIAC BOLT //  PRONE ADENIKE // DRILL // SCOPE // O-ARM //  DIRECT SPINE // NDM //       LUMBAR FUSION  05/28/2021    LUMBAR FUSION  02/21/2023    L3-S1    POSTERIOR LUMBAR INTERBODY FUSION (PLIF) WITH COMPUTER-ASSISTED NAVIGATION N/A 10/11/2024    Procedure: FUSION, SPINE, LUMBAR, PLIF, USING COMPUTER-ASSISTED NAVIGATION;  Surgeon: Andra Mejia MD;  Location: [facility];  Service: Neurosurgery;  Laterality: N/A;    ROTATOR CUFF REPAIR Bilateral 04/12/2022    SMALL INTESTINE SURGERY  2015    SPINE SURGERY  2021 & 2023    TONSILLECTOMY  2017         Family Psychiatric History:   Mother - Schizophrenia     Allergies:   Review of patient's allergies indicates:   Allergen Reactions    Sucralfate Nausea Only       Substance Abuse History:   Tobacco: Ex-smoker  Alcohol: Denies  Illicit Substances: Denies  Treatment: Denies        Scheduled Meds:    atorvastatin  80 mg Oral Daily    enoxparin  40 mg Subcutaneous Q24H (prophylaxis, 1700)    haloperidol lactate  10 mg Intramuscular Once    levETIRAcetam  500 mg Oral BID    levothyroxine  200 mcg Oral Daily    losartan  100 mg Oral Daily    mupirocin   Nasal BID    pantoprazole  40 mg Oral BID      PRN Meds:   Current Facility-Administered Medications:     bisacodyL, 10 mg, Rectal, Daily PRN    hydrALAZINE, 10 mg, Intravenous, Q4H PRN    labetalol, 10 mg, Intravenous, Q6H PRN    oxyCODONE-acetaminophen, 1 tablet, Oral, Q6H PRN   Psychotherapeutics (From admission, onward)      Start     Stop Route Frequency Ordered    11/02/24 0200  haloperidol lactate injection 10 mg         -- IM Once 11/02/24 0048              Social History:  Housing Status:  Lives with   Relationship Status/Sexual Orientation:  for 31 years   Children: 6  Education: 2 years community college   Employment Status/Info: Disabled    history: Denies  History of physical/sexual abuse: Denies   Access to gun: Denies       Legal History:   Past Charges/Incarcerations: Denies   Pending charges: Denies      OBJECTIVE:   Medical Review Of Systems:  Pertinent items are noted in HPI.    Vitals   Vitals:    11/02/24 1132   BP: (!) 153/77   Pulse: 96   Resp:    Temp: 98.1 °F (36.7 °C)        Labs/Imaging/Studies:   Recent Results (from the past 48 hours)   Echo    Collection Time: 10/31/24  1:40 PM   Result Value Ref Range    BSA 2.54 m2    Lyman's Biplane MOD Ejection Fraction 56 %    A2C EF 50 %    A4C EF 60 %    LVOT stroke volume 60.6 cm3    LVIDd 3.4 (A) 3.5 - 6.0 cm    LV Systolic Volume 17.70 mL    LV Systolic Volume Index 7.4 mL/m2    LVIDs 2.3 2.1 - 4.0 cm    LV ESV A2C 33.60 mL    LV Diastolic Volume 48.10 mL    LV ESV A4C 35.60 mL    LV Diastolic Volume Index 20.04 mL/m2    LV EDV A2C 42.998136857424511 mL    LV EDV A4C 41.30 mL    Left Ventricular End Systolic Volume by Teichholz Method 17.70 mL    Left Ventricular End Diastolic Volume by Teichholz Method 48.10 mL    IVS 1.5 (A) 0.6 - 1.1 cm    LVOT diameter 2.0 cm    LVOT area 3.1 cm2    FS 32.4 28 - 44 %    Left Ventricle Relative Wall Thickness 0.59 cm    PW 1.0 0.6 - 1.1 cm    LV mass 138.8 g    LV Mass Index 57.8 g/m2    MV Peak E Andrade 0.56 m/s    TDI LATERAL 0.08 m/s    TDI SEPTAL 0.08 m/s    E/E' ratio 7.00 m/s    MV Peak A Andrade 1.15 m/s    TR Max Andrade 1.95 m/s    E/A ratio 0.49     E wave deceleration time 151.00 msec    LV SEPTAL E/E' RATIO 7.00 m/s    LV LATERAL E/E' RATIO 7.00 m/s    LVOT peak andrade 1.7 m/s    Left Ventricular Outflow Tract Mean Velocity 1.08 cm/s    Left Ventricular Outflow Tract Mean Gradient 6.00 mmHg    LA size 4.30 cm    LA Vol (MOD) 35.50 mL    ENA (MOD) 14.8 mL/m2    AV mean gradient  11.0 mmHg    AV peak gradient 19.4 mmHg    Ao peak ekaterina 2.2 m/s    Ao VTI 29.7 cm    LVOT peak VTI 19.3 cm    AV valve area 2.0 cm²    AV Velocity Ratio 0.77     AV index (prosthetic) 0.65     COMFORT by Velocity Ratio 2.4 cm²    MV mean gradient 4 mmHg    MV peak gradient 10 mmHg    MV stenosis pressure 1/2 time 93.00 ms    MV valve area p 1/2 method 2.37 cm2    MV valve area by continuity eq 2.61 cm2    MV VTI 23.2 cm    Triscuspid Valve Regurgitation Peak Gradient 15 mmHg    PV PEAK VELOCITY 0.96 m/s    PV peak gradient 4 mmHg    Mean e' 0.08 m/s    ZLVIDS -8.27     ZLVIDD -11.68     LA area A4C 16.00 cm2    LA area A2C 15.80 cm2    RVDD 2.32 cm   Comprehensive metabolic panel    Collection Time: 11/01/24  6:19 AM   Result Value Ref Range    Sodium 144 136 - 145 mmol/L    Potassium 3.7 3.5 - 5.1 mmol/L    Chloride 111 (H) 98 - 107 mmol/L    CO2 23 23 - 31 mmol/L    Glucose 112 82 - 115 mg/dL    Blood Urea Nitrogen 18.1 9.8 - 20.1 mg/dL    Creatinine 0.63 0.55 - 1.02 mg/dL    Calcium 8.6 8.4 - 10.2 mg/dL    Protein Total 5.9 5.8 - 7.6 gm/dL    Albumin 3.2 (L) 3.4 - 4.8 g/dL    Globulin 2.7 2.4 - 3.5 gm/dL    Albumin/Globulin Ratio 1.2 1.1 - 2.0 ratio    Bilirubin Total 0.5 <=1.5 mg/dL     40 - 150 unit/L    ALT 21 0 - 55 unit/L    AST 26 5 - 34 unit/L    eGFR >60 mL/min/1.73/m2    Anion Gap 10.0 mEq/L    BUN/Creatinine Ratio 29    CBC with Differential    Collection Time: 11/01/24  6:19 AM   Result Value Ref Range    WBC 9.11 4.50 - 11.50 x10(3)/mcL    RBC 4.11 (L) 4.20 - 5.40 x10(6)/mcL    Hgb 12.7 12.0 - 16.0 g/dL    Hct 38.7 37.0 - 47.0 %    MCV 94.2 (H) 80.0 - 94.0 fL    MCH 30.9 27.0 - 31.0 pg    MCHC 32.8 (L) 33.0 - 36.0 g/dL    RDW 13.2 11.5 - 17.0 %    Platelet 278 130 - 400 x10(3)/mcL    MPV 9.1 7.4 - 10.4 fL    Neut % 65.0 %    Lymph % 21.8 %    Mono % 7.9 %    Eos % 3.3 %    Basophil % 1.2 %    Lymph # 1.99 0.6 - 4.6 x10(3)/mcL    Neut # 5.92 2.1 - 9.2 x10(3)/mcL    Mono # 0.72 0.1 - 1.3 x10(3)/mcL     "Eos # 0.30 0 - 0.9 x10(3)/mcL    Baso # 0.11 <=0.2 x10(3)/mcL    IG# 0.07 (H) 0 - 0.04 x10(3)/mcL    IG% 0.8 %    NRBC% 0.0 %   C-Reactive Protein    Collection Time: 11/01/24  6:53 PM   Result Value Ref Range    CRP 2.10 <5.00 mg/L   Sedimentation rate    Collection Time: 11/01/24  7:57 PM   Result Value Ref Range    Sed Rate 4 0 - 20 mm/hr      No results found for: "PHENYTOIN", "PHENOBARB", "VALPROATE", "CBMZ"        Psychiatric Mental Status Exam:  General Appearance: dressed in hospital garb, overweight  Arousal: alert  Behavior: redirectable  Movements and Motor Activity: no abnormal involuntary movements noted  Orientation: oriented to person, place, and time  Speech: normal rate, rhythm, volume, tone and pitch  Mood: Dysphoric  Affect: mood-congruent  Thought Process: disorganized  Associations: intact  Thought Content and Perceptions: no suicidal or homicidal ideation, no auditory or visual hallucinations, no paranoid ideation, no ideas of reference, no evidence of delusions or psychosis  Recent and Remote Memory: remote memory intact, recent memory impaired; per interview/observation with patient  Attention and Concentration: able to spell WORLD forwards; per interview/observation with patient  Fund of Knowledge: correctly identifies the President and ; based on history, vocabulary, fund of knowledge, syntax, grammar, and content  Insight: limited; based on understanding of severity of illness and HPI  Judgment: limited; based on patient's behavior and HPI      Patient Strengths:  Access to care, Able to verbalize needs, and Capable of independent living      Patient Liabilities:  Complicated medical illness        ASSESSMENT/PLAN:   Diagnoses:  Major Depressive Disorder, Recurrent, Unspecified (F33.9)  Generalized Anxiety Disorder (F41.1)        Past Medical History:   Diagnosis Date    Anemia     Anxiety     Depression     Dyspareunia     Encounter for blood transfusion "     HTN (hypertension)     Hypothyroidism, unspecified     Liver disease     fatty liver    Lumbar radiculopathy     Menopause     Migraine     Obesity     Osteoporosis     Other spondylosis with radiculopathy, lumbar region     Personal history of fall     Spinal stenosis, lumbar region with neurogenic claudication     TIA (transient ischemic attack) 1994    Unspecified osteoarthritis, unspecified site     Urinary incontinence           Problem lists and Management Plans:    Increased confusion likely due to polypharmacy of both psychotropic medications and opioid pain medications for post-operative pain control. Her psych medications were abruptly stopped upon admit, which likely caused a worsening of symptoms, however, she is improving per attending. I suggest to continue with plan of no psychotropics and limit use of pain medications.      Patient is not in imminent danger to herself and/or others, therefore a PEC is not necessary.     Outpatient mental health referral upon discharge - [facility] can assist with this.    Psych will sign off, please re-consult if needed.          Alexandro Ngo, PMHNP-BC

## 2024-11-03 LAB
BACTERIA BLD CULT: NORMAL
BACTERIA BLD CULT: NORMAL
FECAL LEUKOCYTE (OHS): POSITIVE

## 2024-11-03 PROCEDURE — 25000003 PHARM REV CODE 250: Performed by: NURSE PRACTITIONER

## 2024-11-03 PROCEDURE — 87077 CULTURE AEROBIC IDENTIFY: CPT | Performed by: INTERNAL MEDICINE

## 2024-11-03 PROCEDURE — 63600175 PHARM REV CODE 636 W HCPCS: Performed by: INTERNAL MEDICINE

## 2024-11-03 PROCEDURE — 63600175 PHARM REV CODE 636 W HCPCS: Performed by: HOSPITALIST

## 2024-11-03 PROCEDURE — 25000003 PHARM REV CODE 250: Performed by: INTERNAL MEDICINE

## 2024-11-03 PROCEDURE — 21400001 HC TELEMETRY ROOM

## 2024-11-03 PROCEDURE — 89055 LEUKOCYTE ASSESSMENT FECAL: CPT | Performed by: INTERNAL MEDICINE

## 2024-11-03 PROCEDURE — 25000003 PHARM REV CODE 250: Performed by: PSYCHIATRY & NEUROLOGY

## 2024-11-03 RX ORDER — LEVOTHYROXINE SODIUM 100 UG/1
200 TABLET ORAL
Status: DISCONTINUED | OUTPATIENT
Start: 2024-11-04 | End: 2024-11-11 | Stop reason: HOSPADM

## 2024-11-03 RX ADMIN — ATORVASTATIN CALCIUM 80 MG: 40 TABLET, FILM COATED ORAL at 08:11

## 2024-11-03 RX ADMIN — LEVOTHYROXINE SODIUM 200 MCG: 100 TABLET ORAL at 08:11

## 2024-11-03 RX ADMIN — PANTOPRAZOLE SODIUM 40 MG: 40 TABLET, DELAYED RELEASE ORAL at 08:11

## 2024-11-03 RX ADMIN — LOSARTAN POTASSIUM 100 MG: 50 TABLET, FILM COATED ORAL at 08:11

## 2024-11-03 RX ADMIN — MUPIROCIN: 20 OINTMENT TOPICAL at 08:11

## 2024-11-03 RX ADMIN — HYDRALAZINE HYDROCHLORIDE 10 MG: 20 INJECTION INTRAMUSCULAR; INTRAVENOUS at 08:11

## 2024-11-03 RX ADMIN — ENOXAPARIN SODIUM 40 MG: 40 INJECTION SUBCUTANEOUS at 03:11

## 2024-11-03 RX ADMIN — LEVETIRACETAM 500 MG: 500 TABLET, FILM COATED ORAL at 08:11

## 2024-11-03 NOTE — PROGRESS NOTES
Hospital Progress Note  [facility]      Admit Date: 10/30/2024  Post-operative Day:    Hospital Day: 5    I am cross covering this patient for neurosurgeon Dr. Mejia.    SUBJECTIVE:     Status post lumbar fusion with Dr. Mejia on 10/11/2024     Interval History:  Evaluated the patient at the bedside. Thoughts are more fluent today. Speech is normal. Complains of minimal pain to lower back. Reports urinary incontinence multiple times in the last 24 hours. Denies headaches, nausea, dizziness or changes to vision. Continues to deny fever, body aches or chills.     Scheduled Meds:   atorvastatin  80 mg Oral Daily    enoxparin  40 mg Subcutaneous Q24H (prophylaxis, 1700)    levETIRAcetam  500 mg Oral BID    levothyroxine  200 mcg Oral Daily    losartan  100 mg Oral Daily    mupirocin   Nasal BID    pantoprazole  40 mg Oral BID     Continuous Infusions:  PRN Meds:  Current Facility-Administered Medications:     bisacodyL, 10 mg, Rectal, Daily PRN    hydrALAZINE, 10 mg, Intravenous, Q4H PRN    labetalol, 10 mg, Intravenous, Q6H PRN    oxyCODONE-acetaminophen, 1 tablet, Oral, Q6H PRN    Review of patient's allergies indicates:   Allergen Reactions    Sucralfate Nausea Only       Past Medical History:   Diagnosis Date    Anemia     Anxiety     Depression     Dyspareunia     Encounter for blood transfusion     HTN (hypertension)     Hypothyroidism, unspecified     Liver disease     fatty liver    Lumbar radiculopathy     Menopause     Migraine     Obesity     Osteoporosis     Other spondylosis with radiculopathy, lumbar region     Personal history of fall     Spinal stenosis, lumbar region with neurogenic claudication     TIA (transient ischemic attack) 1994    Unspecified osteoarthritis, unspecified site     Urinary incontinence      Past Surgical History:   Procedure Laterality Date    ABDOMINAL SURGERY      Gastric Bypass    CARPAL TUNNEL RELEASE Bilateral     CERVICAL FUSION  12/28/2021     CHOLECYSTECTOMY  2015    COLONOSCOPY      COLPOSCOPY      FOOT SURGERY Left     FRACTURE SURGERY  2001    GASTRIC BYPASS  2011    HAND SURGERY Left     thumb    HERNIA REPAIR  2014    JOINT REPLACEMENT  Knee 2015, Hip 2016    LAMINECTOMY OF THORACIC SPINE BY POSTERIOR APPROACH USING COMPUTER-ASSISTED NAVIGATION N/A 10/11/2024    Procedure: LAMINECTOMY, SPINE, THORACIC, POSTERIOR APPROACH, USING COMPUTER-ASSISTED NAVIGATION;  Surgeon: Andra Mejia MD;  Location: [facility] OR;  Service: Neurosurgery;  Laterality: N/A;  T9-L3 DECOMPRESSION / REMOVAL OF RIGHT ILIAC BOLT AND S1 SCREW / EXTENSION OF FUSION  T9-S2 ILIAC BOLT //  PRONE ADENIKE // DRILL // SCOPE // O-ARM //  DIRECT SPINE // NDM //       LUMBAR FUSION  05/28/2021    LUMBAR FUSION  02/21/2023    L3-S1    POSTERIOR LUMBAR INTERBODY FUSION (PLIF) WITH COMPUTER-ASSISTED NAVIGATION N/A 10/11/2024    Procedure: FUSION, SPINE, LUMBAR, PLIF, USING COMPUTER-ASSISTED NAVIGATION;  Surgeon: Andra Mejia MD;  Location: [facility];  Service: Neurosurgery;  Laterality: N/A;    ROTATOR CUFF REPAIR Bilateral 04/12/2022    SMALL INTESTINE SURGERY  2015    SPINE SURGERY  2021 & 2023    TONSILLECTOMY  2017       OBJECTIVE:     Vital Signs (Most Recent)  Temp: 98.4 °F (36.9 °C) (11/03/24 0733)  Pulse: 93 (11/03/24 0733)  Resp: 18 (11/03/24 0436)  BP: 139/78 (11/03/24 0733)  SpO2: 98 % (11/03/24 0733)    Vital Signs Range (Last 24H):  Temp:  [98.1 °F (36.7 °C)-98.5 °F (36.9 °C)]   Pulse:  []   Resp:  [18-20]   BP: (139-170)/(73-91)   SpO2:  [94 %-98 %]         Physical Exam:  General Awake, alert, conversant, no acute distress  GCS- 15  E-4, V-5, M-6    Opens eyes spontaneously  Oriented x 3  Follows commands in all extremities     Cranial Nerves PERRL, brisk bilaterally  EOMI  Face symmetric  Tongue midline     Motor Strength 5/5 x 4 extremities         Sensory Nl to light touch x 4 extremities     Wound Dressing- clean, dry, and intact         Laboratory Review:    CBC  "without Differential:  Lab Results   Component Value Date    WBC 9.11 11/01/2024    HGB 12.7 11/01/2024    HCT 38.7 11/01/2024     11/01/2024    MCV 94.2 (H) 11/01/2024     Differential:   Lab Results   Component Value Date    NEUTROABS 3.0 01/31/2024    LYMPHSABS 1.6 01/31/2024       Basic Metabolic Panel:  Lab Results   Component Value Date     11/01/2024    K 3.7 11/01/2024     (H) 11/01/2024     (H) 11/03/2023    BUN 18.1 11/01/2024    MG 1.90 10/31/2024    PHOS 3.2 10/31/2024    ANIONGAP 10 07/04/2024     Coagulation Studies:  Lab Results   Component Value Date    INR 1.1 10/31/2024    INR 0.9 12/22/2023    INR 1.0 04/16/2022     No results found for: "PTT"  Urinalysis:  Lab Results   Component Value Date    PHURINE 6.5 10/30/2024    LEUKOCYTESUR Negative 10/30/2024    UROBILINOGEN 2.0 (A) 10/30/2024        Radiology:    I have personally reviewed and evaluated the following reports as well as radiographic studies:    CT Thoracic spine with contrast completed 11/02/2024 reveals postoperative change with posterior hardware present. Subcutaneous fluid collection extending throughout hardware. Likely seroma vs CSF. No gross signs of infection.     CT Lumbar spine with contrast completed 11/02/2024 reveals stable postoperative change with posterior hardware present. No evidence of loosening or failure. Large peripherally enhancing fluid collection through surgical bed spanning T11 through L5.    ASSESSMENT/PLAN:     Ms. Cheek is a 64 year old femals present with intermittent confusion and AMS with speech difficulty. She is status post lumbar fusion with Dr. Mejia on 10/11/2024. MRI brain showed chronic embolic infarct in right MCA territory. New when compared to MRI 07/20224.     Plan:     Floor status, hospital service  Neuro checks Q4  MM pain control  Encouraged PT/OT  CT T/L with and without contrast ordered  TLSO brace to be worn if HOB greater than 30 degrees or OOB  Psych " consulted, appreciate recs  SCDs and Lovenox for DVT,   Fall precautions  Keep scheduled outpatient follow up with Dr. Mejia    Neurosurgery will continue to follow the patient.  Please do not hesitate to contact neurosurgery for any additional questions or concerns.       Char Beckford, PHILIPP  Neurosurgery

## 2024-11-03 NOTE — CONSULTS
[facility] - 4th Floor Medical Telemetry  Wound Care    Patient Name:  Kasandra Cheek   MRN:  [id number]  Date: 11/3/2024  Diagnosis: Altered mental status    History:     Past Medical History:   Diagnosis Date    Anemia     Anxiety     Depression     Dyspareunia     Encounter for blood transfusion     HTN (hypertension)     Hypothyroidism, unspecified     Liver disease     fatty liver    Lumbar radiculopathy     Menopause     Migraine     Obesity     Osteoporosis     Other spondylosis with radiculopathy, lumbar region     Personal history of fall     Spinal stenosis, lumbar region with neurogenic claudication     TIA (transient ischemic attack)     Unspecified osteoarthritis, unspecified site     Urinary incontinence        Social History     Socioeconomic History    Marital status:    Tobacco Use    Smoking status: Former     Current packs/day: 0.00     Average packs/day: 1 pack/day for 27.0 years (27.0 ttl pk-yrs)     Types: Cigarettes     Start date: 1975     Quit date: 2002     Years since quittin.8    Smokeless tobacco: Never    Tobacco comments:     off and on when smoking   Substance and Sexual Activity    Alcohol use: Not Currently     Comment: I do not drink    Drug use: Never    Sexual activity: Not Currently     Partners: Male     Birth control/protection: Post-menopausal     Social Drivers of Health     Financial Resource Strain: Patient Unable To Answer (10/30/2024)    Overall Financial Resource Strain (CARDIA)     Difficulty of Paying Living Expenses: Patient unable to answer   Food Insecurity: Patient Unable To Answer (10/30/2024)    Hunger Vital Sign     Worried About Running Out of Food in the Last Year: Patient unable to answer     Ran Out of Food in the Last Year: Patient unable to answer   Transportation Needs: Patient Unable To Answer (10/30/2024)    TRANSPORTATION NEEDS     Transportation : Patient unable to answer   Stress: Patient Unable To Answer  (10/30/2024)    [employer] [place] [employer] - Occupational Stress Questionnaire     Feeling of Stress : Patient unable to answer   Housing Stability: Patient Unable To Answer (10/30/2024)    Housing Stability Vital Sign     Unable to Pay for Housing in the Last Year: Patient unable to answer     Homeless in the Last Year: Patient unable to answer       Precautions:     Allergies as of 10/30/2024 - Reviewed 10/30/2024   Allergen Reaction Noted    Sucralfate Nausea Only 06/15/2022       WOC Assessment Details/Treatment        11/03/24 1154   WOCN Assessment   Visit Date 11/03/24   Visit Time 1154   Consult Type New   WO Speciality Wound   Wound other  (from tape)   Intervention chart review;assessed;orders   Teaching on-going        Wound 11/02/24 1103 Blister(s) Left distal;anterior Arm   Date First Assessed/Time First Assessed: 11/02/24 1103   Present on Original Admission: No  Primary Wound Type: Blister(s)  Side: Left  Orientation: distal;anterior  Location: Arm   Wound Image    Dressing Appearance Open to air   Drainage Amount None   Drainage Characteristics/Odor No odor   Appearance [place]   Periwound Area Intact;Dry   Care Cleansed with:;Soap and water   Dressing Other (comment)  (Leave INDRA)     WOCN consulted for skin tears from tape on R/L forearms. Family at bedside. Explained reason for visit. Treatment recommendations: RUE/LUE: clean w/ soap/water, dry well, leave INDRA. Daily/prn. Nursing to cont. Tx recs and preventative measures. Will follow up.     11/03/2024

## 2024-11-03 NOTE — PLAN OF CARE
Problem: Stroke, Ischemic (Includes Transient Ischemic Attack)  Goal: Improved Communication Skills  Outcome: Progressing  Intervention: Optimize Communication Skills  Flowsheets (Taken 11/3/2024 1522)  Communication Enhancement Strategies:   call light answered in person   family/caregiver assisted with communication   family involved in communication plan   extra time allowed for response  Goal: Optimal Functional Ability  Outcome: Progressing  Intervention: Optimize Functional Ability  Flowsheets (Taken 11/3/2024 1522)  Activity Management: Rolling - L1     Problem: Skin Injury Risk Increased  Goal: Skin Health and Integrity  Outcome: Not Progressing  Intervention: Optimize Skin Protection  Flowsheets (Taken 11/3/2024 1522)  Pressure Reduction Devices: positioning supports utilized  Activity Management: Rolling - L1  Head of Bed (HOB) Positioning: HOB at 20 degrees

## 2024-11-03 NOTE — PROGRESS NOTES
[facility]  Hospital Medicine Progress Note        Chief Complaint: Inpatient Follow-up for Altered Mental Status ( reports AMS x 3 days.  states pt usually presents this way when she has a UTI. Pt arrives GCS 12.)    HPI: Kasandra Cheek is a 64 y.o. female who  has a past medical history of Anemia, Anxiety, Depression, Dyspareunia, blood transfusions, HTN , Hypothyroidism, unspecified, Liver disease, Lumbar radiculopathy, Menopause, Migraine, Obesity, Osteoporosis, Other spondylosis with radiculopathy, lumbar region, Personal history of fall, Spinal stenosis, lumbar region with neurogenic claudication, TIA (transient ischemic attack) (1994), Unspecified osteoarthritis, unspecified site, and Urinary incontinence.. The patient presented to [facility] on 10/30/2024      64-year-old female with past medical history of anxiety/depression, hypertension, hypothyroidism, fatty liver disease, chronic pain syndrome presents to the emergency room with confusion for the last 72 hours.   is at the bedside helping give history.  Reports getting confused at home.  Some imbalance.  Blood pressure has been elevated.  On arrival to the emergency room she was not oriented.  GCS of 9.  Not following commands.  Vital signs show some mild hypertension but otherwise stable.  No fever.  She has no leukocytosis.  Mild metabolic acidosis appears to be chronic.  UDS was positive for opiates which she was prescribed.  Urinalysis was bland.  CT of the head performed in the ER showed remote infarcts involving the right frontal and parietal lobes which are new compared to scan performed 3 months ago.  Carotid ultrasound showed minimal stenosis of the left carotid and no stenosis of the right.  Echocardiogram is pending.  Neurology was consulted from the ER.  Recommending full stroke workup.  Added aspirin and statin.  Okay to normalize blood pressure.  She was being admitted to the hospitalist  group    Interval Hx:   10-31-24 Dr. English-reviewing Neurology notes there is more history that is important for the case.  Has been refers that around a year ago she had a gradual decline where she requires assistance to the bathroom and transferred to wheelchair.  Has been refers that her mental status waxes and wanes.  Refers that on Sunday she suddenly slumped over and was not really responding.  This prompted him to call EMS and was taken to the emergency room in [place].  In [place] did not much happened other than checking vital signs.  Once back home he took 4 people to get her out of the car because she was weak prompting him to take her to us [facility] for further evaluation where CT of the head was significant for right frontal and right parietal infarcts which are new from 07/24.    On physical examination she remains confused.  She knows-she is in [facility] but unable to say why, can recall present president, month or day.  Her strength is equally distributed over upper and lower extremities but very difficult for her to follow commands.  She is on aspirin and Keppra, her antidepressant has been placed on hold as well as her opiates and gabapentin.  EEG and MRI of the brain are pending      11/01/2024 Dr. English-chart reviewed patient examined.  MRI brain negative for acute CVA but as per Neurology recommendations we need to rule out recurrent right middle cerebral artery cardioembolic stroke with recommendations to start patient on aspirin 325 mg p.o. daily, discontinue Celebrex, start the patient on Keppra 500 mg p.o. b.i.d., issue with polypharmacy including multiple antidepressants/opioids/gabapentin.  Patient will need Holter times 2 weeks on discharge with follow-up with Rabia Bell upon discharge and if Holter negative patient will need loop recorder due to high suspicion for cardioembolic etiology.  The patient cleared for discharge once new findings of suspected  thoracic soft tissue fluid collection findings post thoracic surgery by Neurosurgery.  We will go ahead and consult Neurosurgery Dr. Hawthorne    Her LOC is better today, much more alert but still with periods of confusion.  She is still thinks that misha Dixon is the president.    We will feed follow recommendations by Neurosurgery.  Sed rate/C-reactive protein to follow.  Blood cultures drawn on admission negative.  CT a neck negative for carotid stenosis/EEG negative/echocardiogram with left atrium dilated and bubble study could not be assessed.    11/02/2024 Dr. English-chart reviewed patient examined.  Impressive improvement in the last 24 hours when it comes to level of consciousness/orientation/vocabulary/thoughts.  Case was discussed with Psychiatry we decided to continue to hold all psych meds.  Patient has been seen by Neurosurgery .  CT thoracic lumbar spine with contrast done.  Physical therapy recommended moderate intensity therapy.  We will consult  pending final neurosurgery evaluation of CT thoracic/lumbar spine with IV contrast                  Case was discussed with patient's nurse and  on the floor.  Objective/physical exam:  General: In no acute distress, afebrile. More alert but confused   Chest: Clear to auscultation bilaterally  Heart: RRR, +S1, S2, no appreciable murmur  Abdomen: Soft, nontender, BS +  Back-mid thoracic stuff tissue swelling suspicious for fluid collection  MSK: Warm, no lower extremity edema, no clubbing or cyanosis  Neurologic: Alert but disoriented to time/month/day of the week/president.  Having trouble following commands.  Cranial nerve II-XII intact, strength is decrease but appears to be equally distributed distributed over upper and lower extremities    VITAL SIGNS: 24 HRS MIN & MAX LAST   Temp  Min: 97.6 °F (36.4 °C)  Max: 98.5 °F (36.9 °C) 98.1 °F (36.7 °C)   BP  Min: 125/71  Max: 170/95 (!) 154/73   Pulse  Min: 89  Max: 112  90   Resp  Min: 18   Max: 18 18   SpO2  Min: 94 %  Max: 96 % (!) 94 %     I have reviewed the following labs:  Recent Labs   Lab 10/30/24  1219 10/31/24  0443 11/01/24  0619   WBC 9.12 8.57 9.11   RBC 4.13* 3.97* 4.11*   HGB 12.9 12.5 12.7   HCT 37.7 37.7 38.7   MCV 91.3 95.0* 94.2*   MCH 31.2* 31.5* 30.9   MCHC 34.2 33.2 32.8*   RDW 13.3 13.4 13.2    342 278   MPV 8.9 9.0 9.1     Recent Labs   Lab 10/28/24  1947 10/30/24  1219 10/31/24  0443 11/01/24  0619   NA  --  142 143 144   K  --  3.7 3.5 3.7   CL  --  110* 110* 111*   CO2  --  20* 23 23   BUN  --  14.9 20.8* 18.1   CREATININE  --  0.71 0.75 0.63   CALCIUM  --  9.0 9.4 8.6   PH 7.571*  --   --   --    MG  --  1.90 1.90  --    ALBUMIN  --  3.4 3.3* 3.2*   ALKPHOS  --  161* 158* 145   ALT  --  26 25 21   AST  --  42* 30 26   BILITOT  --  0.7 0.5 0.5     Microbiology Results (last 7 days)       ** No results found for the last 168 hours. **             See below for Radiology    Assessment/Plan:  Remote appearing CVA, right frontal and parietal lobes by ct  Metabolic/toxic encephalopathy -much improved  polypharmacy-opioid/antidepressants/gabapentin/anxiolytics.  All meds discontinued with much improvement/resolution of confusion  Mild metabolic acidosis  Iron-deficiency anemia   Hypothyroidism -TSH 4.3  Essential hypertension   Chronic pain syndrome-controlled on Percocet 10 mg p.o. Q 6 hours p.r.n. pain.  Multiple psychiatric conditions including depression and anxiety-psychiatry consulted, we will discuss case and decided to continue to hold all psychiatric medications for depression/anxiety.  Recent thoracic vertebra surgery by Neurosurgery 3 weeks ago by Dr. Rosario with the development of soft tissue swelling highly suspicious for fluid collection.-Dr. Rosario consulted, Dr. Hyde on-call.  CT thoracic/lumbar spine with IV contrast ordered.  Awaiting final evaluation and recommendations.        Stroke workup:  -MRI brain (7/24 )with chronic right MCA infarction/chronic  small-vessel ischemic changes  -MRI brain without contrast 11/01/2024 with no acute intracranial finding/chronic ischemic changes with encephalomalacia in the right cerebral hemisphere/right MCA territory suspicious for cardioembolic process  -CT head-remote appearing infarcts right frontal and parietal lobes new compared to July 20, 2024  -CTA head/neck no large vessel occlusion or flow-limiting stenosis  -Echocardiogram with ejection fraction 55-60% with grade 1 diastolic dysfunction.  Unable to determine where the bubble study was positive  -Carotid ultrasound with right internal carotid patent with no evidence stenosis.  Left internal carotid with less than 50% stenosis.  Bilateral vertebral arteries are patent with antegrade flow  -hemoglobin A1c 5.2   -TSH 4.3  -cholesterol 217/  -EEG 11/01/2024 negative    Inpatient medications aspirin 325/Keppra/statin    Recs by neurology:  - start aspirin 325 mg, lipitor 80 mg  - discontinue home celecoxib  - discontinue gabapentin  - start keppra 500 mg bid   - holter monitor x 2 weeks on discharge  - follow up in clinic with Rabia Cosme NP in 2 months with holter results. If Holter is negative, she will need a loop recorder due to high suspicion for cardioembolic etiology.   - PT/OT            VTE prophylaxis:  Lovenox    Patient condition: Fair    Anticipated discharge and Disposition:   To be determined      All diagnosis and differential diagnosis have been reviewed; assessment and plan has been documented; I have personally reviewed the labs and test results that are presently available; I have reviewed the patients medication list; I have reviewed the consulting providers response and recommendations. I have reviewed or attempted to review medical records based upon their availability    All of the patient's questions have been  addressed and answered. Patient's is agreeable to the above stated plan. I will continue to monitor closely and make adjustments  to medical management as needed.    Portions of this note dictated using EMR integrated voice recognition software, and may be subject to voice recognition errors not corrected at proofreading. Please contact writer for clarification if needed.   _____________________________________________________________________    Malnutrition Status:    Scheduled Med:   atorvastatin  80 mg Oral Daily    enoxparin  40 mg Subcutaneous Q24H (prophylaxis, 1700)    levETIRAcetam  500 mg Oral BID    levothyroxine  200 mcg Oral Daily    losartan  100 mg Oral Daily    mupirocin   Nasal BID    pantoprazole  40 mg Oral BID      Continuous Infusions:       PRN Meds:    Current Facility-Administered Medications:     bisacodyL, 10 mg, Rectal, Daily PRN    hydrALAZINE, 10 mg, Intravenous, Q4H PRN    labetalol, 10 mg, Intravenous, Q6H PRN    oxyCODONE-acetaminophen, 1 tablet, Oral, Q6H PRN     Radiology:  I have personally reviewed the following imaging and agree with the radiologist.     CT Lumbar Spine With Contrast  EXAMINATION  CT LUMBAR SPINE WITH CONTRAST    CLINICAL HISTORY  rule out fluid collection;    TECHNIQUE  Helical-acquisition CT images were obtained following the intravenous administration of iodine-based contrast media.  Multiplanar reconstructions were accomplished by a CT technologist at a separate workstation and pushed to PACS for physician review.    CONTRAST  IV: Omnipaque 350, 100 mL    COMPARISON  28 August 2024 abdominopelvic CT    FINDINGS  Images were reviewed in soft tissue and bone windows.    Exam quality: There is notable limitation secondary to extensive multilevel posterior spine stabilization hardware and associated widespread metal streak artifact.    Enhancement: No definite evidence of focal abnormal enhancement is appreciated.  However, circumscribed fluid collection with faint peripheral enhancement is not entirely excluded through the midline lumbar posterior surgical bed spanning T11 through L5.  In  the    There are similar advanced spondylitic changes throughout the visualized spinal column with associated T12-L1 destructive endplate changes.  Interval surgical revision of posterior fusion hardware is noted, now spanning from the sacroiliac joints and lumbosacral junction cranially beyond the included lower thoracic spine.  There are also new postoperative changes of spinal canal decompression at T12 through L3.    No acutely displaced fracture, vertebral body compression deformity, or interval change in spinal column alignment.  There is no convincing new focal abnormality of the partially visualized retroperitoneal structures.    IMPRESSION  1. Interval postoperative changes with revision of multilevel thoracolumbosacral stabilization hardware and new T12 through L3 posterior spinal canal decompression.  2. Regional soft tissues are of limited evaluation secondary to extensive metal artifact, but there are findings suspicious for large peripherally enhancing fluid collection through the posterior surgical bed spanning T11 through L5.  3. No evidence of acute or worsening focal osseous abnormality.  ==========  This report was flagged in Epic as abnormal.    RADIATION DOSE  Automated tube current modulation, weight-based exposure dosing, and/or iterative reconstruction technique utilized to reach lowest reasonably achievable exposure rate.    DLP: 1387 mGy*cm    Electronically signed by: Tirso Decker  Date:    11/02/2024  Time:    16:34  CT Thoracic Spine With Contrast  Narrative: CLINICAL HISTORY:  Trauma.    TECHNIQUE:  CT of the thoracic spine Without contrast. Sagittal and coronal reconstructions were performed on the source images.    Automatic exposure control was utilized to reduce the patient's radiation dose.    DLP = 1252    COMPARISON:  No prior imaging available for comparison.    FINDINGS:  Postsurgical changes of lower thoracic fusion with erosive changes and T12-L1.  Surgical fixation and  posterior decompression is new in the interval, however erosive changes similar to prior abdominal CT from August 24.  Posterior to the decompression is a subcutaneous fluid collection extending throughout the spinal hardware.  Limited evaluation secondary to metallic artifact throughout.  Fluid is of unknown etiology.  Further evaluation may be obtained with ultrasound if needed.  Impression: Postsurgical change with no acute fracture.  Fluid is noted midline posterior to surgical hardware.  Further evaluation may be obtained with ultrasound if needed.  Likely seroma versus CSF fluid.  No gross CT signs of infection.    Electronically signed by: Logan Cedeno  Date:    11/02/2024  Time:    16:09      Lennox English MD  Department of Hospital Medicine   [facility]   11/02/2024

## 2024-11-03 NOTE — PLAN OF CARE
Problem: Stroke, Ischemic (Includes Transient Ischemic Attack)  Goal: Optimal Cognitive Function  Outcome: Progressing  Intervention: Optimize Cognitive Function  Flowsheets (Taken 11/2/2024 2325)  Environment Familiarity/Consistency: daily routine followed  Reorientation Measures:   calendar in view   clock in view  Sensory Stimulation Regulation:   auditory stimulation minimized   care clustered   lighting decreased   quiet environment promoted   tactile stimulation minimized   visual stimulation minimized  Goal: Improved Communication Skills  Outcome: Progressing  Intervention: Optimize Communication Skills  Flowsheets (Taken 11/2/2024 2325)  Communication Enhancement Strategies:   communication board used   call light answered in person  Goal: Optimal Functional Ability  Outcome: Progressing  Intervention: Optimize Functional Ability  Flowsheets (Taken 11/2/2024 2325)  Self-Care Promotion:   independence encouraged   BADL personal objects within reach   BADL personal routines maintained  Goal: Improved Sensorimotor Function  Outcome: Progressing  Intervention: Optimize Range of Motion, Motor Control and Function  Flowsheets (Taken 11/2/2024 2325)  Positioning/Transfer Devices:   pillows   wedge  Range of Motion:   active ROM (range of motion) encouraged   ROM (range of motion) performed  Goal: Effective Urinary Elimination  Outcome: Progressing  Intervention: Promote Effective Bladder Elimination  Flowsheets (Taken 11/2/2024 2325)  Urinary Elimination Promotion:   absorbent pad/diaper use encouraged   catheter patency maintained     Problem: Adult Inpatient Plan of Care  Goal: Plan of Care Review  Outcome: Progressing  Flowsheets (Taken 11/2/2024 2325)  Plan of Care Reviewed With: patient  Goal: Patient-Specific Goal (Individualized)  Outcome: Progressing  Flowsheets (Taken 11/2/2024 2325)  Individualized Care Needs: work w/ PT/OT/ST, q4 neuro check/NIH, march care, hold psych meds for now  Anxieties, Fears or  Concerns: none verbalized  Patient/Family-Specific Goals (Include Timeframe): none verbalized  Goal: Absence of Hospital-Acquired Illness or Injury  Outcome: Progressing  Intervention: Identify and Manage Fall Risk  Flowsheets (Taken 11/2/2024 2325)  Safety Promotion/Fall Prevention:   assistive device/personal item within reach   commode/urinal/bedpan at bedside   Fall Risk reviewed with patient/family   Fall Risk signage in place   high risk medications identified   medications reviewed   patient expresses understanding of fall risk and prevention   side rails raised x 3  Intervention: Prevent Skin Injury  Flowsheets (Taken 11/2/2024 2325)  Skin Protection:   incontinence pads utilized   skin sealant/moisture barrier applied   transparent dressing maintained  Device Skin Pressure Protection:   absorbent pad utilized/changed   positioning supports utilized   pressure points protected   tubing/devices free from skin contact  Intervention: Prevent and Manage VTE (Venous Thromboembolism) Risk  Flowsheets (Taken 11/2/2024 2325)  VTE Prevention/Management:   remove, assess skin, and reapply sequential compression device   bleeding risk assessed   bleeding precautions maintained   ROM (active) performed   ROM (passive) performed  Goal: Optimal Comfort and Wellbeing  Outcome: Progressing  Intervention: Monitor Pain and Promote Comfort  Flowsheets (Taken 11/2/2024 2325)  Pain Management Interventions:   care clustered   pain management plan reviewed with patient/caregiver   position adjusted   quiet environment facilitated   relaxation techniques promoted  Intervention: Provide Person-Centered Care  Flowsheets (Taken 11/2/2024 2325)  Trust Relationship/Rapport:   care explained   questions encouraged   choices provided   reassurance provided   emotional support provided   empathic listening provided   questions answered   thoughts/feelings acknowledged  Goal: Readiness for Transition of Care  Outcome: Progressing      Problem: Skin Injury Risk Increased  Goal: Skin Health and Integrity  Outcome: Progressing  Intervention: Optimize Skin Protection  Flowsheets (Taken 11/2/2024 6495)  Pressure Reduction Techniques:   frequent weight shift encouraged   heels elevated off bed   positioned off wounds   pressure points protected   weight shift assistance provided  Pressure Reduction Devices:   foam padding utilized   heel offloading device utilized   positioning supports utilized  Skin Protection:   incontinence pads utilized   skin sealant/moisture barrier applied   transparent dressing maintained     Problem: Infection  Goal: Absence of Infection Signs and Symptoms  Outcome: Met     Problem: Stroke, Ischemic (Includes Transient Ischemic Attack)  Goal: Optimal Coping  Outcome: Met  Goal: Effective Bowel Elimination  Outcome: Met  Goal: Optimal Cerebral Tissue Perfusion  Outcome: Met  Goal: Optimal Nutrition Intake  Outcome: Met  Goal: Effective Oxygenation and Ventilation  Outcome: Met  Goal: Safe and Effective Swallow  Outcome: Met     Problem: Bariatric Environmental Safety  Goal: Safety Maintained with Care  Outcome: Met     Problem: Wound  Goal: Optimal Coping  Outcome: Met  Goal: Optimal Functional Ability  Outcome: Met  Goal: Absence of Infection Signs and Symptoms  Outcome: Met  Goal: Improved Oral Intake  Outcome: Met  Goal: Optimal Pain Control and Function  Outcome: Met  Goal: Skin Health and Integrity  Outcome: Met  Goal: Optimal Wound Healing  Outcome: Met

## 2024-11-04 PROCEDURE — 21400001 HC TELEMETRY ROOM

## 2024-11-04 PROCEDURE — 25000003 PHARM REV CODE 250: Performed by: NURSE PRACTITIONER

## 2024-11-04 PROCEDURE — 25000003 PHARM REV CODE 250: Performed by: PSYCHIATRY & NEUROLOGY

## 2024-11-04 PROCEDURE — 97110 THERAPEUTIC EXERCISES: CPT | Mod: CQ

## 2024-11-04 PROCEDURE — 97535 SELF CARE MNGMENT TRAINING: CPT

## 2024-11-04 PROCEDURE — 97116 GAIT TRAINING THERAPY: CPT | Mod: CQ

## 2024-11-04 PROCEDURE — 25000003 PHARM REV CODE 250: Performed by: INTERNAL MEDICINE

## 2024-11-04 PROCEDURE — 25000003 PHARM REV CODE 250: Performed by: HOSPITALIST

## 2024-11-04 PROCEDURE — 63600175 PHARM REV CODE 636 W HCPCS: Performed by: INTERNAL MEDICINE

## 2024-11-04 RX ORDER — ASPIRIN 325 MG
325 TABLET, DELAYED RELEASE (ENTERIC COATED) ORAL DAILY
Status: DISCONTINUED | OUTPATIENT
Start: 2024-11-04 | End: 2024-11-11 | Stop reason: HOSPADM

## 2024-11-04 RX ORDER — ACETAMINOPHEN 325 MG/1
650 TABLET ORAL EVERY 6 HOURS PRN
Status: DISCONTINUED | OUTPATIENT
Start: 2024-11-05 | End: 2024-11-11 | Stop reason: HOSPADM

## 2024-11-04 RX ADMIN — MUPIROCIN: 20 OINTMENT TOPICAL at 08:11

## 2024-11-04 RX ADMIN — ACETAMINOPHEN 650 MG: 325 TABLET, FILM COATED ORAL at 11:11

## 2024-11-04 RX ADMIN — ASPIRIN 325 MG: 325 TABLET, COATED ORAL at 10:11

## 2024-11-04 RX ADMIN — ATORVASTATIN CALCIUM 80 MG: 40 TABLET, FILM COATED ORAL at 08:11

## 2024-11-04 RX ADMIN — PANTOPRAZOLE SODIUM 40 MG: 40 TABLET, DELAYED RELEASE ORAL at 08:11

## 2024-11-04 RX ADMIN — ENOXAPARIN SODIUM 40 MG: 40 INJECTION SUBCUTANEOUS at 03:11

## 2024-11-04 RX ADMIN — LEVETIRACETAM 500 MG: 500 TABLET, FILM COATED ORAL at 08:11

## 2024-11-04 RX ADMIN — LOSARTAN POTASSIUM 100 MG: 50 TABLET, FILM COATED ORAL at 08:11

## 2024-11-04 RX ADMIN — LEVOTHYROXINE SODIUM 200 MCG: 100 TABLET ORAL at 05:11

## 2024-11-04 NOTE — NURSING
At approximately 0130, patient called me saying she needed 'a rapid response NOW.' Upon entering the room, patient had thrown everything in her bed onto the floor, yelled at me for not bringing in more nurses and doctors, and said that she could not feel her legs.     When I went to turn on the light using the button on the call bell, the patient attempted to hit me.     I performed a full neuro assessment, no changes from my shift assessment. I asked the patient to close her eyes, and I touched her legs in random order, in random places, and she was able to tell me which legs were be touched w/ 100% accuracy.     Patient was turned on the wedge, covered up, and call bell placed within reach upon leaving the room.

## 2024-11-04 NOTE — PROGRESS NOTES
[facility]  Hospital Medicine Progress Note        Chief Complaint: Inpatient Follow-up for Altered Mental Status ( reports AMS x 3 days.  states pt usually presents this way when she has a UTI. Pt arrives GCS 12.)    HPI: Kasandra Cheek is a 64 y.o. female who  has a past medical history of Anemia, Anxiety, Depression, Dyspareunia, blood transfusions, HTN , Hypothyroidism, unspecified, Liver disease, Lumbar radiculopathy, Menopause, Migraine, Obesity, Osteoporosis, Other spondylosis with radiculopathy, lumbar region, Personal history of fall, Spinal stenosis, lumbar region with neurogenic claudication, TIA (transient ischemic attack) (1994), Unspecified osteoarthritis, unspecified site, and Urinary incontinence.. The patient presented to [facility] on 10/30/2024      64-year-old female with past medical history of anxiety/depression, hypertension, hypothyroidism, fatty liver disease, chronic pain syndrome presents to the emergency room with confusion for the last 72 hours.   is at the bedside helping give history.  Reports getting confused at home.  Some imbalance.  Blood pressure has been elevated.  On arrival to the emergency room she was not oriented.  GCS of 9.  Not following commands.  Vital signs show some mild hypertension but otherwise stable.  No fever.  She has no leukocytosis.  Mild metabolic acidosis appears to be chronic.  UDS was positive for opiates which she was prescribed.  Urinalysis was bland.  CT of the head performed in the ER showed remote infarcts involving the right frontal and parietal lobes which are new compared to scan performed 3 months ago.  Carotid ultrasound showed minimal stenosis of the left carotid and no stenosis of the right.  Echocardiogram is pending.  Neurology was consulted from the ER.  Recommending full stroke workup.  Added aspirin and statin.  Okay to normalize blood pressure.  She was being admitted to the hospitalist  group    Interval Hx:   10-31-24 Dr. English-reviewing Neurology notes there is more history that is important for the case.  Has been refers that around a year ago she had a gradual decline where she requires assistance to the bathroom and transferred to wheelchair.  Has been refers that her mental status waxes and wanes.  Refers that on Sunday she suddenly slumped over and was not really responding.  This prompted him to call EMS and was taken to the emergency room in [place].  In [place] did not much happened other than checking vital signs.  Once back home he took 4 people to get her out of the car because she was weak prompting him to take her to us [facility] for further evaluation where CT of the head was significant for right frontal and right parietal infarcts which are new from 07/24.    On physical examination she remains confused.  She knows-she is in [facility] but unable to say why, can recall present president, month or day.  Her strength is equally distributed over upper and lower extremities but very difficult for her to follow commands.  She is on aspirin and Keppra, her antidepressant has been placed on hold as well as her opiates and gabapentin.  EEG and MRI of the brain are pending      11/01/2024 Dr. English-chart reviewed patient examined.  MRI brain negative for acute CVA but as per Neurology recommendations we need to rule out recurrent right middle cerebral artery cardioembolic stroke with recommendations to start patient on aspirin 325 mg p.o. daily, discontinue Celebrex, start the patient on Keppra 500 mg p.o. b.i.d., issue with polypharmacy including multiple antidepressants/opioids/gabapentin.  Patient will need Holter times 2 weeks on discharge with follow-up with Rabia Bell upon discharge and if Holter negative patient will need loop recorder due to high suspicion for cardioembolic etiology.  The patient cleared for discharge once new findings of suspected  thoracic soft tissue fluid collection findings post thoracic surgery by Neurosurgery.  We will go ahead and consult Neurosurgery Dr. Hawthorne    Her LOC is better today, much more alert but still with periods of confusion.  She is still thinks that misha Dixon is the president.    We will feed follow recommendations by Neurosurgery.  Sed rate/C-reactive protein to follow.  Blood cultures drawn on admission negative.  CT a neck negative for carotid stenosis/EEG negative/echocardiogram with left atrium dilated and bubble study could not be assessed.    11/02/2024 Dr. English-clare reviewed patient examined.  Impressive improvement in the last 24 hours when it comes to level of consciousness/orientation/vocabulary/thoughts.  Case was discussed with Psychiatry we decided to continue to hold all psych meds.  Patient has been seen by Neurosurgery .  CT thoracic lumbar spine with contrast done.  Physical therapy recommended moderate intensity therapy.  We will consult  pending final neurosurgery evaluation of CT thoracic/lumbar spine with IV contrast    11/03/2024-Dr. Rod reviewed patient examined.  Patient continues to improve after she was taken all of her medications including opioids/gabapentin/muscle relaxant/anxiolytics/antidepressants.  Still patient will require to be placed secondary to very unsteady gait.  She is alert active and oriented at the present moment.  CT thoracic/lumbar spine with contrast did show fluid collection.  Dr. Rosario with Neurosurgery to go over films tomorrow.  Patient has no signs of infection.  There is no erythema over surgical wound/drainage or pain to palpation.  Inflammatory markers are within normal limits well as vital signs and white blood cell count.                  Case was discussed with patient's nurse and  on the floor.  Objective/physical exam:  General: In no acute distress, afebrile. More alert but confused   Chest: Clear to auscultation  bilaterally  Heart: RRR, +S1, S2, no appreciable murmur  Abdomen: Soft, nontender, BS +  Back-mid thoracic stuff tissue swelling suspicious for fluid collection  MSK: Warm, no lower extremity edema, no clubbing or cyanosis  Neurologic: Alert but disoriented to time/month/day of the week/president.  Having trouble following commands.  Cranial nerve II-XII intact, strength is decrease but appears to be equally distributed distributed over upper and lower extremities    VITAL SIGNS: 24 HRS MIN & MAX LAST   Temp  Min: 97.5 °F (36.4 °C)  Max: 98.6 °F (37 °C) 97.6 °F (36.4 °C)   BP  Min: 139/68  Max: 174/95 139/68   Pulse  Min: 84  Max: 124  86   Resp  Min: 18  Max: 20 20   SpO2  Min: 92 %  Max: 98 % (!) 93 %     I have reviewed the following labs:  Recent Labs   Lab 10/30/24  1219 10/31/24  0443 11/01/24  0619   WBC 9.12 8.57 9.11   RBC 4.13* 3.97* 4.11*   HGB 12.9 12.5 12.7   HCT 37.7 37.7 38.7   MCV 91.3 95.0* 94.2*   MCH 31.2* 31.5* 30.9   MCHC 34.2 33.2 32.8*   RDW 13.3 13.4 13.2    342 278   MPV 8.9 9.0 9.1     Recent Labs   Lab 10/28/24  1947 10/30/24  1219 10/31/24  0443 11/01/24  0619   NA  --  142 143 144   K  --  3.7 3.5 3.7   CL  --  110* 110* 111*   CO2  --  20* 23 23   BUN  --  14.9 20.8* 18.1   CREATININE  --  0.71 0.75 0.63   CALCIUM  --  9.0 9.4 8.6   PH 7.571*  --   --   --    MG  --  1.90 1.90  --    ALBUMIN  --  3.4 3.3* 3.2*   ALKPHOS  --  161* 158* 145   ALT  --  26 25 21   AST  --  42* 30 26   BILITOT  --  0.7 0.5 0.5     Microbiology Results (last 7 days)       Procedure Component Value Units Date/Time    Stool Culture [[id number]] Collected: 11/03/24 1424    Order Status: Sent Specimen: Stool Updated: 11/03/24 1447             See below for Radiology    Assessment/Plan:  Remote appearing CVA, right frontal and parietal lobes by ct  Metabolic/toxic encephalopathy -much improved  polypharmacy-opioid/antidepressants/gabapentin/anxiolytics.  All meds discontinued with much  improvement/resolution of confusion  Mild metabolic acidosis  Iron-deficiency anemia   Hypothyroidism -TSH 4.3  Essential hypertension   Chronic pain syndrome-controlled on Percocet 10 mg p.o. Q 6 hours p.r.n. pain.  Multiple psychiatric conditions including depression and anxiety-psychiatry consulted, we decided to continue to hold all psychiatric medications for depression/anxiety/opioids/muscle relaxants with much improving in level of consciousness..  Recent thoracic vertebra surgery by Neurosurgery 3 weeks ago by Dr. Rosario with the development of soft tissue swelling highly suspicious for fluid collection, most likely seroma.-Dr. Rosario consulted, Dr. Hyde on-call.  CT thoracic/lumbar spine with IV contrast ordered with findings of large fluid collection, most likely seroma.  Dr. Rosario to re-evaluate tomorrow.    Patient was seen by Physical therapy with unsteady gait and necessity for placement.      Stroke workup:  -MRI brain (7/24 )with chronic right MCA infarction/chronic small-vessel ischemic changes  -MRI brain without contrast 11/01/2024 with no acute intracranial finding/chronic ischemic changes with encephalomalacia in the right cerebral hemisphere/right MCA territory suspicious for cardioembolic process  -CT head-remote appearing infarcts right frontal and parietal lobes new compared to July 20, 2024  -CTA head/neck no large vessel occlusion or flow-limiting stenosis  -Echocardiogram with ejection fraction 55-60% with grade 1 diastolic dysfunction.  Unable to determine where the bubble study was positive  -Carotid ultrasound with right internal carotid patent with no evidence stenosis.  Left internal carotid with less than 50% stenosis.  Bilateral vertebral arteries are patent with antegrade flow  -hemoglobin A1c 5.2   -TSH 4.3  -cholesterol 217/  -EEG 11/01/2024 negative    Inpatient medications aspirin 325/Keppra/statin    Recs by neurology:  - start aspirin 325 mg, lipitor 80 mg  - discontinue  home celecoxib  - discontinue gabapentin  - start keppra 500 mg bid   - holter monitor x 2 weeks on discharge  - follow up in clinic with Rabia Cosme NP in 2 months with holter results. If Holter is negative, she will need a loop recorder due to high suspicion for cardioembolic etiology.   - PT/OT            VTE prophylaxis:  Lovenox    Patient condition: Fair    Anticipated discharge and Disposition:   To be determined      All diagnosis and differential diagnosis have been reviewed; assessment and plan has been documented; I have personally reviewed the labs and test results that are presently available; I have reviewed the patients medication list; I have reviewed the consulting providers response and recommendations. I have reviewed or attempted to review medical records based upon their availability    All of the patient's questions have been  addressed and answered. Patient's is agreeable to the above stated plan. I will continue to monitor closely and make adjustments to medical management as needed.    Portions of this note dictated using EMR integrated voice recognition software, and may be subject to voice recognition errors not corrected at proofreading. Please contact writer for clarification if needed.   _____________________________________________________________________    Malnutrition Status:    Scheduled Med:   atorvastatin  80 mg Oral Daily    enoxparin  40 mg Subcutaneous Q24H (prophylaxis, 1700)    levETIRAcetam  500 mg Oral BID    [START ON 11/4/2024] levothyroxine  200 mcg Oral Before breakfast    losartan  100 mg Oral Daily    mupirocin   Nasal BID    pantoprazole  40 mg Oral BID      Continuous Infusions:       PRN Meds:    Current Facility-Administered Medications:     bisacodyL, 10 mg, Rectal, Daily PRN    hydrALAZINE, 10 mg, Intravenous, Q4H PRN    labetalol, 10 mg, Intravenous, Q6H PRN    oxyCODONE-acetaminophen, 1 tablet, Oral, Q6H PRN     Radiology:  I have personally reviewed the  following imaging and agree with the radiologist.     CT Lumbar Spine With Contrast  EXAMINATION  CT LUMBAR SPINE WITH CONTRAST    CLINICAL HISTORY  rule out fluid collection;    TECHNIQUE  Helical-acquisition CT images were obtained following the intravenous administration of iodine-based contrast media.  Multiplanar reconstructions were accomplished by a CT technologist at a separate workstation and pushed to PACS for physician review.    CONTRAST  IV: Omnipaque 350, 100 mL    COMPARISON  28 August 2024 abdominopelvic CT    FINDINGS  Images were reviewed in soft tissue and bone windows.    Exam quality: There is notable limitation secondary to extensive multilevel posterior spine stabilization hardware and associated widespread metal streak artifact.    Enhancement: No definite evidence of focal abnormal enhancement is appreciated.  However, circumscribed fluid collection with faint peripheral enhancement is not entirely excluded through the midline lumbar posterior surgical bed spanning T11 through L5.  In the    There are similar advanced spondylitic changes throughout the visualized spinal column with associated T12-L1 destructive endplate changes.  Interval surgical revision of posterior fusion hardware is noted, now spanning from the sacroiliac joints and lumbosacral junction cranially beyond the included lower thoracic spine.  There are also new postoperative changes of spinal canal decompression at T12 through L3.    No acutely displaced fracture, vertebral body compression deformity, or interval change in spinal column alignment.  There is no convincing new focal abnormality of the partially visualized retroperitoneal structures.    IMPRESSION  1. Interval postoperative changes with revision of multilevel thoracolumbosacral stabilization hardware and new T12 through L3 posterior spinal canal decompression.  2. Regional soft tissues are of limited evaluation secondary to extensive metal artifact, but there  are findings suspicious for large peripherally enhancing fluid collection through the posterior surgical bed spanning T11 through L5.  3. No evidence of acute or worsening focal osseous abnormality.  ==========  This report was flagged in Epic as abnormal.    RADIATION DOSE  Automated tube current modulation, weight-based exposure dosing, and/or iterative reconstruction technique utilized to reach lowest reasonably achievable exposure rate.    DLP: 1387 mGy*cm    Electronically signed by: Tirso Decker  Date:    11/02/2024  Time:    16:34  CT Thoracic Spine With Contrast  Narrative: CLINICAL HISTORY:  Trauma.    TECHNIQUE:  CT of the thoracic spine Without contrast. Sagittal and coronal reconstructions were performed on the source images.    Automatic exposure control was utilized to reduce the patient's radiation dose.    DLP = 1252    COMPARISON:  No prior imaging available for comparison.    FINDINGS:  Postsurgical changes of lower thoracic fusion with erosive changes and T12-L1.  Surgical fixation and posterior decompression is new in the interval, however erosive changes similar to prior abdominal CT from August 24.  Posterior to the decompression is a subcutaneous fluid collection extending throughout the spinal hardware.  Limited evaluation secondary to metallic artifact throughout.  Fluid is of unknown etiology.  Further evaluation may be obtained with ultrasound if needed.  Impression: Postsurgical change with no acute fracture.  Fluid is noted midline posterior to surgical hardware.  Further evaluation may be obtained with ultrasound if needed.  Likely seroma versus CSF fluid.  No gross CT signs of infection.    Electronically signed by: Logan Cedeno  Date:    11/02/2024  Time:    16:09      Lennox English MD  Department of Hospital Medicine   [facility]   11/03/2024

## 2024-11-04 NOTE — PLAN OF CARE
Problem: Stroke, Ischemic (Includes Transient Ischemic Attack)  Goal: Optimal Cognitive Function  Outcome: Progressing  Goal: Improved Communication Skills  Outcome: Progressing  Goal: Optimal Functional Ability  Outcome: Progressing  Goal: Improved Sensorimotor Function  Outcome: Progressing  Goal: Effective Urinary Elimination  Outcome: Progressing     Problem: Adult Inpatient Plan of Care  Goal: Plan of Care Review  Outcome: Progressing  Goal: Patient-Specific Goal (Individualized)  Outcome: Progressing  Goal: Absence of Hospital-Acquired Illness or Injury  Outcome: Progressing  Goal: Optimal Comfort and Wellbeing  Outcome: Progressing  Goal: Readiness for Transition of Care  Outcome: Progressing     Problem: Skin Injury Risk Increased  Goal: Skin Health and Integrity  Outcome: Progressing

## 2024-11-04 NOTE — PLAN OF CARE
11/04/24 1348   Discharge Reassessment   Assessment Type Discharge Planning Reassessment   Did the patient's condition or plan change since previous assessment? Yes   Discharge Plan discussed with: Patient;Spouse/sig other   Communicated CHUCK with patient/caregiver Date not available/Unable to determine   Discharge Plan A Skilled Nursing Facility   Discharge Plan B Skilled Nursing Facility   DME Needed Upon Discharge    (TBD)   Post-Acute Status   Post-Acute Authorization Placement   Post-Acute Placement Status Patient List Provided   Patient choice form signed by patient/caregiver List with quality metrics by geographic area provided     Spoke to pt and  in room about Skilled Nursing facility and provided with list. He would like to visit a couple of the facilities before making a decision.

## 2024-11-04 NOTE — PROGRESS NOTES
Care update:     Patient now sitting up in the chair and I was able to personally inspect the wound.  She has a fresh clean and dry dressing.  I did expose the wound and it was clean and dry with some localized swelling around the edges/borders.      Plan:   Fresh dressing daily  Keep clean and dry  Pain control  Plan as before

## 2024-11-04 NOTE — PROGRESS NOTES
"[facility] - 4th Floor Medical Telemetry  Neurosurgery  Progress Note    Subjective:     Interval History:    Follow up lumbar fusion on 10/11/2024.  Patient evaluated for CVA per Neurology recommendations rule out reoccurring right middle cerebral artery cardioembolic stroke.    Sitting up in bed.    No acute issues overnight.    Mentation improving.        Post-Op Info:  * No surgery found *          Medications:  Continuous Infusions:  Scheduled Meds:   aspirin  325 mg Oral Daily    atorvastatin  80 mg Oral Daily    enoxparin  40 mg Subcutaneous Q24H (prophylaxis, 1700)    levETIRAcetam  500 mg Oral BID    levothyroxine  200 mcg Oral Before breakfast    losartan  100 mg Oral Daily    mupirocin   Nasal BID    pantoprazole  40 mg Oral BID     PRN Meds:  Current Facility-Administered Medications:     bisacodyL, 10 mg, Rectal, Daily PRN    hydrALAZINE, 10 mg, Intravenous, Q4H PRN    labetalol, 10 mg, Intravenous, Q6H PRN    oxyCODONE-acetaminophen, 1 tablet, Oral, Q6H PRN     Review of Systems  Objective:     Weight: (!) 136.1 kg (300 lb 0.7 oz)  Body mass index is 46.99 kg/m².  Vital Signs (Most Recent):  Temp: 97.8 °F (36.6 °C) (11/04/24 1115)  Pulse: 110 (11/04/24 1115)  Resp: 20 (11/04/24 1115)  BP: (!) 141/82 (11/04/24 1115)  SpO2: 97 % (11/04/24 1115) Vital Signs (24h Range):  Temp:  [97.5 °F (36.4 °C)-98.8 °F (37.1 °C)] 97.8 °F (36.6 °C)  Pulse:  [] 110  Resp:  [18-20] 20  SpO2:  [92 %-97 %] 97 %  BP: (126-174)/(68-95) 141/82                              Urethral Catheter 10/30/24 1401 (Active)   Site Assessment Clean;Intact 11/04/24 0800   Collection Container Standard drainage bag 11/04/24 0800   Securement Method secured to top of thigh w/ adhesive device 11/04/24 0800   Catheter Care Performed yes 11/04/24 0800   Reason for Continuing Urinary Catheterization Urinary retention 11/04/24 0800   CAUTI Prevention Bundle Securement Device in place with 1" slack 11/04/24 0800   Output (mL) " "450 mL 11/03/24 9685       Neurosurgery Physical Exam    Awake and oriented with a GCS of 15   PERRLA bilateral brisk   Moving all extremities   Speech clear       Significant Labs:  No results for input(s): "GLU", "NA", "K", "CL", "CO2", "BUN", "CREATININE", "CALCIUM", "MG" in the last 48 hours.  No results for input(s): "WBC", "HGB", "HCT", "PLT" in the last 48 hours.  No results for input(s): "LABPT", "INR", "APTT" in the last 48 hours.  Microbiology Results (last 7 days)       Procedure Component Value Units Date/Time    Stool Culture [[id number]]  (Normal) Collected: 11/03/24 1424    Order Status: Completed Specimen: Stool Updated: 11/04/24 0902     Stool Culture Negative for Salmonella, Shigella, Campylobacter, Vibrio, Aeromonas, Pleisiomonas,Yersinia, or Shiga Toxin 1 and 2.        -MRI brain (7/24 )with chronic right MCA infarction/chronic small-vessel ischemic changes  -MRI brain without contrast 11/01/2024 with no acute intracranial finding/chronic ischemic changes with encephalomalacia in the right cerebral hemisphere/right MCA territory suspicious for cardioembolic process  -CT head-remote appearing infarcts right frontal and parietal lobes new compared to July 20, 2024  -CTA head/neck no large vessel occlusion or flow-limiting stenosis  -Echocardiogram with ejection fraction 55-60% with grade 1 diastolic dysfunction.  Unable to determine where the bubble study was positive  -Carotid ultrasound with right internal carotid patent with no evidence stenosis.  Left internal carotid with less than 50% stenosis.  Bilateral vertebral arteries are patent with antegrade flow    Assessment/Plan:    Neurology has made recommendations for Holter monitor and possible loop recorder to rule out cardioembolic etiology.  Every 4 hour neurological exams   Hospitalist following   PTOT   TLSO brace   Fall precautions  Psych consult  Neurology following-recommended aspirin 325 and Lipitor.  Keppra, seizure precautions  SCDs " and Lovenox    Nothing else to add from a neurosurgical standpoint.         Active Diagnoses:    Diagnosis Date Noted POA    PRINCIPAL PROBLEM:  Altered mental status [R41.82] 10/30/2024 Yes    Acute CVA (cerebrovascular accident) [I63.9] 10/31/2024 Unknown      Problems Resolved During this Admission:       MARY Lucia-BC  Neurosurgery  [facility] - 4th Floor Medical Telemetry

## 2024-11-04 NOTE — PT/OT/SLP PROGRESS
Physical Therapy Treatment    Patient Name:  Kasandra Cheek   MRN:  [id number]    Recommendations:     Discharge therapy intensity: Moderate Intensity Therapy   Discharge Equipment Recommendations: to be determined by next level of care  Barriers to discharge: Decreased caregiver support, Impaired mobility, and Ongoing medical needs    Assessment:     Kasandra Cheek is a 64 y.o. female with a medical diagnosis of metabolic encephalopathy, chronic R MCA infarction/chronic small vessel ischemic changes, polypharmacy, mild metabolic acidosis, depression, and anxiety. Recent spinal surgery with Dr. Mejia, extended chart review completed following evaluation. Pt had T10-L3 decompression, removal of right S1 screw, replacement of right S2 iliac bolt, and T10-S2 fusion.     Rehab Prognosis: Good; patient would benefit from acute skilled PT services to address these deficits and reach maximum level of function.    Recent Surgery: * No surgery found *      Plan:     During this hospitalization, patient would benefit from acute PT services 5 x/week to address the identified rehab impairments via gait training, therapeutic activities, therapeutic exercises, neuromuscular re-education and progress toward the following goals:    Plan of Care Expires:  11/29/24    Subjective     Chief Complaint: weakness in LE's    Objective:     Communicated with nurse prior to session.  Patient found up in chair with march catheter, peripheral IV, pulse ox (continuous), telemetry upon PT entry to room.     General Precautions: Standard, fall  Orthopedic Precautions: spinal precautions  Braces: TLSO  Respiratory Status: Room air  Blood Pressure:   Skin Integrity: coverlet dressing thoracic spine      Functional Mobility:  Min assist with STS and gait 20 ft x 3 RW min assist and chair in tow to ensure pt's safety.   Pt performed LE PRE's to increase strenth, ROM, and endurance to improve overall independence.    Education Provided:  Role and goals of  PT, transfer training, bed mobility, gait training, balance training, safety awareness, assistive device, strengthening exercises, and importance of participating in PT to return to PLOF.    Patient left up in chair with all lines intact and call button in reach    GOALS:   Multidisciplinary Problems       Physical Therapy Goals          Problem: Physical Therapy    Goal Priority Disciplines Outcome Interventions   Physical Therapy Goal     PT, PT/OT Progressing    Description: Goals to be met by: 2024     Patient will increase functional independence with mobility by performin. Supine to sit with Stand-by Assistance  2. Sit to stand transfer with Stand-by Assistance  3. Bed to chair transfer with Stand-by Assistance using Rolling Walker  4. Gait  x 150 feet with Stand-by Assistance using Rolling Walker.   5. Stand for 10 minutes with Stand-by Assistance using Rolling Walker                         Time Tracking:     Billable Minutes: Gait Training 13 and Therapeutic Exercise 10    Treatment Type: Treatment  PT/PTA: PTA     Number of PTA visits since last PT visit: 1     2024

## 2024-11-04 NOTE — PLAN OF CARE
11/04/24 0818   Medicare Message   Important Message from Medicare regarding Discharge Appeal Rights Given to patient/caregiver;Explained to patient/caregiver

## 2024-11-04 NOTE — PT/OT/SLP PROGRESS
Occupational Therapy   Treatment    Name: Kasandra Cheek  MRN: [id number]  Admitting Diagnosis:  Altered mental status       Recommendations:     Recommended therapy intensity at discharge: Moderate Intensity Therapy   Discharge Equipment Recommendations:  to be determined by next level of care  Barriers to discharge:   (ongoing medical needs; placement)    Assessment:     Kasandra Cheek is a 64 y.o. female with a medical diagnosis of metabolic encephalopathy, chronic R MCA infarction/chronic small vessel ischemic changes, polypharmacy, mild metabolic acidosis, depression, and anxiety. Recent spinal surgery with Dr. Mejia, extended chart review completed following evaluation. Pt had T10-L3 decompression, removal of right S1 screw, replacement of right S2 iliac bolt, and T10-S2 fusion.     Performance deficits affecting function are weakness, impaired endurance, impaired self care skills, impaired functional mobility, impaired balance, impaired cognition, decreased safety awareness, pain.     Patient requires assist for all ADLs and mobility. Patient would benefit from moderate intensity therapy upon discharge.    Rehab Prognosis:  Good; patient would benefit from acute skilled OT services to address these deficits and reach maximum level of function.       Plan:     Patient to be seen 5 x/week to address the above listed problems via self-care/home management, therapeutic activities, therapeutic exercises  Plan of Care Expires: 12/01/24  Plan of Care Reviewed with: patient, spouse    Subjective     Pain/Comfort:  Pain Rating 1: 0/10    Objective:     Communicated with: nurse prior to session.  Patient found HOB elevated with peripheral IV, telemetry, pulse ox (continuous), march catheter upon OT entry to room.    General Precautions: Standard, fall    Orthopedic Precautions:spinal precautions  Braces: TLSO  Respiratory Status: Room air     Occupational Performance:     Bed Mobility:    Patient completed Supine to Sit  with moderate assistance and 2 persons     Functional Mobility/Transfers:  Patient completed Sit <> Stand Transfer with moderate assistance  with  rolling walker   Patient completed Bed <> Chair Transfer using Stand Pivot technique with moderate assistance with rolling walker  Functional Mobility: patient with max verbal cues for hand placement and safety during transfer    Activities of Daily Living:  Grooming: minimum assistance styling hair, brushing teeth, and washing face; patient unable to squeeze toothpaste onto toothbrush  Lower Body Dressing: total assistance lamar socks; lamar brief in chair  Toileting: dependence catheter in place  Upper Body Dressing: total A to lamar brace    [facility] 6 Click ADL: 14    Patient Education:  Patient and spouse were provided with verbal education education regarding OT role/goals/POC, post op precautions, fall prevention, safety awareness, and Discharge/DME recommendations.  Understanding was verbalized, however additional teaching warranted.      Patient left up in chair with all lines intact, call button in reach, nurse notified, and  and CNA present.    GOALS:   Multidisciplinary Problems       Occupational Therapy Goals          Problem: Occupational Therapy    Goal Priority Disciplines Outcome Interventions   Occupational Therapy Goal     OT, PT/OT Progressing    Description: Goals to be met by 12/1/2024    Pt will complete feeding tasks with modified independence  Pt will complete grooming sitting in chair with SBA.  Pt will complete UB dressing with mod A.  Pt will complete LB dressing with max A using LRAD.  Pt will complete toileting with max A using LRAD.                         Time Tracking:     OT Date of Treatment: 11/04/24  OT Start Time: 1050  OT Stop Time: 1115  OT Total Time (min): 25 min    Billable Minutes:Self Care/Home Management 25    OT/INDRA: OT     Number of INDRA visits since last OT visit: 1 11/4/2024

## 2024-11-04 NOTE — PLAN OF CARE
Problem: Stroke, Ischemic (Includes Transient Ischemic Attack)  Goal: Optimal Cognitive Function  Outcome: Progressing  Intervention: Optimize Cognitive Function  Flowsheets (Taken 11/3/2024 2300)  Environment Familiarity/Consistency:   daily routine followed   familiar objects from home provided  Reorientation Measures:   calendar in view   clock in view  Sensory Stimulation Regulation:   auditory stimulation minimized   care clustered   quiet environment promoted   tactile stimulation minimized   visual stimulation minimized  Goal: Improved Communication Skills  Outcome: Progressing  Intervention: Optimize Communication Skills  Flowsheets (Taken 11/3/2024 2300)  Communication Enhancement Strategies:   call light answered in person   communication board used   extra time allowed for response   family involved in communication plan   repetition utilized   verbal and visual cues paired  Goal: Optimal Functional Ability  Outcome: Progressing  Intervention: Optimize Functional Ability  Flowsheets (Taken 11/3/2024 2300)  Self-Care Promotion:   independence encouraged   BADL personal objects within reach   BADL personal routines maintained  Goal: Improved Sensorimotor Function  Outcome: Progressing  Intervention: Optimize Range of Motion, Motor Control and Function  Flowsheets (Taken 11/3/2024 2300)  Spasticity Management: positioned with supportive device  Positioning/Transfer Devices:   pillows   wedge  Range of Motion:   active ROM (range of motion) encouraged   ROM (range of motion) performed  Intervention: Optimize Sensory and Perceptual Ability  Flowsheets (Taken 11/3/2024 2300)  Pressure Reduction Techniques:   frequent weight shift encouraged   heels elevated off bed   positioned off wounds   pressure points protected   weight shift assistance provided  Pressure Reduction Devices:   foam padding utilized   heel offloading device utilized   positioning supports utilized  Goal: Effective Urinary  Elimination  Outcome: Progressing  Intervention: Promote Effective Bladder Elimination  Flowsheets (Taken 11/3/2024 2300)  Urinary Elimination Promotion:   absorbent pad/diaper use encouraged   catheter patency maintained   toileting offered     Problem: Adult Inpatient Plan of Care  Goal: Plan of Care Review  Outcome: Progressing  Flowsheets (Taken 11/3/2024 2300)  Plan of Care Reviewed With:   patient   spouse  Goal: Patient-Specific Goal (Individualized)  Outcome: Progressing  Flowsheets (Taken 11/3/2024 2300)  Individualized Care Needs: work w/ PT/OT/ST, q4 neuro checks/NIH, march care, hold psych meds  Anxieties, Fears or Concerns: none verbalized  Patient/Family-Specific Goals (Include Timeframe): none verbalized  Goal: Absence of Hospital-Acquired Illness or Injury  Outcome: Progressing  Intervention: Identify and Manage Fall Risk  Flowsheets (Taken 11/3/2024 2300)  Safety Promotion/Fall Prevention:   assistive device/personal item within reach   bed alarm set   commode/urinal/bedpan at bedside   Fall Risk reviewed with patient/family   Fall Risk signage in place   medications reviewed   nonskid shoes/socks when out of bed   patient expresses understanding of fall risk and prevention   room near unit station   side rails raised x 3  Intervention: Prevent Skin Injury  Flowsheets (Taken 11/3/2024 2300)  Skin Protection:   incontinence pads utilized   skin sealant/moisture barrier applied   transparent dressing maintained  Device Skin Pressure Protection:   absorbent pad utilized/changed   positioning supports utilized   pressure points protected   tubing/devices free from skin contact  Intervention: Prevent and Manage VTE (Venous Thromboembolism) Risk  Flowsheets (Taken 11/3/2024 2301)  VTE Prevention/Management:   bleeding risk assessed   bleeding precautions maintained   remove, assess skin, and reapply sequential compression device   ROM (active) performed   ROM (passive) performed  Intervention: Prevent  Infection  Flowsheets (Taken 11/3/2024 2300)  Infection Prevention:   cohorting utilized   environmental surveillance performed   equipment surfaces disinfected   hand hygiene promoted   personal protective equipment utilized   rest/sleep promoted   single patient room provided  Goal: Optimal Comfort and Wellbeing  Outcome: Progressing  Intervention: Monitor Pain and Promote Comfort  Flowsheets (Taken 11/3/2024 2300)  Pain Management Interventions:   care clustered   pain management plan reviewed with patient/caregiver   pillow support provided   position adjusted   quiet environment facilitated   relaxation techniques promoted  Intervention: Provide Person-Centered Care  Flowsheets (Taken 11/3/2024 2300)  Trust Relationship/Rapport:   care explained   choices provided   emotional support provided   empathic listening provided   questions answered   questions encouraged   reassurance provided   thoughts/feelings acknowledged  Goal: Readiness for Transition of Care  Outcome: Progressing     Problem: Skin Injury Risk Increased  Goal: Skin Health and Integrity  Outcome: Progressing  Intervention: Optimize Skin Protection  Flowsheets (Taken 11/3/2024 2300)  Pressure Reduction Techniques:   frequent weight shift encouraged   heels elevated off bed   positioned off wounds   pressure points protected   weight shift assistance provided  Pressure Reduction Devices:   foam padding utilized   heel offloading device utilized   positioning supports utilized  Skin Protection:   incontinence pads utilized   skin sealant/moisture barrier applied   transparent dressing maintained  Intervention: Promote and Optimize Oral Intake  Flowsheets (Taken 11/3/2024 2300)  Oral Nutrition Promotion:   nutrition counseling provided   physical activity promoted  Nutrition Interventions:   diet adjusted   food preferences provided

## 2024-11-05 LAB
BACTERIA STL CULT: NORMAL
POCT GLUCOSE: 103 MG/DL (ref 70–110)
POCT GLUCOSE: 121 MG/DL (ref 70–110)

## 2024-11-05 PROCEDURE — 25000003 PHARM REV CODE 250: Performed by: NURSE PRACTITIONER

## 2024-11-05 PROCEDURE — 25000003 PHARM REV CODE 250: Performed by: INTERNAL MEDICINE

## 2024-11-05 PROCEDURE — 63600175 PHARM REV CODE 636 W HCPCS: Performed by: INTERNAL MEDICINE

## 2024-11-05 PROCEDURE — 97116 GAIT TRAINING THERAPY: CPT

## 2024-11-05 PROCEDURE — 25000003 PHARM REV CODE 250: Performed by: PSYCHIATRY & NEUROLOGY

## 2024-11-05 PROCEDURE — 97530 THERAPEUTIC ACTIVITIES: CPT

## 2024-11-05 PROCEDURE — 21400001 HC TELEMETRY ROOM

## 2024-11-05 PROCEDURE — 97535 SELF CARE MNGMENT TRAINING: CPT

## 2024-11-05 RX ORDER — CARVEDILOL 3.12 MG/1
3.12 TABLET ORAL 2 TIMES DAILY
Status: DISCONTINUED | OUTPATIENT
Start: 2024-11-05 | End: 2024-11-11 | Stop reason: HOSPADM

## 2024-11-05 RX ADMIN — PANTOPRAZOLE SODIUM 40 MG: 40 TABLET, DELAYED RELEASE ORAL at 09:11

## 2024-11-05 RX ADMIN — ATORVASTATIN CALCIUM 80 MG: 40 TABLET, FILM COATED ORAL at 08:11

## 2024-11-05 RX ADMIN — ENOXAPARIN SODIUM 40 MG: 40 INJECTION SUBCUTANEOUS at 03:11

## 2024-11-05 RX ADMIN — ACETAMINOPHEN 650 MG: 325 TABLET, FILM COATED ORAL at 03:11

## 2024-11-05 RX ADMIN — LEVETIRACETAM 500 MG: 500 TABLET, FILM COATED ORAL at 09:11

## 2024-11-05 RX ADMIN — ASPIRIN 325 MG: 325 TABLET, COATED ORAL at 08:11

## 2024-11-05 RX ADMIN — PANTOPRAZOLE SODIUM 40 MG: 40 TABLET, DELAYED RELEASE ORAL at 08:11

## 2024-11-05 RX ADMIN — LOSARTAN POTASSIUM 100 MG: 50 TABLET, FILM COATED ORAL at 08:11

## 2024-11-05 RX ADMIN — CARVEDILOL 3.12 MG: 3.12 TABLET, FILM COATED ORAL at 09:11

## 2024-11-05 RX ADMIN — LEVOTHYROXINE SODIUM 200 MCG: 100 TABLET ORAL at 07:11

## 2024-11-05 RX ADMIN — ACETAMINOPHEN 650 MG: 325 TABLET, FILM COATED ORAL at 07:11

## 2024-11-05 RX ADMIN — LEVETIRACETAM 500 MG: 500 TABLET, FILM COATED ORAL at 08:11

## 2024-11-05 NOTE — PROGRESS NOTES
[facility] 4th Floor Medical Telemetry  Neurosurgery  Progress Note    Subjective:     Interval History:    Follow up lumbar fusion on 10/11/2024.  Patient evaluated for CVA per Neurology recommendations rule out reoccurring right middle cerebral artery cardioembolic stroke.    Sitting up in the chair.  Some flight of ideas and changes subject a lot in conversation.  Discussed this with hospitalist Dr. English he agrees.  Very difficult to have a conversation with her especially about placement for safety purposes.    Personally inspected lumbar wound.  A new dressing has been placed over wound.  Clean and dry with a minimal edema around the edges as before.    Patient is wearing brace and ambulated with physical therapy earlier.          Post-Op Info:  * No surgery found *          Medications:  Continuous Infusions:  Scheduled Meds:   aspirin  325 mg Oral Daily    atorvastatin  80 mg Oral Daily    enoxparin  40 mg Subcutaneous Q24H (prophylaxis, 1700)    levETIRAcetam  500 mg Oral BID    levothyroxine  200 mcg Oral Before breakfast    losartan  100 mg Oral Daily    pantoprazole  40 mg Oral BID     PRN Meds:  Current Facility-Administered Medications:     acetaminophen, 650 mg, Oral, Q6H PRN    bisacodyL, 10 mg, Rectal, Daily PRN    hydrALAZINE, 10 mg, Intravenous, Q4H PRN    labetalol, 10 mg, Intravenous, Q6H PRN    oxyCODONE-acetaminophen, 1 tablet, Oral, Q6H PRN     Review of Systems  Objective:     Weight: (!) 136.1 kg (300 lb 0.7 oz)  Body mass index is 46.99 kg/m².  Vital Signs (Most Recent):  Temp: 98.2 °F (36.8 °C) (11/05/24 1135)  Pulse: 93 (11/05/24 1135)  Resp: 18 (11/05/24 1135)  BP: (!) 158/79 (11/05/24 1135)  SpO2: 95 % (11/05/24 1135) Vital Signs (24h Range):  Temp:  [97.9 °F (36.6 °C)-98.7 °F (37.1 °C)] 98.2 °F (36.8 °C)  Pulse:  [] 93  Resp:  [18-20] 18  SpO2:  [94 %-97 %] 95 %  BP: (122-158)/(71-83) 158/79     Date 11/05/24 0700 - 11/06/24 0659   Shift 5024-9762 7738-7086  "5757-1574 24 Hour Total   INTAKE   Shift Total(mL/kg)       OUTPUT   Urine(mL/kg/hr) 100   100   Shift Total(mL/kg) 100(0.7)   100(0.7)   Weight (kg) 136.1 136.1 136.1 136.1                            Urethral Catheter 10/30/24 1401 (Active)   Site Assessment Clean;Intact 11/04/24 0800   Collection Container Standard drainage bag 11/04/24 0800   Securement Method secured to top of thigh w/ adhesive device 11/04/24 0800   Catheter Care Performed yes 11/04/24 0800   Reason for Continuing Urinary Catheterization Urinary retention 11/04/24 0800   CAUTI Prevention Bundle Securement Device in place with 1" slack 11/04/24 0800   Output (mL) 450 mL 11/03/24 1755       Neurosurgery Physical Exam    Awake and oriented with a GCS of 15   PERRLA bilateral brisk   Moving all extremities   Speech clear       Significant Labs:  No results for input(s): "GLU", "NA", "K", "CL", "CO2", "BUN", "CREATININE", "CALCIUM", "MG" in the last 48 hours.  No results for input(s): "WBC", "HGB", "HCT", "PLT" in the last 48 hours.  No results for input(s): "LABPT", "INR", "APTT" in the last 48 hours.  Microbiology Results (last 7 days)       Procedure Component Value Units Date/Time    Stool Culture [[id number]]  (Normal) Collected: 11/03/24 1424    Order Status: Completed Specimen: Stool Updated: 11/04/24 0927     Stool Culture Negative for Salmonella, Shigella, Campylobacter, Vibrio, Aeromonas, Pleisiomonas,Yersinia, or Shiga Toxin 1 and 2.        -MRI brain (7/24 )with chronic right MCA infarction/chronic small-vessel ischemic changes  -MRI brain without contrast 11/01/2024 with no acute intracranial finding/chronic ischemic changes with encephalomalacia in the right cerebral hemisphere/right MCA territory suspicious for cardioembolic process  -CT head-remote appearing infarcts right frontal and parietal lobes new compared to July 20, 2024  -CTA head/neck no large vessel occlusion or flow-limiting stenosis  -Echocardiogram with ejection " fraction 55-60% with grade 1 diastolic dysfunction.  Unable to determine where the bubble study was positive  -Carotid ultrasound with right internal carotid patent with no evidence stenosis.  Left internal carotid with less than 50% stenosis.  Bilateral vertebral arteries are patent with antegrade flow    Assessment/Plan:    Neurology has made recommendations for Holter monitor and possible loop recorder to rule out cardioembolic etiology.  Every 4 hour neurological exams   Hospitalist following   PTOT   TLSO brace   Fall precautions  Psych consult  Neurology following-recommended aspirin 325 and Lipitor.  Keppra, seizure precautions  SCDs and Lovenox    Nothing else to add from a neurosurgical standpoint.    Daily dressing changes  Placement       Active Diagnoses:    Diagnosis Date Noted POA    PRINCIPAL PROBLEM:  Altered mental status [R41.82] 10/30/2024 Yes    Acute CVA (cerebrovascular accident) [I63.9] 10/31/2024 Unknown      Problems Resolved During this Admission:       [facility]  Neurosurgery  [facility] - 4th Floor Medical Telemetry

## 2024-11-05 NOTE — PLAN OF CARE
11/05/24 1213   Discharge Reassessment   Assessment Type Discharge Planning Reassessment   Did the patient's condition or plan change since previous assessment? No   Discharge Plan discussed with: Patient;Spouse/sig other   Communicated CHUCK with patient/caregiver Date not available/Unable to determine   Discharge Plan A Skilled Nursing Facility   Discharge Plan B Skilled Nursing Facility   DME Needed Upon Discharge    (TBD)   Why the patient remains in the hospital Requires continued medical care   Post-Acute Status   Post-Acute Authorization Placement   Post-Acute Placement Status Referrals Sent   Patient choice form signed by patient/caregiver List with quality metrics by geographic area provided     Met with pt and spouse in room. Spouse went visit a couple of facilities today and said several did not have available beds. Pt and spouse chose [facility] with no secondary choice.   PASRR done yesterday. Referral sent. [facility] notified.

## 2024-11-05 NOTE — PT/OT/SLP PROGRESS
Occupational Therapy   Treatment    Name: Kasandra Cheek  MRN: [id number]  Admitting Diagnosis:  Altered mental status       Recommendations:     Recommended therapy intensity at discharge: Moderate Intensity Therapy   Discharge Equipment Recommendations:  to be determined by next level of care  Barriers to discharge:       Assessment:     Kasandra Cheek is a 64 y.o. female with a medical diagnosis of metabolic encephalopathy, chronic R MCA infarction/chronic small vessel ischemic changes, polypharmacy, mild metabolic acidosis, depression, and anxiety. Recent spinal surgery with Dr. Mejia, extended chart review completed following evaluation. Pt had T10-L3 decompression, removal of right S1 screw, replacement of right S2 iliac bolt, and T10-S2 fusion.   Performance deficits affecting function are weakness, impaired endurance, impaired self care skills, impaired functional mobility, impaired balance, impaired cognition, decreased safety awareness, pain.   Patient requires assist for all ADLs and mobility. Pt is limited by anxiety/fear of falling, but willing to partic in therapy with increased encouragement. Patient would benefit from moderate intensity therapy upon discharge.    Rehab Prognosis:  Good; patient would benefit from acute skilled OT services to address these deficits and reach maximum level of function.       Plan:     Patient to be seen 5 x/week to address the above listed problems via self-care/home management, therapeutic activities, therapeutic exercises  Plan of Care Expires: 12/01/24  Plan of Care Reviewed with: patient, spouse    Subjective     Pain/Comfort:  Pain Rating 1: 0/10    Objective:     Communicated with: nurse prior to session.  Patient found HOB elevated with telemetry, march catheter, pressure relief boots upon OT entry to room.    General Precautions: Standard, fall    Orthopedic Precautions:spinal precautions  Braces: TLSO  Respiratory Status: Room air     Occupational Performance:      Bed Mobility:    Patient completed Supine to Sit with moderate assistance  Patient completed Sit to Supine with moderate assistance  Pt instructed on log-rolling tech for adherence to spinal prec. Pt able to partic in bed mobility with max cues for sequencing and mod (A) for trunk control      Functional Mobility/Transfers:  Patient completed Sit <> Stand Transfer with minimum assistance  with  rolling walker   Functional Mobility: pt takes lateral side steps toward HOB with RW and only requiring min (A)     Activities of Daily Living:  Grooming: stand by assistance set-up wash cloth for face washing while seated eob  Lower Body Dressing: maximal assistance lamar/doff socks  Toileting: total assistance catheter in place  Upper Body Dressing: total A to lamar brace    [facility] 6 Click ADL: 14    Patient Education:  Patient and spouse were provided with verbal education education regarding OT role/goals/POC, post op precautions, fall prevention, safety awareness, and Discharge/DME recommendations.  Understanding was verbalized, however additional teaching warranted.      Patient left with bed in chair position with TLSO on, all lines intact, call button in reach, nurse notified, and  present.    GOALS:   Multidisciplinary Problems       Occupational Therapy Goals          Problem: Occupational Therapy    Goal Priority Disciplines Outcome Interventions   Occupational Therapy Goal     OT, PT/OT Progressing    Description: Goals to be met by 12/1/2024    Pt will complete feeding tasks with modified independence  Pt will complete grooming sitting in chair with SBA.  Pt will complete UB dressing with mod A.  Pt will complete LB dressing with max A using LRAD.  Pt will complete toileting with max A using LRAD.                         Time Tracking:     OT Date of Treatment: 11/05/24  OT Start Time: 1458  OT Stop Time: 1525  OT Total Time (min): 27 min    Billable Minutes:Self Care/Home Management 2    OT/INDRA: OT      Number of INDRA visits since last OT visit: 2    11/5/2024

## 2024-11-05 NOTE — PT/OT/SLP PROGRESS
Physical Therapy Treatment    Patient Name:  Kasandra Cheek   MRN:  [id number]    Recommendations:     Discharge therapy intensity: Moderate Intensity Therapy   Discharge Equipment Recommendations: to be determined by next level of care  Barriers to discharge: Impaired mobility    Assessment:     Kasandra Cheek is a 64 y.o. female admitted with a medical diagnosis of metabolic encephalopathy and recent spine surgery.  She presents with the following impairments/functional limitations: weakness, impaired self care skills, impaired cognition, impaired endurance, impaired functional mobility, gait instability, impaired balance, decreased safety awareness, pain     Rehab Prognosis: Good; patient would benefit from acute skilled PT services to address these deficits and reach maximum level of function.    Recent Surgery: * No surgery found *      Plan:     During this hospitalization, patient would benefit from acute PT services 5 x/week to address the identified rehab impairments via gait training, therapeutic activities, therapeutic exercises and progress toward the following goals:    Plan of Care Expires:  11/29/24    Subjective     Chief Complaint:   Patient/Family Comments/goals:   Pain/Comfort:         Objective:     Communicated with nurse prior to session.  Patient found supine with march catheter, telemetry upon PT entry to room.     General Precautions: Standard, fall  Orthopedic Precautions: spinal precautions  Braces: TLSO  Respiratory Status: Room air  Blood Pressure:   Skin Integrity: Visible skin intact      Functional Mobility:  Bed Mobility:     Supine to Sit: minimum assistance  Transfers:     Sit to Stand:  minimum assistance with rolling walker  Bed to Chair: minimum assistance with  rolling walker  using  Step Transfer  Gait: pt ambulated 30ft and 20 feet with a rw with cga    Therapeutic Activities/Exercises:      Education:  Patient provided with verbal education education regarding PT role/goals/POC,  fall prevention, and safety awareness.  Understanding was verbalized, however additional teaching warranted.     Patient left up in chair    GOALS:   Multidisciplinary Problems       Physical Therapy Goals          Problem: Physical Therapy    Goal Priority Disciplines Outcome Interventions   Physical Therapy Goal     PT, PT/OT Progressing    Description: Goals to be met by: 2024     Patient will increase functional independence with mobility by performin. Supine to sit with Stand-by Assistance  2. Sit to stand transfer with Stand-by Assistance  3. Bed to chair transfer with Stand-by Assistance using Rolling Walker  4. Gait  x 150 feet with Stand-by Assistance using Rolling Walker.   5. Stand for 10 minutes with Stand-by Assistance using Rolling Walker                         Time Tracking:     PT Received On:    PT Start Time: 0945     PT Stop Time: 1008  PT Total Time (min): 23 min     Billable Minutes: Gait Training 13 and Therapeutic Activity 10    Treatment Type: Evaluation  PT/PTA: PT     Number of PTA visits since last PT visit: 2     2024

## 2024-11-05 NOTE — PROGRESS NOTES
[facility]  Hospital Medicine Progress Note        Chief Complaint: Inpatient Follow-up for Altered Mental Status ( reports AMS x 3 days.  states pt usually presents this way when she has a UTI. Pt arrives GCS 12.)    HPI: Kasandra Cheek is a 64 y.o. female who  has a past medical history of Anemia, Anxiety, Depression, Dyspareunia, blood transfusions, HTN , Hypothyroidism, unspecified, Liver disease, Lumbar radiculopathy, Menopause, Migraine, Obesity, Osteoporosis, Other spondylosis with radiculopathy, lumbar region, Personal history of fall, Spinal stenosis, lumbar region with neurogenic claudication, TIA (transient ischemic attack) (1994), Unspecified osteoarthritis, unspecified site, and Urinary incontinence.. The patient presented to [facility] on 10/30/2024      64-year-old female with past medical history of anxiety/depression, hypertension, hypothyroidism, fatty liver disease, chronic pain syndrome presents to the emergency room with confusion for the last 72 hours.   is at the bedside helping give history.  Reports getting confused at home.  Some imbalance.  Blood pressure has been elevated.  On arrival to the emergency room she was not oriented.  GCS of 9.  Not following commands.  Vital signs show some mild hypertension but otherwise stable.  No fever.  She has no leukocytosis.  Mild metabolic acidosis appears to be chronic.  UDS was positive for opiates which she was prescribed.  Urinalysis was bland.  CT of the head performed in the ER showed remote infarcts involving the right frontal and parietal lobes which are new compared to scan performed 3 months ago.  Carotid ultrasound showed minimal stenosis of the left carotid and no stenosis of the right.  Echocardiogram is pending.  Neurology was consulted from the ER.  Recommending full stroke workup.  Added aspirin and statin.  Okay to normalize blood pressure.  She was being admitted to the hospitalist  group    Interval Hx:   10-31-24 Dr. English-reviewing Neurology notes there is more history that is important for the case.  Has been refers that around a year ago she had a gradual decline where she requires assistance to the bathroom and transferred to wheelchair.  Has been refers that her mental status waxes and wanes.  Refers that on Sunday she suddenly slumped over and was not really responding.  This prompted him to call EMS and was taken to the emergency room in [place].  In [place] did not much happened other than checking vital signs.  Once back home he took 4 people to get her out of the car because she was weak prompting him to take her to us [facility] for further evaluation where CT of the head was significant for right frontal and right parietal infarcts which are new from 07/24.    On physical examination she remains confused.  She knows-she is in [facility] but unable to say why, can recall present president, month or day.  Her strength is equally distributed over upper and lower extremities but very difficult for her to follow commands.  She is on aspirin and Keppra, her antidepressant has been placed on hold as well as her opiates and gabapentin.  EEG and MRI of the brain are pending      11/01/2024 Dr. English-chart reviewed patient examined.  MRI brain negative for acute CVA but as per Neurology recommendations we need to rule out recurrent right middle cerebral artery cardioembolic stroke with recommendations to start patient on aspirin 325 mg p.o. daily, discontinue Celebrex, start the patient on Keppra 500 mg p.o. b.i.d., issue with polypharmacy including multiple antidepressants/opioids/gabapentin.  Patient will need Holter times 2 weeks on discharge with follow-up with Rabia Bell upon discharge and if Holter negative patient will need loop recorder due to high suspicion for cardioembolic etiology.  The patient cleared for discharge once new findings of suspected  thoracic soft tissue fluid collection findings post thoracic surgery by Neurosurgery.  We will go ahead and consult Neurosurgery Dr. Hawthorne    Her LOC is better today, much more alert but still with periods of confusion.  She is still thinks that misha Dixon is the president.    We will feed follow recommendations by Neurosurgery.  Sed rate/C-reactive protein to follow.  Blood cultures drawn on admission negative.  CT a neck negative for carotid stenosis/EEG negative/echocardiogram with left atrium dilated and bubble study could not be assessed.    11/02/2024 Dr. English-clare reviewed patient examined.  Impressive improvement in the last 24 hours when it comes to level of consciousness/orientation/vocabulary/thoughts.  Case was discussed with Psychiatry we decided to continue to hold all psych meds.  Patient has been seen by Neurosurgery .  CT thoracic lumbar spine with contrast done.  Physical therapy recommended moderate intensity therapy.  We will consult  pending final neurosurgery evaluation of CT thoracic/lumbar spine with IV contrast    11/03/2024-Dr. English-clare reviewed patient examined.  Patient continues to improve after she was taken all of her medications including opioids/gabapentin/muscle relaxant/anxiolytics/antidepressants.  Still patient will require to be placed secondary to very unsteady gait.  She is alert active and oriented at the present moment.  CT thoracic/lumbar spine with contrast did show fluid collection.  Dr. Rosario with Neurosurgery to go over films tomorrow.  Patient has no signs of infection.  There is no erythema over surgical wound/drainage or pain to palpation.  Inflammatory markers are within normal limits well as vital signs and white blood cell count.    11/04/2024 Dr. English-physical therapy gave recommendations and this were explained to patient.  We all knew that she was having issues with gait but she can not seem to understand the importance of this.  She is  very evasive with my questions and changes direction of conversation constantly.  appears to be very frustrated with situation since he knows that he can not take care of her.  It appears that he was able to convince her to stay until he is able to visit those  agencies to approve placement.  Mentally she is much improved but definitely appears to be some underlying  psych issues involve                  Case was discussed with patient's nurse and  on the floor.  Objective/physical exam:  General: In no acute distress, afebrile. More alert but confused   Chest: Clear to auscultation bilaterally  Heart: RRR, +S1, S2, no appreciable murmur  Abdomen: Soft, nontender, BS +  Back-mid thoracic stuff tissue swelling suspicious for fluid collection  MSK: Warm, no lower extremity edema, no clubbing or cyanosis  Neurologic: Alert but disoriented to time/month/day of the week/president.  Cranial nerve II-XII intact, strength is decrease but appears to be equally distributed distributed over upper and lower extremities    VITAL SIGNS: 24 HRS MIN & MAX LAST   Temp  Min: 97.6 °F (36.4 °C)  Max: 98.8 °F (37.1 °C) 98 °F (36.7 °C)   BP  Min: 126/72  Max: 174/95 131/79   Pulse  Min: 86  Max: 110  105   Resp  Min: 18  Max: 20 20   SpO2  Min: 92 %  Max: 97 % 96 %     I have reviewed the following labs:  Recent Labs   Lab 10/30/24  1219 10/31/24  0443 11/01/24  0619   WBC 9.12 8.57 9.11   RBC 4.13* 3.97* 4.11*   HGB 12.9 12.5 12.7   HCT 37.7 37.7 38.7   MCV 91.3 95.0* 94.2*   MCH 31.2* 31.5* 30.9   MCHC 34.2 33.2 32.8*   RDW 13.3 13.4 13.2    342 278   MPV 8.9 9.0 9.1     Recent Labs   Lab 10/28/24  1947 10/30/24  1219 10/31/24  0443 11/01/24  0619   NA  --  142 143 144   K  --  3.7 3.5 3.7   CL  --  110* 110* 111*   CO2  --  20* 23 23   BUN  --  14.9 20.8* 18.1   CREATININE  --  0.71 0.75 0.63   CALCIUM  --  9.0 9.4 8.6   PH 7.571*  --   --   --    MG  --  1.90 1.90  --    ALBUMIN  --  3.4 3.3* 3.2*   ALKPHOS   --  161* 158* 145   ALT  --  26 25 21   AST  --  42* 30 26   BILITOT  --  0.7 0.5 0.5     Microbiology Results (last 7 days)       Procedure Component Value Units Date/Time    Stool Culture [[id number]]  (Normal) Collected: 11/03/24 1424    Order Status: Completed Specimen: Stool Updated: 11/04/24 0903     Stool Culture Negative for Salmonella, Shigella, Campylobacter, Vibrio, Aeromonas, Pleisiomonas,Yersinia, or Shiga Toxin 1 and 2.             See below for Radiology    Assessment/Plan:  Remote appearing CVA, right frontal and parietal lobes by ct  Metabolic/toxic encephalopathy -much improved  polypharmacy-opioid/antidepressants/gabapentin/anxiolytics.  All meds discontinued with much improvement/resolution of confusion  Mild metabolic acidosis  Iron-deficiency anemia   Hypothyroidism -TSH 4.3  Essential hypertension   Chronic pain syndrome-controlled on Percocet 10 mg p.o. Q 6 hours p.r.n. pain.  Multiple psychiatric conditions including depression and anxiety-psychiatry consulted, we decided to continue to hold all psychiatric medications for depression/anxiety/opioids/muscle relaxants with much improving in level of consciousness..  Recent thoracic vertebra surgery by Neurosurgery 3 weeks ago by Dr. Rosario with the development of soft tissue swelling highly suspicious for fluid collection, most likely seroma.-Dr. Rosario consulted, Dr. Hyde on-call.  CT thoracic/lumbar spine with IV contrast ordered with findings of large fluid collection, most likely seroma.  Dr. Rosario to re-evaluate .  Patient was seen by Physical therapy with unsteady gait and necessity for placement.      Stroke workup:  -MRI brain (7/24 )with chronic right MCA infarction/chronic small-vessel ischemic changes  -MRI brain without contrast 11/01/2024 with no acute intracranial finding/chronic ischemic changes with encephalomalacia in the right cerebral hemisphere/right MCA territory suspicious for cardioembolic process  -CT head-remote appearing  infarcts right frontal and parietal lobes new compared to July 20, 2024  -CTA head/neck no large vessel occlusion or flow-limiting stenosis  -Echocardiogram with ejection fraction 55-60% with grade 1 diastolic dysfunction.  Unable to determine where the bubble study was positive  -Carotid ultrasound with right internal carotid patent with no evidence stenosis.  Left internal carotid with less than 50% stenosis.  Bilateral vertebral arteries are patent with antegrade flow  -hemoglobin A1c 5.2   -TSH 4.3  -cholesterol 217/  -EEG 11/01/2024 negative    Inpatient medications aspirin 325/Keppra/statin    Recs by neurology:  - start aspirin 325 mg, lipitor 80 mg  - discontinue home celecoxib  - discontinue gabapentin  - start keppra 500 mg bid   - holter monitor x 2 weeks on discharge  - follow up in clinic with Rabia Cosme NP in 2 months with holter results. If Holter is negative, she will need a loop recorder due to high suspicion for cardioembolic etiology.   - PT/OT        Placement ? ...placement as per pt. Will give until tomorrow to decide             VTE prophylaxis:  Lovenox    Patient condition: Fair    Anticipated discharge and Disposition:   To be determined      All diagnosis and differential diagnosis have been reviewed; assessment and plan has been documented; I have personally reviewed the labs and test results that are presently available; I have reviewed the patients medication list; I have reviewed the consulting providers response and recommendations. I have reviewed or attempted to review medical records based upon their availability    All of the patient's questions have been  addressed and answered. Patient's is agreeable to the above stated plan. I will continue to monitor closely and make adjustments to medical management as needed.    Portions of this note dictated using EMR integrated voice recognition software, and may be subject to voice recognition errors not corrected at  proofreading. Please contact writer for clarification if needed.   _____________________________________________________________________    Malnutrition Status:    Scheduled Med:   aspirin  325 mg Oral Daily    atorvastatin  80 mg Oral Daily    enoxparin  40 mg Subcutaneous Q24H (prophylaxis, 1700)    levETIRAcetam  500 mg Oral BID    levothyroxine  200 mcg Oral Before breakfast    losartan  100 mg Oral Daily    mupirocin   Nasal BID    pantoprazole  40 mg Oral BID      Continuous Infusions:       PRN Meds:    Current Facility-Administered Medications:     bisacodyL, 10 mg, Rectal, Daily PRN    hydrALAZINE, 10 mg, Intravenous, Q4H PRN    labetalol, 10 mg, Intravenous, Q6H PRN    oxyCODONE-acetaminophen, 1 tablet, Oral, Q6H PRN     Radiology:  I have personally reviewed the following imaging and agree with the radiologist.     CT Lumbar Spine With Contrast  EXAMINATION  CT LUMBAR SPINE WITH CONTRAST    CLINICAL HISTORY  rule out fluid collection;    TECHNIQUE  Helical-acquisition CT images were obtained following the intravenous administration of iodine-based contrast media.  Multiplanar reconstructions were accomplished by a CT technologist at a separate workstation and pushed to PACS for physician review.    CONTRAST  IV: Omnipaque 350, 100 mL    COMPARISON  28 August 2024 abdominopelvic CT    FINDINGS  Images were reviewed in soft tissue and bone windows.    Exam quality: There is notable limitation secondary to extensive multilevel posterior spine stabilization hardware and associated widespread metal streak artifact.    Enhancement: No definite evidence of focal abnormal enhancement is appreciated.  However, circumscribed fluid collection with faint peripheral enhancement is not entirely excluded through the midline lumbar posterior surgical bed spanning T11 through L5.  In the    There are similar advanced spondylitic changes throughout the visualized spinal column with associated T12-L1 destructive endplate  changes.  Interval surgical revision of posterior fusion hardware is noted, now spanning from the sacroiliac joints and lumbosacral junction cranially beyond the included lower thoracic spine.  There are also new postoperative changes of spinal canal decompression at T12 through L3.    No acutely displaced fracture, vertebral body compression deformity, or interval change in spinal column alignment.  There is no convincing new focal abnormality of the partially visualized retroperitoneal structures.    IMPRESSION  1. Interval postoperative changes with revision of multilevel thoracolumbosacral stabilization hardware and new T12 through L3 posterior spinal canal decompression.  2. Regional soft tissues are of limited evaluation secondary to extensive metal artifact, but there are findings suspicious for large peripherally enhancing fluid collection through the posterior surgical bed spanning T11 through L5.  3. No evidence of acute or worsening focal osseous abnormality.  ==========  This report was flagged in Epic as abnormal.    RADIATION DOSE  Automated tube current modulation, weight-based exposure dosing, and/or iterative reconstruction technique utilized to reach lowest reasonably achievable exposure rate.    DLP: 1387 mGy*cm    Electronically signed by: Tirso Decker  Date:    11/02/2024  Time:    16:34  CT Thoracic Spine With Contrast  Narrative: CLINICAL HISTORY:  Trauma.    TECHNIQUE:  CT of the thoracic spine Without contrast. Sagittal and coronal reconstructions were performed on the source images.    Automatic exposure control was utilized to reduce the patient's radiation dose.    DLP = 1252    COMPARISON:  No prior imaging available for comparison.    FINDINGS:  Postsurgical changes of lower thoracic fusion with erosive changes and T12-L1.  Surgical fixation and posterior decompression is new in the interval, however erosive changes similar to prior abdominal CT from August 24.  Posterior to the  decompression is a subcutaneous fluid collection extending throughout the spinal hardware.  Limited evaluation secondary to metallic artifact throughout.  Fluid is of unknown etiology.  Further evaluation may be obtained with ultrasound if needed.  Impression: Postsurgical change with no acute fracture.  Fluid is noted midline posterior to surgical hardware.  Further evaluation may be obtained with ultrasound if needed.  Likely seroma versus CSF fluid.  No gross CT signs of infection.    Electronically signed by: Logan Cedeno  Date:    11/02/2024  Time:    16:09      Lennox Egnlish MD  Department of Hospital Medicine   [facility]   11/04/2024

## 2024-11-05 NOTE — PLAN OF CARE
[facility] sent referral to [facility], awaiting response.     [facility] is unable to accept patient

## 2024-11-06 PROCEDURE — 25000003 PHARM REV CODE 250: Performed by: NURSE PRACTITIONER

## 2024-11-06 PROCEDURE — 25000003 PHARM REV CODE 250: Performed by: INTERNAL MEDICINE

## 2024-11-06 PROCEDURE — 25000003 PHARM REV CODE 250: Performed by: PSYCHIATRY & NEUROLOGY

## 2024-11-06 PROCEDURE — 21400001 HC TELEMETRY ROOM

## 2024-11-06 PROCEDURE — 97530 THERAPEUTIC ACTIVITIES: CPT

## 2024-11-06 PROCEDURE — 63600175 PHARM REV CODE 636 W HCPCS: Performed by: INTERNAL MEDICINE

## 2024-11-06 RX ORDER — ENOXAPARIN SODIUM 100 MG/ML
40 INJECTION SUBCUTANEOUS EVERY 12 HOURS
Status: DISCONTINUED | OUTPATIENT
Start: 2024-11-06 | End: 2024-11-11 | Stop reason: HOSPADM

## 2024-11-06 RX ADMIN — OXYCODONE HYDROCHLORIDE AND ACETAMINOPHEN 1 TABLET: 5; 325 TABLET ORAL at 01:11

## 2024-11-06 RX ADMIN — OXYCODONE HYDROCHLORIDE AND ACETAMINOPHEN 1 TABLET: 5; 325 TABLET ORAL at 09:11

## 2024-11-06 RX ADMIN — LOSARTAN POTASSIUM 100 MG: 50 TABLET, FILM COATED ORAL at 09:11

## 2024-11-06 RX ADMIN — ENOXAPARIN SODIUM 40 MG: 40 INJECTION SUBCUTANEOUS at 09:11

## 2024-11-06 RX ADMIN — ATORVASTATIN CALCIUM 80 MG: 40 TABLET, FILM COATED ORAL at 09:11

## 2024-11-06 RX ADMIN — LEVETIRACETAM 500 MG: 500 TABLET, FILM COATED ORAL at 09:11

## 2024-11-06 RX ADMIN — PANTOPRAZOLE SODIUM 40 MG: 40 TABLET, DELAYED RELEASE ORAL at 09:11

## 2024-11-06 RX ADMIN — OXYCODONE HYDROCHLORIDE AND ACETAMINOPHEN 1 TABLET: 5; 325 TABLET ORAL at 06:11

## 2024-11-06 RX ADMIN — CARVEDILOL 3.12 MG: 3.12 TABLET, FILM COATED ORAL at 09:11

## 2024-11-06 RX ADMIN — LEVOTHYROXINE SODIUM 200 MCG: 100 TABLET ORAL at 07:11

## 2024-11-06 RX ADMIN — ASPIRIN 325 MG: 325 TABLET, COATED ORAL at 09:11

## 2024-11-06 RX ADMIN — ACETAMINOPHEN 650 MG: 325 TABLET, FILM COATED ORAL at 04:11

## 2024-11-06 NOTE — PLAN OF CARE
[facility] sent referral to [facility], awaiting response

## 2024-11-06 NOTE — PROGRESS NOTES
Inpatient Nutrition Assessment    Admit Date: 10/30/2024   Total duration of encounter: 7 days   Patient Age: 64 y.o.    Nutrition Recommendation/Prescription     Continue Heart Healthy diet.     Communication of Recommendations: reviewed with patient    Nutrition Assessment     Chart Review    Reason Seen: length of stay    Malnutrition Screening Tool Results   Have you recently lost weight without trying?: No  Have you been eating poorly because of a decreased appetite?: No   MST Score: 0   Diagnosis:  Remote appearing CVA  Metabolic/toxic encephalopathy   Mild metabolic acidosis  Iron-deficiency anemia   Hypothyroidism  Essential hypertension     Relevant Medical History: Anemia, Anxiety, Depression, Dyspareunia, HTN , Hypothyroidism, Liver disease, Lumbar radiculopathy, Menopause, Migraine, Obesity, Osteoporosis, Spinal stenosis, lumbar region with neurogenic claudication, TIA, Urinary incontinence     Scheduled Medications:  aspirin, 325 mg, Daily  atorvastatin, 80 mg, Daily  carvediloL, 3.125 mg, BID  enoxparin, 40 mg, Q24H (prophylaxis, 1700)  levETIRAcetam, 500 mg, BID  levothyroxine, 200 mcg, Before breakfast  losartan, 100 mg, Daily  pantoprazole, 40 mg, BID    Continuous Infusions:   PRN Medications:  acetaminophen, 650 mg, Q6H PRN  bisacodyL, 10 mg, Daily PRN  hydrALAZINE, 10 mg, Q4H PRN  labetalol, 10 mg, Q6H PRN  oxyCODONE-acetaminophen, 1 tablet, Q6H PRN    Calorie Containing IV Medications: no significant kcals from medications at this time    Recent Labs   Lab 10/30/24  1219 10/31/24  0443 11/01/24  0619 11/01/24  1853    143 144  --    K 3.7 3.5 3.7  --    CALCIUM 9.0 9.4 8.6  --    PHOS  --  3.2  --   --    MG 1.90 1.90  --   --    * 110* 111*  --    CO2 20* 23 23  --    BUN 14.9 20.8* 18.1  --    CREATININE 0.71 0.75 0.63  --    EGFRNORACEVR >60 >60 >60  --    GLUCOSE 126* 119* 112  --    BILITOT 0.7 0.5 0.5  --    ALKPHOS 161* 158* 145  --    ALT 26 25 21  --    AST 42* 30 26  --   "  ALBUMIN 3.4 3.3* 3.2*  --    CRP  --   --   --  2.10   TRIG  --  155*  --   --    HGBA1C  --  5.2  --   --    AMMONIA 27.8  --   --   --    WBC 9.12 8.57 9.11  --    HGB 12.9 12.5 12.7  --    HCT 37.7 37.7 38.7  --      Nutrition Orders:  Diet Heart Healthy      Appetite/Oral Intake: fair/50-75% of meals  Factors Affecting Nutritional Intake: none identified  Social Needs Impacting Access to Food: none identified  Food/Hinduism/Cultural Preferences: none reported  Food Allergies:  none noted  Last Bowel Movement: 11/05/24  Wound(s):  noted    Comments    11/6/24: Patient reports fair-good oral intake of meals served. Denies any unintentional wt loss.     Anthropometrics    Height: 5' 7" (170.2 cm), Height Method: Stated  Last Weight: (!) 136.1 kg (300 lb 0.7 oz) (10/30/24 2200), Weight Method: Bed Scale  BMI (Calculated): 47  BMI Classification: obese grade III (BMI >/=40)     Ideal Body Weight (IBW), Female: 135 lb     % Ideal Body Weight, Female (lb): 222.26 %                             Usual Weight Provided By: patient denies unintentional weight loss    Wt Readings from Last 5 Encounters:   10/30/24 (!) 136.1 kg (300 lb 0.7 oz)   10/28/24 122.5 kg (270 lb)   10/15/24 129 kg (284 lb 6.3 oz)   10/07/24 129 kg (284 lb 6.3 oz)   08/28/24 136.1 kg (300 lb)     Weight Change(s) Since Admission: .  Wt Readings from Last 1 Encounters:   10/30/24 2200 (!) 136.1 kg (300 lb 0.7 oz)   10/30/24 1059 136.1 kg (300 lb)   Admit Weight: 136.1 kg (300 lb) (10/30/24 1059), Weight Method: Bed Scale      Evaluation of Received Nutrient Intake    Calories: meeting estimated needs  Protein: meeting estimated needs    Patient Education     Not applicable.      Nutrition Goals & Monitoring     Dietitian will monitor: energy intake  Discharge planning: resume home regimen  Nutrition Risk/Follow-Up: low (follow-up in 5-7 days)   Please consult if re-assessment needed sooner.     "

## 2024-11-06 NOTE — PLAN OF CARE
[facility] sent referral to [facility], awaiting response.     [facility] spoke to Rabia at [facility]. They have given the referral to the DON to medically review and they have a message out to her , awaiting response

## 2024-11-06 NOTE — PT/OT/SLP PROGRESS
"Physical Therapy Treatment    Patient Name:  Kasandra Cheek   MRN:  [id number]    Recommendations:     Discharge therapy intensity: Moderate Intensity Therapy   Discharge Equipment Recommendations: to be determined by next level of care  Barriers to discharge: Impaired mobility and Ongoing medical needs    Assessment:     Kasandra Cheek is a 64 y.o. female admitted with a medical diagnosis of metabolic encephalopathy, chronic R MCA infarction/chronic small vessel ischemic changes, polypharmacy, mild metabolic acidosis, depression, and anxiety. Recent spinal surgery with Dr. Mejia, extended chart review completed following evaluation. Pt had T10-L3 decompression, removal of right S1 screw, replacement of right S2 iliac bolt, and T10-S2 fusion.  She presents with the following impairments/functional limitations: weakness, impaired self care skills, impaired cognition, impaired endurance, impaired functional mobility, gait instability, impaired balance, decreased safety awareness, pain.    Rehab Prognosis: Good; patient would benefit from acute skilled PT services to address these deficits and reach maximum level of function.    Recent Surgery: * No surgery found *      Plan:     During this hospitalization, patient would benefit from acute PT services 5 x/week to address the identified rehab impairments via gait training, therapeutic activities, therapeutic exercises and progress toward the following goals:    Plan of Care Expires:  11/29/24    Subjective     Chief Complaint: no complaints  Patient/Family Comments/goals: "I feel great", motivated to participate with therapy  Pain/Comfort:  Pain Rating 1: 0/10      Objective:     Communicated with RN prior to session.  Patient found HOB elevated with telemetry, pressure relief boots, PureWick upon PT entry to room.     General Precautions: Standard, fall  Orthopedic Precautions: spinal precautions  Braces: TLSO  Respiratory Status: Room air      Functional Mobility:  Bed " Mobility:     Supine to Sit: minimum assistance, cues for log roll technique  Transfers:     Sit to Stand: x3 reps; contact guard assistance with rolling walker  Bed to Chair: contact guard assistance with  rolling walker  using  Step Transfer  Gait: The pt ambulated x10 ft with RW and CGA. Distance limited by weakness      Education:  Patient provided with verbal education education regarding PT role/goals/POC, post-op precautions, fall prevention, and safety awareness.  Understanding was verbalized.     Patient left up in chair with all lines intact, call button in reach, chair alarm on, geomat cushion, RN notified, and  present    GOALS:   Multidisciplinary Problems       Physical Therapy Goals          Problem: Physical Therapy    Goal Priority Disciplines Outcome Interventions   Physical Therapy Goal     PT, PT/OT Progressing    Description: Goals to be met by: 2024     Patient will increase functional independence with mobility by performin. Supine to sit with Stand-by Assistance  2. Sit to stand transfer with Stand-by Assistance  3. Bed to chair transfer with Stand-by Assistance using Rolling Walker  4. Gait  x 150 feet with Stand-by Assistance using Rolling Walker.   5. Stand for 10 minutes with Stand-by Assistance using Rolling Walker                         Time Tracking:     PT Received On:    PT Start Time: 935     PT Stop Time: 1010  PT Total Time (min): 35 min     Billable Minutes: Therapeutic Activity 35    Treatment Type: Treatment  PT/PTA: PT     Number of PTA visits since last PT visit: 3     2024

## 2024-11-06 NOTE — PROGRESS NOTES
[facility]  Hospital Medicine Progress Note        Chief Complaint: Inpatient Follow-up for Altered Mental Status ( reports AMS x 3 days.  states pt usually presents this way when she has a UTI. Pt arrives GCS 12.)    HPI: Kasandra Cheek is a 64 y.o. female who  has a past medical history of Anemia, Anxiety, Depression, Dyspareunia, blood transfusions, HTN , Hypothyroidism, unspecified, Liver disease, Lumbar radiculopathy, Menopause, Migraine, Obesity, Osteoporosis, Other spondylosis with radiculopathy, lumbar region, Personal history of fall, Spinal stenosis, lumbar region with neurogenic claudication, TIA (transient ischemic attack) (1994), Unspecified osteoarthritis, unspecified site, and Urinary incontinence.. The patient presented to [facility] on 10/30/2024      64-year-old female with past medical history of anxiety/depression, hypertension, hypothyroidism, fatty liver disease, chronic pain syndrome presents to the emergency room with confusion for the last 72 hours.   is at the bedside helping give history.  Reports getting confused at home.  Some imbalance.  Blood pressure has been elevated.  On arrival to the emergency room she was not oriented.  GCS of 9.  Not following commands.  Vital signs show some mild hypertension but otherwise stable.  No fever.  She has no leukocytosis.  Mild metabolic acidosis appears to be chronic.  UDS was positive for opiates which she was prescribed.  Urinalysis was bland.  CT of the head performed in the ER showed remote infarcts involving the right frontal and parietal lobes which are new compared to scan performed 3 months ago.  Carotid ultrasound showed minimal stenosis of the left carotid and no stenosis of the right.  Echocardiogram is pending.  Neurology was consulted from the ER.  Recommending full stroke workup.  Added aspirin and statin.  Okay to normalize blood pressure.  She was being admitted to the hospitalist  group    Interval Hx:   10-31-24 Dr. English-reviewing Neurology notes there is more history that is important for the case.  Has been refers that around a year ago she had a gradual decline where she requires assistance to the bathroom and transferred to wheelchair.  Has been refers that her mental status waxes and wanes.  Refers that on Sunday she suddenly slumped over and was not really responding.  This prompted him to call EMS and was taken to the emergency room in [place].  In [place] did not much happened other than checking vital signs.  Once back home he took 4 people to get her out of the car because she was weak prompting him to take her to us [facility] for further evaluation where CT of the head was significant for right frontal and right parietal infarcts which are new from 07/24.    On physical examination she remains confused.  She knows-she is in [facility] but unable to say why, can recall present president, month or day.  Her strength is equally distributed over upper and lower extremities but very difficult for her to follow commands.  She is on aspirin and Keppra, her antidepressant has been placed on hold as well as her opiates and gabapentin.  EEG and MRI of the brain are pending      11/01/2024 Dr. English-chart reviewed patient examined.  MRI brain negative for acute CVA but as per Neurology recommendations we need to rule out recurrent right middle cerebral artery cardioembolic stroke with recommendations to start patient on aspirin 325 mg p.o. daily, discontinue Celebrex, start the patient on Keppra 500 mg p.o. b.i.d., issue with polypharmacy including multiple antidepressants/opioids/gabapentin.  Patient will need Holter times 2 weeks on discharge with follow-up with Rabia Bell upon discharge and if Holter negative patient will need loop recorder due to high suspicion for cardioembolic etiology.  The patient cleared for discharge once new findings of suspected  thoracic soft tissue fluid collection findings post thoracic surgery by Neurosurgery.  We will go ahead and consult Neurosurgery Dr. Hawthorne    Her LOC is better today, much more alert but still with periods of confusion.  She is still thinks that misha Dixon is the president.    We will feed follow recommendations by Neurosurgery.  Sed rate/C-reactive protein to follow.  Blood cultures drawn on admission negative.  CT a neck negative for carotid stenosis/EEG negative/echocardiogram with left atrium dilated and bubble study could not be assessed.    11/02/2024 Dr. English-clare reviewed patient examined.  Impressive improvement in the last 24 hours when it comes to level of consciousness/orientation/vocabulary/thoughts.  Case was discussed with Psychiatry we decided to continue to hold all psych meds.  Patient has been seen by Neurosurgery .  CT thoracic lumbar spine with contrast done.  Physical therapy recommended moderate intensity therapy.  We will consult  pending final neurosurgery evaluation of CT thoracic/lumbar spine with IV contrast    11/03/2024-Dr. English-clare reviewed patient examined.  Patient continues to improve after she was taken all of her medications including opioids/gabapentin/muscle relaxant/anxiolytics/antidepressants.  Still patient will require to be placed secondary to very unsteady gait.  She is alert active and oriented at the present moment.  CT thoracic/lumbar spine with contrast did show fluid collection.  Dr. Rosario with Neurosurgery to go over films tomorrow.  Patient has no signs of infection.  There is no erythema over surgical wound/drainage or pain to palpation.  Inflammatory markers are within normal limits well as vital signs and white blood cell count.    11/04/2024 Dr. English-physical therapy gave recommendations and this were explained to patient.  We all knew that she was having issues with gait but she can not seem to understand the importance of this.  She is  very evasive with my questions and changes direction of conversation constantly.  appears to be very frustrated with situation since he knows that he can not take care of her.  It appears that he was able to convince her to stay until he is able to visit those  agencies to approve placement.  Mentally she is much improved but definitely appears to be some underlying  psych issues involve    11/05/2024 Dr. English-chart review.  Patient walking in hallway with physical therapy.  Definitely could benefit from rehabilitation, gait is slow and unsteady to the point that she has to look down where she is stepping.   was able to look at several placement agencies and  was able to send referrals.                  Case was discussed with patient's nurse and  on the floor.  Objective/physical exam:  General: In no acute distress, afebrile. More alert but confused   Chest: Clear to auscultation bilaterally  Heart: RRR, +S1, S2, no appreciable murmur  Abdomen: Soft, nontender, BS +  Back-mid thoracic stuff tissue swelling suspicious for fluid collection  MSK: Warm, no lower extremity edema, no clubbing or cyanosis  Neurologic: Alert ,oriented to time/month/day of the week/president.  Cranial nerve II-XII intact, strength is decrease but appears to be equally distributed distributed over upper and lower extremities    VITAL SIGNS: 24 HRS MIN & MAX LAST   Temp  Min: 97.9 °F (36.6 °C)  Max: 98.7 °F (37.1 °C) 98.3 °F (36.8 °C)   BP  Min: 122/78  Max: 172/103 (!) 172/103   Pulse  Min: 82  Max: 108  108   Resp  Min: 18  Max: 18 18   SpO2  Min: 93 %  Max: 97 % (!) 93 %     I have reviewed the following labs:  Recent Labs   Lab 10/30/24  1219 10/31/24  0443 11/01/24  0619   WBC 9.12 8.57 9.11   RBC 4.13* 3.97* 4.11*   HGB 12.9 12.5 12.7   HCT 37.7 37.7 38.7   MCV 91.3 95.0* 94.2*   MCH 31.2* 31.5* 30.9   MCHC 34.2 33.2 32.8*   RDW 13.3 13.4 13.2    342 278   MPV 8.9 9.0 9.1     Recent Labs    Lab 10/30/24  1219 10/31/24  0443 11/01/24  0619    143 144   K 3.7 3.5 3.7   * 110* 111*   CO2 20* 23 23   BUN 14.9 20.8* 18.1   CREATININE 0.71 0.75 0.63   CALCIUM 9.0 9.4 8.6   MG 1.90 1.90  --    ALBUMIN 3.4 3.3* 3.2*   ALKPHOS 161* 158* 145   ALT 26 25 21   AST 42* 30 26   BILITOT 0.7 0.5 0.5     Microbiology Results (last 7 days)       Procedure Component Value Units Date/Time    Stool Culture [[id number]]  (Normal) Collected: 11/03/24 1424    Order Status: Completed Specimen: Stool Updated: 11/05/24 1238     Stool Culture Negative for Salmonella, Shigella, Campylobacter, Vibrio, Aeromonas, Pleisiomonas,Yersinia, or Shiga Toxin 1 and 2.             See below for Radiology    Assessment/Plan:  Remote appearing CVA, right frontal and parietal lobes by ct  Metabolic/toxic encephalopathy -much improved  polypharmacy-opioid/antidepressants/gabapentin/anxiolytics.  All meds discontinued with much improvement/resolution of confusion  Mild metabolic acidosis  Iron-deficiency anemia   Hypothyroidism -TSH 4.3  Essential hypertension   - uncontrolled   -losartan 100 mgs po daily  - coreg 3.125 mgs po bid   Chronic pain syndrome-controlled on Percocet 10 mg p.o. Q 6 hours p.r.n. pain.  Multiple psychiatric conditions including depression and anxiety-psychiatry consulted, we decided to continue to hold all psychiatric medications for depression/anxiety/opioids/muscle relaxants with much improving in level of consciousness..  Recent thoracic vertebra surgery by Neurosurgery 3 weeks ago by Dr. Rosario with the development of soft tissue swelling highly suspicious for fluid collection, most likely seroma.-Dr. Rosario consulted, Dr. Hyde on-call.  CT thoracic/lumbar spine with IV contrast ordered with findings of large fluid collection, most likely seroma.  Dr. Rosario following .  Patient was seen by Physical therapy with unsteady gait and necessity for placement. looking into placement .      Stroke  workup:  -MRI brain (7/24 )with chronic right MCA infarction/chronic small-vessel ischemic changes  -MRI brain without contrast 11/01/2024 with no acute intracranial finding/chronic ischemic changes with encephalomalacia in the right cerebral hemisphere/right MCA territory suspicious for cardioembolic process  -CT head-remote appearing infarcts right frontal and parietal lobes new compared to July 20, 2024  -CTA head/neck no large vessel occlusion or flow-limiting stenosis  -Echocardiogram with ejection fraction 55-60% with grade 1 diastolic dysfunction.  Unable to determine where the bubble study was positive  -Carotid ultrasound with right internal carotid patent with no evidence stenosis.  Left internal carotid with less than 50% stenosis.  Bilateral vertebral arteries are patent with antegrade flow  -hemoglobin A1c 5.2   -TSH 4.3  -cholesterol 217/  -EEG 11/01/2024 negative    Inpatient medications aspirin 325/Keppra/statin    Recs by neurology:  - start aspirin 325 mg, lipitor 80 mg  - discontinue home celecoxib  - discontinue gabapentin  - start keppra 500 mg bid   - holter monitor x 2 weeks on discharge  - follow up in clinic with Rabia Cosme NP in 2 months with holter results. If Holter is negative, she will need a loop recorder due to high suspicion for cardioembolic etiology.   - PT/OT        Placement ? ...placement as per pt. Will give until tomorrow to decide             VTE prophylaxis:  Lovenox    Patient condition: Fair    Anticipated discharge and Disposition:   To be determined      All diagnosis and differential diagnosis have been reviewed; assessment and plan has been documented; I have personally reviewed the labs and test results that are presently available; I have reviewed the patients medication list; I have reviewed the consulting providers response and recommendations. I have reviewed or attempted to review medical records based upon their availability    All of the patient's  questions have been  addressed and answered. Patient's is agreeable to the above stated plan. I will continue to monitor closely and make adjustments to medical management as needed.    Portions of this note dictated using EMR integrated voice recognition software, and may be subject to voice recognition errors not corrected at proofreading. Please contact writer for clarification if needed.   _____________________________________________________________________    Malnutrition Status:    Scheduled Med:   aspirin  325 mg Oral Daily    atorvastatin  80 mg Oral Daily    enoxparin  40 mg Subcutaneous Q24H (prophylaxis, 1700)    levETIRAcetam  500 mg Oral BID    levothyroxine  200 mcg Oral Before breakfast    losartan  100 mg Oral Daily    pantoprazole  40 mg Oral BID      Continuous Infusions:       PRN Meds:    Current Facility-Administered Medications:     acetaminophen, 650 mg, Oral, Q6H PRN    bisacodyL, 10 mg, Rectal, Daily PRN    hydrALAZINE, 10 mg, Intravenous, Q4H PRN    labetalol, 10 mg, Intravenous, Q6H PRN    oxyCODONE-acetaminophen, 1 tablet, Oral, Q6H PRN     Radiology:  I have personally reviewed the following imaging and agree with the radiologist.     CT Lumbar Spine With Contrast  EXAMINATION  CT LUMBAR SPINE WITH CONTRAST    CLINICAL HISTORY  rule out fluid collection;    TECHNIQUE  Helical-acquisition CT images were obtained following the intravenous administration of iodine-based contrast media.  Multiplanar reconstructions were accomplished by a CT technologist at a separate workstation and pushed to PACS for physician review.    CONTRAST  IV: Omnipaque 350, 100 mL    COMPARISON  28 August 2024 abdominopelvic CT    FINDINGS  Images were reviewed in soft tissue and bone windows.    Exam quality: There is notable limitation secondary to extensive multilevel posterior spine stabilization hardware and associated widespread metal streak artifact.    Enhancement: No definite evidence of focal abnormal  enhancement is appreciated.  However, circumscribed fluid collection with faint peripheral enhancement is not entirely excluded through the midline lumbar posterior surgical bed spanning T11 through L5.  In the    There are similar advanced spondylitic changes throughout the visualized spinal column with associated T12-L1 destructive endplate changes.  Interval surgical revision of posterior fusion hardware is noted, now spanning from the sacroiliac joints and lumbosacral junction cranially beyond the included lower thoracic spine.  There are also new postoperative changes of spinal canal decompression at T12 through L3.    No acutely displaced fracture, vertebral body compression deformity, or interval change in spinal column alignment.  There is no convincing new focal abnormality of the partially visualized retroperitoneal structures.    IMPRESSION  1. Interval postoperative changes with revision of multilevel thoracolumbosacral stabilization hardware and new T12 through L3 posterior spinal canal decompression.  2. Regional soft tissues are of limited evaluation secondary to extensive metal artifact, but there are findings suspicious for large peripherally enhancing fluid collection through the posterior surgical bed spanning T11 through L5.  3. No evidence of acute or worsening focal osseous abnormality.  ==========  This report was flagged in Epic as abnormal.    RADIATION DOSE  Automated tube current modulation, weight-based exposure dosing, and/or iterative reconstruction technique utilized to reach lowest reasonably achievable exposure rate.    DLP: 1387 mGy*cm    Electronically signed by: Tirso Decker  Date:    11/02/2024  Time:    16:34  CT Thoracic Spine With Contrast  Narrative: CLINICAL HISTORY:  Trauma.    TECHNIQUE:  CT of the thoracic spine Without contrast. Sagittal and coronal reconstructions were performed on the source images.    Automatic exposure control was utilized to reduce the patient's  radiation dose.    DLP = 1252    COMPARISON:  No prior imaging available for comparison.    FINDINGS:  Postsurgical changes of lower thoracic fusion with erosive changes and T12-L1.  Surgical fixation and posterior decompression is new in the interval, however erosive changes similar to prior abdominal CT from August 24.  Posterior to the decompression is a subcutaneous fluid collection extending throughout the spinal hardware.  Limited evaluation secondary to metallic artifact throughout.  Fluid is of unknown etiology.  Further evaluation may be obtained with ultrasound if needed.  Impression: Postsurgical change with no acute fracture.  Fluid is noted midline posterior to surgical hardware.  Further evaluation may be obtained with ultrasound if needed.  Likely seroma versus CSF fluid.  No gross CT signs of infection.    Electronically signed by: Logan Cedeno  Date:    11/02/2024  Time:    16:09      Lennox English MD  Department of Hospital Medicine   [facility]   11/05/2024

## 2024-11-06 NOTE — PROGRESS NOTES
Pharmacist Renal Dose Adjustment Note    Kasandra Cheek is a 64 y.o. female being treated with the medication lovenox    Patient Data:    Vital Signs (Most Recent):  Temp: 98.1 °F (36.7 °C) (11/06/24 1100)  Pulse: 86 (11/06/24 1100)  Resp: 18 (11/06/24 0910)  BP: 131/78 (11/06/24 1100)  SpO2: 97 % (11/06/24 1100) Vital Signs (72h Range):  Temp:  [97.5 °F (36.4 °C)-98.8 °F (37.1 °C)]   Pulse:  []   Resp:  [18-20]   BP: (122-174)/()   SpO2:  [92 %-97 %]      Recent Labs   Lab 10/31/24  0443 11/01/24  0619   CREATININE 0.75 0.63     Serum creatinine: 0.63 mg/dL 11/01/24 0619  Estimated creatinine clearance: 131.9 mL/min    Lovenox 40mg q24h will be changed to Lovenox 40mg q12h based on Creatinine Clearance = 131.9 mL/min and BMI = 46.99 kg /m2.     Pharmacist's Name: Jacquelyn Villatoro  Pharmacist's Extension: [phone]

## 2024-11-07 PROCEDURE — 63600175 PHARM REV CODE 636 W HCPCS: Performed by: INTERNAL MEDICINE

## 2024-11-07 PROCEDURE — 25000003 PHARM REV CODE 250: Performed by: INTERNAL MEDICINE

## 2024-11-07 PROCEDURE — 25000003 PHARM REV CODE 250: Performed by: NURSE PRACTITIONER

## 2024-11-07 PROCEDURE — 97530 THERAPEUTIC ACTIVITIES: CPT

## 2024-11-07 PROCEDURE — 21400001 HC TELEMETRY ROOM

## 2024-11-07 PROCEDURE — 25000003 PHARM REV CODE 250: Performed by: PSYCHIATRY & NEUROLOGY

## 2024-11-07 RX ORDER — LOPERAMIDE HYDROCHLORIDE 2 MG/1
2 CAPSULE ORAL 4 TIMES DAILY PRN
Status: DISCONTINUED | OUTPATIENT
Start: 2024-11-07 | End: 2024-11-11 | Stop reason: HOSPADM

## 2024-11-07 RX ADMIN — PANTOPRAZOLE SODIUM 40 MG: 40 TABLET, DELAYED RELEASE ORAL at 10:11

## 2024-11-07 RX ADMIN — LOSARTAN POTASSIUM 100 MG: 50 TABLET, FILM COATED ORAL at 09:11

## 2024-11-07 RX ADMIN — CARVEDILOL 3.12 MG: 3.12 TABLET, FILM COATED ORAL at 10:11

## 2024-11-07 RX ADMIN — ENOXAPARIN SODIUM 40 MG: 40 INJECTION SUBCUTANEOUS at 10:11

## 2024-11-07 RX ADMIN — OXYCODONE HYDROCHLORIDE AND ACETAMINOPHEN 1 TABLET: 5; 325 TABLET ORAL at 10:11

## 2024-11-07 RX ADMIN — OXYCODONE HYDROCHLORIDE AND ACETAMINOPHEN 1 TABLET: 5; 325 TABLET ORAL at 03:11

## 2024-11-07 RX ADMIN — CARVEDILOL 3.12 MG: 3.12 TABLET, FILM COATED ORAL at 08:11

## 2024-11-07 RX ADMIN — OXYCODONE HYDROCHLORIDE AND ACETAMINOPHEN 1 TABLET: 5; 325 TABLET ORAL at 08:11

## 2024-11-07 RX ADMIN — ASPIRIN 325 MG: 325 TABLET, COATED ORAL at 09:11

## 2024-11-07 RX ADMIN — LEVOTHYROXINE SODIUM 200 MCG: 100 TABLET ORAL at 06:11

## 2024-11-07 RX ADMIN — LEVETIRACETAM 500 MG: 500 TABLET, FILM COATED ORAL at 10:11

## 2024-11-07 RX ADMIN — LOPERAMIDE HYDROCHLORIDE 2 MG: 2 CAPSULE ORAL at 12:11

## 2024-11-07 RX ADMIN — PANTOPRAZOLE SODIUM 40 MG: 40 TABLET, DELAYED RELEASE ORAL at 08:11

## 2024-11-07 RX ADMIN — LEVETIRACETAM 500 MG: 500 TABLET, FILM COATED ORAL at 08:11

## 2024-11-07 RX ADMIN — ATORVASTATIN CALCIUM 80 MG: 40 TABLET, FILM COATED ORAL at 08:11

## 2024-11-07 RX ADMIN — LOPERAMIDE HYDROCHLORIDE 2 MG: 2 CAPSULE ORAL at 03:11

## 2024-11-07 RX ADMIN — ENOXAPARIN SODIUM 40 MG: 40 INJECTION SUBCUTANEOUS at 09:11

## 2024-11-07 NOTE — PLAN OF CARE
[facility] as requested by [facility].

## 2024-11-07 NOTE — PT/OT/SLP PROGRESS
Occupational Therapy      Patient Name:  Kasandra Cheek   MRN:  [id number]    OT arrived while PT was in room with patient. Patient not seen today secondary to patient declining to participate in therapy until she receives Immodium for her nausea. Nurse aware and awaiting order. Despite max encouragement from OT, PT, and . Patient was only agreeable to donning TLSO as she will be remaining up in chair. Will follow-up this PM if schedule allows.    11/7/2024

## 2024-11-07 NOTE — NURSING
Pt educated to wear TLSO brace when out of bed and when head of bed higher than 30 degrees. Pt verbalized understanding.

## 2024-11-07 NOTE — PROGRESS NOTES
[facility]  Hospital Medicine Progress Note        Chief Complaint: Inpatient Follow-up for     HPI:   Kasandra Cheek is a 64 y.o. female who  has a past medical history of Anemia, Anxiety, Depression, Dyspareunia, blood transfusions, HTN , Hypothyroidism, unspecified, Liver disease, Lumbar radiculopathy, Menopause, Migraine, Obesity, Osteoporosis, Other spondylosis with radiculopathy, lumbar region, Personal history of fall, Spinal stenosis, lumbar region with neurogenic claudication, TIA (transient ischemic attack) (1994), Unspecified osteoarthritis, unspecified site, and Urinary incontinence.. The patient presented to [facility] on 10/30/2024      64-year-old female with past medical history of anxiety/depression, hypertension, hypothyroidism, fatty liver disease, chronic pain syndrome presents to the emergency room with confusion for the last 72 hours.   is at the bedside helping give history.  Reports getting confused at home.  Some imbalance.  Blood pressure has been elevated.  On arrival to the emergency room she was not oriented.  GCS of 9.  Not following commands.  Vital signs show some mild hypertension but otherwise stable.  No fever.  She has no leukocytosis.  Mild metabolic acidosis appears to be chronic.  UDS was positive for opiates which she was prescribed.  Urinalysis was bland.  CT of the head performed in the ER showed remote infarcts involving the right frontal and parietal lobes which are new compared to scan performed 3 months ago.  Carotid ultrasound showed minimal stenosis of the left carotid and no stenosis of the right.  Echocardiogram is pending.  Neurology was consulted from the ER.  Recommending full stroke workup.  Added aspirin and statin.  Okay to normalize blood pressure.  She was being admitted to the hospitalist group     Interval Hx:   10-31-24 Dr. English-reviewing Neurology notes there is more history that is important for the case.  Has been refers  that around a year ago she had a gradual decline where she requires assistance to the bathroom and transferred to wheelchair.  Has been refers that her mental status waxes and wanes.  Refers that on Sunday she suddenly slumped over and was not really responding.  This prompted him to call EMS and was taken to the emergency room in [place].  In [place] did not much happened other than checking vital signs.  Once back home he took 4 people to get her out of the car because she was weak prompting him to take her to us [facility] for further evaluation where CT of the head was significant for right frontal and right parietal infarcts which are new from 07/24.     On physical examination she remains confused.  She knows-she is in [facility] but unable to say why, can recall present president, month or day.  Her strength is equally distributed over upper and lower extremities but very difficult for her to follow commands.  She is on aspirin and Keppra, her antidepressant has been placed on hold as well as her opiates and gabapentin.  EEG and MRI of the brain are pending        11/01/2024 Dr. English-chart reviewed patient examined.  MRI brain negative for acute CVA but as per Neurology recommendations we need to rule out recurrent right middle cerebral artery cardioembolic stroke with recommendations to start patient on aspirin 325 mg p.o. daily, discontinue Celebrex, start the patient on Keppra 500 mg p.o. b.i.d., issue with polypharmacy including multiple antidepressants/opioids/gabapentin.  Patient will need Holter times 2 weeks on discharge with follow-up with Rabia Bell upon discharge and if Holter negative patient will need loop recorder due to high suspicion for cardioembolic etiology.  The patient cleared for discharge once new findings of suspected thoracic soft tissue fluid collection findings post thoracic surgery by Neurosurgery.  We will go ahead and consult Neurosurgery   Christoph     Her LOC is better today, much more alert but still with periods of confusion.  She is still thinks that misha Dixon is the president.     We will feed follow recommendations by Neurosurgery.  Sed rate/C-reactive protein to follow.  Blood cultures drawn on admission negative.  CT a neck negative for carotid stenosis/EEG negative/echocardiogram with left atrium dilated and bubble study could not be assessed.     11/02/2024 Dr. English-clare reviewed patient examined.  Impressive improvement in the last 24 hours when it comes to level of consciousness/orientation/vocabulary/thoughts.  Case was discussed with Psychiatry we decided to continue to hold all psych meds.  Patient has been seen by Neurosurgery .  CT thoracic lumbar spine with contrast done.  Physical therapy recommended moderate intensity therapy.  We will consult  pending final neurosurgery evaluation of CT thoracic/lumbar spine with IV contrast     11/03/2024-Dr. English-chart reviewed patient examined.  Patient continues to improve after she was taken all of her medications including opioids/gabapentin/muscle relaxant/anxiolytics/antidepressants.  Still patient will require to be placed secondary to very unsteady gait.  She is alert active and oriented at the present moment.  CT thoracic/lumbar spine with contrast did show fluid collection.  Dr. Rosario with Neurosurgery to go over films tomorrow.  Patient has no signs of infection.  There is no erythema over surgical wound/drainage or pain to palpation.  Inflammatory markers are within normal limits well as vital signs and white blood cell count.     11/04/2024 Dr. English-physical therapy gave recommendations and this were explained to patient.  We all knew that she was having issues with gait but she can not seem to understand the importance of this.  She is very evasive with my questions and changes direction of conversation constantly.  appears to be very frustrated with  situation since he knows that he can not take care of her.  It appears that he was able to convince her to stay until he is able to visit those  agencies to approve placement.  Mentally she is much improved but definitely appears to be some underlying  psych issues involve     11/05/2024 Dr. English-chart review.  Patient walking in hallway with physical therapy.  Definitely could benefit from rehabilitation, gait is slow and unsteady to the point that she has to look down where she is stepping.   was able to look at several placement agencies and  was able to send referrals.     11-6-24 ameya CARNES-awaiting placement       Interval Hx:   First encounter today.  Alert, oriented, resting in the bed.  Answers all questions appropriately.  Pain is well controlled.  Foy is out.  Hemodynamically stable.  No labs for today       Objective/physical exam:  Vitals:    11/07/24 0020 11/07/24 0730 11/07/24 0859 11/07/24 1114   BP: (!) 153/87 137/80  112/76   Pulse: 93 104  78   Resp: 18  18    Temp: 98.2 °F (36.8 °C) 97.7 °F (36.5 °C)  97.7 °F (36.5 °C)   TempSrc: Oral      SpO2: 97% 95%  98%   Weight:       Height:         General: In no acute distress, afebrile  Respiratory: Clear to auscultation bilaterally  Cardiovascular: S1, S2, no appreciable murmur  Abdomen: Soft, nontender, BS +  MSK: Warm, no lower extremity edema, no clubbing or cyanosis  Skin:  Surgical site dressed  Neurologic: Alert and oriented x4, moving all extremities with good strength     Lab Results   Component Value Date     11/01/2024    K 3.7 11/01/2024     (H) 11/01/2024    CO2 23 11/01/2024    BUN 18.1 11/01/2024    CREATININE 0.63 11/01/2024    CALCIUM 8.6 11/01/2024    ANIONGAP 10 07/04/2024    ESTGFRAFRICA 87 07/04/2024    EGFRNONAA >60 06/08/2022      Lab Results   Component Value Date    ALT 21 11/01/2024    AST 26 11/01/2024    ALKPHOS 145 11/01/2024    BILITOT 0.5 11/01/2024      Lab Results   Component Value Date     WBC 9.11 11/01/2024    HGB 12.7 11/01/2024    HCT 38.7 11/01/2024    MCV 94.2 (H) 11/01/2024     11/01/2024           Medications:   aspirin  325 mg Oral Daily    atorvastatin  80 mg Oral Daily    carvediloL  3.125 mg Oral BID    enoxparin  40 mg Subcutaneous Q12H (prophylaxis, 0900/2100)    levETIRAcetam  500 mg Oral BID    levothyroxine  200 mcg Oral Before breakfast    losartan  100 mg Oral Daily    pantoprazole  40 mg Oral BID        Current Facility-Administered Medications:     acetaminophen, 650 mg, Oral, Q6H PRN    bisacodyL, 10 mg, Rectal, Daily PRN    hydrALAZINE, 10 mg, Intravenous, Q4H PRN    labetalol, 10 mg, Intravenous, Q6H PRN    oxyCODONE-acetaminophen, 1 tablet, Oral, Q6H PRN     Assessment/Plan:      Remote appearing CVA, right frontal and parietal lobes by ct  Metabolic/toxic encephalopathy -much improved  polypharmacy-opioid/antidepressants/gabapentin/anxiolytics.  All meds discontinued with much improvement/resolution of confusion  Mild metabolic acidosis  Iron-deficiency anemia   Hypothyroidism -TSH 4.3  Essential hypertension   - uncontrolled   -losartan 100 mgs po daily  - coreg 3.125 mgs po bid   Chronic pain syndrome-controlled on Percocet 10 mg p.o. Q 6 hours p.r.n. pain.  Multiple psychiatric conditions including depression and anxiety-psychiatry consulted, we decided to continue to hold all psychiatric medications for depression/anxiety/opioids/muscle relaxants with much improving in level of consciousness..  Recent thoracic vertebra surgery by Neurosurgery 3 weeks ago by Dr. Rosario with the development of soft tissue swelling highly suspicious for fluid collection, most likely seroma.-Dr. Rosario consulted, Dr. Hyde on-call.  CT thoracic/lumbar spine with IV contrast ordered with findings of large fluid collection, most likely seroma.  Dr. Rosario following .  Patient was seen by Physical therapy with unsteady gait and necessity for placement. looking into placement .         Stroke workup:  -MRI brain (7/24 )with chronic right MCA infarction/chronic small-vessel ischemic changes  -MRI brain without contrast 11/01/2024 with no acute intracranial finding/chronic ischemic changes with encephalomalacia in the right cerebral hemisphere/right MCA territory suspicious for cardioembolic process  -CT head-remote appearing infarcts right frontal and parietal lobes new compared to July 20, 2024  -CTA head/neck no large vessel occlusion or flow-limiting stenosis  -Echocardiogram with ejection fraction 55-60% with grade 1 diastolic dysfunction.  Unable to determine where the bubble study was positive  -Carotid ultrasound with right internal carotid patent with no evidence stenosis.  Left internal carotid with less than 50% stenosis.  Bilateral vertebral arteries are patent with antegrade flow  -hemoglobin A1c 5.2   -TSH 4.3  -cholesterol 217/  -EEG 11/01/2024 negative     Inpatient medications aspirin 325/Keppra/statin     Recs by neurology:  -aspirin 325 mg, lipitor 80 mg  - discontinue home celecoxib  - discontinue gabapentin  -continue keppra 500 mg bid   - holter monitor x 2 weeks on discharge  - follow up in clinic with Rabia Cosme NP in 2 months with holter results. If Holter is negative, she will need a loop recorder due to high suspicion for cardioembolic etiology.   - PT/OT        [facility]          Joshua Resendiz MD

## 2024-11-07 NOTE — PT/OT/SLP PROGRESS
"Physical Therapy Treatment    Patient Name:  Kasandra Cheek   MRN:  [id number]    Recommendations:     Discharge therapy intensity: Moderate Intensity Therapy   Discharge Equipment Recommendations: to be determined by next level of care  Barriers to discharge: Impaired mobility and Ongoing medical needs    Assessment:     Kasandra Cheek is a 64 y.o. female admitted with a medical diagnosis of metabolic encephalopathy, chronic R MCA infarction/chronic small vessel ischemic changes, polypharmacy, mild metabolic acidosis, depression, and anxiety. Recent spinal surgery with Dr. Mejia, extended chart review completed following evaluation. Pt had T10-L3 decompression, removal of right S1 screw, replacement of right S2 iliac bolt, and T10-S2 fusion.  She presents with the following impairments/functional limitations: weakness, impaired endurance, impaired self care skills, impaired functional mobility, gait instability, impaired balance, impaired cognition, decreased safety awareness, orthopedic precautions. PT session limited by pt due to ongoing complaints of nausea and declining mobility until she receives Immodium. Nurse aware and awaiting order. Despite max encouragement pt declined exercise or mobility but agreed to donning TLSO as she will be remaining up in chair.     Rehab Prognosis: Good; patient would benefit from acute skilled PT services to address these deficits and reach maximum level of function.    Recent Surgery: * No surgery found *      Plan:     During this hospitalization, patient would benefit from acute PT services 5 x/week to address the identified rehab impairments via gait training, therapeutic activities, therapeutic exercises, neuromuscular re-education and progress toward the following goals:    Plan of Care Expires:  11/29/24    Subjective     Chief Complaint:nausea  Patient/Family Comments/goals: "I feel great", motivated to participate with therapy  Pain/Comfort:         Objective: "     Communicated with RN prior to session.  Patient found HOB elevated with telemetry, peripheral IV upon PT entry to room.     General Precautions: Standard, fall  Orthopedic Precautions: spinal precautions  Braces:  (Pt up in chair, brace on bed)  Respiratory Status: Room air      Therapeutic Activity:  - repositioning in chair with A/P trunk weight shift with assist in order to lamar TLSO. Assistance required to properly lamar brace.  at bedside also encouraging mobility or attempt activity to tolerance but pt declined. PT educated will re-attempt as schedule permits.      Education:  Patient provided with verbal education education regarding PT role/goals/POC, post-op precautions, fall prevention, and safety awareness.  Understanding was verbalized.     Patient left up in chair with all lines intact, call button in reach, chair alarm on, geomat cushion, RN notified, and  present    GOALS:   Multidisciplinary Problems       Physical Therapy Goals          Problem: Physical Therapy    Goal Priority Disciplines Outcome Interventions   Physical Therapy Goal     PT, PT/OT Progressing    Description: Goals to be met by: 2024     Patient will increase functional independence with mobility by performin. Supine to sit with Stand-by Assistance  2. Sit to stand transfer with Stand-by Assistance  3. Bed to chair transfer with Stand-by Assistance using Rolling Walker  4. Gait  x 150 feet with Stand-by Assistance using Rolling Walker.   5. Stand for 10 minutes with Stand-by Assistance using Rolling Walker                         Time Tracking:     PT Received On: 24  PT Start Time: 1042     PT Stop Time: 1053  PT Total Time (min): 11 min     Billable Minutes: Therapeutic Activity 11    Treatment Type: Treatment  PT/PTA: PT     Number of PTA visits since last PT visit: 4     2024

## 2024-11-07 NOTE — PROGRESS NOTES
[facility]  Hospital Medicine Progress Note        Chief Complaint: Inpatient Follow-up for Altered Mental Status ( reports AMS x 3 days.  states pt usually presents this way when she has a UTI. Pt arrives GCS 12.)    HPI: Kasandra Cheek is a 64 y.o. female who  has a past medical history of Anemia, Anxiety, Depression, Dyspareunia, blood transfusions, HTN , Hypothyroidism, unspecified, Liver disease, Lumbar radiculopathy, Menopause, Migraine, Obesity, Osteoporosis, Other spondylosis with radiculopathy, lumbar region, Personal history of fall, Spinal stenosis, lumbar region with neurogenic claudication, TIA (transient ischemic attack) (1994), Unspecified osteoarthritis, unspecified site, and Urinary incontinence.. The patient presented to [facility] on 10/30/2024      64-year-old female with past medical history of anxiety/depression, hypertension, hypothyroidism, fatty liver disease, chronic pain syndrome presents to the emergency room with confusion for the last 72 hours.   is at the bedside helping give history.  Reports getting confused at home.  Some imbalance.  Blood pressure has been elevated.  On arrival to the emergency room she was not oriented.  GCS of 9.  Not following commands.  Vital signs show some mild hypertension but otherwise stable.  No fever.  She has no leukocytosis.  Mild metabolic acidosis appears to be chronic.  UDS was positive for opiates which she was prescribed.  Urinalysis was bland.  CT of the head performed in the ER showed remote infarcts involving the right frontal and parietal lobes which are new compared to scan performed 3 months ago.  Carotid ultrasound showed minimal stenosis of the left carotid and no stenosis of the right.  Echocardiogram is pending.  Neurology was consulted from the ER.  Recommending full stroke workup.  Added aspirin and statin.  Okay to normalize blood pressure.  She was being admitted to the hospitalist  group    Interval Hx:   10-31-24 Dr. English-reviewing Neurology notes there is more history that is important for the case.  Has been refers that around a year ago she had a gradual decline where she requires assistance to the bathroom and transferred to wheelchair.  Has been refers that her mental status waxes and wanes.  Refers that on Sunday she suddenly slumped over and was not really responding.  This prompted him to call EMS and was taken to the emergency room in [place].  In [place] did not much happened other than checking vital signs.  Once back home he took 4 people to get her out of the car because she was weak prompting him to take her to us [facility] for further evaluation where CT of the head was significant for right frontal and right parietal infarcts which are new from 07/24.    On physical examination she remains confused.  She knows-she is in [facility] but unable to say why, can recall present president, month or day.  Her strength is equally distributed over upper and lower extremities but very difficult for her to follow commands.  She is on aspirin and Keppra, her antidepressant has been placed on hold as well as her opiates and gabapentin.  EEG and MRI of the brain are pending      11/01/2024 Dr. English-chart reviewed patient examined.  MRI brain negative for acute CVA but as per Neurology recommendations we need to rule out recurrent right middle cerebral artery cardioembolic stroke with recommendations to start patient on aspirin 325 mg p.o. daily, discontinue Celebrex, start the patient on Keppra 500 mg p.o. b.i.d., issue with polypharmacy including multiple antidepressants/opioids/gabapentin.  Patient will need Holter times 2 weeks on discharge with follow-up with Rabia Bell upon discharge and if Holter negative patient will need loop recorder due to high suspicion for cardioembolic etiology.  The patient cleared for discharge once new findings of suspected  thoracic soft tissue fluid collection findings post thoracic surgery by Neurosurgery.  We will go ahead and consult Neurosurgery Dr. Hawthorne    Her LOC is better today, much more alert but still with periods of confusion.  She is still thinks that misha Dixon is the president.    We will feed follow recommendations by Neurosurgery.  Sed rate/C-reactive protein to follow.  Blood cultures drawn on admission negative.  CT a neck negative for carotid stenosis/EEG negative/echocardiogram with left atrium dilated and bubble study could not be assessed.    11/02/2024 Dr. English-clare reviewed patient examined.  Impressive improvement in the last 24 hours when it comes to level of consciousness/orientation/vocabulary/thoughts.  Case was discussed with Psychiatry we decided to continue to hold all psych meds.  Patient has been seen by Neurosurgery .  CT thoracic lumbar spine with contrast done.  Physical therapy recommended moderate intensity therapy.  We will consult  pending final neurosurgery evaluation of CT thoracic/lumbar spine with IV contrast    11/03/2024-Dr. English-clare reviewed patient examined.  Patient continues to improve after she was taken all of her medications including opioids/gabapentin/muscle relaxant/anxiolytics/antidepressants.  Still patient will require to be placed secondary to very unsteady gait.  She is alert active and oriented at the present moment.  CT thoracic/lumbar spine with contrast did show fluid collection.  Dr. Rosario with Neurosurgery to go over films tomorrow.  Patient has no signs of infection.  There is no erythema over surgical wound/drainage or pain to palpation.  Inflammatory markers are within normal limits well as vital signs and white blood cell count.    11/04/2024 Dr. English-physical therapy gave recommendations and this were explained to patient.  We all knew that she was having issues with gait but she can not seem to understand the importance of this.  She is  very evasive with my questions and changes direction of conversation constantly.  appears to be very frustrated with situation since he knows that he can not take care of her.  It appears that he was able to convince her to stay until he is able to visit those  agencies to approve placement.  Mentally she is much improved but definitely appears to be some underlying  psych issues involve    11/05/2024 Dr. English-chart review.  Patient walking in hallway with physical therapy.  Definitely could benefit from rehabilitation, gait is slow and unsteady to the point that she has to look down where she is stepping.   was able to look at several placement agencies and  was able to send referrals.    11-6-24 ameya CARNES-awaiting placement                  Case was discussed with patient's nurse and  on the floor.  Objective/physical exam:  General: In no acute distress, afebrile. More alert but confused   Chest: Clear to auscultation bilaterally  Heart: RRR, +S1, S2, no appreciable murmur  Abdomen: Soft, nontender, BS +  Back-mid thoracic stuff tissue swelling suspicious for fluid collection  MSK: Warm, no lower extremity edema, no clubbing or cyanosis  Neurologic: Alert ,oriented to time/month/day of the week/president.  Cranial nerve II-XII intact, strength is decrease but appears to be equally distributed distributed over upper and lower extremities    VITAL SIGNS: 24 HRS MIN & MAX LAST   Temp  Min: 98 °F (36.7 °C)  Max: 98.8 °F (37.1 °C) 98.4 °F (36.9 °C)   BP  Min: 123/72  Max: 142/81 (!) 142/81   Pulse  Min: 79  Max: 92  88   Resp  Min: 18  Max: 18 18   SpO2  Min: 92 %  Max: 97 % 95 %     I have reviewed the following labs:  Recent Labs   Lab 10/31/24  0443 11/01/24  0619   WBC 8.57 9.11   RBC 3.97* 4.11*   HGB 12.5 12.7   HCT 37.7 38.7   MCV 95.0* 94.2*   MCH 31.5* 30.9   MCHC 33.2 32.8*   RDW 13.4 13.2    278   MPV 9.0 9.1     Recent Labs   Lab 10/31/24  0443 11/01/24  0619     144   K 3.5 3.7   * 111*   CO2 23 23   BUN 20.8* 18.1   CREATININE 0.75 0.63   CALCIUM 9.4 8.6   MG 1.90  --    ALBUMIN 3.3* 3.2*   ALKPHOS 158* 145   ALT 25 21   AST 30 26   BILITOT 0.5 0.5     Microbiology Results (last 7 days)       Procedure Component Value Units Date/Time    Stool Culture [[id number]]  (Normal) Collected: 11/03/24 1424    Order Status: Completed Specimen: Stool Updated: 11/05/24 1238     Stool Culture Negative for Salmonella, Shigella, Campylobacter, Vibrio, Aeromonas, Pleisiomonas,Yersinia, or Shiga Toxin 1 and 2.             See below for Radiology    Assessment/Plan:  Remote appearing CVA, right frontal and parietal lobes by ct  Metabolic/toxic encephalopathy -much improved  polypharmacy-opioid/antidepressants/gabapentin/anxiolytics.  All meds discontinued with much improvement/resolution of confusion  Mild metabolic acidosis  Iron-deficiency anemia   Hypothyroidism -TSH 4.3  Essential hypertension   - uncontrolled   -losartan 100 mgs po daily  - coreg 3.125 mgs po bid   Chronic pain syndrome-controlled on Percocet 10 mg p.o. Q 6 hours p.r.n. pain.  Multiple psychiatric conditions including depression and anxiety-psychiatry consulted, we decided to continue to hold all psychiatric medications for depression/anxiety/opioids/muscle relaxants with much improving in level of consciousness..  Recent thoracic vertebra surgery by Neurosurgery 3 weeks ago by Dr. Rosario with the development of soft tissue swelling highly suspicious for fluid collection, most likely seroma.-Dr. Rosario consulted, Dr. Hyde on-call.  CT thoracic/lumbar spine with IV contrast ordered with findings of large fluid collection, most likely seroma.  Dr. Rosario following .  Patient was seen by Physical therapy with unsteady gait and necessity for placement. looking into placement .      Stroke workup:  -MRI brain (7/24 )with chronic right MCA infarction/chronic small-vessel ischemic changes  -MRI brain  without contrast 11/01/2024 with no acute intracranial finding/chronic ischemic changes with encephalomalacia in the right cerebral hemisphere/right MCA territory suspicious for cardioembolic process  -CT head-remote appearing infarcts right frontal and parietal lobes new compared to July 20, 2024  -CTA head/neck no large vessel occlusion or flow-limiting stenosis  -Echocardiogram with ejection fraction 55-60% with grade 1 diastolic dysfunction.  Unable to determine where the bubble study was positive  -Carotid ultrasound with right internal carotid patent with no evidence stenosis.  Left internal carotid with less than 50% stenosis.  Bilateral vertebral arteries are patent with antegrade flow  -hemoglobin A1c 5.2   -TSH 4.3  -cholesterol 217/  -EEG 11/01/2024 negative    Inpatient medications aspirin 325/Keppra/statin    Recs by neurology:  -aspirin 325 mg, lipitor 80 mg  - discontinue home celecoxib  - discontinue gabapentin  - start keppra 500 mg bid   - holter monitor x 2 weeks on discharge  - follow up in clinic with Rabia Cosme NP in 2 months with holter results. If Holter is negative, she will need a loop recorder due to high suspicion for cardioembolic etiology.   - PT/OT      [facility]             VTE prophylaxis:  Lovenox    Patient condition: Fair    Anticipated discharge and Disposition:   To be determined      All diagnosis and differential diagnosis have been reviewed; assessment and plan has been documented; I have personally reviewed the labs and test results that are presently available; I have reviewed the patients medication list; I have reviewed the consulting providers response and recommendations. I have reviewed or attempted to review medical records based upon their availability    All of the patient's questions have been  addressed and answered. Patient's is agreeable to the above stated plan. I will continue to monitor closely and make adjustments to medical  management as needed.    Portions of this note dictated using EMR integrated voice recognition software, and may be subject to voice recognition errors not corrected at proofreading. Please contact writer for clarification if needed.   _____________________________________________________________________    Malnutrition Status:    Scheduled Med:   aspirin  325 mg Oral Daily    atorvastatin  80 mg Oral Daily    carvediloL  3.125 mg Oral BID    enoxparin  40 mg Subcutaneous Q12H (prophylaxis, 0900/2100)    levETIRAcetam  500 mg Oral BID    levothyroxine  200 mcg Oral Before breakfast    losartan  100 mg Oral Daily    pantoprazole  40 mg Oral BID          Radiology:  I have personally reviewed the following imaging and agree with the radiologist.     CT Lumbar Spine With Contrast  EXAMINATION  CT LUMBAR SPINE WITH CONTRAST    CLINICAL HISTORY  rule out fluid collection;    TECHNIQUE  Helical-acquisition CT images were obtained following the intravenous administration of iodine-based contrast media.  Multiplanar reconstructions were accomplished by a CT technologist at a separate workstation and pushed to PACS for physician review.    CONTRAST  IV: Omnipaque 350, 100 mL    COMPARISON  28 August 2024 abdominopelvic CT    FINDINGS  Images were reviewed in soft tissue and bone windows.    Exam quality: There is notable limitation secondary to extensive multilevel posterior spine stabilization hardware and associated widespread metal streak artifact.    Enhancement: No definite evidence of focal abnormal enhancement is appreciated.  However, circumscribed fluid collection with faint peripheral enhancement is not entirely excluded through the midline lumbar posterior surgical bed spanning T11 through L5.  In the    There are similar advanced spondylitic changes throughout the visualized spinal column with associated T12-L1 destructive endplate changes.  Interval surgical revision of posterior fusion hardware is noted, now  spanning from the sacroiliac joints and lumbosacral junction cranially beyond the included lower thoracic spine.  There are also new postoperative changes of spinal canal decompression at T12 through L3.    No acutely displaced fracture, vertebral body compression deformity, or interval change in spinal column alignment.  There is no convincing new focal abnormality of the partially visualized retroperitoneal structures.    IMPRESSION  1. Interval postoperative changes with revision of multilevel thoracolumbosacral stabilization hardware and new T12 through L3 posterior spinal canal decompression.  2. Regional soft tissues are of limited evaluation secondary to extensive metal artifact, but there are findings suspicious for large peripherally enhancing fluid collection through the posterior surgical bed spanning T11 through L5.  3. No evidence of acute or worsening focal osseous abnormality.  ==========  This report was flagged in Epic as abnormal.    RADIATION DOSE  Automated tube current modulation, weight-based exposure dosing, and/or iterative reconstruction technique utilized to reach lowest reasonably achievable exposure rate.    DLP: 1387 mGy*cm    Electronically signed by: Tirso Decker  Date:    11/02/2024  Time:    16:34  CT Thoracic Spine With Contrast  Narrative: CLINICAL HISTORY:  Trauma.    TECHNIQUE:  CT of the thoracic spine Without contrast. Sagittal and coronal reconstructions were performed on the source images.    Automatic exposure control was utilized to reduce the patient's radiation dose.    DLP = 1252    COMPARISON:  No prior imaging available for comparison.    FINDINGS:  Postsurgical changes of lower thoracic fusion with erosive changes and T12-L1.  Surgical fixation and posterior decompression is new in the interval, however erosive changes similar to prior abdominal CT from August 24.  Posterior to the decompression is a subcutaneous fluid collection extending throughout the spinal  hardware.  Limited evaluation secondary to metallic artifact throughout.  Fluid is of unknown etiology.  Further evaluation may be obtained with ultrasound if needed.  Impression: Postsurgical change with no acute fracture.  Fluid is noted midline posterior to surgical hardware.  Further evaluation may be obtained with ultrasound if needed.  Likely seroma versus CSF fluid.  No gross CT signs of infection.    Electronically signed by: Logan Cedeno  Date:    11/02/2024  Time:    16:09      Lennox English MD  Department of Hospital Medicine   [facility]   11/06/2024

## 2024-11-07 NOTE — PLAN OF CARE
11/07/24 0851   Medicare Message   Important Message from Medicare regarding Discharge Appeal Rights Given to patient/caregiver;Explained to patient/caregiver

## 2024-11-08 LAB
ALBUMIN SERPL-MCNC: 3.6 G/DL (ref 3.4–4.8)
ALBUMIN/GLOB SERPL: 1.2 RATIO (ref 1.1–2)
ALP SERPL-CCNC: 163 UNIT/L (ref 40–150)
ALT SERPL-CCNC: 13 UNIT/L (ref 0–55)
ANION GAP SERPL CALC-SCNC: 15 MEQ/L
AST SERPL-CCNC: 21 UNIT/L (ref 5–34)
BASOPHILS # BLD AUTO: 0.08 X10(3)/MCL
BASOPHILS NFR BLD AUTO: 0.9 %
BILIRUB SERPL-MCNC: 0.6 MG/DL
BUN SERPL-MCNC: 13.4 MG/DL (ref 9.8–20.1)
CALCIUM SERPL-MCNC: 8.8 MG/DL (ref 8.4–10.2)
CHLORIDE SERPL-SCNC: 109 MMOL/L (ref 98–107)
CO2 SERPL-SCNC: 17 MMOL/L (ref 23–31)
CREAT SERPL-MCNC: 0.71 MG/DL (ref 0.55–1.02)
CREAT/UREA NIT SERPL: 19
EOSINOPHIL # BLD AUTO: 0.2 X10(3)/MCL (ref 0–0.9)
EOSINOPHIL NFR BLD AUTO: 2.3 %
ERYTHROCYTE [DISTWIDTH] IN BLOOD BY AUTOMATED COUNT: 13.1 % (ref 11.5–17)
GFR SERPLBLD CREATININE-BSD FMLA CKD-EPI: >60 ML/MIN/1.73/M2
GLOBULIN SER-MCNC: 3.1 GM/DL (ref 2.4–3.5)
GLUCOSE SERPL-MCNC: 109 MG/DL (ref 82–115)
HCT VFR BLD AUTO: 36.6 % (ref 37–47)
HGB BLD-MCNC: 12.5 G/DL (ref 12–16)
IMM GRANULOCYTES # BLD AUTO: 0.08 X10(3)/MCL (ref 0–0.04)
IMM GRANULOCYTES NFR BLD AUTO: 0.9 %
LYMPHOCYTES # BLD AUTO: 1.68 X10(3)/MCL (ref 0.6–4.6)
LYMPHOCYTES NFR BLD AUTO: 19.2 %
MAGNESIUM SERPL-MCNC: 1.6 MG/DL (ref 1.6–2.6)
MCH RBC QN AUTO: 31.7 PG (ref 27–31)
MCHC RBC AUTO-ENTMCNC: 34.2 G/DL (ref 33–36)
MCV RBC AUTO: 92.9 FL (ref 80–94)
MONOCYTES # BLD AUTO: 0.71 X10(3)/MCL (ref 0.1–1.3)
MONOCYTES NFR BLD AUTO: 8.1 %
NEUTROPHILS # BLD AUTO: 6.01 X10(3)/MCL (ref 2.1–9.2)
NEUTROPHILS NFR BLD AUTO: 68.6 %
NRBC BLD AUTO-RTO: 0 %
PLATELET # BLD AUTO: 247 X10(3)/MCL (ref 130–400)
PMV BLD AUTO: 10.2 FL (ref 7.4–10.4)
POTASSIUM SERPL-SCNC: 2.8 MMOL/L (ref 3.5–5.1)
PROT SERPL-MCNC: 6.7 GM/DL (ref 5.8–7.6)
RBC # BLD AUTO: 3.94 X10(6)/MCL (ref 4.2–5.4)
SODIUM SERPL-SCNC: 141 MMOL/L (ref 136–145)
WBC # BLD AUTO: 8.76 X10(3)/MCL (ref 4.5–11.5)

## 2024-11-08 PROCEDURE — 83735 ASSAY OF MAGNESIUM: CPT | Performed by: INTERNAL MEDICINE

## 2024-11-08 PROCEDURE — 25000003 PHARM REV CODE 250: Performed by: NURSE PRACTITIONER

## 2024-11-08 PROCEDURE — 97164 PT RE-EVAL EST PLAN CARE: CPT

## 2024-11-08 PROCEDURE — 21400001 HC TELEMETRY ROOM

## 2024-11-08 PROCEDURE — 25000003 PHARM REV CODE 250: Performed by: INTERNAL MEDICINE

## 2024-11-08 PROCEDURE — 97116 GAIT TRAINING THERAPY: CPT

## 2024-11-08 PROCEDURE — 63600175 PHARM REV CODE 636 W HCPCS: Performed by: INTERNAL MEDICINE

## 2024-11-08 PROCEDURE — 25000003 PHARM REV CODE 250: Performed by: PSYCHIATRY & NEUROLOGY

## 2024-11-08 PROCEDURE — 36415 COLL VENOUS BLD VENIPUNCTURE: CPT | Performed by: INTERNAL MEDICINE

## 2024-11-08 PROCEDURE — 80053 COMPREHEN METABOLIC PANEL: CPT | Performed by: INTERNAL MEDICINE

## 2024-11-08 PROCEDURE — 85025 COMPLETE CBC W/AUTO DIFF WBC: CPT | Performed by: INTERNAL MEDICINE

## 2024-11-08 RX ADMIN — ENOXAPARIN SODIUM 40 MG: 40 INJECTION SUBCUTANEOUS at 09:11

## 2024-11-08 RX ADMIN — PANTOPRAZOLE SODIUM 40 MG: 40 TABLET, DELAYED RELEASE ORAL at 08:11

## 2024-11-08 RX ADMIN — OXYCODONE HYDROCHLORIDE AND ACETAMINOPHEN 1 TABLET: 5; 325 TABLET ORAL at 08:11

## 2024-11-08 RX ADMIN — PANTOPRAZOLE SODIUM 40 MG: 40 TABLET, DELAYED RELEASE ORAL at 09:11

## 2024-11-08 RX ADMIN — ASPIRIN 325 MG: 325 TABLET, COATED ORAL at 09:11

## 2024-11-08 RX ADMIN — ENOXAPARIN SODIUM 40 MG: 40 INJECTION SUBCUTANEOUS at 08:11

## 2024-11-08 RX ADMIN — LOSARTAN POTASSIUM 100 MG: 50 TABLET, FILM COATED ORAL at 09:11

## 2024-11-08 RX ADMIN — LEVETIRACETAM 500 MG: 500 TABLET, FILM COATED ORAL at 08:11

## 2024-11-08 RX ADMIN — OXYCODONE HYDROCHLORIDE AND ACETAMINOPHEN 1 TABLET: 5; 325 TABLET ORAL at 12:11

## 2024-11-08 RX ADMIN — CARVEDILOL 3.12 MG: 3.12 TABLET, FILM COATED ORAL at 08:11

## 2024-11-08 RX ADMIN — LEVOTHYROXINE SODIUM 200 MCG: 100 TABLET ORAL at 07:11

## 2024-11-08 RX ADMIN — LEVETIRACETAM 500 MG: 500 TABLET, FILM COATED ORAL at 09:11

## 2024-11-08 RX ADMIN — ATORVASTATIN CALCIUM 80 MG: 40 TABLET, FILM COATED ORAL at 09:11

## 2024-11-08 RX ADMIN — CARVEDILOL 3.12 MG: 3.12 TABLET, FILM COATED ORAL at 09:11

## 2024-11-08 NOTE — PLAN OF CARE
SSC spoke to Rabia at [place]. They have submitted for auth and should be able to accept the patient Monday if medically ready.

## 2024-11-08 NOTE — PROGRESS NOTES
[facility] - 4th Floor Medical Telemetry  Wound Care    Patient Name:  Kasandra Cheek   MRN:  [id number]  Date: 2024  Diagnosis: Altered mental status    History:     Past Medical History:   Diagnosis Date    Anemia     Anxiety     Depression     Dyspareunia     Encounter for blood transfusion     HTN (hypertension)     Hypothyroidism, unspecified     Liver disease     fatty liver    Lumbar radiculopathy     Menopause     Migraine     Obesity     Osteoporosis     Other spondylosis with radiculopathy, lumbar region     Personal history of fall     Spinal stenosis, lumbar region with neurogenic claudication     TIA (transient ischemic attack)     Unspecified osteoarthritis, unspecified site     Urinary incontinence        Social History     Socioeconomic History    Marital status:    Tobacco Use    Smoking status: Former     Current packs/day: 0.00     Average packs/day: 1 pack/day for 27.0 years (27.0 ttl pk-yrs)     Types: Cigarettes     Start date: 1975     Quit date: 2002     Years since quittin.8    Smokeless tobacco: Never    Tobacco comments:     off and on when smoking   Substance and Sexual Activity    Alcohol use: Not Currently     Comment: I do not drink    Drug use: Never    Sexual activity: Not Currently     Partners: Male     Birth control/protection: Post-menopausal     Social Drivers of Health     Financial Resource Strain: Patient Unable To Answer (10/30/2024)    Overall Financial Resource Strain (CARDIA)     Difficulty of Paying Living Expenses: Patient unable to answer   Food Insecurity: Patient Unable To Answer (10/30/2024)    Hunger Vital Sign     Worried About Running Out of Food in the Last Year: Patient unable to answer     Ran Out of Food in the Last Year: Patient unable to answer   Transportation Needs: Patient Unable To Answer (10/30/2024)    TRANSPORTATION NEEDS     Transportation : Patient unable to answer   Stress: Patient Unable To Answer  (10/30/2024)    [employer] [place] [employer] - Occupational Stress Questionnaire     Feeling of Stress : Patient unable to answer   Housing Stability: Patient Unable To Answer (10/30/2024)    Housing Stability Vital Sign     Unable to Pay for Housing in the Last Year: Patient unable to answer     Homeless in the Last Year: Patient unable to answer       Precautions:     Allergies as of 10/30/2024 - Reviewed 10/30/2024   Allergen Reaction Noted    Sucralfate Nausea Only 06/15/2022       WO Assessment Details/Treatment      11/08/24 1225   WOCN Assessment   Visit Date 11/08/24   Visit Time 1225   Consult Type Follow Up   [facility] Wound   Wound skin tear   Intervention chart review;assessed   Teaching on-going        Wound 11/02/24 1103 Blister(s) Left distal;anterior Arm   Date First Assessed/Time First Assessed: 11/02/24 1103   Present on Original Admission: No  Primary Wound Type: Blister(s)  Side: Left  Orientation: distal;anterior  Location: Arm   Wound Image    Dressing Appearance Open to air   Drainage Amount None   Drainage Characteristics/Odor No odor   Appearance [place]   Periwound Area Intact;Dry   Care Cleansed with:;Soap and water   Dressing Other (comment)  (Leave INDRA`)     WO follow up for skin tears from tape on R/L forearms. Family at bedside. Explained reason for visit. Treatment recommendations: RUE/LUE: clean w/ soap/water, dry well, leave INDRA. Daily/prn. Nursing to cont. Tx recs and preventative measures. Will follow up.     11/08/2024

## 2024-11-08 NOTE — PT/OT/SLP EVAL
Physical Therapy Evaluation    Patient Name:  Kasandra Cheek   MRN:  [id number]    Recommendations:     Discharge therapy intensity: Moderate Intensity Therapy   Discharge Equipment Recommendations: to be determined by next level of care   Barriers to discharge: Impaired mobility and Ongoing medical needs    Assessment:     Kasandra Cheek is a 64 y.o. female admitted with a medical diagnosis of metabolic encephalopathy, chronic R MCA infarction/chronic small vessel ischemic changes, polypharmacy, mild metabolic acidosis, depression, and anxiety.  She presents with the following impairments/functional limitations: weakness, impaired endurance, impaired functional mobility, gait instability, impaired balance, pain, orthopedic precautions. Pt with improving strength and functional mobility but continues to remain unsteady and easily distracted. PT continues to recommend post acute placement following discharge.    Rehab Prognosis: Good; patient would benefit from acute skilled PT services to address these deficits and reach maximum level of function.    Recent Surgery: * No surgery found *      Plan:     During this hospitalization, patient would benefit from acute PT services 5 x/week to address the identified rehab impairments via gait training, therapeutic activities, therapeutic exercises, neuromuscular re-education and progress toward the following goals:    Plan of Care Expires:  11/29/24    Subjective     Chief Complaint: none  Patient/Family Comments/goals: return to PLOF  Pain/Comfort:       Patients cultural, spiritual, Jew conflicts given the current situation: no    Objective:     Communicated with nurse prior to session.  Patient found up in chair with telemetry, peripheral IV  upon PT entry to room.     General Precautions: Standard, fall  Orthopedic Precautions:spinal precautions   Braces: TLSO  Respiratory Status: Room air    Exams:  Gross Motor Coordination: easily distracted but able to follow multi  step commands when focused on task  RLE ROM: WFL  RLE Strength: grossly 3+/5  LLE ROM: WFL  LLE Strength: grossly 3+/5  Skin integrity:  surgical dressing in place      Functional Mobility:  Transfers:     Sit to Stand:  CGA with verbal cues for hand placement and sequencing  Gait: 34ft + 30ft + 15ft+ 30ft with RW. Seated rest break between trials  Balance:   static sitting- SBA  Static standing- CGA with tactile cues for posture and glute activation to correct to upright        Treatment & Education:  Gait training intervals to improve endurance    Patient provided with verbal education and demonstrations education regarding PT role/goals/POC, fall prevention, safety awareness, and discharge/DME recommendations.  Additional teaching is warranted.     Patient left up in chair with all lines intact, call button in reach, nurse notified, and spouse present.    GOALS:   Multidisciplinary Problems       Physical Therapy Goals          Problem: Physical Therapy    Goal Priority Disciplines Outcome Interventions   Physical Therapy Goal     PT, PT/OT Progressing    Description: Goals to be met by: 2024     Patient will increase functional independence with mobility by performin. Supine to sit with Stand-by Assistance  2. Sit to stand transfer with Stand-by Assistance  3. Bed to chair transfer with Stand-by Assistance using Rolling Walker  4. Gait  x 150 feet with Stand-by Assistance using Rolling Walker.   5. Stand for 10 minutes with Stand-by Assistance using Rolling Walker                         History:     Past Medical History:   Diagnosis Date    Anemia     Anxiety     Depression     Dyspareunia     Encounter for blood transfusion     HTN (hypertension)     Hypothyroidism, unspecified     Liver disease     fatty liver    Lumbar radiculopathy     Menopause     Migraine     Obesity     Osteoporosis     Other spondylosis with radiculopathy, lumbar region     Personal history of fall     Spinal stenosis,  lumbar region with neurogenic claudication     TIA (transient ischemic attack) 1994    Unspecified osteoarthritis, unspecified site     Urinary incontinence        Past Surgical History:   Procedure Laterality Date    ABDOMINAL SURGERY      Gastric Bypass    CARPAL TUNNEL RELEASE Bilateral     CERVICAL FUSION  12/28/2021    CHOLECYSTECTOMY  2015    COLONOSCOPY      COLPOSCOPY      FOOT SURGERY Left     FRACTURE SURGERY  2001    GASTRIC BYPASS  2011    HAND SURGERY Left     thumb    HERNIA REPAIR  2014    JOINT REPLACEMENT  Knee 2015, Hip 2016    LAMINECTOMY OF THORACIC SPINE BY POSTERIOR APPROACH USING COMPUTER-ASSISTED NAVIGATION N/A 10/11/2024    Procedure: LAMINECTOMY, SPINE, THORACIC, POSTERIOR APPROACH, USING COMPUTER-ASSISTED NAVIGATION;  Surgeon: Andra Mejia MD;  Location: [facility] OR;  Service: Neurosurgery;  Laterality: N/A;  T9-L3 DECOMPRESSION / REMOVAL OF RIGHT ILIAC BOLT AND S1 SCREW / EXTENSION OF FUSION  T9-S2 ILIAC BOLT //  PRONE ADENIKE // DRILL // SCOPE // O-ARM //  DIRECT SPINE // NDM //       LUMBAR FUSION  05/28/2021    LUMBAR FUSION  02/21/2023    L3-S1    POSTERIOR LUMBAR INTERBODY FUSION (PLIF) WITH COMPUTER-ASSISTED NAVIGATION N/A 10/11/2024    Procedure: FUSION, SPINE, LUMBAR, PLIF, USING COMPUTER-ASSISTED NAVIGATION;  Surgeon: Andra Mejia MD;  Location: [facility] OR;  Service: Neurosurgery;  Laterality: N/A;    ROTATOR CUFF REPAIR Bilateral 04/12/2022    SMALL INTESTINE SURGERY  2015    SPINE SURGERY  2021 & 2023    TONSILLECTOMY  2017       Time Tracking:     PT Received On: 11/08/24  PT Start Time: 0936     PT Stop Time: 1003  PT Total Time (min): 27 min     Billable Minutes: Re-eval 15 and Gait Training 12      11/08/2024

## 2024-11-08 NOTE — PROGRESS NOTES
[facility]  Hospital Medicine Progress Note        Chief Complaint: Inpatient Follow-up for     HPI:   Kasandra Cheek is a 64 y.o. female who  has a past medical history of Anemia, Anxiety, Depression, Dyspareunia, blood transfusions, HTN , Hypothyroidism, unspecified, Liver disease, Lumbar radiculopathy, Menopause, Migraine, Obesity, Osteoporosis, Other spondylosis with radiculopathy, lumbar region, Personal history of fall, Spinal stenosis, lumbar region with neurogenic claudication, TIA (transient ischemic attack) (1994), Unspecified osteoarthritis, unspecified site, and Urinary incontinence.. The patient presented to [facility] on 10/30/2024      64-year-old female with past medical history of anxiety/depression, hypertension, hypothyroidism, fatty liver disease, chronic pain syndrome presents to the emergency room with confusion for the last 72 hours.   is at the bedside helping give history.  Reports getting confused at home.  Some imbalance.  Blood pressure has been elevated.  On arrival to the emergency room she was not oriented.  GCS of 9.  Not following commands.  Vital signs show some mild hypertension but otherwise stable.  No fever.  She has no leukocytosis.  Mild metabolic acidosis appears to be chronic.  UDS was positive for opiates which she was prescribed.  Urinalysis was bland.  CT of the head performed in the ER showed remote infarcts involving the right frontal and parietal lobes which are new compared to scan performed 3 months ago.  Carotid ultrasound showed minimal stenosis of the left carotid and no stenosis of the right.  Echocardiogram is pending.  Neurology was consulted from the ER.  Recommending full stroke workup.  Added aspirin and statin.  Okay to normalize blood pressure.  She was being admitted to the hospitalist group     Interval Hx:   10-31-24 Dr. English-reviewing Neurology notes there is more history that is important for the case.  Has been refers  that around a year ago she had a gradual decline where she requires assistance to the bathroom and transferred to wheelchair.  Has been refers that her mental status waxes and wanes.  Refers that on Sunday she suddenly slumped over and was not really responding.  This prompted him to call EMS and was taken to the emergency room in [place].  In [place] did not much happened other than checking vital signs.  Once back home he took 4 people to get her out of the car because she was weak prompting him to take her to us [facility] for further evaluation where CT of the head was significant for right frontal and right parietal infarcts which are new from 07/24.     On physical examination she remains confused.  She knows-she is in [facility] but unable to say why, can recall present president, month or day.  Her strength is equally distributed over upper and lower extremities but very difficult for her to follow commands.  She is on aspirin and Keppra, her antidepressant has been placed on hold as well as her opiates and gabapentin.  EEG and MRI of the brain are pending        11/01/2024 Dr. English-chart reviewed patient examined.  MRI brain negative for acute CVA but as per Neurology recommendations we need to rule out recurrent right middle cerebral artery cardioembolic stroke with recommendations to start patient on aspirin 325 mg p.o. daily, discontinue Celebrex, start the patient on Keppra 500 mg p.o. b.i.d., issue with polypharmacy including multiple antidepressants/opioids/gabapentin.  Patient will need Holter times 2 weeks on discharge with follow-up with Rabia Bell upon discharge and if Holter negative patient will need loop recorder due to high suspicion for cardioembolic etiology.  The patient cleared for discharge once new findings of suspected thoracic soft tissue fluid collection findings post thoracic surgery by Neurosurgery.  We will go ahead and consult Neurosurgery   Christoph     Her LOC is better today, much more alert but still with periods of confusion.  She is still thinks that misha Dixon is the president.     We will feed follow recommendations by Neurosurgery.  Sed rate/C-reactive protein to follow.  Blood cultures drawn on admission negative.  CT a neck negative for carotid stenosis/EEG negative/echocardiogram with left atrium dilated and bubble study could not be assessed.     11/02/2024 Dr. English-clare reviewed patient examined.  Impressive improvement in the last 24 hours when it comes to level of consciousness/orientation/vocabulary/thoughts.  Case was discussed with Psychiatry we decided to continue to hold all psych meds.  Patient has been seen by Neurosurgery .  CT thoracic lumbar spine with contrast done.  Physical therapy recommended moderate intensity therapy.  We will consult  pending final neurosurgery evaluation of CT thoracic/lumbar spine with IV contrast     11/03/2024-Dr. English-chart reviewed patient examined.  Patient continues to improve after she was taken all of her medications including opioids/gabapentin/muscle relaxant/anxiolytics/antidepressants.  Still patient will require to be placed secondary to very unsteady gait.  She is alert active and oriented at the present moment.  CT thoracic/lumbar spine with contrast did show fluid collection.  Dr. Rosario with Neurosurgery to go over films tomorrow.  Patient has no signs of infection.  There is no erythema over surgical wound/drainage or pain to palpation.  Inflammatory markers are within normal limits well as vital signs and white blood cell count.     11/04/2024 Dr. English-physical therapy gave recommendations and this were explained to patient.  We all knew that she was having issues with gait but she can not seem to understand the importance of this.  She is very evasive with my questions and changes direction of conversation constantly.  appears to be very frustrated with  situation since he knows that he can not take care of her.  It appears that he was able to convince her to stay until he is able to visit those  agencies to approve placement.  Mentally she is much improved but definitely appears to be some underlying  psych issues involve     11/05/2024 Dr. English-chart review.  Patient walking in hallway with physical therapy.  Definitely could benefit from rehabilitation, gait is slow and unsteady to the point that she has to look down where she is stepping.   was able to look at several placement agencies and  was able to send referrals.     11-6-24 ameya CARNES-awaiting placement       Interval Hx:   No new issues overnight.  Alert, comfortably resting in the chair.  Pain is well controlled.    Objective/physical exam:  Vitals:    11/07/24 2231 11/07/24 2334 11/08/24 0707 11/08/24 1139   BP:  (!) 145/79 138/74 110/73   Pulse:  88 90 79   Resp: 20 18     Temp:  98.3 °F (36.8 °C) 98 °F (36.7 °C) 98.5 °F (36.9 °C)   TempSrc:  Oral Oral Oral   SpO2:  98%  96%   Weight:       Height:         General: In no acute distress, afebrile  Respiratory: Clear to auscultation bilaterally  Cardiovascular: S1, S2, no appreciable murmur  Abdomen: Soft, nontender, BS +  MSK: Warm, no lower extremity edema, no clubbing or cyanosis  Skin:  Surgical site dressed  Neurologic: Alert and oriented x4, moving all extremities with good strength     Lab Results   Component Value Date     11/08/2024    K 2.8 (L) 11/08/2024     (H) 11/08/2024    CO2 17 (L) 11/08/2024    BUN 13.4 11/08/2024    CREATININE 0.71 11/08/2024    CALCIUM 8.8 11/08/2024    ANIONGAP 10 07/04/2024    ESTGFRAFRICA 87 07/04/2024    EGFRNONAA >60 06/08/2022      Lab Results   Component Value Date    ALT 13 11/08/2024    AST 21 11/08/2024    ALKPHOS 163 (H) 11/08/2024    BILITOT 0.6 11/08/2024      Lab Results   Component Value Date    WBC 8.76 11/08/2024    HGB 12.5 11/08/2024    HCT 36.6 (L) 11/08/2024    MCV  92.9 11/08/2024     11/08/2024           Medications:   aspirin  325 mg Oral Daily    atorvastatin  80 mg Oral Daily    carvediloL  3.125 mg Oral BID    enoxparin  40 mg Subcutaneous Q12H (prophylaxis, 0900/2100)    levETIRAcetam  500 mg Oral BID    levothyroxine  200 mcg Oral Before breakfast    losartan  100 mg Oral Daily    pantoprazole  40 mg Oral BID        Current Facility-Administered Medications:     acetaminophen, 650 mg, Oral, Q6H PRN    bisacodyL, 10 mg, Rectal, Daily PRN    hydrALAZINE, 10 mg, Intravenous, Q4H PRN    labetalol, 10 mg, Intravenous, Q6H PRN    loperamide, 2 mg, Oral, QID PRN    oxyCODONE-acetaminophen, 1 tablet, Oral, Q6H PRN     Assessment/Plan:      Remote appearing CVA, right frontal and parietal lobes by ct  Metabolic/toxic encephalopathy -much improved  polypharmacy-opioid/antidepressants/gabapentin/anxiolytics.  All meds discontinued with much improvement/resolution of confusion  Mild metabolic acidosis  Iron-deficiency anemia   Hypothyroidism -TSH 4.3  Essential hypertension   - uncontrolled   -losartan 100 mgs po daily  - coreg 3.125 mgs po bid   Chronic pain syndrome-controlled on Percocet 10 mg p.o. Q 6 hours p.r.n. pain.  Multiple psychiatric conditions including depression and anxiety-psychiatry consulted, we decided to continue to hold all psychiatric medications for depression/anxiety/opioids/muscle relaxants with much improving in level of consciousness..  Recent thoracic vertebra surgery by Neurosurgery 3 weeks ago by Dr. Rosario with the development of soft tissue swelling highly suspicious for fluid collection, most likely seroma.-Dr. Rosario consulted, Dr. Hyde on-call.  CT thoracic/lumbar spine with IV contrast ordered with findings of large fluid collection, most likely seroma.  Dr. Rosario following .  Patient was seen by Physical therapy with unsteady gait and necessity for placement. looking into placement .        Stroke workup:  -MRI brain (7/24 )with  chronic right MCA infarction/chronic small-vessel ischemic changes  -MRI brain without contrast 11/01/2024 with no acute intracranial finding/chronic ischemic changes with encephalomalacia in the right cerebral hemisphere/right MCA territory suspicious for cardioembolic process  -CT head-remote appearing infarcts right frontal and parietal lobes new compared to July 20, 2024  -CTA head/neck no large vessel occlusion or flow-limiting stenosis  -Echocardiogram with ejection fraction 55-60% with grade 1 diastolic dysfunction.  Unable to determine where the bubble study was positive  -Carotid ultrasound with right internal carotid patent with no evidence stenosis.  Left internal carotid with less than 50% stenosis.  Bilateral vertebral arteries are patent with antegrade flow  -hemoglobin A1c 5.2   -TSH 4.3  -cholesterol 217/  -EEG 11/01/2024 negative     Inpatient medications aspirin 325/Keppra/statin     Recs by neurology:  -aspirin 325 mg, lipitor 80 mg  -continue keppra 500 mg bid   - holter monitor x 2 weeks on discharge  - follow up in clinic with Rabia Cosme NP in 2 months with holter results. If Holter is negative, she will need a loop recorder due to high suspicion for cardioembolic etiology.   - PT/OT        [facility]          Joshua Resendiz MD

## 2024-11-09 LAB — POCT GLUCOSE: 99 MG/DL (ref 70–110)

## 2024-11-09 PROCEDURE — 25000003 PHARM REV CODE 250: Performed by: INTERNAL MEDICINE

## 2024-11-09 PROCEDURE — 63600175 PHARM REV CODE 636 W HCPCS: Performed by: INTERNAL MEDICINE

## 2024-11-09 PROCEDURE — 21400001 HC TELEMETRY ROOM

## 2024-11-09 PROCEDURE — 25000003 PHARM REV CODE 250: Performed by: NURSE PRACTITIONER

## 2024-11-09 PROCEDURE — 25000003 PHARM REV CODE 250: Performed by: PSYCHIATRY & NEUROLOGY

## 2024-11-09 RX ADMIN — ENOXAPARIN SODIUM 40 MG: 40 INJECTION SUBCUTANEOUS at 09:11

## 2024-11-09 RX ADMIN — PANTOPRAZOLE SODIUM 40 MG: 40 TABLET, DELAYED RELEASE ORAL at 08:11

## 2024-11-09 RX ADMIN — ATORVASTATIN CALCIUM 80 MG: 40 TABLET, FILM COATED ORAL at 09:11

## 2024-11-09 RX ADMIN — LEVETIRACETAM 500 MG: 500 TABLET, FILM COATED ORAL at 09:11

## 2024-11-09 RX ADMIN — OXYCODONE HYDROCHLORIDE AND ACETAMINOPHEN 1 TABLET: 5; 325 TABLET ORAL at 08:11

## 2024-11-09 RX ADMIN — LOSARTAN POTASSIUM 100 MG: 50 TABLET, FILM COATED ORAL at 09:11

## 2024-11-09 RX ADMIN — CARVEDILOL 3.12 MG: 3.12 TABLET, FILM COATED ORAL at 08:11

## 2024-11-09 RX ADMIN — CARVEDILOL 3.12 MG: 3.12 TABLET, FILM COATED ORAL at 09:11

## 2024-11-09 RX ADMIN — ASPIRIN 325 MG: 325 TABLET, COATED ORAL at 09:11

## 2024-11-09 RX ADMIN — ACETAMINOPHEN 650 MG: 325 TABLET, FILM COATED ORAL at 11:11

## 2024-11-09 RX ADMIN — PANTOPRAZOLE SODIUM 40 MG: 40 TABLET, DELAYED RELEASE ORAL at 09:11

## 2024-11-09 RX ADMIN — OXYCODONE HYDROCHLORIDE AND ACETAMINOPHEN 1 TABLET: 5; 325 TABLET ORAL at 07:11

## 2024-11-09 RX ADMIN — OXYCODONE HYDROCHLORIDE AND ACETAMINOPHEN 1 TABLET: 5; 325 TABLET ORAL at 12:11

## 2024-11-09 RX ADMIN — LEVOTHYROXINE SODIUM 200 MCG: 100 TABLET ORAL at 07:11

## 2024-11-09 RX ADMIN — ENOXAPARIN SODIUM 40 MG: 40 INJECTION SUBCUTANEOUS at 08:11

## 2024-11-09 RX ADMIN — ACETAMINOPHEN 650 MG: 325 TABLET, FILM COATED ORAL at 01:11

## 2024-11-09 RX ADMIN — LEVETIRACETAM 500 MG: 500 TABLET, FILM COATED ORAL at 08:11

## 2024-11-09 NOTE — PROGRESS NOTES
[facility]  [facility] Progress [facility]        Chief Complaint: Inpatient Follow-up for     HPI:   Kasandra Cheek is a 64 y.o. female who  has a past medical history of Anemia, Anxiety, Depression, Dyspareunia, blood transfusions, HTN , Hypothyroidism, unspecified, Liver disease, Lumbar radiculopathy, Menopause, Migraine, Obesity, Osteoporosis, Other spondylosis with radiculopathy, lumbar region, Personal history of fall, Spinal stenosis, lumbar region with neurogenic claudication, TIA (transient ischemic attack) (1994), Unspecified osteoarthritis, unspecified site, and Urinary incontinence.. The patient presented to [facility] on 10/30/2024      64-year-old female with past medical history of anxiety/depression, hypertension, hypothyroidism, fatty liver disease, chronic pain syndrome presents to the emergency room with confusion for the last 72 hours.   is at the bedside helping give history.  Reports getting confused at home.  Some imbalance.  Blood pressure has been elevated.  On arrival to the emergency room she was not oriented.  GCS of 9.  Not following commands.  Vital signs show some mild hypertension but otherwise stable.  No fever.  She has no leukocytosis.  Mild metabolic acidosis appears to be chronic.  UDS was positive for opiates which she was prescribed.  Urinalysis was bland.  CT of the head performed in the ER showed remote infarcts involving the right frontal and parietal lobes which are new compared to scan performed 3 months ago.  Carotid ultrasound showed minimal stenosis of the left carotid and no stenosis of the right.  Echocardiogram is pending.  Neurology was consulted from the ER.  Recommending full stroke workup.  Added aspirin and statin.  Okay to normalize blood pressure.  She was being admitted to the hospitalist group     Interval Hx:   10-31-24 Dr. English-reviewing Neurology notes there is more history that is important for the case.  Has been refers  that around a year ago she had a gradual decline where she requires assistance to the bathroom and transferred to wheelchair.  Has been refers that her mental status waxes and wanes.  Refers that on Sunday she suddenly slumped over and was not really responding.  This prompted him to call EMS and was taken to the emergency room in [place].  In [place] did not much happened other than checking vital signs.  Once back home he took 4 people to get her out of the car because she was weak prompting him to take her to us [facility] for further evaluation where CT of the head was significant for right frontal and right parietal infarcts which are new from 07/24.     On physical examination she remains confused.  She knows-she is in [facility] but unable to say why, can recall present president, month or day.  Her strength is equally distributed over upper and lower extremities but very difficult for her to follow commands.  She is on aspirin and Keppra, her antidepressant has been placed on hold as well as her opiates and gabapentin.  EEG and MRI of the brain are pending        11/01/2024 Dr. English-chart reviewed patient examined.  MRI brain negative for acute CVA but as per Neurology recommendations we need to rule out recurrent right middle cerebral artery cardioembolic stroke with recommendations to start patient on aspirin 325 mg p.o. daily, discontinue Celebrex, start the patient on Keppra 500 mg p.o. b.i.d., issue with polypharmacy including multiple antidepressants/opioids/gabapentin.  Patient will need Holter times 2 weeks on discharge with follow-up with Rabia Bell upon discharge and if Holter negative patient will need loop recorder due to high suspicion for cardioembolic etiology.  The patient cleared for discharge once new findings of suspected thoracic soft tissue fluid collection findings post thoracic surgery by Neurosurgery.  We will go ahead and consult Neurosurgery   Christoph     Her LOC is better today, much more alert but still with periods of confusion.  She is still thinks that misha Dixon is the president.     We will feed follow recommendations by Neurosurgery.  Sed rate/C-reactive protein to follow.  Blood cultures drawn on admission negative.  CT a neck negative for carotid stenosis/EEG negative/echocardiogram with left atrium dilated and bubble study could not be assessed.     11/02/2024 Dr. English-clare reviewed patient examined.  Impressive improvement in the last 24 hours when it comes to level of consciousness/orientation/vocabulary/thoughts.  Case was discussed with Psychiatry we decided to continue to hold all psych meds.  Patient has been seen by Neurosurgery .  CT thoracic lumbar spine with contrast done.  Physical therapy recommended moderate intensity therapy.  We will consult  pending final neurosurgery evaluation of CT thoracic/lumbar spine with IV contrast     11/03/2024-Dr. English-chart reviewed patient examined.  Patient continues to improve after she was taken all of her medications including opioids/gabapentin/muscle relaxant/anxiolytics/antidepressants.  Still patient will require to be placed secondary to very unsteady gait.  She is alert active and oriented at the present moment.  CT thoracic/lumbar spine with contrast did show fluid collection.  Dr. Rosario with Neurosurgery to go over films tomorrow.  Patient has no signs of infection.  There is no erythema over surgical wound/drainage or pain to palpation.  Inflammatory markers are within normal limits well as vital signs and white blood cell count.     11/04/2024 Dr. English-physical therapy gave recommendations and this were explained to patient.  We all knew that she was having issues with gait but she can not seem to understand the importance of this.  She is very evasive with my questions and changes direction of conversation constantly.  appears to be very frustrated with  situation since he knows that he can not take care of her.  It appears that he was able to convince her to stay until he is able to visit those  agencies to approve placement.  Mentally she is much improved but definitely appears to be some underlying  psych issues involve     11/05/2024 Dr. English-chart review.  Patient walking in hallway with physical therapy.  Definitely could benefit from rehabilitation, gait is slow and unsteady to the point that she has to look down where she is stepping.   was able to look at several placement agencies and  was able to send referrals.     11-6-24 ameya CARNES-awaiting placement       Interval Hx:   Comfortably resting.  No acute events overnight.  Currently awaiting insurance approval placement.    Objective/physical exam:  Vitals:    11/09/24 0111 11/09/24 0430 11/09/24 0715 11/09/24 0715   BP:  116/70  121/74   Pulse:  76  80   Resp:  18 18    Temp: 98.9 °F (37.2 °C) 98.7 °F (37.1 °C)  98.3 °F (36.8 °C)   TempSrc:    Oral   SpO2:  97%  96%   Weight:       Height:         General: In no acute distress, afebrile  Respiratory: Clear to auscultation bilaterally  Cardiovascular: S1, S2, no appreciable murmur  Abdomen: Soft, nontender, BS +  MSK: Warm, no lower extremity edema, no clubbing or cyanosis  Skin:  Surgical site dressed  Neurologic: Alert and oriented x4, moving all extremities with good strength     Lab Results   Component Value Date     11/08/2024    K 2.8 (L) 11/08/2024     (H) 11/08/2024    CO2 17 (L) 11/08/2024    BUN 13.4 11/08/2024    CREATININE 0.71 11/08/2024    CALCIUM 8.8 11/08/2024    ANIONGAP 10 07/04/2024    ESTGFRAFRICA 87 07/04/2024    EGFRNONAA >60 06/08/2022      Lab Results   Component Value Date    ALT 13 11/08/2024    AST 21 11/08/2024    ALKPHOS 163 (H) 11/08/2024    BILITOT 0.6 11/08/2024      Lab Results   Component Value Date    WBC 8.76 11/08/2024    HGB 12.5 11/08/2024    HCT 36.6 (L) 11/08/2024    MCV 92.9  11/08/2024     11/08/2024           Medications:   aspirin  325 mg Oral Daily    atorvastatin  80 mg Oral Daily    carvediloL  3.125 mg Oral BID    enoxparin  40 mg Subcutaneous Q12H (prophylaxis, 0900/2100)    levETIRAcetam  500 mg Oral BID    levothyroxine  200 mcg Oral Before breakfast    losartan  100 mg Oral Daily    pantoprazole  40 mg Oral BID        Current Facility-Administered Medications:     acetaminophen, 650 mg, Oral, Q6H PRN    bisacodyL, 10 mg, Rectal, Daily PRN    hydrALAZINE, 10 mg, Intravenous, Q4H PRN    labetalol, 10 mg, Intravenous, Q6H PRN    loperamide, 2 mg, Oral, QID PRN    oxyCODONE-acetaminophen, 1 tablet, Oral, Q6H PRN     Assessment/Plan:      Remote appearing CVA, right frontal and parietal lobes by ct  Metabolic/toxic encephalopathy -much improved  polypharmacy-opioid/antidepressants/gabapentin/anxiolytics.  All meds discontinued with much improvement/resolution of confusion  Mild metabolic acidosis  Iron-deficiency anemia   Hypothyroidism -TSH 4.3  Essential hypertension   - uncontrolled   -losartan 100 mgs po daily  - coreg 3.125 mgs po bid   Chronic pain syndrome-controlled on Percocet 10 mg p.o. Q 6 hours p.r.n. pain.  Multiple psychiatric conditions including depression and anxiety-psychiatry consulted, we decided to continue to hold all psychiatric medications for depression/anxiety/opioids/muscle relaxants with much improving in level of consciousness..  Recent thoracic vertebra surgery by Neurosurgery 3 weeks ago by Dr. Rosario with the development of soft tissue swelling highly suspicious for fluid collection, most likely seroma.-Dr. Rosario consulted, Dr. Hyde on-call.  CT thoracic/lumbar spine with IV contrast ordered with findings of large fluid collection, most likely seroma.  Dr. Rosario following .  Patient was seen by Physical therapy with unsteady gait and necessity for placement. looking into placement .        Stroke workup:  -MRI brain (7/24 )with  chronic right MCA infarction/chronic small-vessel ischemic changes  -MRI brain without contrast 11/01/2024 with no acute intracranial finding/chronic ischemic changes with encephalomalacia in the right cerebral hemisphere/right MCA territory suspicious for cardioembolic process  -CT head-remote appearing infarcts right frontal and parietal lobes new compared to July 20, 2024  -CTA head/neck no large vessel occlusion or flow-limiting stenosis  -Echocardiogram with ejection fraction 55-60% with grade 1 diastolic dysfunction.  Unable to determine where the bubble study was positive  -Carotid ultrasound with right internal carotid patent with no evidence stenosis.  Left internal carotid with less than 50% stenosis.  Bilateral vertebral arteries are patent with antegrade flow  -hemoglobin A1c 5.2   -TSH 4.3  -cholesterol 217/  -EEG 11/01/2024 negative     Inpatient medications aspirin 325/Keppra/statin     Recs by neurology:  -aspirin 325 mg, lipitor 80 mg  -continue keppra 500 mg bid   - holter monitor x 2 weeks on discharge  - follow up in clinic with Rabia Cosme NP in 2 months with holter results. If Holter is negative, she will need a loop recorder due to high suspicion for cardioembolic etiology.   - PT/OT        [facility]          Joshua Resendiz MD

## 2024-11-09 NOTE — PLAN OF CARE
Problem: Stroke, Ischemic (Includes Transient Ischemic Attack)  Goal: Optimal Cognitive Function  11/9/2024 0339 by Jamila Christiansen, RN  Outcome: Progressing  11/9/2024 0338 by Jamila Christiansen RN  Outcome: Progressing  Goal: Improved Communication Skills  11/9/2024 0339 by Jamila Christiansen, RN  Outcome: Progressing  11/9/2024 0338 by Jamila Christiansen, RN  Outcome: Progressing  Goal: Optimal Functional Ability  11/9/2024 0339 by Jamila Christiansen, RN  Outcome: Progressing  11/9/2024 0338 by Jamila Christiansen, RN  Outcome: Progressing  Goal: Improved Sensorimotor Function  11/9/2024 0339 by Jamila Christiansen, RN  Outcome: Progressing  11/9/2024 0338 by Jamila Christiansen, RN  Outcome: Progressing  Goal: Effective Urinary Elimination  11/9/2024 0339 by Jamila Christiansen, RN  Outcome: Progressing  11/9/2024 0338 by Jamila Christiansen, RN  Outcome: Progressing     Problem: Adult Inpatient Plan of Care  Goal: Plan of Care Review  11/9/2024 0339 by Jamila Christiansen, RN  Outcome: Progressing  11/9/2024 0338 by Jamila Christiansen, RN  Outcome: Progressing  Goal: Patient-Specific Goal (Individualized)  11/9/2024 0339 by Jamila Christiansen, RN  Outcome: Progressing  11/9/2024 0338 by Jamila Christiansen, RN  Outcome: Progressing  Goal: Absence of Hospital-Acquired Illness or Injury  11/9/2024 0339 by Jamila Christiansen, RN  Outcome: Progressing  11/9/2024 0338 by Jamila Christiansen, RN  Outcome: Progressing  Goal: Optimal Comfort and Wellbeing  11/9/2024 0339 by Jamila Christiansen, RN  Outcome: Progressing  11/9/2024 0338 by Jamila Christiansen, RN  Outcome: Progressing  Goal: Readiness for Transition of Care  11/9/2024 0339 by Jamila Christiansen, RN  Outcome: Progressing  11/9/2024 0338 by Jamila Christiansen, RN  Outcome: Progressing     Problem: Skin Injury Risk Increased  Goal: Skin Health and Integrity  11/9/2024 0339 by Jamila Christiansen, RN  Outcome: Progressing  11/9/2024 0338 by Jamila Christiansen, RN  Outcome: Progressing

## 2024-11-10 LAB
ANION GAP SERPL CALC-SCNC: 9 MEQ/L
BASOPHILS # BLD AUTO: 0.06 X10(3)/MCL
BASOPHILS NFR BLD AUTO: 1 %
BUN SERPL-MCNC: 11 MG/DL (ref 9.8–20.1)
CALCIUM SERPL-MCNC: 8.4 MG/DL (ref 8.4–10.2)
CHLORIDE SERPL-SCNC: 108 MMOL/L (ref 98–107)
CO2 SERPL-SCNC: 24 MMOL/L (ref 23–31)
CREAT SERPL-MCNC: 0.63 MG/DL (ref 0.55–1.02)
CREAT/UREA NIT SERPL: 17
EOSINOPHIL # BLD AUTO: 0.25 X10(3)/MCL (ref 0–0.9)
EOSINOPHIL NFR BLD AUTO: 4.2 %
ERYTHROCYTE [DISTWIDTH] IN BLOOD BY AUTOMATED COUNT: 13.5 % (ref 11.5–17)
GFR SERPLBLD CREATININE-BSD FMLA CKD-EPI: >60 ML/MIN/1.73/M2
GLUCOSE SERPL-MCNC: 96 MG/DL (ref 82–115)
HCT VFR BLD AUTO: 33.1 % (ref 37–47)
HGB BLD-MCNC: 11.3 G/DL (ref 12–16)
IMM GRANULOCYTES # BLD AUTO: 0.06 X10(3)/MCL (ref 0–0.04)
IMM GRANULOCYTES NFR BLD AUTO: 1 %
LYMPHOCYTES # BLD AUTO: 1.67 X10(3)/MCL (ref 0.6–4.6)
LYMPHOCYTES NFR BLD AUTO: 28.2 %
MCH RBC QN AUTO: 31.3 PG (ref 27–31)
MCHC RBC AUTO-ENTMCNC: 34.1 G/DL (ref 33–36)
MCV RBC AUTO: 91.7 FL (ref 80–94)
MONOCYTES # BLD AUTO: 0.51 X10(3)/MCL (ref 0.1–1.3)
MONOCYTES NFR BLD AUTO: 8.6 %
NEUTROPHILS # BLD AUTO: 3.38 X10(3)/MCL (ref 2.1–9.2)
NEUTROPHILS NFR BLD AUTO: 57 %
NRBC BLD AUTO-RTO: 0 %
PLATELET # BLD AUTO: 261 X10(3)/MCL (ref 130–400)
PMV BLD AUTO: 10.1 FL (ref 7.4–10.4)
POTASSIUM SERPL-SCNC: 2.8 MMOL/L (ref 3.5–5.1)
RBC # BLD AUTO: 3.61 X10(6)/MCL (ref 4.2–5.4)
SODIUM SERPL-SCNC: 141 MMOL/L (ref 136–145)
WBC # BLD AUTO: 5.93 X10(3)/MCL (ref 4.5–11.5)

## 2024-11-10 PROCEDURE — 25000003 PHARM REV CODE 250: Performed by: INTERNAL MEDICINE

## 2024-11-10 PROCEDURE — 25000003 PHARM REV CODE 250: Performed by: PSYCHIATRY & NEUROLOGY

## 2024-11-10 PROCEDURE — 63600175 PHARM REV CODE 636 W HCPCS: Performed by: INTERNAL MEDICINE

## 2024-11-10 PROCEDURE — 25000003 PHARM REV CODE 250: Performed by: NURSE PRACTITIONER

## 2024-11-10 PROCEDURE — 80048 BASIC METABOLIC PNL TOTAL CA: CPT | Performed by: INTERNAL MEDICINE

## 2024-11-10 PROCEDURE — 36415 COLL VENOUS BLD VENIPUNCTURE: CPT | Performed by: INTERNAL MEDICINE

## 2024-11-10 PROCEDURE — 21400001 HC TELEMETRY ROOM

## 2024-11-10 PROCEDURE — 85025 COMPLETE CBC W/AUTO DIFF WBC: CPT | Performed by: INTERNAL MEDICINE

## 2024-11-10 RX ORDER — POTASSIUM CHLORIDE 14.9 MG/ML
20 INJECTION INTRAVENOUS
Status: COMPLETED | OUTPATIENT
Start: 2024-11-10 | End: 2024-11-10

## 2024-11-10 RX ORDER — QUETIAPINE FUMARATE 25 MG/1
25 TABLET, FILM COATED ORAL NIGHTLY
Status: DISCONTINUED | OUTPATIENT
Start: 2024-11-10 | End: 2024-11-11

## 2024-11-10 RX ORDER — POTASSIUM CHLORIDE 20 MEQ/1
40 TABLET, EXTENDED RELEASE ORAL ONCE
Status: COMPLETED | OUTPATIENT
Start: 2024-11-10 | End: 2024-11-10

## 2024-11-10 RX ADMIN — LEVETIRACETAM 500 MG: 500 TABLET, FILM COATED ORAL at 08:11

## 2024-11-10 RX ADMIN — PANTOPRAZOLE SODIUM 40 MG: 40 TABLET, DELAYED RELEASE ORAL at 08:11

## 2024-11-10 RX ADMIN — ENOXAPARIN SODIUM 40 MG: 40 INJECTION SUBCUTANEOUS at 08:11

## 2024-11-10 RX ADMIN — ASPIRIN 325 MG: 325 TABLET, COATED ORAL at 08:11

## 2024-11-10 RX ADMIN — OXYCODONE HYDROCHLORIDE AND ACETAMINOPHEN 1 TABLET: 5; 325 TABLET ORAL at 11:11

## 2024-11-10 RX ADMIN — OXYCODONE HYDROCHLORIDE AND ACETAMINOPHEN 1 TABLET: 5; 325 TABLET ORAL at 05:11

## 2024-11-10 RX ADMIN — QUETIAPINE FUMARATE 25 MG: 25 TABLET ORAL at 08:11

## 2024-11-10 RX ADMIN — LOSARTAN POTASSIUM 100 MG: 50 TABLET, FILM COATED ORAL at 08:11

## 2024-11-10 RX ADMIN — OXYCODONE HYDROCHLORIDE AND ACETAMINOPHEN 1 TABLET: 5; 325 TABLET ORAL at 09:11

## 2024-11-10 RX ADMIN — OXYCODONE HYDROCHLORIDE AND ACETAMINOPHEN 1 TABLET: 5; 325 TABLET ORAL at 03:11

## 2024-11-10 RX ADMIN — POTASSIUM CHLORIDE 40 MEQ: 1500 TABLET, EXTENDED RELEASE ORAL at 01:11

## 2024-11-10 RX ADMIN — CARVEDILOL 3.12 MG: 3.12 TABLET, FILM COATED ORAL at 08:11

## 2024-11-10 RX ADMIN — POTASSIUM CHLORIDE 20 MEQ: 14.9 INJECTION, SOLUTION INTRAVENOUS at 05:11

## 2024-11-10 RX ADMIN — POTASSIUM CHLORIDE 20 MEQ: 14.9 INJECTION, SOLUTION INTRAVENOUS at 07:11

## 2024-11-10 RX ADMIN — ATORVASTATIN CALCIUM 80 MG: 40 TABLET, FILM COATED ORAL at 08:11

## 2024-11-10 RX ADMIN — LEVOTHYROXINE SODIUM 200 MCG: 100 TABLET ORAL at 05:11

## 2024-11-10 NOTE — PLAN OF CARE
Problem: Stroke, Ischemic (Includes Transient Ischemic Attack)  Goal: Optimal Cognitive Function  Outcome: Progressing  Goal: Improved Communication Skills  Outcome: Progressing  Goal: Optimal Functional Ability  Outcome: Progressing  Goal: Improved Sensorimotor Function  Outcome: Progressing  Goal: Effective Urinary Elimination  Outcome: Progressing     Problem: Adult Inpatient Plan of Care  Goal: Plan of Care Review  Outcome: Progressing  Goal: Patient-Specific Goal (Individualized)  Outcome: Progressing  Goal: Absence of Hospital-Acquired Illness or Injury  Outcome: Progressing  Goal: Optimal Comfort and Wellbeing  Outcome: Progressing  Goal: Readiness for Transition of Care  Outcome: Progressing

## 2024-11-10 NOTE — PROGRESS NOTES
[facility] Hospital Medicine Progress Note        Chief Complaint: Inpatient Follow-up for     HPI: 64 y.o. female who  has a past medical history of Anemia, Anxiety, Depression, Dyspareunia, blood transfusions, HTN , Hypothyroidism, unspecified, Liver disease, Lumbar radiculopathy, Menopause, Migraine, Obesity, Osteoporosis, Other spondylosis with radiculopathy, lumbar region, Personal history of fall, Spinal stenosis, lumbar region with neurogenic claudication, TIA (transient ischemic attack) (1994), Unspecified osteoarthritis, unspecified site, and Urinary incontinence.. The patient presented to [facility] on 10/30/2024      64-year-old female with past medical history of anxiety/depression, hypertension, hypothyroidism, fatty liver disease, chronic pain syndrome presents to the emergency room with confusion for the last 72 hours.   is at the bedside helping give history.  Reports getting confused at home.  Some imbalance.  Blood pressure has been elevated.  On arrival to the emergency room she was not oriented.  GCS of 9.  Not following commands.  Vital signs show some mild hypertension but otherwise stable.  No fever.  She has no leukocytosis.  Mild metabolic acidosis appears to be chronic.  UDS was positive for opiates which she was prescribed.  Urinalysis was bland.  CT of the head performed in the ER showed remote infarcts involving the right frontal and parietal lobes which are new compared to scan performed 3 months ago.  Carotid ultrasound showed minimal stenosis of the left carotid and no stenosis of the right.  Echocardiogram is pending.  Neurology was consulted from the ER.  Recommending full stroke workup.  Added aspirin and statin.  Okay to normalize blood pressure.  She was being admitted to the hospitalist group  MRI brain negative for acute CVA but as per Neurology recommendations we need to rule out recurrent right middle cerebral artery cardioembolic stroke with  recommendations to start patient on aspirin 325 mg p.o. daily, discontinue Celebrex, start the patient on Keppra 500 mg p.o. b.i.d., issue with polypharmacy including multiple antidepressants/opioids/gabapentin. Patient will need Holter times 2 weeks on discharge with follow-up with Rabia Bell upon discharge and if Holter negative patient will need loop recorder due to high suspicion for cardioembolic etiology.   Neurosurgery was consulted for thoracic fluid collection as such no signs of infection there was no erythema over the surgical wound site inflammatory markers were normal  CT of T and L-spine did show subcutaneous fluid collection extending throughout the hardware as per Dr. Valdes note likely seroma versus CSF no gross signs of infection  Interval Hx:   Patient seen and examined this morning with family member bedside all vitals stable denied any complaints except that she was not able to sleep last night reports she has been taking Seroquel at night  Case was discussed with patient's nurse and  on the floor.    Objective/physical exam:  General: In no acute distress, afebrile  Chest: Clear to auscultation bilaterally  Heart: RRR, +S1, S2, no appreciable murmur  Abdomen: Soft, nontender, BS +  MSK: Warm, no lower extremity edema, no clubbing or cyanosis  Neurologic: Alert and oriented x4,   VITAL SIGNS: 24 HRS MIN & MAX LAST   Temp  Min: 98 °F (36.7 °C)  Max: 98.4 °F (36.9 °C) 98 °F (36.7 °C)   BP  Min: 114/65  Max: 147/79 (!) 147/79   Pulse  Min: 72  Max: 80  74   Resp  Min: 18  Max: 20 18   SpO2  Min: 94 %  Max: 97 % 97 %     I have reviewed the following labs:  Recent Labs   Lab 11/08/24  0440   WBC 8.76   RBC 3.94*   HGB 12.5   HCT 36.6*   MCV 92.9   MCH 31.7*   MCHC 34.2   RDW 13.1      MPV 10.2     Recent Labs   Lab 11/08/24  0440      K 2.8*   *   CO2 17*   BUN 13.4   CREATININE 0.71   CALCIUM 8.8   MG 1.60   ALBUMIN 3.6   ALKPHOS 163*   ALT 13   AST 21   BILITOT  0.6     Microbiology Results (last 7 days)       Procedure Component Value Units Date/Time    Stool Culture [[id number]]  (Normal) Collected: 11/03/24 1424    Order Status: Completed Specimen: Stool Updated: 11/05/24 1238     Stool Culture Negative for Salmonella, Shigella, Campylobacter, Vibrio, Aeromonas, Pleisiomonas,Yersinia, or Shiga Toxin 1 and 2.             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 recommended Holter monitor for 2 weeks after discharge follow up with Neurology NP with Holter results if Holter negative then they are recommending loop recorder because of high suspicion for cardioembolic etiology continuing PT and OT   Neurosurgery has signed off  Will add small dose of Seroquel to see if that helps with the sleep   Will consult psych to area just psych medications  Blood pressure slightly on the higher side this morning but later it is better   Continue with home medication   Saturating well on room air  Potassium is 2.8 today we will give 40 mEq of p.o. and 40 of IV potassium   Continue with PT and OT   Case management is working on placement      VTE prophylaxis:     Patient condition:  Stable/Fair/Guarded/ Serious/ Critical    Anticipated discharge and Disposition:         All diagnosis and differential diagnosis have been reviewed; assessment and plan has been documented; I have personally reviewed the labs and test results that are presently available; I have reviewed the patients medication list; I have reviewed the consulting providers response and recommendations. I have reviewed or attempted to review medical records based upon  their availability    All of the patient's questions have been  addressed and answered. Patient's is agreeable to the above stated plan. I will continue to monitor closely and make adjustments to medical management as needed.    Portions of this note dictated using EMR integrated voice recognition software, and may be subject to voice recognition errors not corrected at proofreading. Please contact writer for clarification if needed.   _____________________________________________________________________    Malnutrition Status:    Scheduled Med:   aspirin  325 mg Oral Daily    atorvastatin  80 mg Oral Daily    carvediloL  3.125 mg Oral BID    enoxparin  40 mg Subcutaneous Q12H (prophylaxis, 0900/2100)    levETIRAcetam  500 mg Oral BID    levothyroxine  200 mcg Oral Before breakfast    losartan  100 mg Oral Daily    pantoprazole  40 mg Oral BID      Continuous Infusions:     PRN Meds:    Current Facility-Administered Medications:     acetaminophen, 650 mg, Oral, Q6H PRN    bisacodyL, 10 mg, Rectal, Daily PRN    hydrALAZINE, 10 mg, Intravenous, Q4H PRN    labetalol, 10 mg, Intravenous, Q6H PRN    loperamide, 2 mg, Oral, QID PRN    oxyCODONE-acetaminophen, 1 tablet, Oral, Q6H PRN     Radiology:  I have personally reviewed the following imaging and agree with the radiologist.     CT Lumbar Spine With Contrast  EXAMINATION  CT LUMBAR SPINE WITH CONTRAST    CLINICAL HISTORY  rule out fluid collection;    TECHNIQUE  Helical-acquisition CT images were obtained following the intravenous administration of iodine-based contrast media.  Multiplanar reconstructions were accomplished by a CT technologist at a separate workstation and pushed to PACS for physician review.    CONTRAST  IV: Omnipaque 350, 100 mL    COMPARISON  28 August 2024 abdominopelvic CT    FINDINGS  Images were reviewed in soft tissue and bone windows.    Exam quality: There is notable limitation secondary to extensive multilevel posterior spine stabilization  hardware and associated widespread metal streak artifact.    Enhancement: No definite evidence of focal abnormal enhancement is appreciated.  However, circumscribed fluid collection with faint peripheral enhancement is not entirely excluded through the midline lumbar posterior surgical bed spanning T11 through L5.  In the    There are similar advanced spondylitic changes throughout the visualized spinal column with associated T12-L1 destructive endplate changes.  Interval surgical revision of posterior fusion hardware is noted, now spanning from the sacroiliac joints and lumbosacral junction cranially beyond the included lower thoracic spine.  There are also new postoperative changes of spinal canal decompression at T12 through L3.    No acutely displaced fracture, vertebral body compression deformity, or interval change in spinal column alignment.  There is no convincing new focal abnormality of the partially visualized retroperitoneal structures.    IMPRESSION  1. Interval postoperative changes with revision of multilevel thoracolumbosacral stabilization hardware and new T12 through L3 posterior spinal canal decompression.  2. Regional soft tissues are of limited evaluation secondary to extensive metal artifact, but there are findings suspicious for large peripherally enhancing fluid collection through the posterior surgical bed spanning T11 through L5.  3. No evidence of acute or worsening focal osseous abnormality.  ==========  This report was flagged in Epic as abnormal.    RADIATION DOSE  Automated tube current modulation, weight-based exposure dosing, and/or iterative reconstruction technique utilized to reach lowest reasonably achievable exposure rate.    DLP: 1387 mGy*cm    Electronically signed by: Tirso Decker  Date:    11/02/2024  Time:    16:34  CT Thoracic Spine With Contrast  Narrative: CLINICAL HISTORY:  Trauma.    TECHNIQUE:  CT of the thoracic spine Without contrast. Sagittal and coronal  reconstructions were performed on the source images.    Automatic exposure control was utilized to reduce the patient's radiation dose.    DLP = 1252    COMPARISON:  No prior imaging available for comparison.    FINDINGS:  Postsurgical changes of lower thoracic fusion with erosive changes and T12-L1.  Surgical fixation and posterior decompression is new in the interval, however erosive changes similar to prior abdominal CT from August 24.  Posterior to the decompression is a subcutaneous fluid collection extending throughout the spinal hardware.  Limited evaluation secondary to metallic artifact throughout.  Fluid is of unknown etiology.  Further evaluation may be obtained with ultrasound if needed.  Impression: Postsurgical change with no acute fracture.  Fluid is noted midline posterior to surgical hardware.  Further evaluation may be obtained with ultrasound if needed.  Likely seroma versus CSF fluid.  No gross CT signs of infection.    Electronically signed by: Logan Cedeno  Date:    11/02/2024  Time:    16:09      Rose Benitez MD  Department of Hospital Medicine   [facility]   11/10/2024

## 2024-11-11 VITALS
HEART RATE: 80 BPM | TEMPERATURE: 98 F | DIASTOLIC BLOOD PRESSURE: 79 MMHG | WEIGHT: 293 LBS | HEIGHT: 67 IN | BODY MASS INDEX: 45.99 KG/M2 | RESPIRATION RATE: 18 BRPM | OXYGEN SATURATION: 97 % | SYSTOLIC BLOOD PRESSURE: 118 MMHG

## 2024-11-11 LAB
ANION GAP SERPL CALC-SCNC: 10 MEQ/L
BUN SERPL-MCNC: 10.2 MG/DL (ref 9.8–20.1)
CALCIUM SERPL-MCNC: 8.6 MG/DL (ref 8.4–10.2)
CHLORIDE SERPL-SCNC: 107 MMOL/L (ref 98–107)
CO2 SERPL-SCNC: 26 MMOL/L (ref 23–31)
CREAT SERPL-MCNC: 0.65 MG/DL (ref 0.55–1.02)
CREAT/UREA NIT SERPL: 16
GFR SERPLBLD CREATININE-BSD FMLA CKD-EPI: >60 ML/MIN/1.73/M2
GLUCOSE SERPL-MCNC: 93 MG/DL (ref 82–115)
POTASSIUM SERPL-SCNC: 3.3 MMOL/L (ref 3.5–5.1)
SODIUM SERPL-SCNC: 143 MMOL/L (ref 136–145)

## 2024-11-11 PROCEDURE — 25000003 PHARM REV CODE 250: Performed by: PSYCHIATRY & NEUROLOGY

## 2024-11-11 PROCEDURE — 25000003 PHARM REV CODE 250: Performed by: INTERNAL MEDICINE

## 2024-11-11 PROCEDURE — 25000003 PHARM REV CODE 250: Performed by: NURSE PRACTITIONER

## 2024-11-11 PROCEDURE — 36415 COLL VENOUS BLD VENIPUNCTURE: CPT | Performed by: INTERNAL MEDICINE

## 2024-11-11 PROCEDURE — 63600175 PHARM REV CODE 636 W HCPCS: Performed by: INTERNAL MEDICINE

## 2024-11-11 PROCEDURE — 80048 BASIC METABOLIC PNL TOTAL CA: CPT | Performed by: INTERNAL MEDICINE

## 2024-11-11 RX ORDER — OXYCODONE AND ACETAMINOPHEN 5; 325 MG/1; MG/1
1 TABLET ORAL EVERY 8 HOURS PRN
Qty: 9 TABLET | Refills: 0 | Status: SHIPPED | OUTPATIENT
Start: 2024-11-11 | End: 2024-11-14

## 2024-11-11 RX ORDER — CARVEDILOL 3.12 MG/1
3.12 TABLET ORAL 2 TIMES DAILY
Qty: 60 TABLET | Refills: 0 | Status: SHIPPED | OUTPATIENT
Start: 2024-11-11 | End: 2024-12-11

## 2024-11-11 RX ORDER — POTASSIUM CHLORIDE 20 MEQ/1
40 TABLET, EXTENDED RELEASE ORAL ONCE
Status: COMPLETED | OUTPATIENT
Start: 2024-11-11 | End: 2024-11-11

## 2024-11-11 RX ORDER — MIRTAZAPINE 15 MG/1
15 TABLET, FILM COATED ORAL NIGHTLY
Qty: 30 TABLET | Refills: 0 | Status: SHIPPED | OUTPATIENT
Start: 2024-11-11 | End: 2024-12-11

## 2024-11-11 RX ORDER — MIRTAZAPINE 15 MG/1
15 TABLET, FILM COATED ORAL NIGHTLY
Status: DISCONTINUED | OUTPATIENT
Start: 2024-11-11 | End: 2024-11-11 | Stop reason: HOSPADM

## 2024-11-11 RX ORDER — ASPIRIN 325 MG
325 TABLET, DELAYED RELEASE (ENTERIC COATED) ORAL DAILY
Qty: 30 TABLET | Refills: 0 | Status: SHIPPED | OUTPATIENT
Start: 2024-11-12 | End: 2024-12-12

## 2024-11-11 RX ORDER — LEVETIRACETAM 500 MG/1
500 TABLET ORAL 2 TIMES DAILY
Qty: 60 TABLET | Refills: 0 | Status: SHIPPED | OUTPATIENT
Start: 2024-11-11 | End: 2024-12-11

## 2024-11-11 RX ORDER — ATORVASTATIN CALCIUM 80 MG/1
80 TABLET, FILM COATED ORAL DAILY
Qty: 30 TABLET | Refills: 0 | Status: SHIPPED | OUTPATIENT
Start: 2024-11-12 | End: 2024-12-12

## 2024-11-11 RX ADMIN — LEVOTHYROXINE SODIUM 200 MCG: 100 TABLET ORAL at 05:11

## 2024-11-11 RX ADMIN — LEVETIRACETAM 500 MG: 500 TABLET, FILM COATED ORAL at 09:11

## 2024-11-11 RX ADMIN — ASPIRIN 325 MG: 325 TABLET, COATED ORAL at 09:11

## 2024-11-11 RX ADMIN — OXYCODONE HYDROCHLORIDE AND ACETAMINOPHEN 1 TABLET: 5; 325 TABLET ORAL at 06:11

## 2024-11-11 RX ADMIN — LOSARTAN POTASSIUM 100 MG: 50 TABLET, FILM COATED ORAL at 09:11

## 2024-11-11 RX ADMIN — LOPERAMIDE HYDROCHLORIDE 2 MG: 2 CAPSULE ORAL at 09:11

## 2024-11-11 RX ADMIN — POTASSIUM CHLORIDE 40 MEQ: 1500 TABLET, EXTENDED RELEASE ORAL at 02:11

## 2024-11-11 RX ADMIN — ATORVASTATIN CALCIUM 80 MG: 40 TABLET, FILM COATED ORAL at 09:11

## 2024-11-11 RX ADMIN — CARVEDILOL 3.12 MG: 3.12 TABLET, FILM COATED ORAL at 09:11

## 2024-11-11 RX ADMIN — ACETAMINOPHEN 650 MG: 325 TABLET, FILM COATED ORAL at 11:11

## 2024-11-11 RX ADMIN — OXYCODONE HYDROCHLORIDE AND ACETAMINOPHEN 1 TABLET: 5; 325 TABLET ORAL at 02:11

## 2024-11-11 RX ADMIN — ENOXAPARIN SODIUM 40 MG: 40 INJECTION SUBCUTANEOUS at 09:11

## 2024-11-11 RX ADMIN — PANTOPRAZOLE SODIUM 40 MG: 40 TABLET, DELAYED RELEASE ORAL at 09:11

## 2024-11-11 NOTE — NURSING
1615: Patient discharged in stable condition via wheelchair with [place] transportation.  present.

## 2024-11-11 NOTE — PLAN OF CARE
11/11/24 0818   Medicare Message   Important Message from Medicare regarding Discharge Appeal Rights Given to patient/caregiver;Explained to patient/caregiver

## 2024-11-11 NOTE — PT/OT/SLP PROGRESS
Physical Therapy    Missed Treatment Session    Patient Name:  Kasandra Cheek   MRN:  [id number]    Patient declining PT services at this time stating she is awaiting transport for discharge to SNF facility.

## 2024-11-11 NOTE — NURSING
Chavo Prince notified of Holtor Monitor Test needed after discharge and he verbalized that he will put the request in.

## 2024-11-11 NOTE — DISCHARGE SUMMARY
[facility] Medicine Discharge Summary    Admit Date: 10/30/2024  Discharge Date and Time: 11/11/202412:54 PM  Admitting Physician: DORI Team  Discharging Physician: Rose Benitez MD.  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 female who  has a past medical history of Anemia, Anxiety, Depression, Dyspareunia, blood transfusions, HTN , Hypothyroidism, unspecified, Liver disease, Lumbar radiculopathy, Menopause, Migraine, Obesity, Osteoporosis, Other spondylosis with radiculopathy, lumbar region, Personal history of fall, Spinal stenosis, lumbar region with neurogenic claudication, TIA (transient ischemic attack) (1994), Unspecified osteoarthritis, unspecified site, and Urinary incontinence.. The patient presented to [facility] on 10/30/2024      64-year-old female with past medical history of anxiety/depression, hypertension, hypothyroidism, fatty liver disease, chronic pain syndrome presents to the emergency room with confusion for the last 72 hours.   is at the bedside helping give history.  Reports getting confused at home.  Some imbalance.  Blood pressure has been elevated.  On arrival to the emergency room she was not oriented.  GCS of 9.  Not following commands.  Vital signs show some mild hypertension but otherwise stable.  No fever.  She has no leukocytosis.  Mild metabolic acidosis appears to be chronic.  UDS was positive for opiates which she was prescribed.  Urinalysis was bland.  CT of the head performed in the ER showed remote infarcts involving the right frontal and parietal lobes which are new  compared to scan performed 3 months ago.  Carotid ultrasound showed minimal stenosis of the left carotid and no stenosis of the right.  Echocardiogram is pending.  Neurology was consulted from the ER.  Recommending full stroke workup.  Added aspirin and statin.  Okay to normalize blood pressure.  She was being admitted to the hospitalist group  MRI brain negative for acute CVA but as per Neurology recommendations we need to rule out recurrent right middle cerebral artery cardioembolic stroke with recommendations to start patient on aspirin 325 mg p.o. daily, discontinue Celebrex, start the patient on Keppra 500 mg p.o. b.i.d., issue with polypharmacy including multiple antidepressants/opioids/gabapentin. Patient will need Holter times 2 weeks on discharge with follow-up with Rabia Bell upon discharge and if Holter negative patient will need loop recorder due to high suspicion for cardioembolic etiology.   Neurosurgery was consulted for thoracic fluid collection as such no signs of infection there was no erythema over the surgical wound site inflammatory markers were normal  CT of T and L-spine did show subcutaneous fluid collection extending throughout the hardware as per Dr. Hyde  note likely seroma versus CSF no gross signs of infection patient will continue with aspirin and statin and Keppra b.i.d. and they recommended Holter monitor for 2 weeks and follow up with Neurology NP patient was working with PT and OT Neurosurgery signed off psych was consulted to adjust the psych medication blood pressure was better electrolyte abnormalities were corrected patient was working with PT and OT and was discharged to skilled nursing facility in stable condition  Pt was seen and examined on the day of discharge  Vitals:  VITAL SIGNS: 24 HRS MIN & MAX LAST   Temp  Min: 97.7 °F (36.5 °C)  Max: 99.3 °F (37.4 °C) 99.3 °F (37.4 °C)   BP  Min: 118/72  Max: 141/83 118/72   Pulse  Min: 79  Max: 101  85   Resp  Min: 18  Max: 20 20    SpO2  Min: 92 %  Max: 99 % (!) 92 %       Physical Exam:  General: In no acute distress, afebrile  Chest: Clear to auscultation bilaterally  Heart: RRR, +S1, S2, no appreciable murmur  Abdomen: Soft, nontender, BS +  MSK: Warm, no lower extremity edema, no clubbing or cyanosis  Neurologic: Alert and oriented x4,     Procedures Performed: No admission procedures for hospital encounter.     Significant Diagnostic Studies: See Full reports for all details    Recent Labs   Lab 11/08/24  0440 11/10/24  0737   WBC 8.76 5.93   RBC 3.94* 3.61*   HGB 12.5 11.3*   HCT 36.6* 33.1*   MCV 92.9 91.7   MCH 31.7* 31.3*   MCHC 34.2 34.1   RDW 13.1 13.5    261   MPV 10.2 10.1       Recent Labs   Lab 11/08/24  0440 11/10/24  0737 11/11/24  1036    141 143   K 2.8* 2.8* 3.3*   * 108* 107   CO2 17* 24 26   BUN 13.4 11.0 10.2   CREATININE 0.71 0.63 0.65   CALCIUM 8.8 8.4 8.6   MG 1.60  --   --    ALBUMIN 3.6  --   --    ALKPHOS 163*  --   --    ALT 13  --   --    AST 21  --   --    BILITOT 0.6  --   --         Microbiology Results (last 7 days)       Procedure Component Value Units Date/Time    Stool Culture [[id number]]  (Normal) Collected: 11/03/24 1424    Order Status: Completed Specimen: Stool Updated: 11/05/24 1238     Stool Culture Negative for Salmonella, Shigella, Campylobacter, Vibrio, Aeromonas, Pleisiomonas,Yersinia, or Shiga Toxin 1 and 2.             CT Lumbar Spine With Contrast  EXAMINATION  CT LUMBAR SPINE WITH CONTRAST    CLINICAL HISTORY  rule out fluid collection;    TECHNIQUE  Helical-acquisition CT images were obtained following the intravenous administration of iodine-based contrast media.  Multiplanar reconstructions were accomplished by a CT technologist at a separate workstation and pushed to PACS for physician review.    CONTRAST  IV: Omnipaque 350, 100 mL    COMPARISON  28 August 2024 abdominopelvic CT    FINDINGS  Images were reviewed in soft tissue and bone windows.    Exam quality: There  is notable limitation secondary to extensive multilevel posterior spine stabilization hardware and associated widespread metal streak artifact.    Enhancement: No definite evidence of focal abnormal enhancement is appreciated.  However, circumscribed fluid collection with faint peripheral enhancement is not entirely excluded through the midline lumbar posterior surgical bed spanning T11 through L5.  In the    There are similar advanced spondylitic changes throughout the visualized spinal column with associated T12-L1 destructive endplate changes.  Interval surgical revision of posterior fusion hardware is noted, now spanning from the sacroiliac joints and lumbosacral junction cranially beyond the included lower thoracic spine.  There are also new postoperative changes of spinal canal decompression at T12 through L3.    No acutely displaced fracture, vertebral body compression deformity, or interval change in spinal column alignment.  There is no convincing new focal abnormality of the partially visualized retroperitoneal structures.    IMPRESSION  1. Interval postoperative changes with revision of multilevel thoracolumbosacral stabilization hardware and new T12 through L3 posterior spinal canal decompression.  2. Regional soft tissues are of limited evaluation secondary to extensive metal artifact, but there are findings suspicious for large peripherally enhancing fluid collection through the posterior surgical bed spanning T11 through L5.  3. No evidence of acute or worsening focal osseous abnormality.  ==========  This report was flagged in Epic as abnormal.    RADIATION DOSE  Automated tube current modulation, weight-based exposure dosing, and/or iterative reconstruction technique utilized to reach lowest reasonably achievable exposure rate.    DLP: 1387 mGy*cm    Electronically signed by: Tirso Decker  Date:    11/02/2024  Time:    16:34  CT Thoracic Spine With Contrast  Narrative: CLINICAL  HISTORY:  Trauma.    TECHNIQUE:  CT of the thoracic spine Without contrast. Sagittal and coronal reconstructions were performed on the source images.    Automatic exposure control was utilized to reduce the patient's radiation dose.    DLP = 1252    COMPARISON:  No prior imaging available for comparison.    FINDINGS:  Postsurgical changes of lower thoracic fusion with erosive changes and T12-L1.  Surgical fixation and posterior decompression is new in the interval, however erosive changes similar to prior abdominal CT from August 24.  Posterior to the decompression is a subcutaneous fluid collection extending throughout the spinal hardware.  Limited evaluation secondary to metallic artifact throughout.  Fluid is of unknown etiology.  Further evaluation may be obtained with ultrasound if needed.  Impression: Postsurgical change with no acute fracture.  Fluid is noted midline posterior to surgical hardware.  Further evaluation may be obtained with ultrasound if needed.  Likely seroma versus CSF fluid.  No gross CT signs of infection.    Electronically signed by: Logan Cedeno  Date:    11/02/2024  Time:    16:09         Medication List        START taking these medications      aspirin 325 MG EC tablet  Commonly known as: ECOTRIN  Take 1 tablet (325 mg total) by mouth once daily.  Start taking on: November 12, 2024     atorvastatin 80 MG tablet  Commonly known as: LIPITOR  Take 1 tablet (80 mg total) by mouth once daily.  Start taking on: November 12, 2024     carvediloL 3.125 MG tablet  Commonly known as: COREG  Take 1 tablet (3.125 mg total) by mouth 2 (two) times daily.     levETIRAcetam 500 MG Tab  Commonly known as: KEPPRA  Take 1 tablet (500 mg total) by mouth 2 (two) times daily.     mirtazapine 15 MG tablet  Commonly known as: REMERON  Take 1 tablet (15 mg total) by mouth every evening.     oxyCODONE-acetaminophen 5-325 mg per tablet  Commonly known as: PERCOCET  Take 1 tablet by mouth every 8 (eight) hours  as needed for Pain.            CONTINUE taking these medications      cholecalciferol (vitamin D3) 250 mcg (10,000 unit) Cap capsule  Commonly known as: VITAMIN D3     cyanocobalamin (vitamin B-12) 2,500 mcg Tab     docusate sodium 250 MG capsule  Commonly known as: COLACE     ferrous sulfate 325 mg (65 mg iron) Tab tablet  Commonly known as: FEOSOL     FISH OIL OMEGA 3-6-9 ORAL     furosemide 20 MG tablet  Commonly known as: LASIX     levothyroxine 200 MCG tablet  Commonly known as: SYNTHROID     losartan 100 MG tablet  Commonly known as: COZAAR     ondansetron 8 MG tablet  Commonly known as: ZOFRAN     pantoprazole 40 MG tablet  Commonly known as: PROTONIX     VITAMIN K2 ORAL            STOP taking these medications      buPROPion 150 MG TB24 tablet  Commonly known as: WELLBUTRIN XL     buPROPion 300 MG 24 hr tablet  Commonly known as: WELLBUTRIN XL     busPIRone 30 MG Tab  Commonly known as: BUSPAR     celecoxib 100 MG capsule  Commonly known as: CeleBREX     DULoxetine 60 MG capsule  Commonly known as: CYMBALTA     gabapentin 600 MG tablet  Commonly known as: NEURONTIN     magnesium oxide 400 mg (241.3 mg magnesium) tablet  Commonly known as: MAG-OX     oxyCODONE 20 mg Tab immediate release tablet  Commonly known as: ROXICODONE     promethazine 12.5 MG Tab  Commonly known as: PHENERGAN     QUEtiapine 300 MG Tab  Commonly known as: SEROQUEL            ASK your doctor about these medications      acetaminophen 325 MG tablet  Commonly known as: TYLENOL     APPLE CIDER VINEGAR ORAL               Where to Get Your Medications        These medications were sent to [facility] [address] [facility] [address] [address] [facility] [address], [facility] [address]      Phone: [phone]   aspirin 325 MG EC tablet  atorvastatin 80 MG tablet  carvediloL 3.125 MG tablet  levETIRAcetam 500 MG Tab  mirtazapine 15 MG tablet  oxyCODONE-acetaminophen 5-325 mg per tablet          Explained in detail to the patient about the  discharge plan, medications, and follow-up visits. Pt understands and agrees with the treatment plan  Discharge Disposition: Home or Self Care   Discharged Condition: stable  Diet-   Dietary Orders (From admission, onward)       Start     Ordered    11/01/24 1556  Diet Heart Healthy  Diet effective now         11/01/24 1558                   Medications Per DC med rec  Activities as tolerated   Follow-up Information       [facility] Follow up.    Specialties: Home Health Services, Home Therapy Services, Home Living Aide Services  Why: This is your home health provider. You may contact the office for any questions or concerns regarding your home health services.  Contact information:  [address] [facility] [address] [facility] [address]  [facility] [address]  [phone]             Rabia Cosme FNP Follow up in 2 month(s).    Specialty: Neurology  Contact information:  [address] [place] [address] [place] [address] [phone]               Ravinder Mazariegos, DO Follow up in 1 week(s).    Specialty: Family Medicine  Contact information:  [address] [facility]  [address]  [facility]  [place] [address] [phone]               holter monitor Follow up in 2 week(s).                           For further questions contact hospitalist office    Discharge time 33 minutes    For worsening symptoms, chest pain, shortness of breath, increased abdominal pain, high grade fever, stroke or stroke like symptoms, immediately go to the nearest Emergency Room or call 911 as soon as possible.      Rose Jones M.D, on 11/11/2024. at 12:54 PM.

## 2024-11-11 NOTE — PLAN OF CARE
11/11/24 1405   Final Note   Assessment Type Final Discharge Note   Anticipated Discharge Disposition SNF   Post-Acute Status   Post-Acute Authorization Placement   Post-Acute Placement Status Set-up Complete/Auth obtained   Discharge Delays None known at this time     Pt being discharged to [facility].

## 2024-11-11 NOTE — PLAN OF CARE
11/11/24 0957   Discharge Reassessment   Assessment Type Discharge Planning Reassessment   Did the patient's condition or plan change since previous assessment? No   Discharge Plan discussed with: Patient;Adult children   Discharge Plan A Skilled Nursing Facility   Discharge Plan B Skilled Nursing Facility   Post-Acute Status   Post-Acute Authorization Placement   Post-Acute Placement Status Set-up Complete/Auth obtained   Discharge Delays None known at this time     Called in psych consult to Mary at [facility].

## 2024-11-11 NOTE — CONSULTS
"11/11/2024  Kasandra Cheek   1960   [id number]        Psychiatry Progress Note     Chief Complaint: Psychiatric consult for "medication adjustments"    SUBJECTIVE:   Kasandra Cheke is a 64 y.o. female with a past medical history that includes anemia, anxiety, depression, HTN, hypothyroidism, liver disease, osteoporosis, TIA, and osteoarthritis who presented to [facility] on 10/30/24 with confusion for 72 hours.  CT of the head performed in the ER showed remote infarcts involving the right frontal and parietal lobes which are new compared to scan performed 3 months ago. Carotid ultrasound showed minimal stenosis of the left carotid and no stenosis of the right. MRI brain negative for acute CVA. Issues with polypharmacy and psychiatry consulted and initially saw on 11/02/24. At that time it was decided to not use psychotropic medications. Planned transfer today and psychiatry consulted for medication adjustments.    Seen at the bedside with her  present. She is alert, pleasant, polite, and cooperative. AAOx4 with grossly intact recent memory and attention. She reports depressed mood with onset of current episode "one week ago". She endorses occasional tearful episodes as well as difficulties with sleep and recent appetite. She denies suicidal ideations or homicidal ideations. Denies a history of episodes of euphoric, elevated, or irritable mood lasting multiple days and accompanied by changes in sleep, behavior, or energy. No evidence of seun at this time. Denies auditory/visual hallucinations.  Quetiapine started yesterday which she reports helped her sleep last night.        Current Medications:   Scheduled Meds:    aspirin  325 mg Oral Daily    atorvastatin  80 mg Oral Daily    carvediloL  3.125 mg Oral BID    enoxparin  40 mg Subcutaneous Q12H (prophylaxis, 0900/2100)    levETIRAcetam  500 mg Oral BID    levothyroxine  200 mcg Oral Before breakfast    losartan  100 mg Oral Daily    pantoprazole  40 mg Oral BID "    QUEtiapine  25 mg Oral QHS      PRN Meds:   Current Facility-Administered Medications:     acetaminophen, 650 mg, Oral, Q6H PRN    bisacodyL, 10 mg, Rectal, Daily PRN    hydrALAZINE, 10 mg, Intravenous, Q4H PRN    labetalol, 10 mg, Intravenous, Q6H PRN    loperamide, 2 mg, Oral, QID PRN    oxyCODONE-acetaminophen, 1 tablet, Oral, Q6H PRN   Psychotherapeutics (From admission, onward)      Start     Stop Route Frequency Ordered    11/10/24 2100  QUEtiapine tablet 25 mg         -- Oral Nightly 11/10/24 1402            Allergies:   Review of patient's allergies indicates:   Allergen Reactions    Sucralfate Nausea Only        OBJECTIVE:   Vitals   Vitals:    11/11/24 1103   BP:    Pulse:    Resp:    Temp: 98.6 °F (37 °C)        Labs/Imaging/Studies:   Recent Results (from the past 36 hours)   Basic Metabolic Panel    Collection Time: 11/10/24  7:37 AM   Result Value Ref Range    Sodium 141 136 - 145 mmol/L    Potassium 2.8 (L) 3.5 - 5.1 mmol/L    Chloride 108 (H) 98 - 107 mmol/L    CO2 24 23 - 31 mmol/L    Glucose 96 82 - 115 mg/dL    Blood Urea Nitrogen 11.0 9.8 - 20.1 mg/dL    Creatinine 0.63 0.55 - 1.02 mg/dL    BUN/Creatinine Ratio 17     Calcium 8.4 8.4 - 10.2 mg/dL    Anion Gap 9.0 mEq/L    eGFR >60 mL/min/1.73/m2   CBC with Differential    Collection Time: 11/10/24  7:37 AM   Result Value Ref Range    WBC 5.93 4.50 - 11.50 x10(3)/mcL    RBC 3.61 (L) 4.20 - 5.40 x10(6)/mcL    Hgb 11.3 (L) 12.0 - 16.0 g/dL    Hct 33.1 (L) 37.0 - 47.0 %    MCV 91.7 80.0 - 94.0 fL    MCH 31.3 (H) 27.0 - 31.0 pg    MCHC 34.1 33.0 - 36.0 g/dL    RDW 13.5 11.5 - 17.0 %    Platelet 261 130 - 400 x10(3)/mcL    MPV 10.1 7.4 - 10.4 fL    Neut % 57.0 %    Lymph % 28.2 %    Mono % 8.6 %    Eos % 4.2 %    Basophil % 1.0 %    Lymph # 1.67 0.6 - 4.6 x10(3)/mcL    Neut # 3.38 2.1 - 9.2 x10(3)/mcL    Mono # 0.51 0.1 - 1.3 x10(3)/mcL    Eos # 0.25 0 - 0.9 x10(3)/mcL    Baso # 0.06 <=0.2 x10(3)/mcL    IG# 0.06 (H) 0 - 0.04 x10(3)/mcL    IG% 1.0 %     NRBC% 0.0 %      Psychiatric Mental Status Exam:  General Appearance: appears stated age, dressed in hospital garb, in no acute distress, sitting in chair  Arousal: alert  Behavior: cooperative, pleasant, polite, appropriate eye-contact, under good behavioral control  Movements and Motor Activity: no abnormal involuntary movements noted  Orientation: oriented to person, place, time, and situation  Speech: normal rate, rhythm, volume, tone and pitch  Mood: Depressed  Affect: reactive, full-range  Thought Process: intact, linear, goal-directed, organized, logical  Associations: intact, no loosening of associations  Thought Content and Perceptions: no suicidal or homicidal ideation, no auditory or visual hallucinations, no paranoid ideation, no ideas of reference, no evidence of delusions or psychosis  Recent and Remote Memory: grossly intact; per interview/observation with patient  Attention and Concentration: grossly intact; per interview/observation with patient  Fund of Knowledge: grossly intact; based on history, vocabulary, fund of knowledge, syntax, grammar, and content  Insight: adequate; based on understanding of severity of illness and HPI  Judgment: adequate; based on patient's behavior and HPI    ASSESSMENT/PLAN:   Problems Addressed/Diagnoses:  Major Depressive Disorder, Recurrent, Mild  Generalized Anxiety Disorder (F41.1)    Past Medical History:   Diagnosis Date    Anemia     Anxiety     Depression     Dyspareunia     Encounter for blood transfusion     HTN (hypertension)     Hypothyroidism, unspecified     Liver disease     fatty liver    Lumbar radiculopathy     Menopause     Migraine     Obesity     Osteoporosis     Other spondylosis with radiculopathy, lumbar region     Personal history of fall     Spinal stenosis, lumbar region with neurogenic claudication     TIA (transient ischemic attack) 1994    Unspecified osteoarthritis, unspecified site     Urinary incontinence         Plan:  Medication  Management  Remeron 15mg PO QHS  Discontinue Quetiapine  Legal  PEC not recommended. Low risk of imminent harm to self or others. Not currently gravely disabled due to acute psychiatric condition.  Disposition  Per primary team  Recommend outpatient psychiatric follow-up. If discharged to [facility] can have [facility] follow while admitted.  Psychiatry will sign-off        Bakari Andrade

## 2024-11-11 NOTE — PT/OT/SLP PROGRESS
Occupational Therapy      Patient Name:  Kasandra Cheek   MRN:  [id number]    Patient not seen today secondary to declining OT services. Patient is waiting for transport to take her to another facility.     11/11/2024

## 2024-11-24 ENCOUNTER — HOSPITAL ENCOUNTER (INPATIENT)
Facility: HOSPITAL | Age: 64
LOS: 8 days | Discharge: NURSING, REHAB OR LONG-TERM CARE | DRG: 071 | End: 2024-12-03
Attending: STUDENT IN AN ORGANIZED HEALTH CARE EDUCATION/TRAINING PROGRAM | Admitting: INTERNAL MEDICINE
Payer: MEDICARE

## 2024-11-24 DIAGNOSIS — R41.82 AMS (ALTERED MENTAL STATUS): Primary | ICD-10-CM

## 2024-11-24 DIAGNOSIS — E83.42 HYPOMAGNESEMIA: ICD-10-CM

## 2024-11-24 DIAGNOSIS — R79.89 TROPONIN LEVEL ELEVATED: ICD-10-CM

## 2024-11-24 DIAGNOSIS — G93.40 ACUTE ENCEPHALOPATHY: ICD-10-CM

## 2024-11-24 DIAGNOSIS — E87.6 HYPOKALEMIA: ICD-10-CM

## 2024-11-24 DIAGNOSIS — R07.9 CHEST PAIN: ICD-10-CM

## 2024-11-24 LAB
ALBUMIN SERPL-MCNC: 3.4 G/DL (ref 3.4–4.8)
ALBUMIN/GLOB SERPL: 1.2 RATIO (ref 1.1–2)
ALP SERPL-CCNC: 132 UNIT/L (ref 40–150)
ALT SERPL-CCNC: 13 UNIT/L (ref 0–55)
ANION GAP SERPL CALC-SCNC: 10 MEQ/L
AST SERPL-CCNC: 17 UNIT/L (ref 5–34)
BASOPHILS # BLD AUTO: 0.04 X10(3)/MCL
BASOPHILS NFR BLD AUTO: 0.7 %
BILIRUB SERPL-MCNC: 0.5 MG/DL
BUN SERPL-MCNC: 12.1 MG/DL (ref 9.8–20.1)
CALCIUM SERPL-MCNC: 9 MG/DL (ref 8.4–10.2)
CHLORIDE SERPL-SCNC: 109 MMOL/L (ref 98–107)
CO2 SERPL-SCNC: 26 MMOL/L (ref 23–31)
CREAT SERPL-MCNC: 0.8 MG/DL (ref 0.55–1.02)
CREAT/UREA NIT SERPL: 15
EOSINOPHIL # BLD AUTO: 0.1 X10(3)/MCL (ref 0–0.9)
EOSINOPHIL NFR BLD AUTO: 1.7 %
ERYTHROCYTE [DISTWIDTH] IN BLOOD BY AUTOMATED COUNT: 13.4 % (ref 11.5–17)
FLUAV AG UPPER RESP QL IA.RAPID: NOT DETECTED
FLUBV AG UPPER RESP QL IA.RAPID: NOT DETECTED
GFR SERPLBLD CREATININE-BSD FMLA CKD-EPI: >60 ML/MIN/1.73/M2
GLOBULIN SER-MCNC: 2.8 GM/DL (ref 2.4–3.5)
GLUCOSE SERPL-MCNC: 109 MG/DL (ref 82–115)
HCT VFR BLD AUTO: 32.6 % (ref 37–47)
HGB BLD-MCNC: 10.8 G/DL (ref 12–16)
IMM GRANULOCYTES # BLD AUTO: 0.03 X10(3)/MCL (ref 0–0.04)
IMM GRANULOCYTES NFR BLD AUTO: 0.5 %
LYMPHOCYTES # BLD AUTO: 1.7 X10(3)/MCL (ref 0.6–4.6)
LYMPHOCYTES NFR BLD AUTO: 29.3 %
MAGNESIUM SERPL-MCNC: 1.5 MG/DL (ref 1.6–2.6)
MCH RBC QN AUTO: 31.1 PG (ref 27–31)
MCHC RBC AUTO-ENTMCNC: 33.1 G/DL (ref 33–36)
MCV RBC AUTO: 93.9 FL (ref 80–94)
MONOCYTES # BLD AUTO: 0.53 X10(3)/MCL (ref 0.1–1.3)
MONOCYTES NFR BLD AUTO: 9.1 %
NEUTROPHILS # BLD AUTO: 3.4 X10(3)/MCL (ref 2.1–9.2)
NEUTROPHILS NFR BLD AUTO: 58.7 %
NRBC BLD AUTO-RTO: 0 %
PLATELET # BLD AUTO: 240 X10(3)/MCL (ref 130–400)
PMV BLD AUTO: 9 FL (ref 7.4–10.4)
POCT GLUCOSE: 110 MG/DL (ref 70–110)
POTASSIUM SERPL-SCNC: 3.2 MMOL/L (ref 3.5–5.1)
PROT SERPL-MCNC: 6.2 GM/DL (ref 5.8–7.6)
RBC # BLD AUTO: 3.47 X10(6)/MCL (ref 4.2–5.4)
SARS-COV-2 RNA RESP QL NAA+PROBE: NOT DETECTED
SODIUM SERPL-SCNC: 145 MMOL/L (ref 136–145)
T4 FREE SERPL-MCNC: 1.13 NG/DL (ref 0.7–1.48)
TROPONIN I SERPL-MCNC: 0.05 NG/ML (ref 0–0.04)
TSH SERPL-ACNC: 4.73 UIU/ML (ref 0.35–4.94)
WBC # BLD AUTO: 5.8 X10(3)/MCL (ref 4.5–11.5)

## 2024-11-24 PROCEDURE — 84443 ASSAY THYROID STIM HORMONE: CPT | Performed by: STUDENT IN AN ORGANIZED HEALTH CARE EDUCATION/TRAINING PROGRAM

## 2024-11-24 PROCEDURE — 82962 GLUCOSE BLOOD TEST: CPT

## 2024-11-24 PROCEDURE — 84439 ASSAY OF FREE THYROXINE: CPT | Performed by: STUDENT IN AN ORGANIZED HEALTH CARE EDUCATION/TRAINING PROGRAM

## 2024-11-24 PROCEDURE — 85025 COMPLETE CBC W/AUTO DIFF WBC: CPT | Performed by: PHYSICIAN ASSISTANT

## 2024-11-24 PROCEDURE — 99285 EMERGENCY DEPT VISIT HI MDM: CPT | Mod: 25

## 2024-11-24 PROCEDURE — 83735 ASSAY OF MAGNESIUM: CPT | Performed by: STUDENT IN AN ORGANIZED HEALTH CARE EDUCATION/TRAINING PROGRAM

## 2024-11-24 PROCEDURE — 0240U COVID/FLU A&B PCR: CPT | Performed by: STUDENT IN AN ORGANIZED HEALTH CARE EDUCATION/TRAINING PROGRAM

## 2024-11-24 PROCEDURE — 82550 ASSAY OF CK (CPK): CPT | Performed by: STUDENT IN AN ORGANIZED HEALTH CARE EDUCATION/TRAINING PROGRAM

## 2024-11-24 PROCEDURE — 63600175 PHARM REV CODE 636 W HCPCS: Performed by: STUDENT IN AN ORGANIZED HEALTH CARE EDUCATION/TRAINING PROGRAM

## 2024-11-24 PROCEDURE — 25500020 PHARM REV CODE 255: Performed by: STUDENT IN AN ORGANIZED HEALTH CARE EDUCATION/TRAINING PROGRAM

## 2024-11-24 PROCEDURE — 25000003 PHARM REV CODE 250: Performed by: STUDENT IN AN ORGANIZED HEALTH CARE EDUCATION/TRAINING PROGRAM

## 2024-11-24 PROCEDURE — 84484 ASSAY OF TROPONIN QUANT: CPT | Performed by: STUDENT IN AN ORGANIZED HEALTH CARE EDUCATION/TRAINING PROGRAM

## 2024-11-24 PROCEDURE — 96366 THER/PROPH/DIAG IV INF ADDON: CPT

## 2024-11-24 PROCEDURE — 80053 COMPREHEN METABOLIC PANEL: CPT | Performed by: PHYSICIAN ASSISTANT

## 2024-11-24 PROCEDURE — 96365 THER/PROPH/DIAG IV INF INIT: CPT

## 2024-11-24 PROCEDURE — 93005 ELECTROCARDIOGRAM TRACING: CPT

## 2024-11-24 PROCEDURE — 96375 TX/PRO/DX INJ NEW DRUG ADDON: CPT

## 2024-11-24 RX ORDER — POTASSIUM CHLORIDE 7.45 MG/ML
10 INJECTION INTRAVENOUS
Status: COMPLETED | OUTPATIENT
Start: 2024-11-24 | End: 2024-11-25

## 2024-11-24 RX ORDER — HALOPERIDOL 5 MG/ML
5 INJECTION INTRAMUSCULAR
Status: COMPLETED | OUTPATIENT
Start: 2024-11-25 | End: 2024-11-25

## 2024-11-24 RX ORDER — ASPIRIN 325 MG
325 TABLET, DELAYED RELEASE (ENTERIC COATED) ORAL
Status: COMPLETED | OUTPATIENT
Start: 2024-11-24 | End: 2024-11-25

## 2024-11-24 RX ORDER — MAGNESIUM SULFATE HEPTAHYDRATE 40 MG/ML
2 INJECTION, SOLUTION INTRAVENOUS
Status: COMPLETED | OUTPATIENT
Start: 2024-11-24 | End: 2024-11-25

## 2024-11-24 RX ORDER — MORPHINE SULFATE 4 MG/ML
4 INJECTION, SOLUTION INTRAMUSCULAR; INTRAVENOUS
Status: COMPLETED | OUTPATIENT
Start: 2024-11-24 | End: 2024-11-24

## 2024-11-24 RX ORDER — ONDANSETRON HYDROCHLORIDE 2 MG/ML
4 INJECTION, SOLUTION INTRAVENOUS
Status: COMPLETED | OUTPATIENT
Start: 2024-11-24 | End: 2024-11-24

## 2024-11-24 RX ORDER — SODIUM CHLORIDE 9 MG/ML
1000 INJECTION, SOLUTION INTRAVENOUS
Status: COMPLETED | OUTPATIENT
Start: 2024-11-24 | End: 2024-11-25

## 2024-11-24 RX ADMIN — ONDANSETRON 4 MG: 2 INJECTION INTRAMUSCULAR; INTRAVENOUS at 11:11

## 2024-11-24 RX ADMIN — MORPHINE SULFATE 4 MG: 4 INJECTION INTRAVENOUS at 11:11

## 2024-11-24 RX ADMIN — MAGNESIUM SULFATE HEPTAHYDRATE 2 G: 40 INJECTION, SOLUTION INTRAVENOUS at 10:11

## 2024-11-24 RX ADMIN — IOHEXOL 100 ML: 350 INJECTION, SOLUTION INTRAVENOUS at 10:11

## 2024-11-24 RX ADMIN — SODIUM CHLORIDE 1000 ML: 9 INJECTION, SOLUTION INTRAVENOUS at 11:11

## 2024-11-24 RX ADMIN — POTASSIUM CHLORIDE 10 MEQ: 7.46 INJECTION, SOLUTION INTRAVENOUS at 10:11

## 2024-11-25 LAB
ALBUMIN SERPL-MCNC: 3.3 G/DL (ref 3.4–4.8)
ALBUMIN/GLOB SERPL: 1.4 RATIO (ref 1.1–2)
ALP SERPL-CCNC: 134 UNIT/L (ref 40–150)
ALT SERPL-CCNC: 15 UNIT/L (ref 0–55)
AMMONIA PLAS-MSCNC: 36.4 UMOL/L (ref 18–72)
AMPHET UR QL SCN: NEGATIVE
ANION GAP SERPL CALC-SCNC: 12 MEQ/L
AST SERPL-CCNC: 20 UNIT/L (ref 5–34)
BACTERIA #/AREA URNS AUTO: ABNORMAL /HPF
BARBITURATE SCN PRESENT UR: NEGATIVE
BASOPHILS # BLD AUTO: 0.04 X10(3)/MCL
BASOPHILS NFR BLD AUTO: 0.6 %
BENZODIAZ UR QL SCN: NEGATIVE
BILIRUB SERPL-MCNC: 0.5 MG/DL
BILIRUB UR QL STRIP.AUTO: NEGATIVE
BUN SERPL-MCNC: 10.7 MG/DL (ref 9.8–20.1)
CALCIUM SERPL-MCNC: 8.5 MG/DL (ref 8.4–10.2)
CANNABINOIDS UR QL SCN: NEGATIVE
CHLORIDE SERPL-SCNC: 109 MMOL/L (ref 98–107)
CK SERPL-CCNC: 105 U/L (ref 29–168)
CLARITY UR: CLEAR
CO2 SERPL-SCNC: 24 MMOL/L (ref 23–31)
COCAINE UR QL SCN: NEGATIVE
COLOR UR AUTO: YELLOW
CREAT SERPL-MCNC: 0.69 MG/DL (ref 0.55–1.02)
CREAT/UREA NIT SERPL: 16
CRP SERPL-MCNC: 2.2 MG/L
EOSINOPHIL # BLD AUTO: 0.02 X10(3)/MCL (ref 0–0.9)
EOSINOPHIL NFR BLD AUTO: 0.3 %
ERYTHROCYTE [DISTWIDTH] IN BLOOD BY AUTOMATED COUNT: 13.3 % (ref 11.5–17)
ERYTHROCYTE [SEDIMENTATION RATE] IN BLOOD: 1 MM/HR (ref 0–20)
ETHANOL SERPL-MCNC: <10 MG/DL
FENTANYL UR QL SCN: NEGATIVE
GFR SERPLBLD CREATININE-BSD FMLA CKD-EPI: >60 ML/MIN/1.73/M2
GLOBULIN SER-MCNC: 2.3 GM/DL (ref 2.4–3.5)
GLUCOSE SERPL-MCNC: 121 MG/DL (ref 82–115)
GLUCOSE UR QL STRIP: NORMAL
HCT VFR BLD AUTO: 30.6 % (ref 37–47)
HGB BLD-MCNC: 10.2 G/DL (ref 12–16)
HGB UR QL STRIP: NEGATIVE
IMM GRANULOCYTES # BLD AUTO: 0.03 X10(3)/MCL (ref 0–0.04)
IMM GRANULOCYTES NFR BLD AUTO: 0.4 %
KETONES UR QL STRIP: NEGATIVE
LEUKOCYTE ESTERASE UR QL STRIP: 25
LYMPHOCYTES # BLD AUTO: 0.86 X10(3)/MCL (ref 0.6–4.6)
LYMPHOCYTES NFR BLD AUTO: 12.6 %
MAGNESIUM SERPL-MCNC: 1.8 MG/DL (ref 1.6–2.6)
MCH RBC QN AUTO: 31.2 PG (ref 27–31)
MCHC RBC AUTO-ENTMCNC: 33.3 G/DL (ref 33–36)
MCV RBC AUTO: 93.6 FL (ref 80–94)
MDMA UR QL SCN: NEGATIVE
MONOCYTES # BLD AUTO: 0.49 X10(3)/MCL (ref 0.1–1.3)
MONOCYTES NFR BLD AUTO: 7.2 %
MUCOUS THREADS URNS QL MICRO: ABNORMAL /LPF
NEUTROPHILS # BLD AUTO: 5.39 X10(3)/MCL (ref 2.1–9.2)
NEUTROPHILS NFR BLD AUTO: 78.9 %
NITRITE UR QL STRIP: ABNORMAL
NRBC BLD AUTO-RTO: 0 %
OHS QRS DURATION: 80 MS
OHS QRS DURATION: 84 MS
OHS QTC CALCULATION: 398 MS
OHS QTC CALCULATION: 467 MS
OPIATES UR QL SCN: POSITIVE
PCP UR QL: NEGATIVE
PH UR STRIP: 6 [PH]
PH UR: 6 [PH] (ref 3–11)
PHOSPHATE SERPL-MCNC: 2.8 MG/DL (ref 2.3–4.7)
PLATELET # BLD AUTO: 255 X10(3)/MCL (ref 130–400)
PMV BLD AUTO: 9.1 FL (ref 7.4–10.4)
POTASSIUM SERPL-SCNC: 3.1 MMOL/L (ref 3.5–5.1)
PROT SERPL-MCNC: 5.6 GM/DL (ref 5.8–7.6)
PROT UR QL STRIP: ABNORMAL
RBC # BLD AUTO: 3.27 X10(6)/MCL (ref 4.2–5.4)
RBC #/AREA URNS AUTO: ABNORMAL /HPF
SODIUM SERPL-SCNC: 145 MMOL/L (ref 136–145)
SP GR UR STRIP.AUTO: 1.03 (ref 1–1.03)
SPECIFIC GRAVITY, URINE AUTO (.000) (OHS): 1.03 (ref 1–1.03)
SQUAMOUS #/AREA URNS LPF: ABNORMAL /HPF
TROPONIN I SERPL-MCNC: 0.05 NG/ML (ref 0–0.04)
TROPONIN I SERPL-MCNC: 0.05 NG/ML (ref 0–0.04)
UROBILINOGEN UR STRIP-ACNC: NORMAL
WBC # BLD AUTO: 6.83 X10(3)/MCL (ref 4.5–11.5)
WBC #/AREA URNS AUTO: ABNORMAL /HPF

## 2024-11-25 PROCEDURE — 36415 COLL VENOUS BLD VENIPUNCTURE: CPT | Performed by: PHYSICIAN ASSISTANT

## 2024-11-25 PROCEDURE — 80053 COMPREHEN METABOLIC PANEL: CPT | Performed by: NURSE PRACTITIONER

## 2024-11-25 PROCEDURE — 82140 ASSAY OF AMMONIA: CPT | Performed by: STUDENT IN AN ORGANIZED HEALTH CARE EDUCATION/TRAINING PROGRAM

## 2024-11-25 PROCEDURE — 63600175 PHARM REV CODE 636 W HCPCS: Performed by: STUDENT IN AN ORGANIZED HEALTH CARE EDUCATION/TRAINING PROGRAM

## 2024-11-25 PROCEDURE — 96366 THER/PROPH/DIAG IV INF ADDON: CPT

## 2024-11-25 PROCEDURE — 84484 ASSAY OF TROPONIN QUANT: CPT | Performed by: PHYSICIAN ASSISTANT

## 2024-11-25 PROCEDURE — 86140 C-REACTIVE PROTEIN: CPT | Performed by: NURSE PRACTITIONER

## 2024-11-25 PROCEDURE — 83735 ASSAY OF MAGNESIUM: CPT | Performed by: NURSE PRACTITIONER

## 2024-11-25 PROCEDURE — 84484 ASSAY OF TROPONIN QUANT: CPT | Performed by: STUDENT IN AN ORGANIZED HEALTH CARE EDUCATION/TRAINING PROGRAM

## 2024-11-25 PROCEDURE — 82077 ASSAY SPEC XCP UR&BREATH IA: CPT | Performed by: STUDENT IN AN ORGANIZED HEALTH CARE EDUCATION/TRAINING PROGRAM

## 2024-11-25 PROCEDURE — 85652 RBC SED RATE AUTOMATED: CPT | Performed by: NURSE PRACTITIONER

## 2024-11-25 PROCEDURE — 36415 COLL VENOUS BLD VENIPUNCTURE: CPT | Mod: XB | Performed by: NURSE PRACTITIONER

## 2024-11-25 PROCEDURE — 63600175 PHARM REV CODE 636 W HCPCS: Performed by: NURSE PRACTITIONER

## 2024-11-25 PROCEDURE — 25000003 PHARM REV CODE 250: Performed by: PHYSICIAN ASSISTANT

## 2024-11-25 PROCEDURE — 25000003 PHARM REV CODE 250: Performed by: STUDENT IN AN ORGANIZED HEALTH CARE EDUCATION/TRAINING PROGRAM

## 2024-11-25 PROCEDURE — 63600175 PHARM REV CODE 636 W HCPCS: Performed by: INTERNAL MEDICINE

## 2024-11-25 PROCEDURE — 80307 DRUG TEST PRSMV CHEM ANLYZR: CPT | Performed by: STUDENT IN AN ORGANIZED HEALTH CARE EDUCATION/TRAINING PROGRAM

## 2024-11-25 PROCEDURE — 36415 COLL VENOUS BLD VENIPUNCTURE: CPT | Performed by: NURSE PRACTITIONER

## 2024-11-25 PROCEDURE — 25000003 PHARM REV CODE 250: Performed by: INTERNAL MEDICINE

## 2024-11-25 PROCEDURE — 25500020 PHARM REV CODE 255: Performed by: INTERNAL MEDICINE

## 2024-11-25 PROCEDURE — 85025 COMPLETE CBC W/AUTO DIFF WBC: CPT | Performed by: NURSE PRACTITIONER

## 2024-11-25 PROCEDURE — 81001 URINALYSIS AUTO W/SCOPE: CPT | Performed by: PHYSICIAN ASSISTANT

## 2024-11-25 PROCEDURE — 25000003 PHARM REV CODE 250: Performed by: NURSE PRACTITIONER

## 2024-11-25 PROCEDURE — 21400001 HC TELEMETRY ROOM

## 2024-11-25 PROCEDURE — 96368 THER/DIAG CONCURRENT INF: CPT

## 2024-11-25 PROCEDURE — 84100 ASSAY OF PHOSPHORUS: CPT | Performed by: NURSE PRACTITIONER

## 2024-11-25 RX ORDER — BACLOFEN 10 MG/1
20 TABLET ORAL 3 TIMES DAILY
Status: DISCONTINUED | OUTPATIENT
Start: 2024-11-25 | End: 2024-12-03 | Stop reason: HOSPADM

## 2024-11-25 RX ORDER — CARVEDILOL 3.12 MG/1
3.12 TABLET ORAL 2 TIMES DAILY
Status: DISCONTINUED | OUTPATIENT
Start: 2024-11-25 | End: 2024-12-03 | Stop reason: HOSPADM

## 2024-11-25 RX ORDER — HYDROMORPHONE HYDROCHLORIDE 2 MG/ML
0.5 INJECTION, SOLUTION INTRAMUSCULAR; INTRAVENOUS; SUBCUTANEOUS ONCE
Status: COMPLETED | OUTPATIENT
Start: 2024-11-25 | End: 2024-11-25

## 2024-11-25 RX ORDER — OXYCODONE AND ACETAMINOPHEN 5; 325 MG/1; MG/1
1 TABLET ORAL EVERY 4 HOURS PRN
Status: DISCONTINUED | OUTPATIENT
Start: 2024-11-25 | End: 2024-11-26

## 2024-11-25 RX ORDER — POLYETHYLENE GLYCOL 3350 17 G/17G
17 POWDER, FOR SOLUTION ORAL 2 TIMES DAILY PRN
Status: DISCONTINUED | OUTPATIENT
Start: 2024-11-25 | End: 2024-12-03 | Stop reason: HOSPADM

## 2024-11-25 RX ORDER — ASPIRIN 325 MG
325 TABLET, DELAYED RELEASE (ENTERIC COATED) ORAL DAILY
Status: DISCONTINUED | OUTPATIENT
Start: 2024-11-26 | End: 2024-12-03 | Stop reason: HOSPADM

## 2024-11-25 RX ORDER — OXYCODONE HCL 20 MG/1
20 TABLET, FILM COATED, EXTENDED RELEASE ORAL EVERY 12 HOURS
Status: ON HOLD | COMMUNITY
End: 2024-11-25

## 2024-11-25 RX ORDER — ONDANSETRON HYDROCHLORIDE 2 MG/ML
4 INJECTION, SOLUTION INTRAVENOUS EVERY 4 HOURS PRN
Status: DISCONTINUED | OUTPATIENT
Start: 2024-11-25 | End: 2024-12-03 | Stop reason: HOSPADM

## 2024-11-25 RX ORDER — SIMETHICONE 80 MG
1 TABLET,CHEWABLE ORAL 4 TIMES DAILY PRN
Status: DISCONTINUED | OUTPATIENT
Start: 2024-11-25 | End: 2024-11-26

## 2024-11-25 RX ORDER — ATORVASTATIN CALCIUM 40 MG/1
80 TABLET, FILM COATED ORAL DAILY
Status: DISCONTINUED | OUTPATIENT
Start: 2024-11-25 | End: 2024-12-03 | Stop reason: HOSPADM

## 2024-11-25 RX ORDER — TALC
6 POWDER (GRAM) TOPICAL NIGHTLY PRN
Status: DISCONTINUED | OUTPATIENT
Start: 2024-11-25 | End: 2024-12-03 | Stop reason: HOSPADM

## 2024-11-25 RX ORDER — OXYCODONE AND ACETAMINOPHEN 5; 325 MG/1; MG/1
1 TABLET ORAL EVERY 4 HOURS PRN
Status: ON HOLD | COMMUNITY
End: 2024-12-03

## 2024-11-25 RX ORDER — LEVOTHYROXINE SODIUM 100 UG/1
200 TABLET ORAL
Status: DISCONTINUED | OUTPATIENT
Start: 2024-11-25 | End: 2024-12-03 | Stop reason: HOSPADM

## 2024-11-25 RX ORDER — ENOXAPARIN SODIUM 100 MG/ML
40 INJECTION SUBCUTANEOUS EVERY 24 HOURS
Status: DISCONTINUED | OUTPATIENT
Start: 2024-11-25 | End: 2024-12-03 | Stop reason: HOSPADM

## 2024-11-25 RX ORDER — PROCHLORPERAZINE EDISYLATE 5 MG/ML
5 INJECTION INTRAMUSCULAR; INTRAVENOUS EVERY 6 HOURS PRN
Status: DISCONTINUED | OUTPATIENT
Start: 2024-11-25 | End: 2024-12-03 | Stop reason: HOSPADM

## 2024-11-25 RX ORDER — NALOXONE HCL 0.4 MG/ML
0.02 VIAL (ML) INJECTION
Status: DISCONTINUED | OUTPATIENT
Start: 2024-11-25 | End: 2024-12-03 | Stop reason: HOSPADM

## 2024-11-25 RX ORDER — ACETAMINOPHEN 325 MG/1
650 TABLET ORAL EVERY 6 HOURS PRN
Status: DISCONTINUED | OUTPATIENT
Start: 2024-11-25 | End: 2024-11-26

## 2024-11-25 RX ORDER — LOSARTAN POTASSIUM 50 MG/1
100 TABLET ORAL DAILY
Status: DISCONTINUED | OUTPATIENT
Start: 2024-11-25 | End: 2024-12-03 | Stop reason: HOSPADM

## 2024-11-25 RX ADMIN — DOCUSATE SODIUM 250 MG: 50 CAPSULE, LIQUID FILLED ORAL at 01:11

## 2024-11-25 RX ADMIN — ACETAMINOPHEN 650 MG: 325 TABLET, FILM COATED ORAL at 01:11

## 2024-11-25 RX ADMIN — BACLOFEN 20 MG: 10 TABLET ORAL at 08:11

## 2024-11-25 RX ADMIN — DOCUSATE SODIUM 250 MG: 50 CAPSULE, LIQUID FILLED ORAL at 08:11

## 2024-11-25 RX ADMIN — CARVEDILOL 3.12 MG: 3.12 TABLET, FILM COATED ORAL at 01:11

## 2024-11-25 RX ADMIN — ENOXAPARIN SODIUM 40 MG: 40 INJECTION SUBCUTANEOUS at 05:11

## 2024-11-25 RX ADMIN — LEVOTHYROXINE SODIUM 200 MCG: 100 TABLET ORAL at 01:11

## 2024-11-25 RX ADMIN — LOSARTAN POTASSIUM 100 MG: 50 TABLET, FILM COATED ORAL at 05:11

## 2024-11-25 RX ADMIN — ATORVASTATIN CALCIUM 80 MG: 40 TABLET, FILM COATED ORAL at 01:11

## 2024-11-25 RX ADMIN — HALOPERIDOL LACTATE 5 MG: 5 INJECTION, SOLUTION INTRAMUSCULAR at 12:11

## 2024-11-25 RX ADMIN — ASPIRIN 325 MG: 325 TABLET, COATED ORAL at 12:11

## 2024-11-25 RX ADMIN — IOHEXOL 100 ML: 350 INJECTION, SOLUTION INTRAVENOUS at 12:11

## 2024-11-25 RX ADMIN — Medication 6 MG: at 08:11

## 2024-11-25 RX ADMIN — POTASSIUM CHLORIDE 10 MEQ: 7.46 INJECTION, SOLUTION INTRAVENOUS at 12:11

## 2024-11-25 RX ADMIN — CARVEDILOL 3.12 MG: 3.12 TABLET, FILM COATED ORAL at 08:11

## 2024-11-25 RX ADMIN — HYDROMORPHONE HYDROCHLORIDE 0.5 MG: 2 INJECTION INTRAMUSCULAR; INTRAVENOUS; SUBCUTANEOUS at 04:11

## 2024-11-25 NOTE — ED NOTES
"Assumed care of the patient at this time. Pt is awake and alert GCS 14 for confusion. EMS reports patient has altered mental status. EMS reports the patient recently had back surgery. Pt arrived alone with EMS.    Upon assessment pt is uncooperative and attempting to get off of the bed. Pt keeps repeating "help" and "my back hurts".   "

## 2024-11-25 NOTE — PLAN OF CARE
11/25/24 1147   Discharge Assessment   Assessment Type Discharge Planning Assessment   Confirmed/corrected address, phone number and insurance Yes   Confirmed Demographics Correct on Facesheet   Source of Information patient   Communicated CHUCK with patient/caregiver Date not available/Unable to determine   Reason For Admission chest pain, AMS,encephalopathy   People in Home spouse   Do you expect to return to your current living situation? Other (see comments)  (needs placement)   Do you have help at home or someone to help you manage your care at home? Yes   Who are your caregiver(s) and their phone number(s)? spouse Leonce   Prior to hospitilization cognitive status: Unable to Assess   Current cognitive status: Unable to Assess   Walking or Climbing Stairs Difficulty yes   Walking or Climbing Stairs ambulation difficulty, requires equipment;stair climbing difficulty, requires equipment;transferring difficulty, requires equipment   Mobility Management rollator   Dressing/Bathing Difficulty yes   Dressing/Bathing bathing difficulty, assistance 1 person;dressing difficulty, assistance 1 person   Dressing/Bathing Management fluctuates   Home Accessibility stairs to enter home   Equipment Currently Used at Home rollator   Readmission within 30 days? Yes   Patient currently being followed by outpatient case management? No   Do you currently have service(s) that help you manage your care at home? No   Do you take prescription medications? Yes   Do you have prescription coverage? Yes   Coverage [employer]   Do you have any problems affording any of your prescribed medications? No   Is the patient taking medications as prescribed? yes   Who is going to help you get home at discharge? spouse   How do you get to doctors appointments? family or friend will provide   Are you on dialysis? No   Do you take coumadin? No   Discharge Plan A Skilled Nursing Facility   Discharge Plan B Return to Nursing Home   DME Needed Upon  Discharge  none   Discharge Plan discussed with: Patient;Spouse/sig other   Name(s) and Number(s) Maxwellqing   Transition of Care Barriers Mental illness;Does not adhere to care plan

## 2024-11-25 NOTE — ED PROVIDER NOTES
"Encounter Date: 11/24/2024    SCRIBE #1 NOTE: I, Jenise Tesfaye, am scribing for, and in the presence of,  Jose Varghese MD. I have scribed the following portions of the note - Other sections scribed: HPI, ROS, PE.       History     Chief Complaint   Patient presents with    Altered Mental Status     Pt arrives via [facility], EMS reports family called due to AMS, Pt will on answer "Kasandra Help" to any questions. CBG in Triage 110     64 year old female with history of anemia, anxiety, depression, HTN, hypothyroidism, fatty liver disease, migraines, and TIA presents to the ED via EMS for altered mental status. History from pt is limited due to AMS. Pt complains of back pain. Per triage, family called EMS because pt was altered and would only respond "Kasandra help" to questions.     The history is provided by the patient and the EMS personnel. No  was used.     Review of patient's allergies indicates:   Allergen Reactions    Sucralfate Nausea Only     Past Medical History:   Diagnosis Date    Anemia     Anxiety     Depression     Dyspareunia     Encounter for blood transfusion     HTN (hypertension)     Hypothyroidism, unspecified     Liver disease     fatty liver    Lumbar radiculopathy     Menopause     Migraine     Obesity     Osteoporosis     Other spondylosis with radiculopathy, lumbar region     Personal history of fall     Spinal stenosis, lumbar region with neurogenic claudication     TIA (transient ischemic attack) 1994    Unspecified osteoarthritis, unspecified site     Urinary incontinence      Past Surgical History:   Procedure Laterality Date    ABDOMINAL SURGERY      Gastric Bypass    CARPAL TUNNEL RELEASE Bilateral     CERVICAL FUSION  12/28/2021    CHOLECYSTECTOMY  2015    COLONOSCOPY      COLPOSCOPY      FOOT SURGERY Left     FRACTURE SURGERY  2001    GASTRIC BYPASS  2011    HAND SURGERY Left     thumb    HERNIA REPAIR  2014    JOINT REPLACEMENT  Knee 2015, Hip 2016    " LAMINECTOMY OF THORACIC SPINE BY POSTERIOR APPROACH USING COMPUTER-ASSISTED NAVIGATION N/A 10/11/2024    Procedure: LAMINECTOMY, SPINE, THORACIC, POSTERIOR APPROACH, USING COMPUTER-ASSISTED NAVIGATION;  Surgeon: Andra Mejia MD;  Location: [facility] OR;  Service: Neurosurgery;  Laterality: N/A;  T9-L3 DECOMPRESSION / REMOVAL OF RIGHT ILIAC BOLT AND S1 SCREW / EXTENSION OF FUSION  T9-S2 ILIAC BOLT //  PRONE ADENIKE // DRILL // SCOPE // O-ARM //  DIRECT SPINE // NDM //       LUMBAR FUSION  05/28/2021    LUMBAR FUSION  02/21/2023    L3-S1    POSTERIOR LUMBAR INTERBODY FUSION (PLIF) WITH COMPUTER-ASSISTED NAVIGATION N/A 10/11/2024    Procedure: FUSION, SPINE, LUMBAR, PLIF, USING COMPUTER-ASSISTED NAVIGATION;  Surgeon: Andra Mejia MD;  Location: [facility] OR;  Service: Neurosurgery;  Laterality: N/A;    ROTATOR CUFF REPAIR Bilateral 04/12/2022    SMALL INTESTINE SURGERY  2015    SPINE SURGERY  2021 & 2023    TONSILLECTOMY  2017     Family History   Problem Relation Name Age of Onset    Cystic fibrosis Sister Florence Kennedy     Arthritis Sister Florence Kennedy         Ankle,  hands    Depression Sister Florence Kennedy     Hypertension Sister Florence Kennedy         Me, Kasandraclaus Grantn    Vision loss Sister Florence Kennedy     Cystic fibrosis Brother Adriano Kennedy     Early death Brother Adriano Kennedy         CF when 28    Cancer Mother Latoya Pfeifferard         Breast    Depression Mother Latoya Pfeifferard     Mental illness Mother Latoya Kennedy     Arthritis Father Eduard Pfeifferard         Back, knees,  hands    Cancer Father Eduard Brent         Lung    Diabetes Father Eduard Brent     Hypertension Father Eduard Brent     Vision loss Father Eduard Laneview     Early death Sister Sabina Kennedy         CF when 5    Heart failure Maternal Grandmother Letty Willett         Heart Attack    Cancer Maternal Aunt Gema Smith         Breast Cancer    Cancer Maternal Aunt Aparna Manrique         Lung Cancer    Cancer Maternal Aunt Jeanie  Tawanna         Breast Cancer    Cancer Paternal Aunt Hilary Matthews         Skin Cancer    Cancer Paternal Aunt Enma Alford         Skin Cancer     Social History     Tobacco Use    Smoking status: Former     Current packs/day: 0.00     Average packs/day: 1 pack/day for 27.0 years (27.0 ttl pk-yrs)     Types: Cigarettes     Start date: 1975     Quit date: 2002     Years since quittin.9    Smokeless tobacco: Never    Tobacco comments:     off and on when smoking   Substance Use Topics    Alcohol use: Not Currently     Comment: I do not drink    Drug use: Never     Review of Systems   Unable to perform ROS: Mental status change       Physical Exam     Initial Vitals [24]   BP Pulse Resp Temp SpO2   110/81 103 20 98.1 °F (36.7 °C) 96 %      MAP       --         Physical Exam    Nursing note and vitals reviewed.  Cardiovascular:  Normal rate.           Pulmonary/Chest: No respiratory distress.   Abdominal: Abdomen is soft.   Musculoskeletal:         General: No tenderness.     Neurological: She is alert.   Confused, disoriented, GCS14         ED Course   Procedures  Labs Reviewed   URINALYSIS, REFLEX TO URINE CULTURE - Abnormal       Result Value    Color, UA Yellow      Appearance, UA Clear      Specific Gravity, UA 1.033 (*)     pH, UA 6.0      Protein, UA Trace (*)     Glucose, UA Normal      Ketones, UA Negative      Blood, UA Negative      Bilirubin, UA Negative      Urobilinogen, UA Normal      Nitrites, UA 2+ (*)     Leukocyte Esterase, UA 25 (*)     RBC, UA 0-5      WBC, UA 6-10 (*)     Bacteria, UA Trace      Squamous Epithelial Cells, UA None Seen      Mucous, UA Trace (*)    COMPREHENSIVE METABOLIC PANEL - Abnormal    Sodium 145      Potassium 3.2 (*)     Chloride 109 (*)     CO2 26      Glucose 109      Blood Urea Nitrogen 12.1      Creatinine 0.80      Calcium 9.0      Protein Total 6.2      Albumin 3.4      Globulin 2.8      Albumin/Globulin Ratio 1.2      Bilirubin Total 0.5      ALP  132      ALT 13      AST 17      eGFR >60      Anion Gap 10.0      BUN/Creatinine Ratio 15     CBC WITH DIFFERENTIAL - Abnormal    WBC 5.80      RBC 3.47 (*)     Hgb 10.8 (*)     Hct 32.6 (*)     MCV 93.9      MCH 31.1 (*)     MCHC 33.1      RDW 13.4      Platelet 240      MPV 9.0      Neut % 58.7      Lymph % 29.3      Mono % 9.1      Eos % 1.7      Basophil % 0.7      Lymph # 1.70      Neut # 3.40      Mono # 0.53      Eos # 0.10      Baso # 0.04      IG# 0.03      IG% 0.5      NRBC% 0.0     DRUG SCREEN, URINE (BEAKER) - Abnormal    Amphetamines, Urine Negative      Barbiturates, Urine Negative      Benzodiazepine, Urine Negative      Cannabinoids, Urine Negative      Cocaine, Urine Negative      Fentanyl, Urine Negative      MDMA, Urine Negative      Opiates, Urine Positive (*)     Phencyclidine, Urine Negative      pH, Urine 6.0      Specific Gravity, Urine Auto 1.033      Narrative:     Cut off concentrations:    Amphetamines - 1000 ng/ml  Barbiturates - 200 ng/ml  Benzodiazepine - 200 ng/ml  Cannabinoids (THC) - 50 ng/ml  Cocaine - 300 ng/ml  Fentanyl - 1.0 ng/ml  MDMA - 500 ng/ml  Opiates - 300 ng/ml   Phencyclidine (PCP) - 25 ng/ml    Specimen submitted for drug analysis and tested for pH and specific gravity in order to evaluate sample integrity. Suspect tampering if specific gravity is <1.003 and/or pH is not within the range of 4.5 - 8.0  False negatives may result form substances such as bleach added to urine.  False positives may result for the presence of a substance with similar chemical structure to the drug or its metabolite.    This test provides only a PRELIMINARY analytical test result. A more specific alternate chemical method must be used in order to obtain a confirmed analytical result. Gas chromatography/mass spectrometry (GC/MS) is the preferred confirmatory method. Other chemical confirmation methods are available. Clinical consideration and professional judgement should be applied to any  drug of abuse test result, particularly when preliminary positive results are used.    Positive results will be confirmed only at the physicians request. Unconfirmed screening results are to be used only for medical purposes (treatment).        TROPONIN I - Abnormal    Troponin-I 0.048 (*)    MAGNESIUM - Abnormal    Magnesium Level 1.50 (*)    TROPONIN I - Abnormal    Troponin-I 0.047 (*)    TSH - Normal    TSH 4.728     T4, FREE - Normal    Thyroxine Free 1.13     COVID/FLU A&B PCR - Normal    Influenza A PCR Not Detected      Influenza B PCR Not Detected      SARS-CoV-2 PCR Not Detected      Narrative:     The Xpert Xpress SARS-CoV-2/FLU/RSV plus is a rapid, multiplexed real-time PCR test intended for the simultaneous qualitative detection and differentiation of SARS-CoV-2, Influenza A, Influenza B, and respiratory syncytial virus (RSV) viral RNA in either nasopharyngeal swab or nasal swab specimens.         CK - Normal    Creatine Kinase 105     ALCOHOL,MEDICAL (ETHANOL) - Normal    Ethanol Level <10.0     AMMONIA - Normal    Ammonia Level 36.4     CBC W/ AUTO DIFFERENTIAL    Narrative:     The following orders were created for panel order CBC auto differential.  Procedure                               Abnormality         Status                     ---------                               -----------         ------                     CBC with Differential[[id number]]       Abnormal            Final result                 Please view results for these tests on the individual orders.   POCT GLUCOSE    POCT Glucose 110            Imaging Results              CT Head Without Contrast (Final result)  Result time 11/24/24 22:16:27      Final result by Kumar Fuentes MD (11/24/24 22:16:27)                   Impression:      No acute abnormalities are seen      Electronically signed by: Kumar Fuentes MD  Date:    11/24/2024  Time:    22:16               Narrative:    EXAMINATION:  CT HEAD WITHOUT CONTRAST    CLINICAL  HISTORY:  Mental status change, unknown cause;    TECHNIQUE:  Low dose axial images were obtained through the head.  Coronal and sagittal reformations were also performed. Contrast was not administered.    Automatic exposure control (AEC) was utilized for dose reduction.    Dose: 980 mGycm    COMPARISON:  MRI from 11/01/2024    FINDINGS:  Ventricles of normal size and shape there is no shift of the midline noted.  There are no extra-axial fluid collections or areas consistent with hemorrhage noted.  No masses is seen no acute infarcts are noted.  The calvarium appears intact.                                       CT Chest Abdomen Pelvis With IV Contrast (XPD) NO Oral Contrast (Final result)  Result time 11/24/24 22:15:09      Final result by Kumar Fuentes MD (11/24/24 22:15:09)                   Impression:      Probable old bilateral rib fractures.    Extensive hardware in the spine prosthesis in the right hip.    Hepatomegaly.      Electronically signed by: Kumar Fuentes MD  Date:    11/24/2024  Time:    22:15               Narrative:    EXAMINATION:  CT CHEST ABDOMEN PELVIS WITH IV CONTRAST (XPD)    CLINICAL HISTORY:  AMS, pain;    TECHNIQUE:  Low dose axial images, sagittal and coronal reformations were obtained from the thoracic inlet to the pubic symphysis following the IV administration of 100 mL of Omnipaque 350    Automatic exposure control (AEC) was utilized for dose reduction.    Dose: 1518 mGycm    COMPARISON:  None    FINDINGS:  Mediastinum reveals no significant adenopathy.  Thoracic aorta appears intact.  No infiltrates are seen.  No peripheral nodules are noted.  There is prominence of the right lobe of the liver measuring 20 cm.  Spleen appears normal.  Pancreas appears normal.  Biliary system appears normal.  The adrenals are not enlarged.  Kidneys appear normal.  Aorta shows no evidence of an aneurysm.  There is extensive hardware in the spine there is a prosthesis in the right hip.  There are  diverticulum in the colon without evidence of acute diverticulitis.  The appendix is not demonstrated however there is no evidence of acute appendicitis.  There right-sided rib fractures that appear old there are upper left-sided rib fractures that appear old.                                       CT Cervical Spine Without Contrast (Final result)  Result time 11/24/24 22:11:13      Final result by Kumar Fuentes MD (11/24/24 22:11:13)                   Impression:      Postoperative changes, no acute abnormalities are seen      Electronically signed by: Kumar Fuentes MD  Date:    11/24/2024  Time:    22:11               Narrative:    EXAMINATION:  CT CERVICAL SPINE WITHOUT CONTRAST    CLINICAL HISTORY:  AMS;    TECHNIQUE:  Low dose axial images, sagittal and coronal reformations were performed though the cervical spine.  Contrast was not administered.    Automatic exposure control (AEC) was utilized for dose reduction.    Dose: 360 mGycm    COMPARISON:  None    FINDINGS:  Patient is post fusion at C5-6 and C6-7.  The alignment is within normal limits.  The odontoid is intact.  No acute fractures are seen.                                       X-Ray Chest AP Portable (Final result)  Result time 11/24/24 21:14:36      Final result by Kumar Fuentes MD (11/24/24 21:14:36)                   Impression:      Right-sided pleural density.      Electronically signed by: Kumar Fuentes MD  Date:    11/24/2024  Time:    21:14               Narrative:    EXAMINATION:  XR CHEST AP PORTABLE    CLINICAL HISTORY:  Altered mental status, unspecified    TECHNIQUE:  Single frontal view of the chest was performed.    COMPARISON:  10/28/2024    FINDINGS:  There is pleural density on the right measuring 4.1 x 1.5 cm.  Heart size is within normal limits.  There is vascular calcification noted.  No infiltrates are seen.                                       Medications   iohexoL (OMNIPAQUE 350) injection 100 mL (100 mLs Intravenous  Given 11/24/24 2208)   potassium chloride 10 mEq in 100 mL IVPB (0 mEq Intravenous Stopped 11/25/24 0131)   magnesium sulfate 2g in water 50mL IVPB (premix) (0 g Intravenous Stopped 11/25/24 0131)   morphine injection 4 mg (4 mg Intravenous Given 11/24/24 2315)   ondansetron injection 4 mg (4 mg Intravenous Given 11/24/24 2315)   0.9% NaCl infusion (0 mLs Intravenous Stopped 11/25/24 0131)   aspirin EC tablet 325 mg (325 mg Oral Given 11/25/24 0003)   haloperidol lactate injection 5 mg (5 mg Intravenous Given 11/25/24 0007)     Medical Decision Making  Problems Addressed:  Acute encephalopathy: acute illness or injury  AMS (altered mental status): acute illness or injury  Hypokalemia: acute illness or injury  Hypomagnesemia: acute illness or injury    Amount and/or Complexity of Data Reviewed  Labs: ordered.  Radiology: ordered. Decision-making details documented in ED Course.    Risk  OTC drugs.  Prescription drug management.  Decision regarding hospitalization.    Differential diagnosis (includes but is not limited to):   ICH, CVA, TIA, polysubstance abuse, polypharmacy, medication noncompliance, ACS, arrhythmia, electrolyte abnormalities, dehydration, kidney injury    MDM Narrative  64-year-old female presents for evaluation of altered mental status at home.  Patient has been increasingly agitated and confused.  No falls or trauma reported.  CT scans reviewed with no acute traumatic injuries identified, chronic findings noted.  Labs reviewed, mild hypokalemia and hypomagnesemia noted, replacement ordered.  EKG reviewed.  Troponin mildly elevated, will continue to trend.  Further history obtained from patient's , he states with her degree of confusion disorientation he was having trouble caring for her at home.  They had previously sought rehab/SNF placement after the previous hospitalization however patient had signed herself out against medical advice at that time.  She states she had since reconsidered  "and wished to be replaced however they have had issues getting this done.  Patient's  in agreement the patient require sedation with medication given persistent altered mental status.  Haldol given with improvement.  Case discussed with hospitalist, will admit.    Dispo: Admit    My independent radiology interpretation: as above  Point of care US (independently performed and interpreted):   Decision rules/clinical scoring:     Sepsis Perfusion Assessment:     Amount and/or Complexity of Data Reviewed  Independent historian: EMS   Summary of history: Per triage, family called EMS because pt was altered and would only respond "Kasandra help" to questions.   External data reviewed: notes from previous admissions, notes from previous ED visits, and notes from clinic visits  Summary of data reviewed: Prior records reviewed  Risk and benefits of testing: discussed   Labs: ordered and reviewed  Radiology: ordered and independent interpretation performed (see above or ED course)  ECG/medicine tests: ordered and independent interpretation performed (see above or ED course)  Discussion of management or test interpretation with external provider(s): discussed with hospitalist physician   Summary of discussion: as above    Risk  Parenteral controlled substances   Drug therapy requiring intense monitoring for toxicity   Decision regarding hospitalization  Shared decision making     Critical Care  none    Data Reviewed/Counseling: I have personally reviewed the patient's vital signs, nursing notes, and other relevant tests, information, and imaging. I had a detailed discussion regarding the historical points, exam findings, and any diagnostic results supporting the discharge diagnosis. I personally performed the history, PE, MDM and procedures as documented above and agree with the scribe's documentation.    Portions of this note were dictated using voice recognition software. Although it was reviewed for accuracy, some " inherent voice recognition errors may have occurred and may be present in this document.            Attending Attestation:           Physician Attestation for Scribe:  Physician Attestation Statement for Scribe #1: I, Jose Varghese MD, reviewed documentation, as scribed by Jenise Tesfaye in my presence, and it is both accurate and complete.             ED Course as of 11/25/24 0236   Sun Nov 24, 2024 2145 EKG independently interpreted by me.  EKG: NSR @ 94, no STEMI, Qtc 467 [MC]   2204 X-Ray Chest AP Portable  Independently visualized/reviewed by me during the ED visit.  - No acute lobar consolidation or PTX [MC]      ED Course User Index  [MC] Jose Varghese MD                           Clinical Impression:  Final diagnoses:  [R41.82] AMS (altered mental status)  [G93.40] Acute encephalopathy  [E87.6] Hypokalemia  [E83.42] Hypomagnesemia          ED Disposition Condition    Admit Stable                Jose Varghese MD  11/25/24 0236

## 2024-11-25 NOTE — PLAN OF CARE
Problem: Adult Inpatient Plan of Care  Goal: Optimal Comfort and Wellbeing  Outcome: Progressing     Problem: Skin Injury Risk Increased  Goal: Skin Health and Integrity  Outcome: Progressing     Problem: Wound  Goal: Optimal Pain Control and Function  Outcome: Progressing

## 2024-11-25 NOTE — PLAN OF CARE
"PASRR Complete and given to SSC. Will likely be level 2 due to previous inpatient hospital stay at [facility] about 4 months ago. She was inpatient for 1 week at [facility].  states that patient's mental status waxes and wanes during good times she can do her own ADLS but has periods of confusion where she needs assistance with ADLS and has AMS. This has been going on for over a year but this is the worse she has ever been. She had back surgery Oct 11 and her was Admitted to [facility] she sined herself out AMA due to AMS and confusion causing her to be fearful of the change. He would like neuro to evaluate her while in house due to he feels a "bulge in her back" he states that it has been there. Neuro did enter the room during our conversation to assess patient. He would like to retry [facility] for placement encouraged him to have other options to place her as she left from there AMA and I am not sure they will be willing to take her back.    "

## 2024-11-25 NOTE — ED NOTES
Attempted ot call report at this time. Was told by the unit secretary to call back in 5 minutes. Will call back in 5 minutes

## 2024-11-25 NOTE — H&P
"[facility] Hospital Medicine History & Physical Examination       Patient Name: Kasandra Cheek  MRN: [id number]  Patient Class: IP- Inpatient   Admission Date: 11/25/2024   Admitting Service: Hospital Medicine   Length of Stay: 0  Attending Physician:  Mauricio Velázquez MD  Primary Care Provider: Ravinder Mazariegos DO  Face-to-Face encounter date: 11/25/2024  Code Status: Full code   Chief Complaint: Altered Mental Status (Pt arrives via [facility], EMS reports family called due to AMS, Pt will on answer "Kasandra Help" to any questions. CBG in Triage 110)      Present at Bedside:    Patient information was obtained from patient, patient's family, past medical records and ER records.      HISTORY OF PRESENT ILLNESS:   Kasandra Cheek is a 64 y.o. female with a past medical history of hypertension, hyperlipidemia, anemia, hypothyroidism, fatty liver disease, lumbar radiculopathy, migraines, osteoporosis, spinal stenosis, TIA, anxiety, and depression who presented to [facility] on 11/24/2024 via EMS for altered mental status.   reported altered mental status began on Friday, 11/22/2024.   reported patient became very fatigued and was sleeping a lot.   also endorsed agitation and difficulty understanding what patient was saying.  Has been reported prior to Friday patient was in her normal state of health- reports patient normally ambulates with a walker at home.   states that patient has had increasing lower extremity weakness and back pain since fusion last month with Dr. Mejia.   reported that patient was recently inpatient here about 2 weeks ago and was discharged to rehab facility.   stated that patient became frightened when arriving at the rehab facility and left.   states they do not have an appointment with neurosurgeon until she goes through therapy and would like to see him while in patient.   denied any new " medications.  Initial vital signs in ED were /81, pulse 103, respirations 20, temperature 36.7° C, and SpO2 96% on room air.  Labs revealed WBC 5.80, RBC 3.47, hemoglobin 10.8, hematocrit 32.6, MCV 93.9, potassium 3.2, chloride 109, magnesium 1.5, , troponin 0.048, TSH 4.728, T4 1.13, and serum alcohol undetectable.  UDS was positive for opiates.  Flu and COVID testing were negative.  UA revealed trace protein, 2+ nitrite, 25 leukocytes, and 6-10 white blood cells.  CT head without contrast revealed no acute abnormality seen.  CT cervical spine without contrast revealed postoperative changes without acute abnormality seen.  Chest x-ray revealed right-sided pleural density.  CT chest abdomen and pelvis with IV contrast revealed probable old bilateral rib fractures, extensive hardware in the spine prosthesis in the right hip, and hepatomegaly.  EKG revealed normal sinus rhythm with heart rate of 94 beats per minute.  Patient was given IV morphine 4 mg, IV Zofran 4 mg, IV magnesium sulfate 2 g, IV potassium chloride 10 mEq, aspirin 325 mg, and IV Haldol 5 mg in ED. Patient was admitted to hospital medicine service for further medical management.     PAST MEDICAL HISTORY:   Hypertension   Hyperlipidemia   Anemia   Hypothyroidism   Fatty liver disease   Lumbar radiculopathy   Migraines   Osteoporosis   Spinal stenosis   TIA   Anxiety   Depression    PAST SURGICAL HISTORY:     Past Surgical History:   Procedure Laterality Date    ABDOMINAL SURGERY      Gastric Bypass    CARPAL TUNNEL RELEASE Bilateral     CERVICAL FUSION  12/28/2021    CHOLECYSTECTOMY  2015    COLONOSCOPY      COLPOSCOPY      FOOT SURGERY Left     FRACTURE SURGERY  2001    GASTRIC BYPASS  2011    HAND SURGERY Left     thumb    HERNIA REPAIR  2014    JOINT REPLACEMENT  Knee 2015, Hip 2016    LAMINECTOMY OF THORACIC SPINE BY POSTERIOR APPROACH USING COMPUTER-ASSISTED NAVIGATION N/A 10/11/2024    Procedure: LAMINECTOMY, SPINE, THORACIC,  POSTERIOR APPROACH, USING COMPUTER-ASSISTED NAVIGATION;  Surgeon: Andra Mejia MD;  Location: [facility] OR;  Service: Neurosurgery;  Laterality: N/A;  T9-L3 DECOMPRESSION / REMOVAL OF RIGHT ILIAC BOLT AND S1 SCREW / EXTENSION OF FUSION  T9-S2 ILIAC BOLT //  PRONE ADENIKE // DRILL // SCOPE // O-ARM //  DIRECT SPINE // NDM //       LUMBAR FUSION  05/28/2021    LUMBAR FUSION  02/21/2023    L3-S1    POSTERIOR LUMBAR INTERBODY FUSION (PLIF) WITH COMPUTER-ASSISTED NAVIGATION N/A 10/11/2024    Procedure: FUSION, SPINE, LUMBAR, PLIF, USING COMPUTER-ASSISTED NAVIGATION;  Surgeon: Andra Mejia MD;  Location: [facility] OR;  Service: Neurosurgery;  Laterality: N/A;    ROTATOR CUFF REPAIR Bilateral 04/12/2022    SMALL INTESTINE SURGERY  2015    SPINE SURGERY  2021 & 2023    TONSILLECTOMY  2017       FAMILY HISTORY:   Reviewed and negative    SOCIAL HISTORY:   Denied alcohol, tobacco or illicit drug use.     Screening for Social Drivers for health:  Patient screened for food insecurity, housing instability, transportation needs, utility difficulties, and interpersonal safety (select all that apply as identified as concern)  []Housing or Food  []Transportation Needs  []Utility Difficulties  []Interpersonal safety  [x]None    ALLERGIES:   Sucralfate    HOME MEDICATIONS:     Prior to Admission medications    Medication Sig Start Date End Date Taking? Authorizing Provider   aspirin (ECOTRIN) 325 MG EC tablet Take 1 tablet (325 mg total) by mouth once daily. 11/12/24 12/12/24  Rose Benitez MD   atorvastatin (LIPITOR) 80 MG tablet Take 1 tablet (80 mg total) by mouth once daily. 11/12/24 12/12/24  Rose Benitez MD   carvediloL (COREG) 3.125 MG tablet Take 1 tablet (3.125 mg total) by mouth 2 (two) times daily. 11/11/24 12/11/24  Rose Benitez MD   cholecalciferol, vitamin D3, (VITAMIN D3) 250 mcg (10,000 unit) Cap capsule Take 1 tablet by mouth Daily. 9/1/21   Provider, Historical   cyanocobalamin, vitamin B-12, 2,500 mcg  Tab Take 2,500 mcg by mouth Daily.    Provider, Historical   docusate sodium (COLACE) 250 MG capsule Take 250 mg by mouth 2 (two) times a day.    Provider, Historical   ferrous sulfate (FEOSOL) 325 mg (65 mg iron) Tab tablet Take 1 tablet by mouth Daily. 9/1/21   Provider, Historical   furosemide (LASIX) 20 MG tablet Take 20 mg by mouth 2 (two) times daily.    Provider, Historical   levETIRAcetam (KEPPRA) 500 MG Tab Take 1 tablet (500 mg total) by mouth 2 (two) times daily. 11/11/24 12/11/24  Rose Benitez MD   levothyroxine (SYNTHROID) 200 MCG tablet Take 200 mcg by mouth once daily. 7/23/23   Provider, Historical   losartan (COZAAR) 100 MG tablet Take 1 tablet by mouth once daily. 3/31/22   Provider, Historical   mirtazapine (REMERON) 15 MG tablet Take 1 tablet (15 mg total) by mouth every evening. 11/11/24 12/11/24  Rose Benitez MD   omega-3 fatty acids/fish oil (FISH OIL OMEGA 3-6-9 ORAL)  1/1/22   Provider, Historical   ondansetron (ZOFRAN) 8 MG tablet Take 8 mg by mouth every 6 (six) hours as needed for Nausea.    Provider, Historical   pantoprazole (PROTONIX) 40 MG tablet Take 40 mg by mouth 2 (two) times daily. 4/18/23   Provider, Historical   VITAMIN K2 ORAL Take 150 mcg by mouth Daily. 9/1/21   Provider, Historical   ARIPiprazole (ABILIFY) 2 MG Tab Take 2 mg by mouth every morning. 3/9/22 6/8/22  Provider, Historical   diazePAM (VALIUM) 5 MG tablet Take by mouth. 3/31/22 6/8/22  Provider, Historical     ________________________________________________________________________  INPATIENT LIST OF MEDICATIONS     Current Facility-Administered Medications:     acetaminophen tablet 650 mg, 650 mg, Oral, Q6H PRN, Rima Perez, ENRICO-BC    enoxaparin injection 40 mg, 40 mg, Subcutaneous, Daily, Rima Perez, TACO    melatonin tablet 6 mg, 6 mg, Oral, Nightly PRN, Rima Perez, ENRICO-BC    naloxone 0.4 mg/mL injection 0.02 mg, 0.02 mg, Intravenous, PRN, Rima Perez, TACO     ondansetron injection 4 mg, 4 mg, Intravenous, Q4H PRN, AnaRima barraza, AGACNP-BC    polyethylene glycol packet 17 g, 17 g, Oral, BID PRN, AnaJg barrazalikip VASQUEZ AGACNP-BC    prochlorperazine injection Soln 5 mg, 5 mg, Intravenous, Q6H PRN, AnaJg barrazalikip VASQUEZ, AGACNP-BC    simethicone chewable tablet 80 mg, 1 tablet, Oral, QID PRN, AnaJg barrazalikip VASQUEZ AGACNP-BC    Scheduled Meds:   enoxparin  40 mg Subcutaneous Daily     Continuous Infusions:  PRN Meds:.  Current Facility-Administered Medications:     acetaminophen, 650 mg, Oral, Q6H PRN    melatonin, 6 mg, Oral, Nightly PRN    naloxone, 0.02 mg, Intravenous, PRN    ondansetron, 4 mg, Intravenous, Q4H PRN    polyethylene glycol, 17 g, Oral, BID PRN    prochlorperazine, 5 mg, Intravenous, Q6H PRN    simethicone, 1 tablet, Oral, QID PRN      REVIEW OF SYSTEMS:   Except as documented, all other systems reviewed and negative.    PHYSICAL EXAM:     VITAL SIGNS: 24 HRS MIN & MAX LAST   Temp  Min: 97.9 °F (36.6 °C)  Max: 98.1 °F (36.7 °C) 97.9 °F (36.6 °C)   BP  Min: 110/81  Max: 173/83 (!) 141/63   Pulse  Min: 75  Max: 105  85   Resp  Min: 14  Max: 20 18   SpO2  Min: 93 %  Max: 98 % 96 %       General appearance: Female in no apparent distress.  HENT: Atraumatic head.   Eyes: Normal extraocular movements.   Neck: Supple.   Lungs: Clear to auscultation bilaterally.   Heart: Regular rate and rhythm.  Abdomen: Soft, non-distended, non-tender.  Roll belt in place.  Skin: No Rash.   Neuro: Awake, alert, and oriented. Awakens to tactile stimuli.  Oriented to person, place, and time.  Complaining of back pain on exam    LABS AND IMAGING:     Recent Labs   Lab 11/24/24 2123 11/25/24  0359   WBC 5.80 6.83   RBC 3.47* 3.27*   HGB 10.8* 10.2*   HCT 32.6* 30.6*   MCV 93.9 93.6   MCH 31.1* 31.2*   MCHC 33.1 33.3   RDW 13.4 13.3    255   MPV 9.0 9.1       Recent Labs   Lab 11/24/24 2123 11/25/24  0359    145   K 3.2* 3.1*   * 109*   CO2 26 24   BUN 12.1 10.7   CREATININE  0.80 0.69   CALCIUM 9.0 8.5   MG 1.50* 1.80   ALBUMIN 3.4 3.3*   ALKPHOS 132 134   ALT 13 15   AST 17 20   BILITOT 0.5 0.5       Microbiology Results (last 7 days)       ** No results found for the last 168 hours. **             CT Head Without Contrast  Narrative: EXAMINATION:  CT HEAD WITHOUT CONTRAST    CLINICAL HISTORY:  Mental status change, unknown cause;    TECHNIQUE:  Low dose axial images were obtained through the head.  Coronal and sagittal reformations were also performed. Contrast was not administered.    Automatic exposure control (AEC) was utilized for dose reduction.    Dose: 980 mGycm    COMPARISON:  MRI from 11/01/2024    FINDINGS:  Ventricles of normal size and shape there is no shift of the midline noted.  There are no extra-axial fluid collections or areas consistent with hemorrhage noted.  No masses is seen no acute infarcts are noted.  The calvarium appears intact.  Impression: No acute abnormalities are seen    Electronically signed by: Kumar Fuentes MD  Date:    11/24/2024  Time:    22:16  CT Chest Abdomen Pelvis With IV Contrast (XPD) NO Oral Contrast  Narrative: EXAMINATION:  CT CHEST ABDOMEN PELVIS WITH IV CONTRAST (XPD)    CLINICAL HISTORY:  AMS, pain;    TECHNIQUE:  Low dose axial images, sagittal and coronal reformations were obtained from the thoracic inlet to the pubic symphysis following the IV administration of 100 mL of Omnipaque 350    Automatic exposure control (AEC) was utilized for dose reduction.    Dose: 1518 mGycm    COMPARISON:  None    FINDINGS:  Mediastinum reveals no significant adenopathy.  Thoracic aorta appears intact.  No infiltrates are seen.  No peripheral nodules are noted.  There is prominence of the right lobe of the liver measuring 20 cm.  Spleen appears normal.  Pancreas appears normal.  Biliary system appears normal.  The adrenals are not enlarged.  Kidneys appear normal.  Aorta shows no evidence of an aneurysm.  There is extensive hardware in the spine there  is a prosthesis in the right hip.  There are diverticulum in the colon without evidence of acute diverticulitis.  The appendix is not demonstrated however there is no evidence of acute appendicitis.  There right-sided rib fractures that appear old there are upper left-sided rib fractures that appear old.  Impression: Probable old bilateral rib fractures.    Extensive hardware in the spine prosthesis in the right hip.    Hepatomegaly.    Electronically signed by: Kumar Fuentes MD  Date:    11/24/2024  Time:    22:15  CT Cervical Spine Without Contrast  Narrative: EXAMINATION:  CT CERVICAL SPINE WITHOUT CONTRAST    CLINICAL HISTORY:  AMS;    TECHNIQUE:  Low dose axial images, sagittal and coronal reformations were performed though the cervical spine.  Contrast was not administered.    Automatic exposure control (AEC) was utilized for dose reduction.    Dose: 360 mGycm    COMPARISON:  None    FINDINGS:  Patient is post fusion at C5-6 and C6-7.  The alignment is within normal limits.  The odontoid is intact.  No acute fractures are seen.  Impression: Postoperative changes, no acute abnormalities are seen    Electronically signed by: Kumar Fuentes MD  Date:    11/24/2024  Time:    22:11  X-Ray Chest AP Portable  Narrative: EXAMINATION:  XR CHEST AP PORTABLE    CLINICAL HISTORY:  Altered mental status, unspecified    TECHNIQUE:  Single frontal view of the chest was performed.    COMPARISON:  10/28/2024    FINDINGS:  There is pleural density on the right measuring 4.1 x 1.5 cm.  Heart size is within normal limits.  There is vascular calcification noted.  No infiltrates are seen.  Impression: Right-sided pleural density.    Electronically signed by: Kumar Fuentes MD  Date:    11/24/2024  Time:    21:14        ASSESSMENT & PLAN:   Assessment:   Acute metabolic encephalopathy, improving   NSTEMI, likely type 2  Hypokalemia   Hypomagnesemia, improved  Back pain  history of hypertension, hyperlipidemia, anemia, hypothyroidism,  fatty liver disease, lumbar radiculopathy, migraines, osteoporosis, spinal stenosis, TIA, anxiety, and depression      Plan:  Cardiac monitoring   Trend troponin   Close electrolyte monitoring, replace as necessary  Lumbar fusion on 10/11/2024-family requesting to see neurosurgeon, neurosurgery consulted-appreciate recommendations  Case management consulted for placement, appreciate recommendations  PT/OT  Fall precautions   Resume home medications as deemed appropriate once medication reconciliation is updated  Labs in AM    VTE Prophylaxis:  Lovenox    Discharge Planning and Disposition: TBD    I, Hardik Valles PA-C have reviewed and discussed the case with Mauricio Velázquez MD   Please see the attending MD's addendum for further assessment and plan.    Hardik Valles PA-C  Department of Hospital Medicine   [facility]   11/25/2024    This note was created with the assistance of [employer] voice recognition software. There may be transcription errors as a result of using this technology, however minimal. Effort has been made to assure accuracy of transcription, but any obvious errors or omissions should be clarified with the author of the document.    _______________________________________________________________________________  MD Addendum:  I, Dr. DORINDA Velázquez assumed care of this patient today at 3.30 pm  For the patient encounter, I performed the substantive portion of the visit, I reviewed the NP/PA documentation, treatment plan, and medical decision making.  I had face to face time with this patient     A. History:  Kasandra Cheek is a 64 y.o. female with a past medical history of hypertension, hyperlipidemia, anemia, hypothyroidism, fatty liver disease, lumbar radiculopathy, migraines, osteoporosis, spinal stenosis, TIA, anxiety, and depression who presented to [facility] on 11/24/2024 via EMS for altered mental status.   reported altered mental status began on Friday,  11/22/2024.   reported patient became very fatigued and was sleeping a lot.   also endorsed agitation and difficulty understanding what patient was saying.  Has been reported prior to Friday patient was in her normal state of health- reports patient normally ambulates with a walker at home.   states that patient has had increasing lower extremity weakness and back pain since fusion last month with Dr. Mejia.   reported that patient was recently inpatient here about 2 weeks ago and was discharged to rehab facility.   stated that patient became frightened when arriving at the rehab facility and left.   states they do not have an appointment with neurosurgeon until she goes through therapy and would like to see him while in patient.   denied any new medications.    Initial vital signs in ED were /81, pulse 103, respirations 20, temperature 36.7° C, and SpO2 96% on room air.  Labs revealed WBC 5.80, RBC 3.47, hemoglobin 10.8, hematocrit 32.6, MCV 93.9, potassium 3.2, chloride 109, magnesium 1.5, , troponin 0.048, TSH 4.728, T4 1.13, and serum alcohol undetectable.  UDS was positive for opiates.  Flu and COVID testing were negative.  UA revealed trace protein, 2+ nitrite, 25 leukocytes, and 6-10 white blood cells.  CT head without contrast revealed no acute abnormality seen.  CT cervical spine without contrast revealed postoperative changes without acute abnormality seen.  Chest x-ray revealed right-sided pleural density.  CT chest abdomen and pelvis with IV contrast revealed probable old bilateral rib fractures, extensive hardware in the spine prosthesis in the right hip, and hepatomegaly.  EKG revealed normal sinus rhythm with heart rate of 94 beats per minute.  Patient was given IV morphine 4 mg, IV Zofran 4 mg, IV magnesium sulfate 2 g, IV potassium chloride 10 mEq, aspirin 325 mg, and IV Haldol 5 mg in ED. Patient was admitted to hospital medicine  service for further medical management.     Patient recently discharged with laminectomy of thoracic spine including T9 T11-3 decompression, removal of right iliac bone and S1 screw, extension of fusion T9-S2 iliac bolt.       CT T spine   Stable postoperative changes of the thoracic and lumbar spine, with perihardware lucencies at T10-T11.  Residual fluid in the subcutaneous soft tissues of the thoracic spine and laminectomy bed of the lumbar spine, may be postoperative.  No enhancing paraspinal mass identified.    B. Physical exam:  General appearance: Female in no apparent distress.  HENT: Atraumatic head.   Eyes: Normal extraocular movements.   Neck: Supple.   Lungs: Clear to auscultation bilaterally.   Heart: Regular rate and rhythm.  Abdomen: Soft, non-distended, non-tender.  Roll belt in place.  Skin: No Rash.   Neuro: Awake, alert, and oriented. Awakens to tactile stimuli.  Oriented to person, place, and time.  Complaining of back pain on exam    C. Medical decision making:  Acute metabolic encephalopathy, improving   NSTEMI, likely type 2 - trend trops  Hypokalemia   Hypomagnesemia, improved  Back pain  Recently laminectomy of thoracic spine including T9 T11-3 decompression, removal of right iliac bone and S1 screw, extension of fusion T9-S2 iliac bolt.      history of hypertension, hyperlipidemia, anemia, hypothyroidism, fatty liver disease, lumbar radiculopathy, migraines, osteoporosis, spinal stenosis, TIA, anxiety, and depression     F/u neurosurgery reccs - f/u with residual fluid seen in CT tspine  F/u neurology   F/u Psychiatry   IV dilaudid 0.5 x 1 for pain, prn percocet   Resuming home meds as appropriate    VTE Prophylaxis:  Lovenox    All diagnosis and differential diagnosis have been reviewed; assessment and plan has been documented; I have personally reviewed the labs and test results that are presently available; I have reviewed the patients medication list; I have reviewed the consulting  providers response and recommendations. I have reviewed or attempted to review medical records based upon their availability.    All of the patient and family questions have been addressed and answered. Patient's is agreeable to the above stated plan. I will continue to monitor closely and make adjustments to medical management as needed.      11/25/2024

## 2024-11-25 NOTE — CONSULTS
[facility] - 5th [facility] Med Surg  Neurosurgery  Consult Note    Inpatient consult to Neurosurgery  Consult performed by: Nereyda Roberto AGACNP-BC  Consult ordered by: Hardik Valles PA-C        Subjective:     Chief Complaint/Reason for Admission:  Patient known to our service, admitted for altered mental status, with complaints of back pain.    History of Present Illness:  This is a 64-year-old  female known to our service with recent lumbar surgery with past medical history that includes the following:  Hypertension, hyperlipidemia, anemia, hypothyroidism, fatty liver, lumbar radiculopathy, migraines, osteoporosis, spinal stenosis, TIA, anxiety, depression who was admitted on 11/24/2024 via EMS for altered mental status.  Patient is also complaining of lower back pain.  Patient's  stated patient began with altered mental status on 11/22/2024 and he reported she became fatigued and was sleeping a lot.  He also endorsed agitation and difficulty understanding what she was saying.      Patient recently discharged with laminectomy of thoracic spine including T9 T11-3 decompression, removal of right iliac bone and S1 screw, extension of fusion T9-S2 iliac bolt.      According to  patient went to rehab facility and checked herself out AMA.  She then went home where he cared for her.  He brought her in due to altered mental status this time.  Patient was complaining of severe back pain on admission.  Personally inspected wound and had nursing help me rotate patient onto her side.  The surgical incision is clean and dry well approximated no signs of infection there.  She does continue with seroma to right side of wound in the thoracic region.  There is no drainage or erythema or signs of infection in that area.  Her mentation has improved and she is now answering all orientation questions appropriately and following commands.    There was a CT head without contrast performed  yesterday 11/24/2024: No acute abnormalities.      CT cervical spine without contrast 1124: Postop changes no acute abnormality seen.    Afebrile, WBCs 6.83.  PTA Medications   Medication Sig    aspirin (ECOTRIN) 325 MG EC tablet Take 1 tablet (325 mg total) by mouth once daily.    atorvastatin (LIPITOR) 80 MG tablet Take 1 tablet (80 mg total) by mouth once daily.    carvediloL (COREG) 3.125 MG tablet Take 1 tablet (3.125 mg total) by mouth 2 (two) times daily.    cholecalciferol, vitamin D3, (VITAMIN D3) 250 mcg (10,000 unit) Cap capsule Take 1 tablet by mouth Daily.    cyanocobalamin, vitamin B-12, 2,500 mcg Tab Take 2,500 mcg by mouth Daily.    docusate sodium (COLACE) 250 MG capsule Take 250 mg by mouth 2 (two) times a day.    ferrous sulfate (FEOSOL) 325 mg (65 mg iron) Tab tablet Take 1 tablet by mouth Daily.    furosemide (LASIX) 20 MG tablet Take 20 mg by mouth 2 (two) times daily.    levETIRAcetam (KEPPRA) 500 MG Tab Take 1 tablet (500 mg total) by mouth 2 (two) times daily.    levothyroxine (SYNTHROID) 200 MCG tablet Take 200 mcg by mouth once daily.    losartan (COZAAR) 100 MG tablet Take 1 tablet by mouth once daily.    mirtazapine (REMERON) 15 MG tablet Take 1 tablet (15 mg total) by mouth every evening.    omega-3 fatty acids/fish oil (FISH OIL OMEGA 3-6-9 ORAL)     ondansetron (ZOFRAN) 8 MG tablet Take 8 mg by mouth every 6 (six) hours as needed for Nausea.    pantoprazole (PROTONIX) 40 MG tablet Take 40 mg by mouth 2 (two) times daily.    VITAMIN K2 ORAL Take 150 mcg by mouth Daily.       Review of patient's allergies indicates:   Allergen Reactions    Sucralfate Nausea Only       Past Medical History:   Diagnosis Date    Anemia     Anxiety     Depression     Dyspareunia     Encounter for blood transfusion     HTN (hypertension)     Hypothyroidism, unspecified     Liver disease     fatty liver    Lumbar radiculopathy     Menopause     Migraine     Obesity     Osteoporosis     Other spondylosis with  radiculopathy, lumbar region     Personal history of fall     Spinal stenosis, lumbar region with neurogenic claudication     TIA (transient ischemic attack) 1994    Unspecified osteoarthritis, unspecified site     Urinary incontinence      Past Surgical History:   Procedure Laterality Date    ABDOMINAL SURGERY      Gastric Bypass    CARPAL TUNNEL RELEASE Bilateral     CERVICAL FUSION  12/28/2021    CHOLECYSTECTOMY  2015    COLONOSCOPY      COLPOSCOPY      FOOT SURGERY Left     FRACTURE SURGERY  2001    GASTRIC BYPASS  2011    HAND SURGERY Left     thumb    HERNIA REPAIR  2014    JOINT REPLACEMENT  Knee 2015, Hip 2016    LAMINECTOMY OF THORACIC SPINE BY POSTERIOR APPROACH USING COMPUTER-ASSISTED NAVIGATION N/A 10/11/2024    Procedure: LAMINECTOMY, SPINE, THORACIC, POSTERIOR APPROACH, USING COMPUTER-ASSISTED NAVIGATION;  Surgeon: Andra Mejia MD;  Location: [facility];  Service: Neurosurgery;  Laterality: N/A;  T9-L3 DECOMPRESSION / REMOVAL OF RIGHT ILIAC BOLT AND S1 SCREW / EXTENSION OF FUSION  T9-S2 ILIAC BOLT //  PRONE ADENIKE // DRILL // SCOPE // O-ARM //  DIRECT SPINE // NDM //       LUMBAR FUSION  05/28/2021    LUMBAR FUSION  02/21/2023    L3-S1    POSTERIOR LUMBAR INTERBODY FUSION (PLIF) WITH COMPUTER-ASSISTED NAVIGATION N/A 10/11/2024    Procedure: FUSION, SPINE, LUMBAR, PLIF, USING COMPUTER-ASSISTED NAVIGATION;  Surgeon: Andra Mejia MD;  Location: [facility];  Service: Neurosurgery;  Laterality: N/A;    ROTATOR CUFF REPAIR Bilateral 04/12/2022    SMALL INTESTINE SURGERY  2015    SPINE SURGERY  2021 & 2023    TONSILLECTOMY  2017     Family History       Problem Relation (Age of Onset)    Arthritis Sister, Father    Cancer Mother, Father, Maternal Aunt, Maternal Aunt, Maternal Aunt, Paternal Aunt, Paternal Aunt    Cystic fibrosis Sister, Brother    Depression Sister, Mother    Diabetes Father    Early death Brother, Sister    Heart failure Maternal Grandmother    Hypertension Sister, Father    Mental  illness Mother    Vision loss Sister, Father          Tobacco Use    Smoking status: Former     Current packs/day: 0.00     Average packs/day: 1 pack/day for 27.0 years (27.0 ttl pk-yrs)     Types: Cigarettes     Start date: 1975     Quit date: 2002     Years since quittin.9    Smokeless tobacco: Never    Tobacco comments:     off and on when smoking   Substance and Sexual Activity    Alcohol use: Not Currently     Comment: I do not drink    Drug use: Never    Sexual activity: Not Currently     Partners: Male     Birth control/protection: Post-menopausal     Review of Systems   Musculoskeletal:  Positive for back pain.   Psychiatric/Behavioral:  Positive for behavioral problems and confusion.      Objective:     Weight: 99.8 kg (220 lb)  Body mass index is 34.46 kg/m².  Vital Signs (Most Recent):  Temp: 97.9 °F (36.6 °C) (24)  Pulse: 85 (24)  Resp: 18 (24)  BP: (!) 141/63 (24)  SpO2: 96 % (24) Vital Signs (24h Range):  Temp:  [97.9 °F (36.6 °C)-98.1 °F (36.7 °C)] 97.9 °F (36.6 °C)  Pulse:  [] 85  Resp:  [14-20] 18  SpO2:  [93 %-98 %] 96 %  BP: (110-173)/(63-83) 141/63                              Physical Exam:  Nursing note and vitals reviewed.    Constitutional: She appears well-developed and well-nourished. She is not diaphoretic. No distress.     Eyes: Pupils are equal, round, and reactive to light. Conjunctivae and EOM are normal.     Cardiovascular: Normal rate.     Abdominal: Soft.     Skin: Skin displays no rash on trunk. Skin displays no lesions on trunk.     Psych/Behavior: She is oriented to person, place, and time. She has a normal mood and affect.     Musculoskeletal:        Back: There is tenderness. Tenderness is located Lumbar spine.        Right Upper Extremities: Muscle strength is 5/5.        Left Upper Extremities: Muscle strength is 5/5.       Right Lower Extremities: Muscle strength is 4/5.        Left Lower Extremities:  "Muscle strength is 4/5.      Comments: Upper extremities grossly 5/5 to to all motor groups, sensation intact      Lower extremities grossly 4-out of 5 to all motor groups, sensation is intact    No new bowel or bladder issues  No Dumas's  No clonus  Negative Babinski    The surgical wound is clean and dry with no signs of infection or erythema.  There is a seroma/fluid-filled area to the right of the thoracic region that is similar to previous admission.     Neurological:        Sensory: There is no sensory deficit in the trunk. There is no sensory deficit in the extremities.        DTRs: DTRs are DTRS NORMAL AND SYMMETRICnormal and symmetric. She displays no Babinski's sign on the right side. She displays no Babinski's sign on the left side.        Cranial nerves: Cranial nerve(s) II, III, IV, V, VI, VII, VIII, IX, X, XI and XII are intact.   GCS 15 -oriented to all spheres at this time.  Mentation has improved according to nursing.  PERRLA bilateral brisk  Fully oriented to all spheres.    Follows commands   No facial droop, no speech issues.    Moves all extremities with no lateralizing weakness.  Sensation intact throughout.    No gross visual issues.    Cranial nerves grossly intact   No pronator drift          Significant Labs:  Recent Labs   Lab 11/24/24 2123 11/25/24  0359    145   K 3.2* 3.1*   * 109*   CO2 26 24   BUN 12.1 10.7   CREATININE 0.80 0.69   CALCIUM 9.0 8.5   MG 1.50* 1.80     Recent Labs   Lab 11/24/24 2123 11/25/24  0359   WBC 5.80 6.83   HGB 10.8* 10.2*   HCT 32.6* 30.6*    255     No results for input(s): "LABPT", "INR", "APTT" in the last 48 hours.  Microbiology Results (last 7 days)       ** No results found for the last 168 hours. **            Assessment/Plan:    We will order a CT of the thoracic and lumbar spine with and without contrast.  Pain control  PTOT   Mobilization   Fall precautions   Hospital medicine managing   SCDs and Lovenox   The patient is " afebrile with a normal white count and there is no drainage from thoracic/lumbar surgical site.  I will order a CRP and sed rate to spot check.  Patient should also have neurology on board regarding altered mental status.  Patient did have psych rounding on her last hospital admission-may want to consider consulting them again?-we will defer this to hospitalist      Spoke with the patient and her  and answered all of their questions extensively.     There are no hospital problems to display for this patient.      Thank you for your consult. I will follow-up with patient. Please contact us if you have any additional questions.    Nereyda Roberto, [facility]-BC  Neurosurgery  [facility] - 5th Floor Med Surg

## 2024-11-25 NOTE — FIRST PROVIDER EVALUATION
"Medical screening examination initiated.  I have conducted a focused provider triage encounter, findings are as follows:    Brief history of present illness:  64yoF arrive via EMS for AMS. Keeps saying "reva help".    Vitals:    11/24/24 2046   BP: 110/81   Pulse: 103   Resp: 20   Temp: 98.1 °F (36.7 °C)   TempSrc: Oral   SpO2: 96%   Weight: 99.8 kg (220 lb)   Height: 5' 7" (1.702 m)       Pertinent physical exam:  able to understand that people were speaking- told them to stop speaking.     Brief workup plan:  labs and imaging if appropriate    Preliminary workup initiated; this workup will be continued and followed by the physician or advanced practice provider that is assigned to the patient when roomed.  "

## 2024-11-25 NOTE — ED NOTES
Patient is increasingly uncooperative and yelling for help and saying she is in pain. Pt is attempting to get off of the bed and sitting on the edge of the bed. Readjusted patient and roll belt. MD aware.

## 2024-11-26 LAB
ALBUMIN SERPL-MCNC: 2.9 G/DL (ref 3.4–4.8)
ALBUMIN/GLOB SERPL: 1.3 RATIO (ref 1.1–2)
ALP SERPL-CCNC: 123 UNIT/L (ref 40–150)
ALT SERPL-CCNC: 12 UNIT/L (ref 0–55)
ANION GAP SERPL CALC-SCNC: 11 MEQ/L
AST SERPL-CCNC: 15 UNIT/L (ref 5–34)
BASOPHILS # BLD AUTO: 0.03 X10(3)/MCL
BASOPHILS NFR BLD AUTO: 0.7 %
BILIRUB SERPL-MCNC: 0.5 MG/DL
BUN SERPL-MCNC: 6.4 MG/DL (ref 9.8–20.1)
CALCIUM SERPL-MCNC: 8 MG/DL (ref 8.4–10.2)
CHLORIDE SERPL-SCNC: 105 MMOL/L (ref 98–107)
CO2 SERPL-SCNC: 28 MMOL/L (ref 23–31)
CREAT SERPL-MCNC: 0.65 MG/DL (ref 0.55–1.02)
CREAT/UREA NIT SERPL: 10
EOSINOPHIL # BLD AUTO: 0.09 X10(3)/MCL (ref 0–0.9)
EOSINOPHIL NFR BLD AUTO: 2.1 %
ERYTHROCYTE [DISTWIDTH] IN BLOOD BY AUTOMATED COUNT: 13.5 % (ref 11.5–17)
GFR SERPLBLD CREATININE-BSD FMLA CKD-EPI: >60 ML/MIN/1.73/M2
GLOBULIN SER-MCNC: 2.3 GM/DL (ref 2.4–3.5)
GLUCOSE SERPL-MCNC: 136 MG/DL (ref 82–115)
GRAM STN SPEC: NORMAL
GRAM STN SPEC: NORMAL
HCT VFR BLD AUTO: 31.2 % (ref 37–47)
HGB BLD-MCNC: 10.1 G/DL (ref 12–16)
IMM GRANULOCYTES # BLD AUTO: 0.05 X10(3)/MCL (ref 0–0.04)
IMM GRANULOCYTES NFR BLD AUTO: 1.2 %
INR PPP: 1.1
LYMPHOCYTES # BLD AUTO: 1.46 X10(3)/MCL (ref 0.6–4.6)
LYMPHOCYTES NFR BLD AUTO: 33.9 %
MCH RBC QN AUTO: 31 PG (ref 27–31)
MCHC RBC AUTO-ENTMCNC: 32.4 G/DL (ref 33–36)
MCV RBC AUTO: 95.7 FL (ref 80–94)
MONOCYTES # BLD AUTO: 0.35 X10(3)/MCL (ref 0.1–1.3)
MONOCYTES NFR BLD AUTO: 8.1 %
NEUTROPHILS # BLD AUTO: 2.33 X10(3)/MCL (ref 2.1–9.2)
NEUTROPHILS NFR BLD AUTO: 54 %
NRBC BLD AUTO-RTO: 0 %
PLATELET # BLD AUTO: 229 X10(3)/MCL (ref 130–400)
PMV BLD AUTO: 9 FL (ref 7.4–10.4)
POTASSIUM SERPL-SCNC: 3.1 MMOL/L (ref 3.5–5.1)
PROT SERPL-MCNC: 5.2 GM/DL (ref 5.8–7.6)
PROTHROMBIN TIME: 13.8 SECONDS (ref 12.5–14.5)
RBC # BLD AUTO: 3.26 X10(6)/MCL (ref 4.2–5.4)
SODIUM SERPL-SCNC: 144 MMOL/L (ref 136–145)
TROPONIN I SERPL-MCNC: 0.04 NG/ML (ref 0–0.04)
WBC # BLD AUTO: 4.31 X10(3)/MCL (ref 4.5–11.5)

## 2024-11-26 PROCEDURE — 84484 ASSAY OF TROPONIN QUANT: CPT | Performed by: INTERNAL MEDICINE

## 2024-11-26 PROCEDURE — 87075 CULTR BACTERIA EXCEPT BLOOD: CPT | Performed by: NURSE PRACTITIONER

## 2024-11-26 PROCEDURE — 25000003 PHARM REV CODE 250: Performed by: NURSE PRACTITIONER

## 2024-11-26 PROCEDURE — 85025 COMPLETE CBC W/AUTO DIFF WBC: CPT | Performed by: INTERNAL MEDICINE

## 2024-11-26 PROCEDURE — 36415 COLL VENOUS BLD VENIPUNCTURE: CPT | Performed by: INTERNAL MEDICINE

## 2024-11-26 PROCEDURE — 25000003 PHARM REV CODE 250

## 2024-11-26 PROCEDURE — 87102 FUNGUS ISOLATION CULTURE: CPT | Performed by: NURSE PRACTITIONER

## 2024-11-26 PROCEDURE — 63600175 PHARM REV CODE 636 W HCPCS: Performed by: NURSE PRACTITIONER

## 2024-11-26 PROCEDURE — 25000003 PHARM REV CODE 250: Performed by: PHYSICIAN ASSISTANT

## 2024-11-26 PROCEDURE — 87070 CULTURE OTHR SPECIMN AEROBIC: CPT | Performed by: NURSE PRACTITIONER

## 2024-11-26 PROCEDURE — 0J973ZZ DRAINAGE OF BACK SUBCUTANEOUS TISSUE AND FASCIA, PERCUTANEOUS APPROACH: ICD-10-PCS | Performed by: RADIOLOGY

## 2024-11-26 PROCEDURE — 25000003 PHARM REV CODE 250: Performed by: INTERNAL MEDICINE

## 2024-11-26 PROCEDURE — 21400001 HC TELEMETRY ROOM

## 2024-11-26 PROCEDURE — 87205 SMEAR GRAM STAIN: CPT | Performed by: NURSE PRACTITIONER

## 2024-11-26 PROCEDURE — 80053 COMPREHEN METABOLIC PANEL: CPT | Performed by: INTERNAL MEDICINE

## 2024-11-26 PROCEDURE — 99222 1ST HOSP IP/OBS MODERATE 55: CPT | Mod: ,,, | Performed by: PSYCHIATRY & NEUROLOGY

## 2024-11-26 PROCEDURE — 85610 PROTHROMBIN TIME: CPT | Performed by: RADIOLOGY

## 2024-11-26 PROCEDURE — 63600175 PHARM REV CODE 636 W HCPCS: Performed by: INTERNAL MEDICINE

## 2024-11-26 RX ORDER — SERTRALINE HYDROCHLORIDE 50 MG/1
100 TABLET, FILM COATED ORAL DAILY
Status: DISCONTINUED | OUTPATIENT
Start: 2024-11-26 | End: 2024-12-03 | Stop reason: HOSPADM

## 2024-11-26 RX ORDER — POTASSIUM CHLORIDE 20 MEQ/1
40 TABLET, EXTENDED RELEASE ORAL ONCE
Status: COMPLETED | OUTPATIENT
Start: 2024-11-26 | End: 2024-11-26

## 2024-11-26 RX ORDER — LEVETIRACETAM 500 MG/1
500 TABLET ORAL 2 TIMES DAILY
Status: DISCONTINUED | OUTPATIENT
Start: 2024-11-26 | End: 2024-12-03 | Stop reason: HOSPADM

## 2024-11-26 RX ORDER — HYDROMORPHONE HYDROCHLORIDE 2 MG/ML
0.5 INJECTION, SOLUTION INTRAMUSCULAR; INTRAVENOUS; SUBCUTANEOUS EVERY 8 HOURS PRN
Status: DISCONTINUED | OUTPATIENT
Start: 2024-11-26 | End: 2024-12-03 | Stop reason: HOSPADM

## 2024-11-26 RX ORDER — ACETAMINOPHEN 500 MG
1000 TABLET ORAL EVERY 6 HOURS PRN
Status: DISCONTINUED | OUTPATIENT
Start: 2024-11-26 | End: 2024-12-03 | Stop reason: HOSPADM

## 2024-11-26 RX ORDER — ALUMINUM HYDROXIDE, MAGNESIUM HYDROXIDE, AND SIMETHICONE 1200; 120; 1200 MG/30ML; MG/30ML; MG/30ML
30 SUSPENSION ORAL EVERY 6 HOURS PRN
Status: DISCONTINUED | OUTPATIENT
Start: 2024-11-26 | End: 2024-12-03 | Stop reason: HOSPADM

## 2024-11-26 RX ORDER — MIRTAZAPINE 15 MG/1
15 TABLET, FILM COATED ORAL NIGHTLY
Status: DISCONTINUED | OUTPATIENT
Start: 2024-11-26 | End: 2024-12-03 | Stop reason: HOSPADM

## 2024-11-26 RX ORDER — OXYCODONE AND ACETAMINOPHEN 5; 325 MG/1; MG/1
1 TABLET ORAL EVERY 4 HOURS PRN
Status: DISCONTINUED | OUTPATIENT
Start: 2024-11-26 | End: 2024-12-03 | Stop reason: HOSPADM

## 2024-11-26 RX ADMIN — MIRTAZAPINE 15 MG: 15 TABLET, FILM COATED ORAL at 08:11

## 2024-11-26 RX ADMIN — DOCUSATE SODIUM 250 MG: 50 CAPSULE, LIQUID FILLED ORAL at 09:11

## 2024-11-26 RX ADMIN — OXYCODONE HYDROCHLORIDE AND ACETAMINOPHEN 1 TABLET: 5; 325 TABLET ORAL at 06:11

## 2024-11-26 RX ADMIN — LOSARTAN POTASSIUM 100 MG: 50 TABLET, FILM COATED ORAL at 09:11

## 2024-11-26 RX ADMIN — CARVEDILOL 3.12 MG: 3.12 TABLET, FILM COATED ORAL at 09:11

## 2024-11-26 RX ADMIN — HYDROMORPHONE HYDROCHLORIDE 0.5 MG: 2 INJECTION INTRAMUSCULAR; INTRAVENOUS; SUBCUTANEOUS at 10:11

## 2024-11-26 RX ADMIN — OXYCODONE HYDROCHLORIDE AND ACETAMINOPHEN 1 TABLET: 5; 325 TABLET ORAL at 11:11

## 2024-11-26 RX ADMIN — ENOXAPARIN SODIUM 40 MG: 40 INJECTION SUBCUTANEOUS at 04:11

## 2024-11-26 RX ADMIN — SERTRALINE HYDROCHLORIDE 100 MG: 50 TABLET ORAL at 11:11

## 2024-11-26 RX ADMIN — ACETAMINOPHEN 650 MG: 325 TABLET, FILM COATED ORAL at 04:11

## 2024-11-26 RX ADMIN — POTASSIUM CHLORIDE 40 MEQ: 1500 TABLET, EXTENDED RELEASE ORAL at 09:11

## 2024-11-26 RX ADMIN — LEVETIRACETAM 500 MG: 500 TABLET, FILM COATED ORAL at 08:11

## 2024-11-26 RX ADMIN — HYDROMORPHONE HYDROCHLORIDE 0.5 MG: 2 INJECTION INTRAMUSCULAR; INTRAVENOUS; SUBCUTANEOUS at 08:11

## 2024-11-26 RX ADMIN — DOCUSATE SODIUM 250 MG: 50 CAPSULE, LIQUID FILLED ORAL at 08:11

## 2024-11-26 RX ADMIN — BACLOFEN 20 MG: 10 TABLET ORAL at 02:11

## 2024-11-26 RX ADMIN — ALUMINUM HYDROXIDE, MAGNESIUM HYDROXIDE, AND SIMETHICONE 30 ML: 1200; 120; 1200 SUSPENSION ORAL at 10:11

## 2024-11-26 RX ADMIN — OXYCODONE HYDROCHLORIDE AND ACETAMINOPHEN 1 TABLET: 5; 325 TABLET ORAL at 09:11

## 2024-11-26 RX ADMIN — BACLOFEN 20 MG: 10 TABLET ORAL at 09:11

## 2024-11-26 RX ADMIN — ASPIRIN 325 MG: 325 TABLET, COATED ORAL at 09:11

## 2024-11-26 RX ADMIN — ATORVASTATIN CALCIUM 80 MG: 40 TABLET, FILM COATED ORAL at 09:11

## 2024-11-26 RX ADMIN — BACLOFEN 20 MG: 10 TABLET ORAL at 08:11

## 2024-11-26 RX ADMIN — OXYCODONE HYDROCHLORIDE AND ACETAMINOPHEN 1 TABLET: 5; 325 TABLET ORAL at 02:11

## 2024-11-26 RX ADMIN — LEVOTHYROXINE SODIUM 200 MCG: 100 TABLET ORAL at 06:11

## 2024-11-26 RX ADMIN — CARVEDILOL 3.12 MG: 3.12 TABLET, FILM COATED ORAL at 08:11

## 2024-11-26 NOTE — PT/OT/SLP PROGRESS
Physical Therapy Treatment    Patient Name:  Kasandra Cheek   MRN:  [id number]    Patient reports having a procedure today. Asked for PT to return tomorrow.

## 2024-11-26 NOTE — SUBJECTIVE & OBJECTIVE
Past Medical History:   Diagnosis Date    Anemia     Anxiety     Depression     Dyspareunia     Encounter for blood transfusion     HTN (hypertension)     Hypothyroidism, unspecified     Liver disease     fatty liver    Lumbar radiculopathy     Menopause     Migraine     Obesity     Osteoporosis     Other spondylosis with radiculopathy, lumbar region     Personal history of fall     Spinal stenosis, lumbar region with neurogenic claudication     TIA (transient ischemic attack) 1994    Unspecified osteoarthritis, unspecified site     Urinary incontinence        Past Surgical History:   Procedure Laterality Date    ABDOMINAL SURGERY      Gastric Bypass    CARPAL TUNNEL RELEASE Bilateral     CERVICAL FUSION  12/28/2021    CHOLECYSTECTOMY  2015    COLONOSCOPY      COLPOSCOPY      FOOT SURGERY Left     FRACTURE SURGERY  2001    GASTRIC BYPASS  2011    HAND SURGERY Left     thumb    HERNIA REPAIR  2014    JOINT REPLACEMENT  Knee 2015, Hip 2016    LAMINECTOMY OF THORACIC SPINE BY POSTERIOR APPROACH USING COMPUTER-ASSISTED NAVIGATION N/A 10/11/2024    Procedure: LAMINECTOMY, SPINE, THORACIC, POSTERIOR APPROACH, USING COMPUTER-ASSISTED NAVIGATION;  Surgeon: Andra Mejia MD;  Location: [facility];  Service: Neurosurgery;  Laterality: N/A;  T9-L3 DECOMPRESSION / REMOVAL OF RIGHT ILIAC BOLT AND S1 SCREW / EXTENSION OF FUSION  T9-S2 ILIAC BOLT //  PRONE ADENIKE // DRILL // SCOPE // O-ARM //  DIRECT SPINE // NDM //       LUMBAR FUSION  05/28/2021    LUMBAR FUSION  02/21/2023    L3-S1    POSTERIOR LUMBAR INTERBODY FUSION (PLIF) WITH COMPUTER-ASSISTED NAVIGATION N/A 10/11/2024    Procedure: FUSION, SPINE, LUMBAR, PLIF, USING COMPUTER-ASSISTED NAVIGATION;  Surgeon: Andra Mejia MD;  Location: [facility];  Service: Neurosurgery;  Laterality: N/A;    ROTATOR CUFF REPAIR Bilateral 04/12/2022    SMALL INTESTINE SURGERY  2015    SPINE SURGERY  2021 & 2023    TONSILLECTOMY  2017       Review of patient's allergies indicates:    Allergen Reactions    Sucralfate Nausea Only       Current Neurological Medications:     No current facility-administered medications on file prior to encounter.     Current Outpatient Medications on File Prior to Encounter   Medication Sig    aspirin (ECOTRIN) 325 MG EC tablet Take 1 tablet (325 mg total) by mouth once daily.    atorvastatin (LIPITOR) 80 MG tablet Take 1 tablet (80 mg total) by mouth once daily.    carvediloL (COREG) 3.125 MG tablet Take 1 tablet (3.125 mg total) by mouth 2 (two) times daily.    cholecalciferol, vitamin D3, (VITAMIN D3) 250 mcg (10,000 unit) Cap capsule Take 1 tablet by mouth Daily.    cyanocobalamin, vitamin B-12, 2,500 mcg Tab Take 2,500 mcg by mouth Daily.    docusate sodium (COLACE) 250 MG capsule Take 250 mg by mouth 2 (two) times a day.    ferrous sulfate (FEOSOL) 325 mg (65 mg iron) Tab tablet Take 1 tablet by mouth Daily.    furosemide (LASIX) 20 MG tablet Take 20 mg by mouth 2 (two) times daily.    levETIRAcetam (KEPPRA) 500 MG Tab Take 1 tablet (500 mg total) by mouth 2 (two) times daily.    levothyroxine (SYNTHROID) 200 MCG tablet Take 200 mcg by mouth once daily.    losartan (COZAAR) 100 MG tablet Take 1 tablet by mouth once daily.    mirtazapine (REMERON) 15 MG tablet Take 1 tablet (15 mg total) by mouth every evening.    omega-3 fatty acids/fish oil (FISH OIL OMEGA 3-6-9 ORAL)     ondansetron (ZOFRAN) 8 MG tablet Take 8 mg by mouth every 6 (six) hours as needed for Nausea.    oxyCODONE-acetaminophen (PERCOCET) 5-325 mg per tablet Take 1 tablet by mouth every 4 (four) hours as needed for Pain.    pantoprazole (PROTONIX) 40 MG tablet Take 40 mg by mouth 2 (two) times daily.    VITAMIN K2 ORAL Take 150 mcg by mouth Daily.    [DISCONTINUED] ARIPiprazole (ABILIFY) 2 MG Tab Take 2 mg by mouth every morning.    [DISCONTINUED] diazePAM (VALIUM) 5 MG tablet Take by mouth.     Family History       Problem Relation (Age of Onset)    Arthritis Sister, Father    Cancer Mother,  Father, Maternal Aunt, Maternal Aunt, Maternal Aunt, Paternal Aunt, Paternal Aunt    Cystic fibrosis Sister, Brother    Depression Sister, Mother    Diabetes Father    Early death Brother, Sister    Heart failure Maternal Grandmother    Hypertension Sister, Father    Mental illness Mother    Vision loss Sister, Father          Tobacco Use    Smoking status: Former     Current packs/day: 0.00     Average packs/day: 1 pack/day for 27.0 years (27.0 ttl pk-yrs)     Types: Cigarettes     Start date: 1975     Quit date: 2002     Years since quittin.9    Smokeless tobacco: Never    Tobacco comments:     off and on when smoking   Substance and Sexual Activity    Alcohol use: Not Currently     Comment: I do not drink    Drug use: Never    Sexual activity: Not Currently     Partners: Male     Birth control/protection: Post-menopausal     Review of Systems  A 14pt ros was reviewed & is negative unless o/w documented in the hpi    Objective:     Vital Signs (Most Recent):  Temp: 98.4 °F (36.9 °C) (24 1114)  Pulse: 75 (24 1114)  Resp: 18 (24 1057)  BP: 118/74 (24 1114)  SpO2: (!) 94 % (24 1114) Vital Signs (24h Range):  Temp:  [97.8 °F (36.6 °C)-98.7 °F (37.1 °C)] 98.4 °F (36.9 °C)  Pulse:  [71-88] 75  Resp:  [18] 18  SpO2:  [94 %-97 %] 94 %  BP: (118-151)/(63-85) 118/74     Weight: 99.8 kg (220 lb 0.3 oz)  Body mass index is 34.46 kg/m².     Physical Exam  Vitals reviewed.   Constitutional:       General: She is awake.      Appearance: Normal appearance.   HENT:      Head: Normocephalic and atraumatic.      Nose: Nose normal.      Mouth/Throat:      Mouth: Mucous membranes are moist.      Pharynx: Oropharynx is clear.   Eyes:      Extraocular Movements: Extraocular movements intact and EOM normal.      Pupils: Pupils are equal, round, and reactive to light.   Pulmonary:      Effort: Pulmonary effort is normal.   Musculoskeletal:      Cervical back: Normal range of motion.   Skin:      General: Skin is warm and dry.   Neurological:      Mental Status: She is alert and oriented to person, place, and time.   Psychiatric:         Speech: Speech normal.         Behavior: Behavior normal. Behavior is cooperative.         Thought Content: Thought content normal.          NEUROLOGICAL EXAMINATION:     MENTAL STATUS   Oriented to person, place, and time.   Follows 3 step commands.   Attention: normal. Concentration: normal.   Speech: speech is normal   Level of consciousness: alert  Knowledge: good.   Normal comprehension.     CRANIAL NERVES     CN II   Visual fields full to confrontation.     CN III, IV, VI   Pupils are equal, round, and reactive to light.  Extraocular motions are normal.   Nystagmus: none   Conjugate gaze: present    CN V   Facial sensation intact.     CN VII   Facial expression full, symmetric.     MOTOR EXAM   Right arm pronator drift: absent  Left arm pronator drift: absent       BUE 5/5  BLE 2/5     REFLEXES     Reflexes   Right plantar: normal  Left plantar: normal  Right ankle clonus: absent  Left ankle clonus: absent    SENSORY EXAM   Light touch normal.       Significant Labs:   Recent Lab Results         11/26/24  0438   11/25/24  1356        Albumin/Globulin Ratio 1.3         Albumin 2.9                  ALT 12         Anion Gap 11.0         AST 15         Baso # 0.03         Basophil % 0.7         BILIRUBIN TOTAL 0.5         BUN 6.4         BUN/CREAT RATIO 10         Calcium 8.0         Chloride 105         CO2 28         Creatinine 0.65         CRP   2.20       eGFR >60         Eos # 0.09         Eos % 2.1         Globulin, Total 2.3         Glucose 136         Hematocrit 31.2         Hemoglobin 10.1         Immature Grans (Abs) 0.05         Immature Granulocytes 1.2         INR 1.1         Lymph # 1.46         LYMPH % 33.9         MCH 31.0         MCHC 32.4         MCV 95.7         Mono # 0.35         Mono % 8.1         MPV 9.0         Neut # 2.33         Neut %  54.0         nRBC 0.0         Platelet Count 229         Potassium 3.1         PROTEIN TOTAL 5.2         PT 13.8         RBC 3.26         RDW 13.5         Sed Rate   1       Sodium 144         Troponin I 0.042         WBC 4.31                 Significant Imaging:   CT head w/o:  FINDINGS:  Ventricles of normal size and shape there is no shift of the midline noted.  There are no extra-axial fluid collections or areas consistent with hemorrhage noted.  No masses is seen no acute infarcts are noted.  The calvarium appears intact.     Impression:     No acute abnormalities are seen       I have reviewed all pertinent imaging results/findings within the past 24 hours.

## 2024-11-26 NOTE — PROGRESS NOTES
[facility] Hospital Medicine Progress Note        Chief Complaint: Inpatient Follow-up for     HPI:   Kasandra Cheek is a 64 y.o. female with a past medical history of hypertension, hyperlipidemia, anemia, hypothyroidism, fatty liver disease, lumbar radiculopathy, migraines, osteoporosis, spinal stenosis, TIA, anxiety, and depression who presented to [facility] on 11/24/2024 via EMS for altered mental status.   reported altered mental status began on Friday, 11/22/2024.   reported patient became very fatigued and was sleeping a lot.   also endorsed agitation and difficulty understanding what patient was saying.  Has been reported prior to Friday patient was in her normal state of health- reports patient normally ambulates with a walker at home.   states that patient has had increasing lower extremity weakness and back pain since fusion last month with Dr. Mejia.   reported that patient was recently inpatient here about 2 weeks ago and was discharged to rehab facility.   stated that patient became frightened when arriving at the rehab facility and left.   states they do not have an appointment with neurosurgeon until she goes through therapy and would like to see him while in patient.   denied any new medications.     Initial vital signs in ED were /81, pulse 103, respirations 20, temperature 36.7° C, and SpO2 96% on room air.  Labs revealed WBC 5.80, RBC 3.47, hemoglobin 10.8, hematocrit 32.6, MCV 93.9, potassium 3.2, chloride 109, magnesium 1.5, , troponin 0.048, TSH 4.728, T4 1.13, and serum alcohol undetectable.  UDS was positive for opiates.  Flu and COVID testing were negative.  UA revealed trace protein, 2+ nitrite, 25 leukocytes, and 6-10 white blood cells.  CT head without contrast revealed no acute abnormality seen.  CT cervical spine without contrast revealed postoperative changes without acute abnormality  seen.  Chest x-ray revealed right-sided pleural density.  CT chest abdomen and pelvis with IV contrast revealed probable old bilateral rib fractures, extensive hardware in the spine prosthesis in the right hip, and hepatomegaly.  EKG revealed normal sinus rhythm with heart rate of 94 beats per minute.  Patient was given IV morphine 4 mg, IV Zofran 4 mg, IV magnesium sulfate 2 g, IV potassium chloride 10 mEq, aspirin 325 mg, and IV Haldol 5 mg in ED. Patient was admitted to hospital medicine service for further medical management.      Patient recently discharged with laminectomy of thoracic spine including T9 T11-3 decompression, removal of right iliac bone and S1 screw, extension of fusion T9-S2 iliac bolt.       CT T spine   Stable postoperative changes of the thoracic and lumbar spine, with perihardware lucencies at T10-T11.  Residual fluid in the subcutaneous soft tissues of the thoracic spine and laminectomy bed of the lumbar spine, may be postoperative.  No enhancing paraspinal mass identified.    10/2024 ECHO ejection fraction of 55 - 60%. Grade I diastolic dysfunction.    Interval Hx:     US Guided Fluid Aspiration on 11/26 of Residual fluid in the subcutaneous soft tissues of the thoracic spine -  18cc old, dark, bloody fluid - gram stain negative; cx pending  On percocet and prn dilaudid for pain  AF. NAEON. Mentation back to baseline.    Case was discussed with patient's nurse and  on the floor.    Objective/physical exam:  General: In no acute distress, afebrile  Chest: Clear to auscultation bilaterally  Heart: RRR, +S1, S2, no appreciable murmur  Abdomen: Soft, nontender, BS +  MSK: Warm, no lower extremity edema, no clubbing or cyanosis  Neurologic: Alert and oriented x4, Cranial nerve II-XII intact, Strength 5/5 in all 4 extremities    VITAL SIGNS: 24 HRS MIN & MAX LAST   Temp  Min: 97.8 °F (36.6 °C)  Max: 98.7 °F (37.1 °C) 98.2 °F (36.8 °C)   BP  Min: 118/74  Max: 149/79 129/71   Pulse   Min: 71  Max: 79  78   Resp  Min: 18  Max: 18 18   SpO2  Min: 94 %  Max: 97 % 95 %     I have reviewed the following labs:  Recent Labs   Lab 11/24/24 2123 11/25/24 0359 11/26/24 0438   WBC 5.80 6.83 4.31*   RBC 3.47* 3.27* 3.26*   HGB 10.8* 10.2* 10.1*   HCT 32.6* 30.6* 31.2*   MCV 93.9 93.6 95.7*   MCH 31.1* 31.2* 31.0   MCHC 33.1 33.3 32.4*   RDW 13.4 13.3 13.5    255 229   MPV 9.0 9.1 9.0     Recent Labs   Lab 11/24/24 2123 11/25/24 0359 11/26/24 0438    145 144   K 3.2* 3.1* 3.1*   * 109* 105   CO2 26 24 28   BUN 12.1 10.7 6.4*   CREATININE 0.80 0.69 0.65   CALCIUM 9.0 8.5 8.0*   MG 1.50* 1.80  --    ALBUMIN 3.4 3.3* 2.9*   ALKPHOS 132 134 123   ALT 13 15 12   AST 17 20 15   BILITOT 0.5 0.5 0.5     Microbiology Results (last 7 days)       Procedure Component Value Units Date/Time    Gram Stain [[id number]] Collected: 11/26/24 0823    Order Status: Completed Specimen: Body Fluid from Back Updated: 11/26/24 1353     GRAM STAIN Few WBC observed      No bacteria seen    Anaerobic Culture [[id number]] Collected: 11/26/24 0823    Order Status: Sent Specimen: Body Fluid from Back Updated: 11/26/24 0832    Fungal Culture [[id number]] Collected: 11/26/24 0823    Order Status: Sent Specimen: Body Fluid from Back Updated: 11/26/24 0832    Body Fluid Culture [[id number]] Collected: 11/26/24 0823    Order Status: Resulted Specimen: Body Fluid from Back Updated: 11/26/24 0832             See below for Radiology    Assessment/Plan:  Acute metabolic encephalopathy, improving   NSTEMI, likely type 2  Hypokalemia   Hypomagnesemia, improved  Back pain  Recently laminectomy of thoracic spine including T9 T11-3 decompression, removal of right iliac bone and S1 screw, extension of fusion T9-S2 iliac bolt.       history of hypertension, hyperlipidemia, anemia, hypothyroidism, fatty liver disease, lumbar radiculopathy, migraines, osteoporosis, spinal stenosis, TIA, anxiety, and depression     Plan  US Guided  Fluid Aspiration on 11/26 of Residual fluid in the subcutaneous soft tissues of the thoracic spine -  18cc old, dark, bloody fluid   Fluid gram stain negative; culture pending    F/u neurosurgery reccs ; F/u neurology ; F/u Psychiatry     IV dilaudid 0.5 x 1 for pain, prn percocet     Resuming home meds as appropriate  PT/OT order place, cleared by neurosurgery    VTE prophylaxis: Lovenox    Patient condition:  Fair    Anticipated discharge and Disposition:         All diagnosis and differential diagnosis have been reviewed; assessment and plan has been documented; I have personally reviewed the labs and test results that are presently available; I have reviewed the patients medication list; I have reviewed the consulting providers response and recommendations. I have reviewed or attempted to review medical records based upon their availability    All of the patient's questions have been  addressed and answered. Patient's is agreeable to the above stated plan. I will continue to monitor closely and make adjustments to medical management as needed.    Portions of this note dictated using EMR integrated voice recognition software, and may be subject to voice recognition errors not corrected at proofreading. Please contact writer for clarification if needed.   _____________________________________________________________________    Malnutrition Status:  Nutrition consulted. Most recent weight and BMI monitored-     Measurements:  Wt Readings from Last 1 Encounters:   11/25/24 99.8 kg (220 lb 0.3 oz)   Body mass index is 34.46 kg/m².    Patient has been screened and assessed by RD.    Malnutrition Type:  Context:    Level:      Malnutrition Characteristic Summary:       Interventions/Recommendations (treatment strategy):        Scheduled Med:   aspirin  325 mg Oral Daily    atorvastatin  80 mg Oral Daily    baclofen  20 mg Oral TID    carvediloL  3.125 mg Oral BID    docusate sodium  250 mg Oral BID    enoxparin  40 mg  Subcutaneous Daily    levETIRAcetam  500 mg Oral BID    levothyroxine  200 mcg Oral Before breakfast    losartan  100 mg Oral Daily    mirtazapine  15 mg Oral QHS    sertraline  100 mg Oral Daily      Continuous Infusions:     PRN Meds:    Current Facility-Administered Medications:     acetaminophen, 650 mg, Oral, Q6H PRN    HYDROmorphone, 0.5 mg, Intravenous, Q8H PRN    melatonin, 6 mg, Oral, Nightly PRN    naloxone, 0.02 mg, Intravenous, PRN    ondansetron, 4 mg, Intravenous, Q4H PRN    oxyCODONE-acetaminophen, 1 tablet, Oral, Q4H PRN    polyethylene glycol, 17 g, Oral, BID PRN    prochlorperazine, 5 mg, Intravenous, Q6H PRN    simethicone, 1 tablet, Oral, QID PRN     Radiology:  I have personally reviewed the following imaging and agree with the radiologist.     US Guided Needle Placement  Narrative: PROCEDURE:  Ultrasound Guided Drainage (No Drain Left)    CLINICAL HISTORY:  64-year-old female with soft tissue fluid collection in the thoracic region, postop    ANESTHESIA:  Local anesthesia    PHYSICIANS:  Daniel Simental MD    PROCEDURAL SUMMARY:  After informed consent was obtained, the patient was placed supine in the bed. A limited ultrasound of the posterior thoracic soft tissues was performed with Dr. Simental present in the room.  The target was identified. The planned skin entry site and route for needle passage for drain placement was then determined. The skin overlying the target was marked. The site was then prepped and draped in the usual sterile fashion.    The soft tissues were anesthetized with lidocaine and a dermatotomy was performed. Under real time ultrasound guidance, 5F Yueh sheath needle was advanced from the skin entry site towards the target collection. When the needle tip entered the collection, fluid was aspirated. The sheath was then advanced over the needle into the collection and the needle was removed. An Amplatz guidewire was then advanced through the sheath and coiled within the  collection. The sheath was removed and the tract was serially dilated to accommodate a 8F pigtail drainage catheter.  The pigtail catheter was advanced over the wire and into the collection.  The wire was then removed. The cavity was evacuated.  A total of 18 cc of old, dark, bloody fluid was aspirated. The drainage catheter was then removed.  A sterile dressing was applied.    The patient tolerated the procedure well and was without any apparent complications.  Impression: Technically successful ultrasound-guided drainage (no drain left).    Electronically signed by: Daniel Simental  Date:    11/26/2024  Time:    10:50      Mauricio Velázquez MD  Department of Hospital Medicine   [facility]   11/26/2024

## 2024-11-26 NOTE — CONSULTS
"11/26/2024  Kasandra Cheek   1960   [id number]            Psychiatry Evaluation    Date of Admission: 11/24/2024  8:51 PM    Chief Complaint: Psychiatric consult for "NH placement"    SUBJECTIVE:   History of Present Illness:   Kasandra Cheek is a 64 y.o. female with a past medical history that includes HTN, HLD, hypothyroidism, lumbar radiculopathy, migraines, osteoporosis, spinal stenosis, TIA, anxiety, and depression who presented to [facility] on 11/24/24 with altered mental status that was reported by  to have begun on 11/22. He also reported agitation and difficulty understanding patients speech.  states that patient has had increasing lower extremity weakness and back pain since fusion last month with Dr. Mejia. Labs revealed WBC 5.80, RBC 3.47, hemoglobin 10.8, hematocrit 32.6, MCV 93.9, potassium 3.2, chloride 109, magnesium 1.5, , troponin 0.048, TSH 4.728, T4 1.13, and serum alcohol undetectable. UDS was positive for opiates. Flu and COVID testing were negative. UA revealed trace protein, 2+ nitrite, 25 leukocytes, and 6-10 white blood cells. CT head without contrast revealed no acute abnormality seen. CT cervical spine without contrast revealed postoperative changes without acute abnormality seen. Chest x-ray revealed right-sided pleural density. CT chest abdomen and pelvis with IV contrast revealed probable old bilateral rib fractures, extensive hardware in the spine prosthesis in the right hip, and hepatomegaly. Psychiatry consulted for NH placement due to possibility of level II trigger.     Seen at the bedside where she is pleasant, polite, and cooperative. Patient's  at the bedside who reports she showed acute agitation at home along with increased latency of speech and confusion and reports that she is "100 percent better" at this point in time. Currently AAOx4 and displays grossly intact attention and recent memory. She does have a history of depression and anxiety and " reports depressed and anxious mood with onset following spinal surgery in October. Reports decreased sleep and only sleeping approximately 1-2 hours. Denies feelings of hopelessness, helplessness, amotivation, anhedonia, or suicidal ideations. Recently started on sertraline and reports partial improvement in mood with this medication. No evidence of seun or psychosis at this time. Denies homicidal ideations, auditory/visual hallucinations, or paranoia        Past Psychiatric History:   Previous Psychiatric Hospitalizations: Yes, [facility] in [place] for altered mental status  Previous Medication Trials: Wellbutrin, Cymbalta, Seroquel, amitriptyline, buspirone, olanzapine,   Previous Suicide Attempts: Denies   Outpatient psychiatrist: None    Current Medications:   Home Psychiatric Meds: Remeron 15mg PO QHS; Sertraline 50mg PO QD    Past Medical/Surgical History:   Past Medical History:   Diagnosis Date    Anemia     Anxiety     Depression     Dyspareunia     Encounter for blood transfusion     HTN (hypertension)     Hypothyroidism, unspecified     Liver disease     fatty liver    Lumbar radiculopathy     Menopause     Migraine     Obesity     Osteoporosis     Other spondylosis with radiculopathy, lumbar region     Personal history of fall     Spinal stenosis, lumbar region with neurogenic claudication     TIA (transient ischemic attack) 1994    Unspecified osteoarthritis, unspecified site     Urinary incontinence      Past Surgical History:   Procedure Laterality Date    ABDOMINAL SURGERY      Gastric Bypass    CARPAL TUNNEL RELEASE Bilateral     CERVICAL FUSION  12/28/2021    CHOLECYSTECTOMY  2015    COLONOSCOPY      COLPOSCOPY      FOOT SURGERY Left     FRACTURE SURGERY  2001    GASTRIC BYPASS  2011    HAND SURGERY Left     thumb    HERNIA REPAIR  2014    JOINT REPLACEMENT  Knee 2015, Hip 2016    LAMINECTOMY OF THORACIC SPINE BY POSTERIOR APPROACH USING COMPUTER-ASSISTED NAVIGATION N/A 10/11/2024     Procedure: LAMINECTOMY, SPINE, THORACIC, POSTERIOR APPROACH, USING COMPUTER-ASSISTED NAVIGATION;  Surgeon: Andra Mejia MD;  Location: [facility] OR;  Service: Neurosurgery;  Laterality: N/A;  T9-L3 DECOMPRESSION / REMOVAL OF RIGHT ILIAC BOLT AND S1 SCREW / EXTENSION OF FUSION  T9-S2 ILIAC BOLT //  PRONE ADENIKE // DRILL // SCOPE // O-ARM //  DIRECT SPINE // NDM //       LUMBAR FUSION  05/28/2021    LUMBAR FUSION  02/21/2023    L3-S1    POSTERIOR LUMBAR INTERBODY FUSION (PLIF) WITH COMPUTER-ASSISTED NAVIGATION N/A 10/11/2024    Procedure: FUSION, SPINE, LUMBAR, PLIF, USING COMPUTER-ASSISTED NAVIGATION;  Surgeon: Andra Mejia MD;  Location: [facility] OR;  Service: Neurosurgery;  Laterality: N/A;    ROTATOR CUFF REPAIR Bilateral 04/12/2022    SMALL INTESTINE SURGERY  2015    SPINE SURGERY  2021 & 2023    TONSILLECTOMY  2017         Family Psychiatric History:   Mother- Schizophrenia     Allergies:   Review of patient's allergies indicates:   Allergen Reactions    Sucralfate Nausea Only       Substance Abuse History:   Tobacco: Former  Alcohol: Denies  Illicit Substances: Denies  Treatment: Denies        Scheduled Meds:    aspirin  325 mg Oral Daily    atorvastatin  80 mg Oral Daily    baclofen  20 mg Oral TID    carvediloL  3.125 mg Oral BID    docusate sodium  250 mg Oral BID    enoxparin  40 mg Subcutaneous Daily    levothyroxine  200 mcg Oral Before breakfast    losartan  100 mg Oral Daily      PRN Meds:   Current Facility-Administered Medications:     acetaminophen, 650 mg, Oral, Q6H PRN    melatonin, 6 mg, Oral, Nightly PRN    naloxone, 0.02 mg, Intravenous, PRN    ondansetron, 4 mg, Intravenous, Q4H PRN    oxyCODONE-acetaminophen, 1 tablet, Oral, Q4H PRN    polyethylene glycol, 17 g, Oral, BID PRN    prochlorperazine, 5 mg, Intravenous, Q6H PRN    simethicone, 1 tablet, Oral, QID PRN   Psychotherapeutics (From admission, onward)      None              Social History:  Housing Status: Lives with    Relationship Status/Sexual Orientation:    Children: 6  Education: 2 years community college   Employment Status/Info: Disabled    history: Denies  History of physical/sexual abuse: Denies   Access to gun: Denies       Legal History:   Past Charges/Incarcerations: Denies   Pending charges: Denies      OBJECTIVE:     Vitals   Vitals:    11/26/24 0913   BP: 126/72   Pulse:    Resp: 18   Temp:         Labs/Imaging/Studies:   Recent Results (from the past 48 hours)   POCT glucose    Collection Time: 11/24/24  8:51 PM   Result Value Ref Range    POCT Glucose 110 70 - 110 mg/dL   Comprehensive metabolic panel    Collection Time: 11/24/24  9:23 PM   Result Value Ref Range    Sodium 145 136 - 145 mmol/L    Potassium 3.2 (L) 3.5 - 5.1 mmol/L    Chloride 109 (H) 98 - 107 mmol/L    CO2 26 23 - 31 mmol/L    Glucose 109 82 - 115 mg/dL    Blood Urea Nitrogen 12.1 9.8 - 20.1 mg/dL    Creatinine 0.80 0.55 - 1.02 mg/dL    Calcium 9.0 8.4 - 10.2 mg/dL    Protein Total 6.2 5.8 - 7.6 gm/dL    Albumin 3.4 3.4 - 4.8 g/dL    Globulin 2.8 2.4 - 3.5 gm/dL    Albumin/Globulin Ratio 1.2 1.1 - 2.0 ratio    Bilirubin Total 0.5 <=1.5 mg/dL     40 - 150 unit/L    ALT 13 0 - 55 unit/L    AST 17 5 - 34 unit/L    eGFR >60 mL/min/1.73/m2    Anion Gap 10.0 mEq/L    BUN/Creatinine Ratio 15    CBC with Differential    Collection Time: 11/24/24  9:23 PM   Result Value Ref Range    WBC 5.80 4.50 - 11.50 x10(3)/mcL    RBC 3.47 (L) 4.20 - 5.40 x10(6)/mcL    Hgb 10.8 (L) 12.0 - 16.0 g/dL    Hct 32.6 (L) 37.0 - 47.0 %    MCV 93.9 80.0 - 94.0 fL    MCH 31.1 (H) 27.0 - 31.0 pg    MCHC 33.1 33.0 - 36.0 g/dL    RDW 13.4 11.5 - 17.0 %    Platelet 240 130 - 400 x10(3)/mcL    MPV 9.0 7.4 - 10.4 fL    Neut % 58.7 %    Lymph % 29.3 %    Mono % 9.1 %    Eos % 1.7 %    Basophil % 0.7 %    Lymph # 1.70 0.6 - 4.6 x10(3)/mcL    Neut # 3.40 2.1 - 9.2 x10(3)/mcL    Mono # 0.53 0.1 - 1.3 x10(3)/mcL    Eos # 0.10 0 - 0.9 x10(3)/mcL    Baso # 0.04  <=0.2 x10(3)/mcL    IG# 0.03 0 - 0.04 x10(3)/mcL    IG% 0.5 %    NRBC% 0.0 %   TSH    Collection Time: 11/24/24  9:23 PM   Result Value Ref Range    TSH 4.728 0.350 - 4.940 uIU/mL   T4, Free    Collection Time: 11/24/24  9:23 PM   Result Value Ref Range    Thyroxine Free 1.13 0.70 - 1.48 ng/dL   Troponin I    Collection Time: 11/24/24  9:23 PM   Result Value Ref Range    Troponin-I 0.048 (H) 0.000 - 0.045 ng/mL   Magnesium    Collection Time: 11/24/24  9:23 PM   Result Value Ref Range    Magnesium Level 1.50 (L) 1.60 - 2.60 mg/dL   CK    Collection Time: 11/24/24  9:23 PM   Result Value Ref Range    Creatine Kinase 105 29 - 168 U/L   COVID/FLU A&B PCR    Collection Time: 11/24/24  9:25 PM   Result Value Ref Range    Influenza A PCR Not Detected Not Detected    Influenza B PCR Not Detected Not Detected    SARS-CoV-2 PCR Not Detected Not Detected, Negative   EKG 12-lead    Collection Time: 11/24/24  9:28 PM   Result Value Ref Range    QRS Duration 84 ms    [facility] QTC Calculation 398 ms   EKG 12-lead    Collection Time: 11/24/24  9:34 PM   Result Value Ref Range    QRS Duration 80 ms    [facility] QTC Calculation 467 ms   Urinalysis, Reflex to Urine Culture    Collection Time: 11/25/24  1:03 AM    Specimen: Urine, Catheterized   Result Value Ref Range    Color, UA Yellow Yellow, Light-Yellow, Colorless, Straw, Dark-Yellow    Appearance, UA Clear Clear    Specific Gravity, UA 1.033 (H) 1.005 - 1.030    pH, UA 6.0 5.0 - 8.5    Protein, UA Trace (A) Negative    Glucose, UA Normal Negative, Normal    Ketones, UA Negative Negative    Blood, UA Negative Negative    Bilirubin, UA Negative Negative    Urobilinogen, UA Normal 0.2, 1.0, Normal    Nitrites, UA 2+ (A) Negative    Leukocyte Esterase, UA 25 (A) Negative    RBC, UA 0-5 None Seen, 0-2, 3-5, 0-5 /HPF    WBC, UA 6-10 (A) None Seen, 0-2, 3-5, 0-5 /HPF    Bacteria, UA Trace None Seen, Trace /HPF    Squamous Epithelial Cells, UA None Seen None Seen, Trace, Rare /HPF    Mucous, UA  Trace (A) None Seen /LPF   Drug Screen, Urine    Collection Time: 11/25/24  1:03 AM   Result Value Ref Range    Amphetamines, Urine Negative Negative    Barbiturates, Urine Negative Negative    Benzodiazepine, Urine Negative Negative    Cannabinoids, Urine Negative Negative    Cocaine, Urine Negative Negative    Fentanyl, Urine Negative Negative    MDMA, Urine Negative Negative    Opiates, Urine Positive (A) Negative    Phencyclidine, Urine Negative Negative    pH, Urine 6.0 3.0 - 11.0    Specific Gravity, Urine Auto 1.033 1.001 - 1.035   Ethanol    Collection Time: 11/25/24  1:38 AM   Result Value Ref Range    Ethanol Level <10.0 <=10.0 mg/dL   Ammonia    Collection Time: 11/25/24  1:38 AM   Result Value Ref Range    Ammonia Level 36.4 18.0 - 72.0 umol/L   Troponin I    Collection Time: 11/25/24  1:38 AM   Result Value Ref Range    Troponin-I 0.047 (H) 0.000 - 0.045 ng/mL   Comprehensive Metabolic Panel (CMP)    Collection Time: 11/25/24  3:59 AM   Result Value Ref Range    Sodium 145 136 - 145 mmol/L    Potassium 3.1 (L) 3.5 - 5.1 mmol/L    Chloride 109 (H) 98 - 107 mmol/L    CO2 24 23 - 31 mmol/L    Glucose 121 (H) 82 - 115 mg/dL    Blood Urea Nitrogen 10.7 9.8 - 20.1 mg/dL    Creatinine 0.69 0.55 - 1.02 mg/dL    Calcium 8.5 8.4 - 10.2 mg/dL    Protein Total 5.6 (L) 5.8 - 7.6 gm/dL    Albumin 3.3 (L) 3.4 - 4.8 g/dL    Globulin 2.3 (L) 2.4 - 3.5 gm/dL    Albumin/Globulin Ratio 1.4 1.1 - 2.0 ratio    Bilirubin Total 0.5 <=1.5 mg/dL     40 - 150 unit/L    ALT 15 0 - 55 unit/L    AST 20 5 - 34 unit/L    eGFR >60 mL/min/1.73/m2    Anion Gap 12.0 mEq/L    BUN/Creatinine Ratio 16    Magnesium    Collection Time: 11/25/24  3:59 AM   Result Value Ref Range    Magnesium Level 1.80 1.60 - 2.60 mg/dL   Phosphorus    Collection Time: 11/25/24  3:59 AM   Result Value Ref Range    Phosphorus Level 2.8 2.3 - 4.7 mg/dL   CBC with Differential    Collection Time: 11/25/24  3:59 AM   Result Value Ref Range    WBC 6.83 4.50 -  11.50 x10(3)/mcL    RBC 3.27 (L) 4.20 - 5.40 x10(6)/mcL    Hgb 10.2 (L) 12.0 - 16.0 g/dL    Hct 30.6 (L) 37.0 - 47.0 %    MCV 93.6 80.0 - 94.0 fL    MCH 31.2 (H) 27.0 - 31.0 pg    MCHC 33.3 33.0 - 36.0 g/dL    RDW 13.3 11.5 - 17.0 %    Platelet 255 130 - 400 x10(3)/mcL    MPV 9.1 7.4 - 10.4 fL    Neut % 78.9 %    Lymph % 12.6 %    Mono % 7.2 %    Eos % 0.3 %    Basophil % 0.6 %    Lymph # 0.86 0.6 - 4.6 x10(3)/mcL    Neut # 5.39 2.1 - 9.2 x10(3)/mcL    Mono # 0.49 0.1 - 1.3 x10(3)/mcL    Eos # 0.02 0 - 0.9 x10(3)/mcL    Baso # 0.04 <=0.2 x10(3)/mcL    IG# 0.03 0 - 0.04 x10(3)/mcL    IG% 0.4 %    NRBC% 0.0 %   Troponin I    Collection Time: 11/25/24  9:54 AM   Result Value Ref Range    Troponin-I 0.050 (H) 0.000 - 0.045 ng/mL   Sedimentation rate    Collection Time: 11/25/24  1:56 PM   Result Value Ref Range    Sed Rate 1 0 - 20 mm/hr   C-Reactive Protein    Collection Time: 11/25/24  1:56 PM   Result Value Ref Range    CRP 2.20 <5.00 mg/L   Protime-INR    Collection Time: 11/26/24  4:38 AM   Result Value Ref Range    PT 13.8 12.5 - 14.5 seconds    INR 1.1 <=1.3   Comprehensive Metabolic Panel    Collection Time: 11/26/24  4:38 AM   Result Value Ref Range    Sodium 144 136 - 145 mmol/L    Potassium 3.1 (L) 3.5 - 5.1 mmol/L    Chloride 105 98 - 107 mmol/L    CO2 28 23 - 31 mmol/L    Glucose 136 (H) 82 - 115 mg/dL    Blood Urea Nitrogen 6.4 (L) 9.8 - 20.1 mg/dL    Creatinine 0.65 0.55 - 1.02 mg/dL    Calcium 8.0 (L) 8.4 - 10.2 mg/dL    Protein Total 5.2 (L) 5.8 - 7.6 gm/dL    Albumin 2.9 (L) 3.4 - 4.8 g/dL    Globulin 2.3 (L) 2.4 - 3.5 gm/dL    Albumin/Globulin Ratio 1.3 1.1 - 2.0 ratio    Bilirubin Total 0.5 <=1.5 mg/dL     40 - 150 unit/L    ALT 12 0 - 55 unit/L    AST 15 5 - 34 unit/L    eGFR >60 mL/min/1.73/m2    Anion Gap 11.0 mEq/L    BUN/Creatinine Ratio 10    CBC with Differential    Collection Time: 11/26/24  4:38 AM   Result Value Ref Range    WBC 4.31 (L) 4.50 - 11.50 x10(3)/mcL    RBC 3.26 (L) 4.20 -  "5.40 x10(6)/mcL    Hgb 10.1 (L) 12.0 - 16.0 g/dL    Hct 31.2 (L) 37.0 - 47.0 %    MCV 95.7 (H) 80.0 - 94.0 fL    MCH 31.0 27.0 - 31.0 pg    MCHC 32.4 (L) 33.0 - 36.0 g/dL    RDW 13.5 11.5 - 17.0 %    Platelet 229 130 - 400 x10(3)/mcL    MPV 9.0 7.4 - 10.4 fL    Neut % 54.0 %    Lymph % 33.9 %    Mono % 8.1 %    Eos % 2.1 %    Basophil % 0.7 %    Lymph # 1.46 0.6 - 4.6 x10(3)/mcL    Neut # 2.33 2.1 - 9.2 x10(3)/mcL    Mono # 0.35 0.1 - 1.3 x10(3)/mcL    Eos # 0.09 0 - 0.9 x10(3)/mcL    Baso # 0.03 <=0.2 x10(3)/mcL    IG# 0.05 (H) 0 - 0.04 x10(3)/mcL    IG% 1.2 %    NRBC% 0.0 %   Troponin I    Collection Time: 11/26/24  4:38 AM   Result Value Ref Range    Troponin-I 0.042 0.000 - 0.045 ng/mL      No results found for: "PHENYTOIN", "PHENOBARB", "VALPROATE", "CBMZ"        Psychiatric Mental Status Exam:  General Appearance: appears stated age, dressed in hospital garb, lying in bed, in no acute distress  Arousal: alert  Behavior: cooperative, polite, appropriate eye-contact, under good behavioral control  Movements and Motor Activity: no abnormal involuntary movements noted  Orientation: oriented to person, place, time, and situation  Speech: normal rate, rhythm, volume, tone and pitch  Mood: Depressed and Anxious  Affect: mood-congruent  Thought Process: intact, linear, goal-directed, organized, logical  Associations: intact, no loosening of associations  Thought Content and Perceptions: no suicidal or homicidal ideation, no auditory or visual hallucinations, no paranoid ideation, no ideas of reference, no evidence of delusions or psychosis  Recent and Remote Memory: grossly intact; per interview/observation with patient  Attention and Concentration: grossly intact; per interview/observation with patient  Fund of Knowledge: grossly intact; based on history, vocabulary, fund of knowledge, syntax, grammar, and content  Insight: adequate; based on understanding of severity of illness and HPI  Judgment: adequate; based on " patient's behavior and HPI        ASSESSMENT/PLAN:   Diagnoses:  Major Depressive Disorder, Recurrent, Mild  Generalized Anxiety Disorder (F41.1)       Past Medical History:   Diagnosis Date    Anemia     Anxiety     Depression     Dyspareunia     Encounter for blood transfusion     HTN (hypertension)     Hypothyroidism, unspecified     Liver disease     fatty liver    Lumbar radiculopathy     Menopause     Migraine     Obesity     Osteoporosis     Other spondylosis with radiculopathy, lumbar region     Personal history of fall     Spinal stenosis, lumbar region with neurogenic claudication     TIA (transient ischemic attack) 1994    Unspecified osteoarthritis, unspecified site     Urinary incontinence           Problem lists and Management Plans:  Medication Management  Remeron 15mg PO QHS  Sertraline 100mg PO QD  Legal  PEC not recommended. Low risk of imminent harm to self or others.  Disposition  Per primary team  Psychiatrically safe for discharge.  Psychiatry will sign-off          Bakari Andrade

## 2024-11-26 NOTE — PLAN OF CARE
[facility] spoke to [facility] [place] [facility] who confirmed that they received the 142 and are submitting for insurance auth

## 2024-11-26 NOTE — PROGRESS NOTES
Discussed her back and findings of the cat scan.  She is to go to IR for possible biopsy of the right sided fluid collection.  Moves her legs well..

## 2024-11-26 NOTE — CONSULTS
[facility] - 5th Floor Med Surg  Neurology  Consult Note    Patient Name: Kasandra Cheek  MRN: [id number]  Admission Date: 11/24/2024  Hospital Length of Stay: 1 days  Code Status: Full Code   Attending Provider: Mauricio Velázquez MD   Consulting Provider: TACO Pickett  Primary Care Physician: Ravinder Mazariegos DO  Principal Problem:<principal problem not specified>    Inpatient consult to Neurology  Consult performed by: Yulissa Velazquez AGACNP-BC  Consult ordered by: Mauricio Velázquez MD         Subjective:     Chief Complaint:       HPI:   63 y/o F with PMH HTN, HLD, anemia, hypothyroidism, fatty liver disease,, lumbar radiculopathy, migraines, osteoporosis, anxiety, and depression who presented to ED on 11/24 for AMS.  Patient's  reported patient having worsening lethargy that began on 11/22 with associated agitation and difficulty understanding her speech, was in normal state of health prior to this.   During admission, workup revealed seroma to right side surgical incision in thoracic region, for which neurosurgery plans to have removed and biopsied per IR.    Of note, patient underwent spinal fusion with Neurosurgery last October, had some progressive BLE weakness and lower back pain following the procedure, for which she was admitted for approximately 2 weeks ago, and was discharged to inpatient rehab facility.  Patient became frightened and and untrusting of staff, subsequently left AMA.  Patient was also admitted on 10/30 for similar presentation where she was found to have Right frontal and parietal infarcts.  Started on aspirin and atorvastatin.  Intermittent episodes of confusion during that admission.  EEG unrevealing for seizure activity, however, patient was started on Keppra.    CT head without negative for acute intracranial abnormalities.    Labs revealed hypokalemia, otherwise unremarkable.     Past Medical History:   Diagnosis Date    Anemia     Anxiety     Depression      Dyspareunia     Encounter for blood transfusion     HTN (hypertension)     Hypothyroidism, unspecified     Liver disease     fatty liver    Lumbar radiculopathy     Menopause     Migraine     Obesity     Osteoporosis     Other spondylosis with radiculopathy, lumbar region     Personal history of fall     Spinal stenosis, lumbar region with neurogenic claudication     TIA (transient ischemic attack) 1994    Unspecified osteoarthritis, unspecified site     Urinary incontinence        Past Surgical History:   Procedure Laterality Date    ABDOMINAL SURGERY      Gastric Bypass    CARPAL TUNNEL RELEASE Bilateral     CERVICAL FUSION  12/28/2021    CHOLECYSTECTOMY  2015    COLONOSCOPY      COLPOSCOPY      FOOT SURGERY Left     FRACTURE SURGERY  2001    GASTRIC BYPASS  2011    HAND SURGERY Left     thumb    HERNIA REPAIR  2014    JOINT REPLACEMENT  Knee 2015, Hip 2016    LAMINECTOMY OF THORACIC SPINE BY POSTERIOR APPROACH USING COMPUTER-ASSISTED NAVIGATION N/A 10/11/2024    Procedure: LAMINECTOMY, SPINE, THORACIC, POSTERIOR APPROACH, USING COMPUTER-ASSISTED NAVIGATION;  Surgeon: Andra Mejia MD;  Location: [facility];  Service: Neurosurgery;  Laterality: N/A;  T9-L3 DECOMPRESSION / REMOVAL OF RIGHT ILIAC BOLT AND S1 SCREW / EXTENSION OF FUSION  T9-S2 ILIAC BOLT //  PRONE ADENIKE // DRILL // SCOPE // O-ARM //  DIRECT SPINE // NDM //       LUMBAR FUSION  05/28/2021    LUMBAR FUSION  02/21/2023    L3-S1    POSTERIOR LUMBAR INTERBODY FUSION (PLIF) WITH COMPUTER-ASSISTED NAVIGATION N/A 10/11/2024    Procedure: FUSION, SPINE, LUMBAR, PLIF, USING COMPUTER-ASSISTED NAVIGATION;  Surgeon: Andra Mejia MD;  Location: [facility];  Service: Neurosurgery;  Laterality: N/A;    ROTATOR CUFF REPAIR Bilateral 04/12/2022    SMALL INTESTINE SURGERY  2015    SPINE SURGERY  2021 & 2023    TONSILLECTOMY  2017       Review of patient's allergies indicates:   Allergen Reactions    Sucralfate Nausea Only       Current Neurological Medications:      No current facility-administered medications on file prior to encounter.     Current Outpatient Medications on File Prior to Encounter   Medication Sig    aspirin (ECOTRIN) 325 MG EC tablet Take 1 tablet (325 mg total) by mouth once daily.    atorvastatin (LIPITOR) 80 MG tablet Take 1 tablet (80 mg total) by mouth once daily.    carvediloL (COREG) 3.125 MG tablet Take 1 tablet (3.125 mg total) by mouth 2 (two) times daily.    cholecalciferol, vitamin D3, (VITAMIN D3) 250 mcg (10,000 unit) Cap capsule Take 1 tablet by mouth Daily.    cyanocobalamin, vitamin B-12, 2,500 mcg Tab Take 2,500 mcg by mouth Daily.    docusate sodium (COLACE) 250 MG capsule Take 250 mg by mouth 2 (two) times a day.    ferrous sulfate (FEOSOL) 325 mg (65 mg iron) Tab tablet Take 1 tablet by mouth Daily.    furosemide (LASIX) 20 MG tablet Take 20 mg by mouth 2 (two) times daily.    levETIRAcetam (KEPPRA) 500 MG Tab Take 1 tablet (500 mg total) by mouth 2 (two) times daily.    levothyroxine (SYNTHROID) 200 MCG tablet Take 200 mcg by mouth once daily.    losartan (COZAAR) 100 MG tablet Take 1 tablet by mouth once daily.    mirtazapine (REMERON) 15 MG tablet Take 1 tablet (15 mg total) by mouth every evening.    omega-3 fatty acids/fish oil (FISH OIL OMEGA 3-6-9 ORAL)     ondansetron (ZOFRAN) 8 MG tablet Take 8 mg by mouth every 6 (six) hours as needed for Nausea.    oxyCODONE-acetaminophen (PERCOCET) 5-325 mg per tablet Take 1 tablet by mouth every 4 (four) hours as needed for Pain.    pantoprazole (PROTONIX) 40 MG tablet Take 40 mg by mouth 2 (two) times daily.    VITAMIN K2 ORAL Take 150 mcg by mouth Daily.    [DISCONTINUED] ARIPiprazole (ABILIFY) 2 MG Tab Take 2 mg by mouth every morning.    [DISCONTINUED] diazePAM (VALIUM) 5 MG tablet Take by mouth.     Family History       Problem Relation (Age of Onset)    Arthritis Sister, Father    Cancer Mother, Father, Maternal Aunt, Maternal Aunt, Maternal Aunt, Paternal Aunt, Paternal Aunt     Cystic fibrosis Sister, Brother    Depression Sister, Mother    Diabetes Father    Early death Brother, Sister    Heart failure Maternal Grandmother    Hypertension Sister, Father    Mental illness Mother    Vision loss Sister, Father          Tobacco Use    Smoking status: Former     Current packs/day: 0.00     Average packs/day: 1 pack/day for 27.0 years (27.0 ttl pk-yrs)     Types: Cigarettes     Start date: 1975     Quit date: 2002     Years since quittin.9    Smokeless tobacco: Never    Tobacco comments:     off and on when smoking   Substance and Sexual Activity    Alcohol use: Not Currently     Comment: I do not drink    Drug use: Never    Sexual activity: Not Currently     Partners: Male     Birth control/protection: Post-menopausal     Review of Systems  A 14pt ros was reviewed & is negative unless o/w documented in the hpi    Objective:     Vital Signs (Most Recent):  Temp: 98.4 °F (36.9 °C) (24 1114)  Pulse: 75 (24 1114)  Resp: 18 (24 1057)  BP: 118/74 (24 1114)  SpO2: (!) 94 % (24 1114) Vital Signs (24h Range):  Temp:  [97.8 °F (36.6 °C)-98.7 °F (37.1 °C)] 98.4 °F (36.9 °C)  Pulse:  [71-88] 75  Resp:  [18] 18  SpO2:  [94 %-97 %] 94 %  BP: (118-151)/(63-85) 118/74     Weight: 99.8 kg (220 lb 0.3 oz)  Body mass index is 34.46 kg/m².     Physical Exam  Vitals reviewed.   Constitutional:       General: She is awake.      Appearance: Normal appearance.   HENT:      Head: Normocephalic and atraumatic.      Nose: Nose normal.      Mouth/Throat:      Mouth: Mucous membranes are moist.      Pharynx: Oropharynx is clear.   Eyes:      Extraocular Movements: Extraocular movements intact and EOM normal.      Pupils: Pupils are equal, round, and reactive to light.   Pulmonary:      Effort: Pulmonary effort is normal.   Musculoskeletal:      Cervical back: Normal range of motion.   Skin:     General: Skin is warm and dry.   Neurological:      Mental Status: She is alert and  oriented to person, place, and time.   Psychiatric:         Speech: Speech normal.         Behavior: Behavior normal. Behavior is cooperative.         Thought Content: Thought content normal.          NEUROLOGICAL EXAMINATION:     MENTAL STATUS   Oriented to person, place, and time.   Follows 3 step commands.   Attention: normal. Concentration: normal.   Speech: speech is normal   Level of consciousness: alert  Knowledge: good.   Normal comprehension.     CRANIAL NERVES     CN II   Visual fields full to confrontation.     CN III, IV, VI   Pupils are equal, round, and reactive to light.  Extraocular motions are normal.   Nystagmus: none   Conjugate gaze: present    CN V   Facial sensation intact.     CN VII   Facial expression full, symmetric.     MOTOR EXAM   Right arm pronator drift: absent  Left arm pronator drift: absent       BUE 5/5  BLE 2/5     REFLEXES     Reflexes   Right plantar: normal  Left plantar: normal  Right ankle clonus: absent  Left ankle clonus: absent    SENSORY EXAM   Light touch normal.       Significant Labs:   Recent Lab Results         11/26/24  0438   11/25/24  1356        Albumin/Globulin Ratio 1.3         Albumin 2.9                  ALT 12         Anion Gap 11.0         AST 15         Baso # 0.03         Basophil % 0.7         BILIRUBIN TOTAL 0.5         BUN 6.4         BUN/CREAT RATIO 10         Calcium 8.0         Chloride 105         CO2 28         Creatinine 0.65         CRP   2.20       eGFR >60         Eos # 0.09         Eos % 2.1         Globulin, Total 2.3         Glucose 136         Hematocrit 31.2         Hemoglobin 10.1         Immature Grans (Abs) 0.05         Immature Granulocytes 1.2         INR 1.1         Lymph # 1.46         LYMPH % 33.9         MCH 31.0         MCHC 32.4         MCV 95.7         Mono # 0.35         Mono % 8.1         MPV 9.0         Neut # 2.33         Neut % 54.0         nRBC 0.0         Platelet Count 229         Potassium 3.1         PROTEIN  TOTAL 5.2         PT 13.8         RBC 3.26         RDW 13.5         Sed Rate   1       Sodium 144         Troponin I 0.042         WBC 4.31                 Significant Imaging:   CT head w/o:  FINDINGS:  Ventricles of normal size and shape there is no shift of the midline noted.  There are no extra-axial fluid collections or areas consistent with hemorrhage noted.  No masses is seen no acute infarcts are noted.  The calvarium appears intact.     Impression:     No acute abnormalities are seen       I have reviewed all pertinent imaging results/findings within the past 24 hours.  Assessment and Plan:     Altered mental status  Back to neurologic baseline   will resume home dose Keppra  No further recommendations from neurologic standpoint          VTE Risk Mitigation (From admission, onward)           Ordered     enoxaparin injection 40 mg  Daily         11/25/24 0240     IP VTE HIGH RISK PATIENT  Once         11/25/24 0240     Place sequential compression device  Until discontinued         11/25/24 0240                    Thank you for your consult.  Further recommendations to follow per MD Yulissa Velazquez, AGACN-BC  Neurology  [facility] - 5th Floor Med Surg    I have seen/examined the patient.  NP was scribe.  I agree with the findings unless outlined below:    Adam N Foreman, MD [facility]     Agree with above  Mentation normal  Workup reassuring  Signing off

## 2024-11-26 NOTE — H&P (VIEW-ONLY)
[facility]                       [address] [id number]    PATIENT NAME:       JORDAN COYNE   YOB: 1960  CSN:                [id number]   MRN:                [id number]  ADMIT DATE:         11/24/2024 20:51:00  PHYSICIAN:          Andra Mejia MD                            CONSULTATION    DATE OF CONSULT:      I saw, I turned her to the side.  She has this soft tissue leg swelling on the   right side.  I palpated it hard, does not feel like metal.  She did not hurt.    She can move her legs.  Her legs are swollen.  I looked at the scan, it showed   the blanca and the caps are in place and having some question of hardware, the top   screw is having some hallowing, but otherwise the bars are not out.    I discussed this with the  and I explained that I will try to get the   Radiology maybe do an IR, put a needle to see if they can drain anything.  I   discussed with them and he was in agreement.  I told him I am around to see her   as well.        ______________________________  Andra Mejia MD    IM/JOSHUAS  DD:  11/25/2024  Time:  03:02PM  DT:  11/25/2024  Time:  07:29PM  Job #:  [id number]      CONSULTATION

## 2024-11-26 NOTE — INTERVAL H&P NOTE
The patient has been examined and the H&P has been reviewed:    I concur with the findings and no changes have occurred since H&P was written. 63 yo F with superficial fluid in the thoracic spine soft tissues presents for aspiration.        There are no hospital problems to display for this patient.

## 2024-11-26 NOTE — HPI
65 y/o F with PMH HTN, HLD, anemia, hypothyroidism, fatty liver disease,, lumbar radiculopathy, migraines, osteoporosis, anxiety, and depression who presented to ED on 11/24 for AMS.  Patient's  reported patient having worsening lethargy that began on 11/22 with associated agitation and difficulty understanding her speech, was in normal state of health prior to this.   During admission, workup revealed seroma to right side surgical incision in thoracic region, for which neurosurgery plans to have removed and biopsied per IR.    Of note, patient underwent spinal fusion with Neurosurgery last October, had some progressive BLE weakness and lower back pain following the procedure, for which she was admitted for approximately 2 weeks ago, and was discharged to inpatient rehab facility.  Patient became frightened and and untrusting of staff, subsequently left AMA.  Patient was also admitted on 10/30 for similar presentation where she was found to have Right frontal and parietal infarcts.  Started on aspirin and atorvastatin.  Intermittent episodes of confusion during that admission.  EEG unrevealing for seizure activity, however, patient was started on Keppra.    CT head without negative for acute intracranial abnormalities.    Labs revealed hypokalemia, otherwise unremarkable.

## 2024-11-26 NOTE — OP NOTE
Radiology Post-Procedure Note    Pre Op Diagnosis: Post Op Fluid Collection Posterior Thoracic Soft Tissues    Post Op Diagnosis: Same    Secondary Diagnoses:   Problem List Items Addressed This Visit    None  Visit Diagnoses       Chest pain    -  Primary    Relevant Orders    EKG 12-lead (Completed)    EKG 12-lead    AMS (altered mental status)        Relevant Orders    X-Ray Chest AP Portable (Completed)    EKG 12-lead (Completed)    Acute encephalopathy        Hypokalemia        Hypomagnesemia                 Procedure: US Guided Fluid Aspiration    Procedure performed by: Daniel Simental MD    Assistant: None    Written Informed Consent Obtained: Yes    Specimen Removed: 18 cc old, dark, bloody fluid    Estimated Blood Loss: <10 cc    Condition: Stable    Outcome: The patient tolerated the procedure well and was without complications.    For further details please see the imaging report associated.    Disposition: Transfer back to inpatient unit

## 2024-11-26 NOTE — PLAN OF CARE
Problem: Adult Inpatient Plan of Care  Goal: Optimal Comfort and Wellbeing  Outcome: Progressing     Problem: Skin Injury Risk Increased  Goal: Skin Health and Integrity  Outcome: Progressing

## 2024-11-26 NOTE — ASSESSMENT & PLAN NOTE
Back to neurologic baseline   will resume home dose Keppra  No further recommendations from neurologic standpoint

## 2024-11-26 NOTE — CONSULTS
[facility]                       [address] [id number]    PATIENT NAME:       JORDAN COYNE   YOB: 1960  CSN:                [id number]   MRN:                [id number]  ADMIT DATE:         11/24/2024 20:51:00  PHYSICIAN:          Andra eMjia MD                            CONSULTATION    DATE OF CONSULT:      I saw, I turned her to the side.  She has this soft tissue leg swelling on the   right side.  I palpated it hard, does not feel like metal.  She did not hurt.    She can move her legs.  Her legs are swollen.  I looked at the scan, it showed   the blanca and the caps are in place and having some question of hardware, the top   screw is having some hallowing, but otherwise the bars are not out.    I discussed this with the  and I explained that I will try to get the   Radiology maybe do an IR, put a needle to see if they can drain anything.  I   discussed with them and he was in agreement.  I told him I am around to see her   as well.        ______________________________  Andra Mejia MD    IM/JOSHUAS  DD:  11/25/2024  Time:  03:02PM  DT:  11/25/2024  Time:  07:29PM  Job #:  [id number]      CONSULTATION

## 2024-11-27 LAB
ALBUMIN SERPL-MCNC: 2.6 G/DL (ref 3.4–4.8)
ALBUMIN/GLOB SERPL: 1.2 RATIO (ref 1.1–2)
ALP SERPL-CCNC: 109 UNIT/L (ref 40–150)
ALT SERPL-CCNC: 11 UNIT/L (ref 0–55)
ANION GAP SERPL CALC-SCNC: 9 MEQ/L
APICAL FOUR CHAMBER EJECTION FRACTION: 56 %
APICAL TWO CHAMBER EJECTION FRACTION: 56 %
AST SERPL-CCNC: 12 UNIT/L (ref 5–34)
AV INDEX (PROSTH): 0.78
AV MEAN GRADIENT: 5 MMHG
AV PEAK GRADIENT: 9 MMHG
AV VALVE AREA BY VELOCITY RATIO: 2.3 CM²
AV VALVE AREA: 2.4 CM²
AV VELOCITY RATIO: 0.73
BASOPHILS # BLD AUTO: 0.03 X10(3)/MCL
BASOPHILS NFR BLD AUTO: 0.5 %
BILIRUB SERPL-MCNC: 0.4 MG/DL
BSA FOR ECHO PROCEDURE: 2.17 M2
BUN SERPL-MCNC: 7.4 MG/DL (ref 9.8–20.1)
CALCIUM SERPL-MCNC: 7.7 MG/DL (ref 8.4–10.2)
CHLORIDE SERPL-SCNC: 107 MMOL/L (ref 98–107)
CO2 SERPL-SCNC: 27 MMOL/L (ref 23–31)
CREAT SERPL-MCNC: 0.63 MG/DL (ref 0.55–1.02)
CREAT/UREA NIT SERPL: 12
DOP CALC AO PEAK VEL: 1.5 M/S
DOP CALC AO VTI: 30.4 CM
DOP CALC LVOT AREA: 3.1 CM2
DOP CALC LVOT DIAMETER: 2 CM
DOP CALC LVOT PEAK VEL: 1.1 M/S
DOP CALC LVOT STROKE VOLUME: 74.1 CM3
DOP CALC MV VTI: 33.6 CM
DOP CALCLVOT PEAK VEL VTI: 23.6 CM
E WAVE DECELERATION TIME: 280 MSEC
E/A RATIO: 0.69
E/E' RATIO: 7.22 M/S
EOSINOPHIL # BLD AUTO: 0.12 X10(3)/MCL (ref 0–0.9)
EOSINOPHIL NFR BLD AUTO: 2.1 %
ERYTHROCYTE [DISTWIDTH] IN BLOOD BY AUTOMATED COUNT: 13.6 % (ref 11.5–17)
GFR SERPLBLD CREATININE-BSD FMLA CKD-EPI: >60 ML/MIN/1.73/M2
GLOBULIN SER-MCNC: 2.1 GM/DL (ref 2.4–3.5)
GLUCOSE SERPL-MCNC: 100 MG/DL (ref 82–115)
HCT VFR BLD AUTO: 29.9 % (ref 37–47)
HGB BLD-MCNC: 9.8 G/DL (ref 12–16)
HR MV ECHO: 63 BPM
IMM GRANULOCYTES # BLD AUTO: 0.03 X10(3)/MCL (ref 0–0.04)
IMM GRANULOCYTES NFR BLD AUTO: 0.5 %
LEFT ATRIUM AREA SYSTOLIC (APICAL 2 CHAMBER): 17.4 CM2
LEFT ATRIUM AREA SYSTOLIC (APICAL 4 CHAMBER): 17.7 CM2
LEFT ATRIUM SIZE: 4.3 CM
LEFT ATRIUM VOLUME INDEX MOD: 23.2 ML/M2
LEFT ATRIUM VOLUME MOD: 49 ML
LEFT VENTRICLE DIASTOLIC VOLUME INDEX: 54.98 ML/M2
LEFT VENTRICLE DIASTOLIC VOLUME: 116 ML
LEFT VENTRICLE END DIASTOLIC VOLUME APICAL 2 CHAMBER: 104 ML
LEFT VENTRICLE END DIASTOLIC VOLUME APICAL 4 CHAMBER: 123 ML
LEFT VENTRICLE END SYSTOLIC VOLUME APICAL 2 CHAMBER: 46.9 ML
LEFT VENTRICLE END SYSTOLIC VOLUME APICAL 4 CHAMBER: 49.9 ML
LEFT VENTRICLE SYSTOLIC VOLUME INDEX: 23.5 ML/M2
LEFT VENTRICLE SYSTOLIC VOLUME: 49.6 ML
LV LATERAL E/E' RATIO: 6.5 M/S
LV SEPTAL E/E' RATIO: 8.13 M/S
LVOT MG: 3 MMHG
LVOT MV: 0.74 CM/S
LYMPHOCYTES # BLD AUTO: 1.71 X10(3)/MCL (ref 0.6–4.6)
LYMPHOCYTES NFR BLD AUTO: 29.6 %
MAGNESIUM SERPL-MCNC: 1.8 MG/DL (ref 1.6–2.6)
MCH RBC QN AUTO: 31.4 PG (ref 27–31)
MCHC RBC AUTO-ENTMCNC: 32.8 G/DL (ref 33–36)
MCV RBC AUTO: 95.8 FL (ref 80–94)
MONOCYTES # BLD AUTO: 0.46 X10(3)/MCL (ref 0.1–1.3)
MONOCYTES NFR BLD AUTO: 8 %
MV MEAN GRADIENT: 2 MMHG
MV PEAK A VEL: 0.94 M/S
MV PEAK E VEL: 0.65 M/S
MV PEAK GRADIENT: 5 MMHG
MV STENOSIS PRESSURE HALF TIME: 47 MS
MV VALVE AREA BY CONTINUITY EQUATION: 2.21 CM2
MV VALVE AREA P 1/2 METHOD: 4.68 CM2
NEUTROPHILS # BLD AUTO: 3.43 X10(3)/MCL (ref 2.1–9.2)
NEUTROPHILS NFR BLD AUTO: 59.3 %
NRBC BLD AUTO-RTO: 0 %
OHS LV EJECTION FRACTION SIMPSONS BIPLANE MOD: 57 %
PISA TR MAX VEL: 2.27 M/S
PLATELET # BLD AUTO: 218 X10(3)/MCL (ref 130–400)
PMV BLD AUTO: 8.7 FL (ref 7.4–10.4)
POTASSIUM SERPL-SCNC: 3.2 MMOL/L (ref 3.5–5.1)
PROT SERPL-MCNC: 4.7 GM/DL (ref 5.8–7.6)
PV PEAK GRADIENT: 4 MMHG
PV PEAK VELOCITY: 0.97 M/S
RBC # BLD AUTO: 3.12 X10(6)/MCL (ref 4.2–5.4)
SODIUM SERPL-SCNC: 143 MMOL/L (ref 136–145)
TDI LATERAL: 0.1 M/S
TDI SEPTAL: 0.08 M/S
TDI: 0.09 M/S
TR MAX PG: 21 MMHG
TRICUSPID ANNULAR PLANE SYSTOLIC EXCURSION: 2.04 CM
WBC # BLD AUTO: 5.78 X10(3)/MCL (ref 4.5–11.5)

## 2024-11-27 PROCEDURE — 25000003 PHARM REV CODE 250

## 2024-11-27 PROCEDURE — 25000003 PHARM REV CODE 250: Performed by: INTERNAL MEDICINE

## 2024-11-27 PROCEDURE — 21400001 HC TELEMETRY ROOM

## 2024-11-27 PROCEDURE — 25000003 PHARM REV CODE 250: Performed by: NURSE PRACTITIONER

## 2024-11-27 PROCEDURE — 83735 ASSAY OF MAGNESIUM: CPT | Performed by: INTERNAL MEDICINE

## 2024-11-27 PROCEDURE — 36415 COLL VENOUS BLD VENIPUNCTURE: CPT | Performed by: INTERNAL MEDICINE

## 2024-11-27 PROCEDURE — 97162 PT EVAL MOD COMPLEX 30 MIN: CPT

## 2024-11-27 PROCEDURE — 25000003 PHARM REV CODE 250: Performed by: PHYSICIAN ASSISTANT

## 2024-11-27 PROCEDURE — 85025 COMPLETE CBC W/AUTO DIFF WBC: CPT | Performed by: INTERNAL MEDICINE

## 2024-11-27 PROCEDURE — 63600175 PHARM REV CODE 636 W HCPCS: Performed by: INTERNAL MEDICINE

## 2024-11-27 PROCEDURE — 63600175 PHARM REV CODE 636 W HCPCS: Performed by: NURSE PRACTITIONER

## 2024-11-27 PROCEDURE — 97166 OT EVAL MOD COMPLEX 45 MIN: CPT

## 2024-11-27 PROCEDURE — 97535 SELF CARE MNGMENT TRAINING: CPT

## 2024-11-27 PROCEDURE — 80053 COMPREHEN METABOLIC PANEL: CPT | Performed by: INTERNAL MEDICINE

## 2024-11-27 RX ORDER — SELENIUM 50 MCG
1 TABLET ORAL
Status: DISCONTINUED | OUTPATIENT
Start: 2024-11-27 | End: 2024-12-03 | Stop reason: HOSPADM

## 2024-11-27 RX ORDER — CALCIUM CARBONATE 200(500)MG
1000 TABLET,CHEWABLE ORAL 4 TIMES DAILY PRN
Status: DISCONTINUED | OUTPATIENT
Start: 2024-11-27 | End: 2024-12-03 | Stop reason: HOSPADM

## 2024-11-27 RX ORDER — PANTOPRAZOLE SODIUM 40 MG/1
40 TABLET, DELAYED RELEASE ORAL DAILY
Status: DISCONTINUED | OUTPATIENT
Start: 2024-11-27 | End: 2024-12-03 | Stop reason: HOSPADM

## 2024-11-27 RX ADMIN — BACLOFEN 20 MG: 10 TABLET ORAL at 08:11

## 2024-11-27 RX ADMIN — OXYCODONE HYDROCHLORIDE AND ACETAMINOPHEN 1 TABLET: 5; 325 TABLET ORAL at 01:11

## 2024-11-27 RX ADMIN — CARVEDILOL 3.12 MG: 3.12 TABLET, FILM COATED ORAL at 08:11

## 2024-11-27 RX ADMIN — PANTOPRAZOLE SODIUM 40 MG: 40 TABLET, DELAYED RELEASE ORAL at 04:11

## 2024-11-27 RX ADMIN — POTASSIUM BICARBONATE 50 MEQ: 977.5 TABLET, EFFERVESCENT ORAL at 10:11

## 2024-11-27 RX ADMIN — OXYCODONE HYDROCHLORIDE AND ACETAMINOPHEN 1 TABLET: 5; 325 TABLET ORAL at 05:11

## 2024-11-27 RX ADMIN — Medication 1 CAPSULE: at 05:11

## 2024-11-27 RX ADMIN — HYDROMORPHONE HYDROCHLORIDE 0.5 MG: 2 INJECTION INTRAMUSCULAR; INTRAVENOUS; SUBCUTANEOUS at 10:11

## 2024-11-27 RX ADMIN — ENOXAPARIN SODIUM 40 MG: 40 INJECTION SUBCUTANEOUS at 05:11

## 2024-11-27 RX ADMIN — CALCIUM CARBONATE (ANTACID) CHEW TAB 500 MG 1000 MG: 500 CHEW TAB at 10:11

## 2024-11-27 RX ADMIN — SERTRALINE HYDROCHLORIDE 100 MG: 50 TABLET ORAL at 08:11

## 2024-11-27 RX ADMIN — MIRTAZAPINE 15 MG: 15 TABLET, FILM COATED ORAL at 08:11

## 2024-11-27 RX ADMIN — OXYCODONE HYDROCHLORIDE AND ACETAMINOPHEN 1 TABLET: 5; 325 TABLET ORAL at 07:11

## 2024-11-27 RX ADMIN — OXYCODONE HYDROCHLORIDE AND ACETAMINOPHEN 1 TABLET: 5; 325 TABLET ORAL at 03:11

## 2024-11-27 RX ADMIN — CALCIUM CARBONATE (ANTACID) CHEW TAB 500 MG 1000 MG: 500 CHEW TAB at 04:11

## 2024-11-27 RX ADMIN — HYDROMORPHONE HYDROCHLORIDE 0.5 MG: 2 INJECTION INTRAMUSCULAR; INTRAVENOUS; SUBCUTANEOUS at 06:11

## 2024-11-27 RX ADMIN — LEVOTHYROXINE SODIUM 200 MCG: 100 TABLET ORAL at 05:11

## 2024-11-27 RX ADMIN — LOSARTAN POTASSIUM 100 MG: 50 TABLET, FILM COATED ORAL at 08:11

## 2024-11-27 RX ADMIN — ASPIRIN 325 MG: 325 TABLET, COATED ORAL at 08:11

## 2024-11-27 RX ADMIN — ATORVASTATIN CALCIUM 80 MG: 40 TABLET, FILM COATED ORAL at 08:11

## 2024-11-27 RX ADMIN — LEVETIRACETAM 500 MG: 500 TABLET, FILM COATED ORAL at 08:11

## 2024-11-27 RX ADMIN — OXYCODONE HYDROCHLORIDE AND ACETAMINOPHEN 1 TABLET: 5; 325 TABLET ORAL at 09:11

## 2024-11-27 RX ADMIN — HYDROMORPHONE HYDROCHLORIDE 0.5 MG: 2 INJECTION INTRAMUSCULAR; INTRAVENOUS; SUBCUTANEOUS at 05:11

## 2024-11-27 RX ADMIN — ALUMINUM HYDROXIDE, MAGNESIUM HYDROXIDE, AND SIMETHICONE 30 ML: 1200; 120; 1200 SUSPENSION ORAL at 09:11

## 2024-11-27 RX ADMIN — BACLOFEN 20 MG: 10 TABLET ORAL at 03:11

## 2024-11-27 NOTE — PROGRESS NOTES
[facility] Hospital Medicine Progress Note        Chief Complaint: Inpatient Follow-up for     HPI: 64 y.o. female with a past medical history of hypertension, hyperlipidemia, anemia, hypothyroidism, fatty liver disease, lumbar radiculopathy, migraines, osteoporosis, spinal stenosis, TIA, anxiety, and depression who presented to [facility] on 11/24/2024 via EMS for altered mental status.   reported altered mental status began on Friday, 11/22/2024.   reported patient became very fatigued and was sleeping a lot.   also endorsed agitation and difficulty understanding what patient was saying.  Has been reported prior to Friday patient was in her normal state of health- reports patient normally ambulates with a walker at home.   states that patient has had increasing lower extremity weakness and back pain since fusion last month with Dr. Mejia.   reported that patient was recently inpatient here about 2 weeks ago and was discharged to rehab facility.   stated that patient became frightened when arriving at the rehab facility and left.   states they do not have an appointment with neurosurgeon until she goes through therapy and would like to see him while in patient.   denied any new medications.     Initial vital signs in ED were /81, pulse 103, respirations 20, temperature 36.7° C, and SpO2 96% on room air.  Labs revealed WBC 5.80, RBC 3.47, hemoglobin 10.8, hematocrit 32.6, MCV 93.9, potassium 3.2, chloride 109, magnesium 1.5, , troponin 0.048, TSH 4.728, T4 1.13, and serum alcohol undetectable.  UDS was positive for opiates.  Flu and COVID testing were negative.  UA revealed trace protein, 2+ nitrite, 25 leukocytes, and 6-10 white blood cells.  CT head without contrast revealed no acute abnormality seen.  CT cervical spine without contrast revealed postoperative changes without acute abnormality seen.  Chest x-ray  revealed right-sided pleural density.  CT chest abdomen and pelvis with IV contrast revealed probable old bilateral rib fractures, extensive hardware in the spine prosthesis in the right hip, and hepatomegaly.  EKG revealed normal sinus rhythm with heart rate of 94 beats per minute.  Patient was given IV morphine 4 mg, IV Zofran 4 mg, IV magnesium sulfate 2 g, IV potassium chloride 10 mEq, aspirin 325 mg, and IV Haldol 5 mg in ED. Patient was admitted to hospital medicine service for further medical management.      Patient recently discharged with laminectomy of thoracic spine including T9 T11-3 decompression, removal of right iliac bone and S1 screw, extension of fusion T9-S2 iliac bolt.       CT T spine   Stable postoperative changes of the thoracic and lumbar spine, with perihardware lucencies at T10-T11.  Residual fluid in the subcutaneous soft tissues of the thoracic spine and laminectomy bed of the lumbar spine, may be postoperative.  No enhancing paraspinal mass identified.  Neurosurgery was consulted and they consulted IR patient is status post ultrasound-guided fluid aspiration with almost 18 cc of old dark bloody fluid done on November 26 send it for cultures  Psych consulted patient is on Remeron and sertraline.  Neurology was consulted to since patient is back to baseline they do not recommend any other        Interval Hx:   Patient seen and examined this morning has a  monitor asking for pain medications  Case was discussed with patient's nurse and  on the floor.    Objective/physical exam:  General: In no acute distress, afebrile  Chest: Clear to auscultation bilaterally  Heart: RRR, +S1, S2, no appreciable murmur  Abdomen: Soft, nontender, BS +  MSK: Warm, no lower extremity edema, no clubbing or cyanosis  Neurologic: Alert and awake  VITAL SIGNS: 24 HRS MIN & MAX LAST   Temp  Min: 97.8 °F (36.6 °C)  Max: 98.6 °F (37 °C) 98.1 °F (36.7 °C)   BP  Min: 118/74  Max: 164/78 (!) 164/78    Pulse  Min: 75  Max: 82  82   Resp  Min: 18  Max: 20 18   SpO2  Min: 94 %  Max: 97 % (!) 94 %     I have reviewed the following labs:  Recent Labs   Lab 11/25/24 0359 11/26/24 0438 11/27/24 0356   WBC 6.83 4.31* 5.78   RBC 3.27* 3.26* 3.12*   HGB 10.2* 10.1* 9.8*   HCT 30.6* 31.2* 29.9*   MCV 93.6 95.7* 95.8*   MCH 31.2* 31.0 31.4*   MCHC 33.3 32.4* 32.8*   RDW 13.3 13.5 13.6    229 218   MPV 9.1 9.0 8.7     Recent Labs   Lab 11/24/24 2123 11/25/24 0359 11/26/24 0438 11/27/24 0356    145 144 143   K 3.2* 3.1* 3.1* 3.2*   * 109* 105 107   CO2 26 24 28 27   BUN 12.1 10.7 6.4* 7.4*   CREATININE 0.80 0.69 0.65 0.63   CALCIUM 9.0 8.5 8.0* 7.7*   MG 1.50* 1.80  --  1.80   ALBUMIN 3.4 3.3* 2.9* 2.6*   ALKPHOS 132 134 123 109   ALT 13 15 12 11   AST 17 20 15 12   BILITOT 0.5 0.5 0.5 0.4     Microbiology Results (last 7 days)       Procedure Component Value Units Date/Time    Gram Stain [[id number]] Collected: 11/26/24 0823    Order Status: Completed Specimen: Body Fluid from Back Updated: 11/26/24 1353     GRAM STAIN Few WBC observed      No bacteria seen    Anaerobic Culture [[id number]] Collected: 11/26/24 0823    Order Status: Sent Specimen: Body Fluid from Back Updated: 11/26/24 0832    Fungal Culture [[id number]] Collected: 11/26/24 0823    Order Status: Sent Specimen: Body Fluid from Back Updated: 11/26/24 0832    Body Fluid Culture [[id number]] Collected: 11/26/24 0823    Order Status: Resulted Specimen: Body Fluid from Back Updated: 11/26/24 0832             See below for Radiology    Assessment/Plan:  Acute metabolic encephalopathy, improving   NSTEMI, likely type 2  Hypokalemia   Hypomagnesemia, improved  Back pain  Recently laminectomy of thoracic spine including T9 T11-3 decompression, removal of right iliac bone and S1 screw, extension of fusion T9-S2 iliac bolt.       history of hypertension, hyperlipidemia, anemia, hypothyroidism, fatty liver disease, lumbar radiculopathy,  migraines, osteoporosis, spinal stenosis, TIA, anxiety, and depression    Patient is status post ultrasound-guided fluid aspiration on 11/26 of Residual fluid in the subcutaneous soft tissues of the thoracic spine -  18cc old, dark, bloody fluid .  Cultures pending   Neurosurgery following await culture results  Neurology was consulted and since patient is back to her baseline they recommend no further testing   Psych consulted and following they are recommending Remeron and sertraline  Potassium is low we will supplement  PT and OT consulted will see how patient is doing with PT and OT accordingly we will make discharge disposition discussed with the nursing staff to give her IV pain medication before she works with physical therapy as she is complaining of pain  Mental status is stable  Troponins did trend down but I will order 2D echo to look for any wall motion abnormality please follow up on results  Continue with other home medications that includes aspirin, statin, baclofen, Coreg, Keppra, Synthroid, losartan, mirtazapine and sertraline     Discharge disposition: Will see how patient is doing with PT and OT and  awaiting culture results    Prophylaxis: Lovenox    VTE prophylaxis:     Patient condition:  Stable/Fair/Guarded/ Serious/ Critical    Anticipated discharge and Disposition:         All diagnosis and differential diagnosis have been reviewed; assessment and plan has been documented; I have personally reviewed the labs and test results that are presently available; I have reviewed the patients medication list; I have reviewed the consulting providers response and recommendations. I have reviewed or attempted to review medical records based upon their availability    All of the patient's questions have been  addressed and answered. Patient's is agreeable to the above stated plan. I will continue to monitor closely and make adjustments to medical management as needed.    Portions of this note dictated  using EMR integrated voice recognition software, and may be subject to voice recognition errors not corrected at proofreading. Please contact writer for clarification if needed.   _____________________________________________________________________    Malnutrition Status:  Nutrition consulted. Most recent weight and BMI monitored-     Measurements:  Wt Readings from Last 1 Encounters:   11/25/24 99.8 kg (220 lb 0.3 oz)   Body mass index is 34.46 kg/m².    Patient has been screened and assessed by RD.    Malnutrition Type:  Context:    Level:      Malnutrition Characteristic Summary:       Interventions/Recommendations (treatment strategy):        Scheduled Med:   aspirin  325 mg Oral Daily    atorvastatin  80 mg Oral Daily    baclofen  20 mg Oral TID    carvediloL  3.125 mg Oral BID    docusate sodium  250 mg Oral BID    enoxparin  40 mg Subcutaneous Daily    levETIRAcetam  500 mg Oral BID    levothyroxine  200 mcg Oral Before breakfast    losartan  100 mg Oral Daily    mirtazapine  15 mg Oral QHS    pantoprazole  40 mg Oral Daily    sertraline  100 mg Oral Daily      Continuous Infusions:     PRN Meds:    Current Facility-Administered Medications:     acetaminophen, 1,000 mg, Oral, Q6H PRN    aluminum-magnesium hydroxide-simethicone, 30 mL, Oral, Q6H PRN    calcium carbonate, 1,000 mg, Oral, QID PRN    HYDROmorphone, 0.5 mg, Intravenous, Q8H PRN    melatonin, 6 mg, Oral, Nightly PRN    naloxone, 0.02 mg, Intravenous, PRN    ondansetron, 4 mg, Intravenous, Q4H PRN    oxyCODONE-acetaminophen, 1 tablet, Oral, Q4H PRN    polyethylene glycol, 17 g, Oral, BID PRN    prochlorperazine, 5 mg, Intravenous, Q6H PRN     Radiology:  I have personally reviewed the following imaging and agree with the radiologist.     US Guided Needle Placement  Narrative: PROCEDURE:  Ultrasound Guided Drainage (No Drain Left)    CLINICAL HISTORY:  64-year-old female with soft tissue fluid collection in the thoracic region,  postop    ANESTHESIA:  Local anesthesia    PHYSICIANS:  Daniel Simental MD    PROCEDURAL SUMMARY:  After informed consent was obtained, the patient was placed supine in the bed. A limited ultrasound of the posterior thoracic soft tissues was performed with Dr. Simental present in the room.  The target was identified. The planned skin entry site and route for needle passage for drain placement was then determined. The skin overlying the target was marked. The site was then prepped and draped in the usual sterile fashion.    The soft tissues were anesthetized with lidocaine and a dermatotomy was performed. Under real time ultrasound guidance, 5F Yueh sheath needle was advanced from the skin entry site towards the target collection. When the needle tip entered the collection, fluid was aspirated. The sheath was then advanced over the needle into the collection and the needle was removed. An Amplatz guidewire was then advanced through the sheath and coiled within the collection. The sheath was removed and the tract was serially dilated to accommodate a 8F pigtail drainage catheter.  The pigtail catheter was advanced over the wire and into the collection.  The wire was then removed. The cavity was evacuated.  A total of 18 cc of old, dark, bloody fluid was aspirated. The drainage catheter was then removed.  A sterile dressing was applied.    The patient tolerated the procedure well and was without any apparent complications.  Impression: Technically successful ultrasound-guided drainage (no drain left).    Electronically signed by: Daniel Simental  Date:    11/26/2024  Time:    10:50      Rose Benitez MD  Department of Hospital Medicine   [facility]   11/27/2024

## 2024-11-27 NOTE — PLAN OF CARE
Problem: Physical Therapy  Goal: Physical Therapy Goal  Description: Goals to be met by: 24     Patient will increase functional independence with mobility by performin. Supine to sit with Stand-by Assistance  2. Sit to supine with Stand-by Assistance  3. Sit to stand transfer with Stand-by Assistance  4. Bed to chair transfer with Stand-by Assistance using Rolling Walker  5. Gait  x 200 feet with Stand-by Assistance using Rolling Walker.     Outcome: Progressing

## 2024-11-27 NOTE — PROGRESS NOTES
[facility] - 5th Floor Med Surg  Neurosurgery  Progress Note    Subjective:     Interval History:     Mentation has improved drastically.    She has gone for her IR ultrasound-guided fluid aspiration and so far cultures are negative.  Psych has combined evaluated patient and adjusted medications.    Patient is back at her baseline.    Post-Op Info:  * No surgery found *          Medications:  Continuous Infusions:  Scheduled Meds:   aspirin  325 mg Oral Daily    atorvastatin  80 mg Oral Daily    baclofen  20 mg Oral TID    carvediloL  3.125 mg Oral BID    docusate sodium  250 mg Oral BID    enoxparin  40 mg Subcutaneous Daily    levETIRAcetam  500 mg Oral BID    levothyroxine  200 mcg Oral Before breakfast    losartan  100 mg Oral Daily    mirtazapine  15 mg Oral QHS    pantoprazole  40 mg Oral Daily    sertraline  100 mg Oral Daily     PRN Meds:  Current Facility-Administered Medications:     acetaminophen, 1,000 mg, Oral, Q6H PRN    aluminum-magnesium hydroxide-simethicone, 30 mL, Oral, Q6H PRN    calcium carbonate, 1,000 mg, Oral, QID PRN    HYDROmorphone, 0.5 mg, Intravenous, Q8H PRN    melatonin, 6 mg, Oral, Nightly PRN    naloxone, 0.02 mg, Intravenous, PRN    ondansetron, 4 mg, Intravenous, Q4H PRN    oxyCODONE-acetaminophen, 1 tablet, Oral, Q4H PRN    polyethylene glycol, 17 g, Oral, BID PRN    prochlorperazine, 5 mg, Intravenous, Q6H PRN     Review of Systems  Objective:     Weight: 99.8 kg (220 lb 0.3 oz)  Body mass index is 34.46 kg/m².  Vital Signs (Most Recent):  Temp: 98.8 °F (37.1 °C) (11/27/24 1040)  Pulse: 72 (11/27/24 1040)  Resp: 18 (11/27/24 1039)  BP: (!) 146/77 (11/27/24 1040)  SpO2: 98 % (11/27/24 1040) Vital Signs (24h Range):  Temp:  [97.5 °F (36.4 °C)-98.8 °F (37.1 °C)] 98.8 °F (37.1 °C)  Pulse:  [72-82] 72  Resp:  [18-20] 18  SpO2:  [94 %-98 %] 98 %  BP: (118-164)/(67-79) 146/77                              Neurosurgery Physical Exam    Awake and oriented   PERRLA   Follows  commands  Cooperative    Significant Labs:  Recent Labs   Lab 11/26/24 0438 11/27/24  0356    143   K 3.1* 3.2*    107   CO2 28 27   BUN 6.4* 7.4*   CREATININE 0.65 0.63   CALCIUM 8.0* 7.7*   MG  --  1.80     Recent Labs   Lab 11/26/24 0438 11/27/24 0356   WBC 4.31* 5.78   HGB 10.1* 9.8*   HCT 31.2* 29.9*    218     Recent Labs   Lab 11/26/24 0438   INR 1.1     Microbiology Results (last 7 days)       Procedure Component Value Units Date/Time    Body Fluid Culture [[id number]] Collected: 11/26/24 0823    Order Status: Completed Specimen: Body Fluid from Back Updated: 11/27/24 0642     Body Fluid Culture No Growth At 24 Hours    Gram Stain [[id number]] Collected: 11/26/24 0823    Order Status: Completed Specimen: Body Fluid from Back Updated: 11/26/24 1353     GRAM STAIN Few WBC observed      No bacteria seen    Anaerobic Culture [[id number]] Collected: 11/26/24 0823    Order Status: Sent Specimen: Body Fluid from Back Updated: 11/26/24 0832    Fungal Culture [[id number]] Collected: 11/26/24 0823    Order Status: Sent Specimen: Body Fluid from Back Updated: 11/26/24 0832            Assessment/Plan:    Following cultures   PTOT   SCDs, Lovenox   Fall precautions   Psych is evaluated patient and adjusted medications   Hospital medicine managing       Placement    Encouraged out of bed with physical therapy but patient wanting more pain medications before doing so.     Active Diagnoses:    Diagnosis Date Noted POA    Altered mental status [R41.82] 10/30/2024 Yes      Problems Resolved During this Admission:       ENRICO Lucia-BC  Neurosurgery  [facility] - 5th Floor Med Surg

## 2024-11-27 NOTE — PT/OT/SLP EVAL
Physical Therapy Evaluation    Patient Name:  Kasandra Cheek   MRN:  [id number]    Recommendations:     Discharge therapy intensity: Moderate Intensity Therapy   Discharge Equipment Recommendations: to be determined by next level of care   Barriers to discharge: Impaired mobility and Ongoing medical needs    Assessment:     Kasandra Cheek is a 64 y.o. female admitted with a medical diagnosis of acute metabolic encephalopathy, NSTEMI, hypokalemia, hypomagnesemia, back pain, recent laminectomy of thoracic spine including T9 T11-3 decompression, removal of right iliac bone and S1 screw, extension of fusion T9-S2 iliac bolt. Confirmed with Dr. Mejia pt is to continue wearing TLSO for mobility. Also with recent IR procedure for fluid aspiration of thoracic soft tissue fluid collection, awaiting cultures.  She presents with the following impairments/functional limitations: weakness, impaired endurance, impaired functional mobility, impaired self care skills, gait instability, impaired balance, decreased safety awareness, pain, orthopedic precautions. Per discussion with spouse, pt with brief post-acute stay following last hospitalization but ultimately returned home. She required assistance for all mobility and supervision at all times.  reports increasing difficulty with assisting pt at home, with concerns regarding pain management. Due to ongoing activity limitations, LE weakness, and dependence with ADLs, PT recommending MOD intensity placement upon discharge.    Rehab Prognosis: Fair; patient would benefit from acute skilled PT services to address these deficits and reach maximum level of function.    Recent Surgery: * No surgery found *      Plan:     During this hospitalization, patient would benefit from acute PT services 5 x/week to address the identified rehab impairments via gait training, therapeutic activities, therapeutic exercises, neuromuscular re-education and progress toward the following goals:    Plan  "of Care Expires:  12/27/24    Subjective     Chief Complaint: needing pain meds before attempting mobility  Patient/Family Comments/goals: get stronger before return home  Pain/Comfort:  Pain Rating 1:  (Pt did not complain of pain during session)    Patients cultural, spiritual, Zoroastrian conflicts given the current situation: no    Living Environment:  Pt lives at home with spouse. Sleeps in recliner,  assists with all mobility.  Equipment used at home: rollator.  DME owned (not currently used): none.  Upon discharge, patient will have assistance from spouse.    Objective:     Communicated with nurse prior to session.  Patient found supine with PureWick, peripheral IV, pulse ox (continuous), telemetry  upon PT entry to room.    General Precautions: Standard, fall  Orthopedic Precautions:spinal precautions   Braces: TLSO  Respiratory Status: Room air      Exams:  Cognitive Exam:  Patient is oriented to Person, Place, and Time  Gross Motor Coordination:  WFL  RLE ROM: WFL except hip flexion limited <80degrees  RLE Strength: Deficits: hip 3+/5, knee 3/5  LLE ROM: WFL except hip flexion <80 degrees  LLE Strength: Deficits: hip 3+/5, knee 3/5  Skin integrity:  posterior thoracic fluid collection      Functional Mobility:  Bed Mobility:     Rolling Left:  minimum assistance  Scooting: minimum assistance  Supine to Sit: minimum assistance  Transfers:     Sit to Stand:  minimum assistance with rolling walker  Gait: 10ft + 30ft with RW min A. Slow ahmet with wide LUIS, step through gait pattern with short step length and inconsistent foot clearance. Pt requesting to sit stating her legs "couldn't go anymore"  Balance: static sitting balance SBA      AM-PAC 6 CLICK MOBILITY  Total Score:18       Patient provided with verbal education and demonstrations education regarding PT role/goals/POC, post-op precautions, fall prevention, and safety awareness.  Understanding was verbalized, however additional teaching " warranted.     Patient left up in chair with all lines intact, call button in reach, nurse notified, and  present.    GOALS:   Multidisciplinary Problems       Physical Therapy Goals          Problem: Physical Therapy    Goal Priority Disciplines Outcome Interventions   Physical Therapy Goal     PT, PT/OT Progressing    Description: Goals to be met by: 24     Patient will increase functional independence with mobility by performin. Supine to sit with Stand-by Assistance  2. Sit to supine with Stand-by Assistance  3. Sit to stand transfer with Stand-by Assistance  4. Bed to chair transfer with Stand-by Assistance using Rolling Walker  5. Gait  x 200 feet with Stand-by Assistance using Rolling Walker.                          History:     Past Medical History:   Diagnosis Date    Anemia     Anxiety     Depression     Dyspareunia     Encounter for blood transfusion     HTN (hypertension)     Hypothyroidism, unspecified     Liver disease     fatty liver    Lumbar radiculopathy     Menopause     Migraine     Obesity     Osteoporosis     Other spondylosis with radiculopathy, lumbar region     Personal history of fall     Spinal stenosis, lumbar region with neurogenic claudication     TIA (transient ischemic attack)     Unspecified osteoarthritis, unspecified site     Urinary incontinence        Past Surgical History:   Procedure Laterality Date    ABDOMINAL SURGERY      Gastric Bypass    CARPAL TUNNEL RELEASE Bilateral     CERVICAL FUSION  2021    CHOLECYSTECTOMY  2015    COLONOSCOPY      COLPOSCOPY      FOOT SURGERY Left     FRACTURE SURGERY      GASTRIC BYPASS  2011    HAND SURGERY Left     thumb    HERNIA REPAIR  2014    JOINT REPLACEMENT  Knee 2015, Hip 2016    LAMINECTOMY OF THORACIC SPINE BY POSTERIOR APPROACH USING COMPUTER-ASSISTED NAVIGATION N/A 10/11/2024    Procedure: LAMINECTOMY, SPINE, THORACIC, POSTERIOR APPROACH, USING COMPUTER-ASSISTED NAVIGATION;  Surgeon: Andra Mejia,  MD;  Location: [facility] OR;  Service: Neurosurgery;  Laterality: N/A;  T9-L3 DECOMPRESSION / REMOVAL OF RIGHT ILIAC BOLT AND S1 SCREW / EXTENSION OF FUSION  T9-S2 ILIAC BOLT //  PRONE ADENIKE // DRILL // SCOPE // O-ARM //  DIRECT SPINE // NDM //       LUMBAR FUSION  05/28/2021    LUMBAR FUSION  02/21/2023    L3-S1    POSTERIOR LUMBAR INTERBODY FUSION (PLIF) WITH COMPUTER-ASSISTED NAVIGATION N/A 10/11/2024    Procedure: FUSION, SPINE, LUMBAR, PLIF, USING COMPUTER-ASSISTED NAVIGATION;  Surgeon: Andra Mejia MD;  Location: [facility];  Service: Neurosurgery;  Laterality: N/A;    ROTATOR CUFF REPAIR Bilateral 04/12/2022    SMALL INTESTINE SURGERY  2015    SPINE SURGERY  2021 & 2023    TONSILLECTOMY  2017       Time Tracking:     PT Received On: 11/27/24  PT Start Time: 1112     PT Stop Time: 1135  PT Total Time (min): 23 min     Billable Minutes: Evaluation moderate      11/27/2024

## 2024-11-27 NOTE — PT/OT/SLP EVAL
Occupational Therapy  Evaluation    Name: Kasandra Cheek  MRN: [id number]  Admitting Diagnosis: AMS  Recent Surgery: * No surgery found *      Recommendations:     Discharge therapy intensity: Moderate Intensity Therapy   Discharge Equipment Recommendations:  to be determined by next level of care  Barriers to discharge:       Assessment:     Kasandra Cheek is a 64 y.o. female with a medical diagnosis of acute metabolic encephalopathy, NSTEMI, hypokalemia, hypomagnesemia, back pain, recent laminectomy of thoracic spine including T9 T11-3 decompression, removal of right iliac bone and S1 screw, extension of fusion T9-S2 iliac bolt. Confirmed with Dr. Mejia pt is to continue wearing TLSO for mobility. Also with recent IR procedure for fluid aspiration of thoracic soft tissue fluid collection, awaiting cultures.  She presents with the following performance deficits affecting function: weakness, impaired endurance, orthopedic precautions, impaired self care skills, impaired functional mobility, gait instability, decreased safety awareness, impaired balance, pain.   Per discussion with spouse, pt with brief post-acute stay following last hospitalization but ultimately returned home. She required assistance for all mobility and supervision at all times.  reports increasing difficulty with assisting pt at home, with concerns regarding pain management. Due to ongoing activity limitations, LE weakness, and dependence with ADLs, recommending MOD intensity placement upon discharge.   Rehab Prognosis: Good and Fair; patient would benefit from acute skilled OT services to address these deficits and reach maximum level of function.       Plan:     Patient to be seen 5 x/week to address the above listed problems via self-care/home management, therapeutic activities, therapeutic exercises  Plan of Care Expires: 12/27/24  Plan of Care Reviewed with: patient, spouse    Subjective     Chief Complaint: back pain  Patient/Family  Comments/goals: go home    Occupational Profile:  Living Environment: Pt lives at home with spouse. Sleeps in recliner,  assists with all mobility.   Previous level of function: Assist with all ADLs.   Roles and Routines: wife  Equipment Used at Home: rollator  Assistance upon Discharge: TBD    Pain/Comfort:  Pain Rating 1:  (no c/o pain t/o session)  Pain Addressed 1: Pre-medicate for activity    Patients cultural, spiritual, Confucianist conflicts given the current situation: no    Objective:     OT communicated with RN prior to session.      Patient was found HOB elevated with pulse ox (continuous), telemetry, PureWick, peripheral IV upon OT entry to room.    General Precautions: Standard, fall  Orthopedic Precautions: spinal precautions  Braces: TLSO    Vital Signs: Respiratory Status: on room air    Bed Mobility:    Patient completed Supine to Sit with minimum assistance and cues for log roll tech     Functional Mobility/Transfers:  Patient completed Sit <> Stand Transfer with minimum assistance  with  rolling walker   Patient completed Bed <> Chair Transfer using Step Transfer technique with minimum assistance with rolling walker  Functional Mobility: in hw with RW and CGA followed by chair. Refere to PT doc for further detail on gait     Activities of Daily Living:  Upper Body Dressing: maximal assistance to don TLSO  Lower Body Dressing: maximal assistance to don socks   Toileting: maximal assistance brief and pure wick in place    AMPAC 6 Click ADL:  AMPAC Total Score: 17    Functional Cognition:  Affect: Anxious  Orientation: oriented to Person and Place  Communication: able to communicate basic wants/needs  Problem Solving: Impaired.    Safety Awareness: Impaired.      Visual Perceptual Skills:  Intact    Upper Extremity Function:  Right Upper Extremity:   WFL except limited by spinal precautions    Left Upper Extremity:  WFL except limited by spinal precautions    Balance:   Impaired.       Therapeutic Positioning  Risk for acquired pressure injuries is increased due to relative decrease in mobility d/t hospitalization , impaired mobility, incontinence, and obesity.    OT interventions performed during the course of today's session:   Education was provided on benefits of and recommendations for therapeutic positioning    Skin assessment: all bony prominences were assessed    Findings: no redness or breakdown noted    OT recommendations for therapeutic positioning throughout hospitalization:   Follow [facility] Pressure Injury Prevention Protocol  Geomat recommended for sacral protection while UIC    Patient Education:  Patient and spouse were provided with verbal education education regarding OT role/goals/POC, post op precautions, fall prevention, safety awareness, and Discharge/DME recommendations.  Understanding was verbalized.     Patient left up in chair with all lines intact, call button in reach, chair alarm on, RN notified, and  present.    GOALS:   Multidisciplinary Problems       Occupational Therapy Goals          Problem: Occupational Therapy    Goal Priority Disciplines Outcome Interventions   Occupational Therapy Goal     OT, PT/OT Progressing    Description: LTG: Pt will perform basic ADLs and ADL transfers with Modified independence using LRAD by discharge.    STG: to be met by d/c    Pt will complete grooming standing at sink with LRAD with SBA.  Pt will complete UB dressing with mod (A), including management of TLSO.  Pt will complete LB dressing with SBA using LRAD.  Pt will complete toileting with SBA using LRAD.  Pt will complete functional mobility to/from toilet and toilet transfer with SBA using LRAD.                         History:     Past Medical History:   Diagnosis Date    Anemia     Anxiety     Depression     Dyspareunia     Encounter for blood transfusion     HTN (hypertension)     Hypothyroidism, unspecified     Liver disease     fatty liver    Lumbar  radiculopathy     Menopause     Migraine     Obesity     Osteoporosis     Other spondylosis with radiculopathy, lumbar region     Personal history of fall     Spinal stenosis, lumbar region with neurogenic claudication     TIA (transient ischemic attack) 1994    Unspecified osteoarthritis, unspecified site     Urinary incontinence          Past Surgical History:   Procedure Laterality Date    ABDOMINAL SURGERY      Gastric Bypass    CARPAL TUNNEL RELEASE Bilateral     CERVICAL FUSION  12/28/2021    CHOLECYSTECTOMY  2015    COLONOSCOPY      COLPOSCOPY      FOOT SURGERY Left     FRACTURE SURGERY  2001    GASTRIC BYPASS  2011    HAND SURGERY Left     thumb    HERNIA REPAIR  2014    JOINT REPLACEMENT  Knee 2015, Hip 2016    LAMINECTOMY OF THORACIC SPINE BY POSTERIOR APPROACH USING COMPUTER-ASSISTED NAVIGATION N/A 10/11/2024    Procedure: LAMINECTOMY, SPINE, THORACIC, POSTERIOR APPROACH, USING COMPUTER-ASSISTED NAVIGATION;  Surgeon: Andra Mejia MD;  Location: [facility];  Service: Neurosurgery;  Laterality: N/A;  T9-L3 DECOMPRESSION / REMOVAL OF RIGHT ILIAC BOLT AND S1 SCREW / EXTENSION OF FUSION  T9-S2 ILIAC BOLT //  PRONE ADENIKE // DRILL // SCOPE // O-ARM //  DIRECT SPINE // NDM //       LUMBAR FUSION  05/28/2021    LUMBAR FUSION  02/21/2023    L3-S1    POSTERIOR LUMBAR INTERBODY FUSION (PLIF) WITH COMPUTER-ASSISTED NAVIGATION N/A 10/11/2024    Procedure: FUSION, SPINE, LUMBAR, PLIF, USING COMPUTER-ASSISTED NAVIGATION;  Surgeon: Andra Mejia MD;  Location: [facility];  Service: Neurosurgery;  Laterality: N/A;    ROTATOR CUFF REPAIR Bilateral 04/12/2022    SMALL INTESTINE SURGERY  2015    SPINE SURGERY  2021 & 2023    TONSILLECTOMY  2017       Time Tracking:     OT Date of Treatment: 11/27/24  OT Start Time: 1112  OT Stop Time: 1137  OT Total Time (min): 25 min    Billable Minutes:Evaluation moderate  Self Care/Home Management 1    11/27/2024

## 2024-11-27 NOTE — PT/OT/SLP PROGRESS
Physical Therapy    Missed Treatment Session  11/27/2024    Patient Name:  Kasandra Cheek   MRN:  [id number]    PT attempted evaluation, noted TLSO present in room. But pt declined therapy at this time stating MD promised her increased pain medication prior to therapy sessions. Discussed with nursing and attempted to schedule session for later this AM.

## 2024-11-27 NOTE — PLAN OF CARE
Problem: Occupational Therapy  Goal: Occupational Therapy Goal  Description: LTG: Pt will perform basic ADLs and ADL transfers with Modified independence using LRAD by discharge.    STG: to be met by d/c    Pt will complete grooming standing at sink with LRAD with SBA.  Pt will complete UB dressing with mod (A), including management of TLSO.  Pt will complete LB dressing with SBA using LRAD.  Pt will complete toileting with SBA using LRAD.  Pt will complete functional mobility to/from toilet and toilet transfer with SBA using LRAD.    Outcome: Progressing

## 2024-11-28 LAB
ALBUMIN SERPL-MCNC: 2.5 G/DL (ref 3.4–4.8)
ALBUMIN/GLOB SERPL: 1.2 RATIO (ref 1.1–2)
ALP SERPL-CCNC: 117 UNIT/L (ref 40–150)
ALT SERPL-CCNC: 11 UNIT/L (ref 0–55)
ANION GAP SERPL CALC-SCNC: 8 MEQ/L
AST SERPL-CCNC: 14 UNIT/L (ref 5–34)
BASOPHILS # BLD AUTO: 0.03 X10(3)/MCL
BASOPHILS NFR BLD AUTO: 0.7 %
BILIRUB SERPL-MCNC: 0.4 MG/DL
BUN SERPL-MCNC: 7 MG/DL (ref 9.8–20.1)
CALCIUM SERPL-MCNC: 7.5 MG/DL (ref 8.4–10.2)
CHLORIDE SERPL-SCNC: 107 MMOL/L (ref 98–107)
CO2 SERPL-SCNC: 26 MMOL/L (ref 23–31)
CREAT SERPL-MCNC: 0.56 MG/DL (ref 0.55–1.02)
CREAT/UREA NIT SERPL: 13
EOSINOPHIL # BLD AUTO: 0.08 X10(3)/MCL (ref 0–0.9)
EOSINOPHIL NFR BLD AUTO: 1.8 %
ERYTHROCYTE [DISTWIDTH] IN BLOOD BY AUTOMATED COUNT: 13.5 % (ref 11.5–17)
GFR SERPLBLD CREATININE-BSD FMLA CKD-EPI: >60 ML/MIN/1.73/M2
GLOBULIN SER-MCNC: 2.1 GM/DL (ref 2.4–3.5)
GLUCOSE SERPL-MCNC: 96 MG/DL (ref 82–115)
HCT VFR BLD AUTO: 30 % (ref 37–47)
HGB BLD-MCNC: 9.6 G/DL (ref 12–16)
IMM GRANULOCYTES # BLD AUTO: 0.02 X10(3)/MCL (ref 0–0.04)
IMM GRANULOCYTES NFR BLD AUTO: 0.4 %
LYMPHOCYTES # BLD AUTO: 1.68 X10(3)/MCL (ref 0.6–4.6)
LYMPHOCYTES NFR BLD AUTO: 37.7 %
MCH RBC QN AUTO: 30.8 PG (ref 27–31)
MCHC RBC AUTO-ENTMCNC: 32 G/DL (ref 33–36)
MCV RBC AUTO: 96.2 FL (ref 80–94)
MONOCYTES # BLD AUTO: 0.34 X10(3)/MCL (ref 0.1–1.3)
MONOCYTES NFR BLD AUTO: 7.6 %
NEUTROPHILS # BLD AUTO: 2.31 X10(3)/MCL (ref 2.1–9.2)
NEUTROPHILS NFR BLD AUTO: 51.8 %
NRBC BLD AUTO-RTO: 0 %
PLATELET # BLD AUTO: 235 X10(3)/MCL (ref 130–400)
PMV BLD AUTO: 9.1 FL (ref 7.4–10.4)
POTASSIUM SERPL-SCNC: 3.9 MMOL/L (ref 3.5–5.1)
PROT SERPL-MCNC: 4.6 GM/DL (ref 5.8–7.6)
RBC # BLD AUTO: 3.12 X10(6)/MCL (ref 4.2–5.4)
SODIUM SERPL-SCNC: 141 MMOL/L (ref 136–145)
WBC # BLD AUTO: 4.46 X10(3)/MCL (ref 4.5–11.5)

## 2024-11-28 PROCEDURE — 80053 COMPREHEN METABOLIC PANEL: CPT | Performed by: INTERNAL MEDICINE

## 2024-11-28 PROCEDURE — 63600175 PHARM REV CODE 636 W HCPCS: Performed by: INTERNAL MEDICINE

## 2024-11-28 PROCEDURE — 21400001 HC TELEMETRY ROOM

## 2024-11-28 PROCEDURE — 25000003 PHARM REV CODE 250: Performed by: INTERNAL MEDICINE

## 2024-11-28 PROCEDURE — 25000003 PHARM REV CODE 250: Performed by: PHYSICIAN ASSISTANT

## 2024-11-28 PROCEDURE — 25000003 PHARM REV CODE 250

## 2024-11-28 PROCEDURE — 63600175 PHARM REV CODE 636 W HCPCS: Performed by: NURSE PRACTITIONER

## 2024-11-28 PROCEDURE — 36415 COLL VENOUS BLD VENIPUNCTURE: CPT | Performed by: INTERNAL MEDICINE

## 2024-11-28 PROCEDURE — 85025 COMPLETE CBC W/AUTO DIFF WBC: CPT | Performed by: INTERNAL MEDICINE

## 2024-11-28 RX ORDER — LANOLIN ALCOHOL/MO/W.PET/CERES
1 CREAM (GRAM) TOPICAL DAILY
Status: DISCONTINUED | OUTPATIENT
Start: 2024-11-29 | End: 2024-12-03 | Stop reason: HOSPADM

## 2024-11-28 RX ADMIN — MIRTAZAPINE 15 MG: 15 TABLET, FILM COATED ORAL at 08:11

## 2024-11-28 RX ADMIN — LEVETIRACETAM 500 MG: 500 TABLET, FILM COATED ORAL at 08:11

## 2024-11-28 RX ADMIN — HYDROMORPHONE HYDROCHLORIDE 0.5 MG: 2 INJECTION INTRAMUSCULAR; INTRAVENOUS; SUBCUTANEOUS at 07:11

## 2024-11-28 RX ADMIN — CARVEDILOL 3.12 MG: 3.12 TABLET, FILM COATED ORAL at 08:11

## 2024-11-28 RX ADMIN — Medication 1 CAPSULE: at 11:11

## 2024-11-28 RX ADMIN — ATORVASTATIN CALCIUM 80 MG: 40 TABLET, FILM COATED ORAL at 08:11

## 2024-11-28 RX ADMIN — OXYCODONE HYDROCHLORIDE AND ACETAMINOPHEN 1 TABLET: 5; 325 TABLET ORAL at 10:11

## 2024-11-28 RX ADMIN — DOCUSATE SODIUM 250 MG: 50 CAPSULE, LIQUID FILLED ORAL at 08:11

## 2024-11-28 RX ADMIN — BACLOFEN 20 MG: 10 TABLET ORAL at 08:11

## 2024-11-28 RX ADMIN — HYDROMORPHONE HYDROCHLORIDE 0.5 MG: 2 INJECTION INTRAMUSCULAR; INTRAVENOUS; SUBCUTANEOUS at 11:11

## 2024-11-28 RX ADMIN — HYDROMORPHONE HYDROCHLORIDE 0.5 MG: 2 INJECTION INTRAMUSCULAR; INTRAVENOUS; SUBCUTANEOUS at 02:11

## 2024-11-28 RX ADMIN — Medication 1 CAPSULE: at 04:11

## 2024-11-28 RX ADMIN — ONDANSETRON 4 MG: 2 INJECTION INTRAMUSCULAR; INTRAVENOUS at 08:11

## 2024-11-28 RX ADMIN — OXYCODONE HYDROCHLORIDE AND ACETAMINOPHEN 1 TABLET: 5; 325 TABLET ORAL at 06:11

## 2024-11-28 RX ADMIN — BACLOFEN 20 MG: 10 TABLET ORAL at 02:11

## 2024-11-28 RX ADMIN — OXYCODONE HYDROCHLORIDE AND ACETAMINOPHEN 1 TABLET: 5; 325 TABLET ORAL at 05:11

## 2024-11-28 RX ADMIN — ASPIRIN 325 MG: 325 TABLET, COATED ORAL at 08:11

## 2024-11-28 RX ADMIN — OXYCODONE HYDROCHLORIDE AND ACETAMINOPHEN 1 TABLET: 5; 325 TABLET ORAL at 02:11

## 2024-11-28 RX ADMIN — SERTRALINE HYDROCHLORIDE 100 MG: 50 TABLET ORAL at 08:11

## 2024-11-28 RX ADMIN — ENOXAPARIN SODIUM 40 MG: 40 INJECTION SUBCUTANEOUS at 04:11

## 2024-11-28 RX ADMIN — PANTOPRAZOLE SODIUM 40 MG: 40 TABLET, DELAYED RELEASE ORAL at 08:11

## 2024-11-28 RX ADMIN — Medication 1 CAPSULE: at 08:11

## 2024-11-28 RX ADMIN — OXYCODONE HYDROCHLORIDE AND ACETAMINOPHEN 1 TABLET: 5; 325 TABLET ORAL at 01:11

## 2024-11-28 RX ADMIN — LEVOTHYROXINE SODIUM 200 MCG: 100 TABLET ORAL at 05:11

## 2024-11-28 RX ADMIN — LOSARTAN POTASSIUM 100 MG: 50 TABLET, FILM COATED ORAL at 08:11

## 2024-11-29 LAB — BACTERIA SPEC ANAEROBE CULT: NORMAL

## 2024-11-29 PROCEDURE — 25000003 PHARM REV CODE 250: Performed by: NURSE PRACTITIONER

## 2024-11-29 PROCEDURE — 21400001 HC TELEMETRY ROOM

## 2024-11-29 PROCEDURE — 25000003 PHARM REV CODE 250: Performed by: INTERNAL MEDICINE

## 2024-11-29 PROCEDURE — 25000003 PHARM REV CODE 250: Performed by: PHYSICIAN ASSISTANT

## 2024-11-29 PROCEDURE — 63600175 PHARM REV CODE 636 W HCPCS: Performed by: INTERNAL MEDICINE

## 2024-11-29 PROCEDURE — 25000003 PHARM REV CODE 250

## 2024-11-29 PROCEDURE — 63600175 PHARM REV CODE 636 W HCPCS: Performed by: NURSE PRACTITIONER

## 2024-11-29 RX ORDER — HYDROMORPHONE HYDROCHLORIDE 2 MG/ML
1 INJECTION, SOLUTION INTRAMUSCULAR; INTRAVENOUS; SUBCUTANEOUS ONCE
Status: COMPLETED | OUTPATIENT
Start: 2024-11-30 | End: 2024-11-30

## 2024-11-29 RX ADMIN — ASPIRIN 325 MG: 325 TABLET, COATED ORAL at 09:11

## 2024-11-29 RX ADMIN — BACLOFEN 20 MG: 10 TABLET ORAL at 09:11

## 2024-11-29 RX ADMIN — ONDANSETRON 4 MG: 2 INJECTION INTRAMUSCULAR; INTRAVENOUS at 05:11

## 2024-11-29 RX ADMIN — CARVEDILOL 3.12 MG: 3.12 TABLET, FILM COATED ORAL at 08:11

## 2024-11-29 RX ADMIN — BACLOFEN 20 MG: 10 TABLET ORAL at 08:11

## 2024-11-29 RX ADMIN — ONDANSETRON 4 MG: 2 INJECTION INTRAMUSCULAR; INTRAVENOUS at 08:11

## 2024-11-29 RX ADMIN — BACLOFEN 20 MG: 10 TABLET ORAL at 03:11

## 2024-11-29 RX ADMIN — OXYCODONE HYDROCHLORIDE AND ACETAMINOPHEN 1 TABLET: 5; 325 TABLET ORAL at 03:11

## 2024-11-29 RX ADMIN — LEVETIRACETAM 500 MG: 500 TABLET, FILM COATED ORAL at 08:11

## 2024-11-29 RX ADMIN — ACETAMINOPHEN 1000 MG: 500 TABLET ORAL at 09:11

## 2024-11-29 RX ADMIN — ATORVASTATIN CALCIUM 80 MG: 40 TABLET, FILM COATED ORAL at 09:11

## 2024-11-29 RX ADMIN — PANTOPRAZOLE SODIUM 40 MG: 40 TABLET, DELAYED RELEASE ORAL at 09:11

## 2024-11-29 RX ADMIN — ENOXAPARIN SODIUM 40 MG: 40 INJECTION SUBCUTANEOUS at 04:11

## 2024-11-29 RX ADMIN — DOCUSATE SODIUM 250 MG: 50 CAPSULE, LIQUID FILLED ORAL at 09:11

## 2024-11-29 RX ADMIN — SERTRALINE HYDROCHLORIDE 100 MG: 50 TABLET ORAL at 09:11

## 2024-11-29 RX ADMIN — OXYCODONE HYDROCHLORIDE AND ACETAMINOPHEN 1 TABLET: 5; 325 TABLET ORAL at 10:11

## 2024-11-29 RX ADMIN — FERROUS SULFATE TAB 325 MG (65 MG ELEMENTAL FE) 1 EACH: 325 (65 FE) TAB at 09:11

## 2024-11-29 RX ADMIN — ONDANSETRON 4 MG: 2 INJECTION INTRAMUSCULAR; INTRAVENOUS at 01:11

## 2024-11-29 RX ADMIN — LEVOTHYROXINE SODIUM 200 MCG: 100 TABLET ORAL at 05:11

## 2024-11-29 RX ADMIN — CARVEDILOL 3.12 MG: 3.12 TABLET, FILM COATED ORAL at 09:11

## 2024-11-29 RX ADMIN — PROCHLORPERAZINE EDISYLATE 5 MG: 5 INJECTION INTRAMUSCULAR; INTRAVENOUS at 09:11

## 2024-11-29 RX ADMIN — OXYCODONE HYDROCHLORIDE AND ACETAMINOPHEN 1 TABLET: 5; 325 TABLET ORAL at 11:11

## 2024-11-29 RX ADMIN — HYDROMORPHONE HYDROCHLORIDE 0.5 MG: 2 INJECTION INTRAMUSCULAR; INTRAVENOUS; SUBCUTANEOUS at 12:11

## 2024-11-29 RX ADMIN — Medication 1 CAPSULE: at 12:11

## 2024-11-29 RX ADMIN — ACETAMINOPHEN 1000 MG: 500 TABLET ORAL at 01:11

## 2024-11-29 RX ADMIN — Medication 1 CAPSULE: at 09:11

## 2024-11-29 RX ADMIN — OXYCODONE HYDROCHLORIDE AND ACETAMINOPHEN 1 TABLET: 5; 325 TABLET ORAL at 06:11

## 2024-11-29 RX ADMIN — HYDROMORPHONE HYDROCHLORIDE 0.5 MG: 2 INJECTION INTRAMUSCULAR; INTRAVENOUS; SUBCUTANEOUS at 08:11

## 2024-11-29 RX ADMIN — LOSARTAN POTASSIUM 100 MG: 50 TABLET, FILM COATED ORAL at 09:11

## 2024-11-29 RX ADMIN — Medication 1 CAPSULE: at 04:11

## 2024-11-29 RX ADMIN — ONDANSETRON 4 MG: 2 INJECTION INTRAMUSCULAR; INTRAVENOUS at 04:11

## 2024-11-29 RX ADMIN — LEVETIRACETAM 500 MG: 500 TABLET, FILM COATED ORAL at 09:11

## 2024-11-29 RX ADMIN — HYDROMORPHONE HYDROCHLORIDE 0.5 MG: 2 INJECTION INTRAMUSCULAR; INTRAVENOUS; SUBCUTANEOUS at 03:11

## 2024-11-29 RX ADMIN — MIRTAZAPINE 15 MG: 15 TABLET, FILM COATED ORAL at 08:11

## 2024-11-29 RX ADMIN — DOCUSATE SODIUM 250 MG: 50 CAPSULE, LIQUID FILLED ORAL at 08:11

## 2024-11-29 NOTE — PLAN OF CARE
Problem: Adult Inpatient Plan of Care  Goal: Plan of Care Review  Outcome: Progressing  Flowsheets (Taken 11/28/2024 2245)  Plan of Care Reviewed With: patient  Goal: Patient-Specific Goal (Individualized)  Outcome: Progressing  Flowsheets (Taken 11/28/2024 2245)  Individualized Care Needs: Pain Management, Comfort Measure  Anxieties, Fears or Concerns: Denies  Patient/Family-Specific Goals (Include Timeframe): Pain Management  Goal: Absence of Hospital-Acquired Illness or Injury  Outcome: Progressing  Goal: Optimal Comfort and Wellbeing  Outcome: Progressing  Goal: Readiness for Transition of Care  Outcome: Progressing     Problem: Wound  Goal: Optimal Coping  Outcome: Progressing  Goal: Optimal Functional Ability  Outcome: Progressing  Goal: Absence of Infection Signs and Symptoms  Outcome: Progressing  Goal: Improved Oral Intake  Outcome: Progressing  Goal: Optimal Pain Control and Function  Outcome: Progressing  Goal: Skin Health and Integrity  Outcome: Progressing  Goal: Optimal Wound Healing  Outcome: Progressing     Problem: Skin Injury Risk Increased  Goal: Skin Health and Integrity  Outcome: Progressing     Problem: Fall Injury Risk  Goal: Absence of Fall and Fall-Related Injury  Outcome: Progressing

## 2024-11-29 NOTE — PROGRESS NOTES
[facility] Hospital Medicine Progress Note        Chief Complaint: Inpatient Follow-up for     HPI: 64 y.o. female with a past medical history of hypertension, hyperlipidemia, anemia, hypothyroidism, fatty liver disease, lumbar radiculopathy, migraines, osteoporosis, spinal stenosis, TIA, anxiety, and depression who presented to [facility] on 11/24/2024 via EMS for altered mental status.   reported altered mental status began on Friday, 11/22/2024.   reported patient became very fatigued and was sleeping a lot.   also endorsed agitation and difficulty understanding what patient was saying.  Has been reported prior to Friday patient was in her normal state of health- reports patient normally ambulates with a walker at home.   states that patient has had increasing lower extremity weakness and back pain since fusion last month with Dr. Mejia.   reported that patient was recently inpatient here about 2 weeks ago and was discharged to rehab facility.   stated that patient became frightened when arriving at the rehab facility and left.   states they do not have an appointment with neurosurgeon until she goes through therapy and would like to see him while in patient.   denied any new medications.     Initial vital signs in ED were /81, pulse 103, respirations 20, temperature 36.7° C, and SpO2 96% on room air.  Labs revealed WBC 5.80, RBC 3.47, hemoglobin 10.8, hematocrit 32.6, MCV 93.9, potassium 3.2, chloride 109, magnesium 1.5, , troponin 0.048, TSH 4.728, T4 1.13, and serum alcohol undetectable.  UDS was positive for opiates.  Flu and COVID testing were negative.  UA revealed trace protein, 2+ nitrite, 25 leukocytes, and 6-10 white blood cells.  CT head without contrast revealed no acute abnormality seen.  CT cervical spine without contrast revealed postoperative changes without acute abnormality seen.  Chest x-ray  revealed right-sided pleural density.  CT chest abdomen and pelvis with IV contrast revealed probable old bilateral rib fractures, extensive hardware in the spine prosthesis in the right hip, and hepatomegaly.  EKG revealed normal sinus rhythm with heart rate of 94 beats per minute.  Patient was given IV morphine 4 mg, IV Zofran 4 mg, IV magnesium sulfate 2 g, IV potassium chloride 10 mEq, aspirin 325 mg, and IV Haldol 5 mg in ED. Patient was admitted to hospital medicine service for further medical management.      Patient recently discharged with laminectomy of thoracic spine including T9 T11-3 decompression, removal of right iliac bone and S1 screw, extension of fusion T9-S2 iliac bolt.       CT T spine   Stable postoperative changes of the thoracic and lumbar spine, with perihardware lucencies at T10-T11.  Residual fluid in the subcutaneous soft tissues of the thoracic spine and laminectomy bed of the lumbar spine, may be postoperative.  No enhancing paraspinal mass identified.  Neurosurgery was consulted and they consulted IR patient is status post ultrasound-guided fluid aspiration with almost 18 cc of old dark bloody fluid done on November 26 send it for cultures  Psych consulted patient is on Remeron and sertraline.  Neurology was consulted to since patient is back to baseline they do not recommend any other        Interval Hx:   11/27/2024 Patient seen and examined this morning has a  monitor asking for pain medications  Case was discussed with patient's nurse and  on the floor.    11/28/2024 Dr. English-chart reviewed patient examined.  Mentation is much improved.  Patient is status post ultrasound-guided fluid aspiration of fluid collection thoracic spine.    Objective/physical exam:  General: In no acute distress, afebrile  Chest: Clear to auscultation bilaterally  Heart: RRR, +S1, S2, no appreciable murmur  Abdomen: Soft, nontender, BS +  MSK: Warm, no lower extremity edema, no clubbing or  cyanosis  Neurologic: Alert and awake  VITAL SIGNS: 24 HRS MIN & MAX LAST   Temp  Min: 98.2 °F (36.8 °C)  Max: 99.1 °F (37.3 °C) 99.1 °F (37.3 °C)   BP  Min: 115/69  Max: 159/77 (!) 150/73   Pulse  Min: 59  Max: 83  70   Resp  Min: 16  Max: 18 18   SpO2  Min: 95 %  Max: 99 % 96 %     I have reviewed the following labs:  Recent Labs   Lab 11/26/24 0438 11/27/24 0356 11/28/24  0515   WBC 4.31* 5.78 4.46*   RBC 3.26* 3.12* 3.12*   HGB 10.1* 9.8* 9.6*   HCT 31.2* 29.9* 30.0*   MCV 95.7* 95.8* 96.2*   MCH 31.0 31.4* 30.8   MCHC 32.4* 32.8* 32.0*   RDW 13.5 13.6 13.5    218 235   MPV 9.0 8.7 9.1     Recent Labs   Lab 11/24/24 2123 11/25/24 0359 11/26/24 0438 11/27/24 0356 11/28/24  0515    145 144 143 141   K 3.2* 3.1* 3.1* 3.2* 3.9   * 109* 105 107 107   CO2 26 24 28 27 26   BUN 12.1 10.7 6.4* 7.4* 7.0*   CREATININE 0.80 0.69 0.65 0.63 0.56   CALCIUM 9.0 8.5 8.0* 7.7* 7.5*   MG 1.50* 1.80  --  1.80  --    ALBUMIN 3.4 3.3* 2.9* 2.6* 2.5*   ALKPHOS 132 134 123 109 117   ALT 13 15 12 11 11   AST 17 20 15 12 14   BILITOT 0.5 0.5 0.5 0.4 0.4     Microbiology Results (last 7 days)       Procedure Component Value Units Date/Time    Anaerobic Culture [[id number]] Collected: 11/26/24 0823    Order Status: Completed Specimen: Body Fluid from Back Updated: 11/28/24 0802     Anaerobe Culture No Anaerobes Isolated    Body Fluid Culture [[id number]] Collected: 11/26/24 0823    Order Status: Completed Specimen: Body Fluid from Back Updated: 11/28/24 0801     Body Fluid Culture No Growth At 48 Hours    Gram Stain [[id number]] Collected: 11/26/24 0823    Order Status: Completed Specimen: Body Fluid from Back Updated: 11/26/24 1353     GRAM STAIN Few WBC observed      No bacteria seen    Fungal Culture [[id number]] Collected: 11/26/24 0823    Order Status: Sent Specimen: Body Fluid from Back Updated: 11/26/24 0832             See below for Radiology    Assessment/Plan:  Acute metabolic encephalopathy, improving    NSTEMI, likely type 2  Hypokalemia   Hypomagnesemia, improved  Back pain  Recently laminectomy of thoracic spine including T9 T11-3 decompression, removal of right iliac bone and S1 screw, extension of fusion T9-S2 iliac bolt.       history of hypertension, hyperlipidemia, anemia, hypothyroidism, fatty liver disease, lumbar radiculopathy, migraines, osteoporosis, spinal stenosis, TIA, anxiety, and depression    Patient is status post ultrasound-guided fluid aspiration on 11/26 of Residual fluid in the subcutaneous soft tissues of the thoracic spine -  18cc old, dark, bloody fluid .  Final Cultures pending   Neurosurgery following await culture results  Neurology was consulted and since patient is back to her baseline they recommend no further testing   Psych consulted and following they are recommending Remeron and sertraline  Potassium is low we will supplement  PT and OT consulted will see how patient is doing with PT and OT accordingly we will make discharge disposition discussed with the nursing staff to give her IV pain medication before she works with physical therapy as she is complaining of pain  Mental status is stable  Troponins did trend down but I will order 2D echo to look for any wall motion abnormality please follow up on results  Continue with other home medications that includes aspirin, statin, baclofen, Coreg, Keppra, Synthroid, losartan, mirtazapine and sertraline     Discharge disposition: Will see how patient is doing with PT and OT and  awaiting culture results      VTE prophylaxis:  Lovenox    Patient condition:  Stable/    Anticipated discharge and Disposition:   [facility]      All diagnosis and differential diagnosis have been reviewed; assessment and plan has been documented; I have personally reviewed the labs and test results that are presently available; I have reviewed the patients medication list; I have reviewed the consulting providers response and recommendations. I have reviewed or  attempted to review medical records based upon their availability    All of the patient's questions have been  addressed and answered. Patient's is agreeable to the above stated plan. I will continue to monitor closely and make adjustments to medical management as needed.    Portions of this note dictated using EMR integrated voice recognition software, and may be subject to voice recognition errors not corrected at proofreading. Please contact writer for clarification if needed.   _____________________________________________________________________    Malnutrition Status:  Nutrition consulted. Most recent weight and BMI monitored-     Measurements:  Wt Readings from Last 1 Encounters:   11/25/24 99.8 kg (220 lb 0.3 oz)   Body mass index is 34.46 kg/m².    Patient has been screened and assessed by RD.    Malnutrition Type:  Context:    Level:      Malnutrition Characteristic Summary:       Interventions/Recommendations (treatment strategy):        Scheduled Med:   aspirin  325 mg Oral Daily    atorvastatin  80 mg Oral Daily    baclofen  20 mg Oral TID    carvediloL  3.125 mg Oral BID    docusate sodium  250 mg Oral BID    enoxparin  40 mg Subcutaneous Daily    Lactobacillus acidophilus  1 capsule Oral TID WM    levETIRAcetam  500 mg Oral BID    levothyroxine  200 mcg Oral Before breakfast    losartan  100 mg Oral Daily    mirtazapine  15 mg Oral QHS    pantoprazole  40 mg Oral Daily    sertraline  100 mg Oral Daily      Continuous Infusions:     PRN Meds:    Current Facility-Administered Medications:     acetaminophen, 1,000 mg, Oral, Q6H PRN    aluminum-magnesium hydroxide-simethicone, 30 mL, Oral, Q6H PRN    calcium carbonate, 1,000 mg, Oral, QID PRN    HYDROmorphone, 0.5 mg, Intravenous, Q8H PRN    melatonin, 6 mg, Oral, Nightly PRN    naloxone, 0.02 mg, Intravenous, PRN    ondansetron, 4 mg, Intravenous, Q4H PRN    oxyCODONE-acetaminophen, 1 tablet, Oral, Q4H PRN    polyethylene glycol, 17 g, Oral, BID PRN     prochlorperazine, 5 mg, Intravenous, Q6H PRN     Radiology:  I have personally reviewed the following imaging and agree with the radiologist.     Echo    Left Ventricle: The left ventricle is normal in size. Normal wall   thickness. Normal wall motion. There is normal systolic function with a   visually estimated ejection fraction of 55 - 60%. Grade I diastolic   dysfunction.    Right Ventricle: Normal right ventricular cavity size. Systolic   function is normal.    Aortic Valve: The aortic valve is a trileaflet valve. There is no   stenosis. There is no significant regurgitation.    Mitral Valve: There is mitral annular calcification present. There is   normal leaflet mobility. There is mild regurgitation.    Tricuspid Valve: The tricuspid valve is structurally normal. There is   trace regurgitation.    Pericardium: There is no pericardial effusion.      Lennox English MD  Department of Hospital Medicine   [facility]   11/28/2024

## 2024-11-29 NOTE — PROGRESS NOTES
[facility] - 5th Floor Med Surg  Neurosurgery  Progress Note    Subjective:     Interval History: NAEs overnight. Reports gradual improvement of her lumbar pain. Has been ambulating and sitting up at bedside. Cultures remain negative.     Post-Op Info:  * No surgery found *          Medications:  Continuous Infusions:  Scheduled Meds:   aspirin  325 mg Oral Daily    atorvastatin  80 mg Oral Daily    baclofen  20 mg Oral TID    carvediloL  3.125 mg Oral BID    docusate sodium  250 mg Oral BID    enoxparin  40 mg Subcutaneous Daily    ferrous sulfate  1 tablet Oral Daily    Lactobacillus acidophilus  1 capsule Oral TID WM    levETIRAcetam  500 mg Oral BID    levothyroxine  200 mcg Oral Before breakfast    losartan  100 mg Oral Daily    mirtazapine  15 mg Oral QHS    pantoprazole  40 mg Oral Daily    sertraline  100 mg Oral Daily     PRN Meds:  Current Facility-Administered Medications:     acetaminophen, 1,000 mg, Oral, Q6H PRN    aluminum-magnesium hydroxide-simethicone, 30 mL, Oral, Q6H PRN    calcium carbonate, 1,000 mg, Oral, QID PRN    HYDROmorphone, 0.5 mg, Intravenous, Q8H PRN    melatonin, 6 mg, Oral, Nightly PRN    naloxone, 0.02 mg, Intravenous, PRN    ondansetron, 4 mg, Intravenous, Q4H PRN    oxyCODONE-acetaminophen, 1 tablet, Oral, Q4H PRN    polyethylene glycol, 17 g, Oral, BID PRN    prochlorperazine, 5 mg, Intravenous, Q6H PRN     Review of Systems  Objective:     Weight: 99.8 kg (220 lb 0.3 oz)  Body mass index is 34.46 kg/m².  Vital Signs (Most Recent):  Temp: 97.8 °F (36.6 °C) (11/29/24 0732)  Pulse: 80 (11/29/24 0732)  Resp: 17 (11/29/24 0633)  BP: (!) 144/82 (11/29/24 0732)  SpO2: 97 % (11/29/24 0732) Vital Signs (24h Range):  Temp:  [97.8 °F (36.6 °C)-99.1 °F (37.3 °C)] 97.8 °F (36.6 °C)  Pulse:  [59-89] 80  Resp:  [17-18] 17  SpO2:  [95 %-98 %] 97 %  BP: (115-150)/(69-82) 144/82                              Neurosurgery Physical Exam  Awake, alert. Oriented.   NAD. Resting in bed.  "  Moves all extremities well in bed.     Significant Labs:  Recent Labs   Lab 11/28/24  0515      K 3.9      CO2 26   BUN 7.0*   CREATININE 0.56   CALCIUM 7.5*     Recent Labs   Lab 11/28/24  0515   WBC 4.46*   HGB 9.6*   HCT 30.0*        No results for input(s): "LABPT", "INR", "APTT" in the last 48 hours.  Microbiology Results (last 7 days)       Procedure Component Value Units Date/Time    Anaerobic Culture [[id number]] Collected: 11/26/24 0823    Order Status: Completed Specimen: Body Fluid from Back Updated: 11/28/24 0802     Anaerobe Culture No Anaerobes Isolated    Body Fluid Culture [[id number]] Collected: 11/26/24 0823    Order Status: Completed Specimen: Body Fluid from Back Updated: 11/28/24 0801     Body Fluid Culture No Growth At 48 Hours    Gram Stain [[id number]] Collected: 11/26/24 0823    Order Status: Completed Specimen: Body Fluid from Back Updated: 11/26/24 1353     GRAM STAIN Few WBC observed      No bacteria seen    Fungal Culture [[id number]] Collected: 11/26/24 0823    Order Status: Sent Specimen: Body Fluid from Back Updated: 11/26/24 0832              Assessment/Plan:     Active Diagnoses:    Diagnosis Date Noted POA    Altered mental status [R41.82] 10/30/2024 Yes      Problems Resolved During this Admission:     -Cultures remain negative  -Continue current management  -PTOT for mobilization   -Will follow     KENIA Enrique  Neurosurgery  [facility] - 5th Floor Med Surg    "

## 2024-11-30 PROCEDURE — 25000003 PHARM REV CODE 250: Performed by: INTERNAL MEDICINE

## 2024-11-30 PROCEDURE — 97110 THERAPEUTIC EXERCISES: CPT | Mod: CQ

## 2024-11-30 PROCEDURE — 25000003 PHARM REV CODE 250: Performed by: NURSE PRACTITIONER

## 2024-11-30 PROCEDURE — 25000003 PHARM REV CODE 250

## 2024-11-30 PROCEDURE — 63600175 PHARM REV CODE 636 W HCPCS: Performed by: NURSE PRACTITIONER

## 2024-11-30 PROCEDURE — 25000003 PHARM REV CODE 250: Performed by: PHYSICIAN ASSISTANT

## 2024-11-30 PROCEDURE — 63600175 PHARM REV CODE 636 W HCPCS: Performed by: INTERNAL MEDICINE

## 2024-11-30 PROCEDURE — 97116 GAIT TRAINING THERAPY: CPT | Mod: CQ

## 2024-11-30 PROCEDURE — 21400001 HC TELEMETRY ROOM

## 2024-11-30 RX ADMIN — OXYCODONE HYDROCHLORIDE AND ACETAMINOPHEN 1 TABLET: 5; 325 TABLET ORAL at 06:11

## 2024-11-30 RX ADMIN — SERTRALINE HYDROCHLORIDE 100 MG: 50 TABLET ORAL at 08:11

## 2024-11-30 RX ADMIN — FERROUS SULFATE TAB 325 MG (65 MG ELEMENTAL FE) 1 EACH: 325 (65 FE) TAB at 08:11

## 2024-11-30 RX ADMIN — PANTOPRAZOLE SODIUM 40 MG: 40 TABLET, DELAYED RELEASE ORAL at 08:11

## 2024-11-30 RX ADMIN — PROCHLORPERAZINE EDISYLATE 5 MG: 5 INJECTION INTRAMUSCULAR; INTRAVENOUS at 03:11

## 2024-11-30 RX ADMIN — DOCUSATE SODIUM 250 MG: 50 CAPSULE, LIQUID FILLED ORAL at 08:11

## 2024-11-30 RX ADMIN — LEVETIRACETAM 500 MG: 500 TABLET, FILM COATED ORAL at 08:11

## 2024-11-30 RX ADMIN — LOSARTAN POTASSIUM 100 MG: 50 TABLET, FILM COATED ORAL at 08:11

## 2024-11-30 RX ADMIN — ONDANSETRON 4 MG: 2 INJECTION INTRAMUSCULAR; INTRAVENOUS at 05:11

## 2024-11-30 RX ADMIN — OXYCODONE HYDROCHLORIDE AND ACETAMINOPHEN 1 TABLET: 5; 325 TABLET ORAL at 02:11

## 2024-11-30 RX ADMIN — ASPIRIN 325 MG: 325 TABLET, COATED ORAL at 08:11

## 2024-11-30 RX ADMIN — Medication 1 CAPSULE: at 04:11

## 2024-11-30 RX ADMIN — HYDROMORPHONE HYDROCHLORIDE 0.5 MG: 2 INJECTION INTRAMUSCULAR; INTRAVENOUS; SUBCUTANEOUS at 04:11

## 2024-11-30 RX ADMIN — Medication 6 MG: at 08:11

## 2024-11-30 RX ADMIN — HYDROMORPHONE HYDROCHLORIDE 0.5 MG: 2 INJECTION INTRAMUSCULAR; INTRAVENOUS; SUBCUTANEOUS at 08:11

## 2024-11-30 RX ADMIN — BACLOFEN 20 MG: 10 TABLET ORAL at 08:11

## 2024-11-30 RX ADMIN — CARVEDILOL 3.12 MG: 3.12 TABLET, FILM COATED ORAL at 08:11

## 2024-11-30 RX ADMIN — ATORVASTATIN CALCIUM 80 MG: 40 TABLET, FILM COATED ORAL at 08:11

## 2024-11-30 RX ADMIN — ENOXAPARIN SODIUM 40 MG: 40 INJECTION SUBCUTANEOUS at 04:11

## 2024-11-30 RX ADMIN — LEVOTHYROXINE SODIUM 200 MCG: 100 TABLET ORAL at 05:11

## 2024-11-30 RX ADMIN — BACLOFEN 20 MG: 10 TABLET ORAL at 02:11

## 2024-11-30 RX ADMIN — Medication 1 CAPSULE: at 11:11

## 2024-11-30 RX ADMIN — MIRTAZAPINE 15 MG: 15 TABLET, FILM COATED ORAL at 08:11

## 2024-11-30 RX ADMIN — OXYCODONE HYDROCHLORIDE AND ACETAMINOPHEN 1 TABLET: 5; 325 TABLET ORAL at 10:11

## 2024-11-30 RX ADMIN — Medication 1 CAPSULE: at 08:11

## 2024-11-30 RX ADMIN — ONDANSETRON 4 MG: 2 INJECTION INTRAMUSCULAR; INTRAVENOUS at 12:11

## 2024-11-30 RX ADMIN — OXYCODONE HYDROCHLORIDE AND ACETAMINOPHEN 1 TABLET: 5; 325 TABLET ORAL at 11:11

## 2024-11-30 RX ADMIN — HYDROMORPHONE HYDROCHLORIDE 1 MG: 2 INJECTION INTRAMUSCULAR; INTRAVENOUS; SUBCUTANEOUS at 12:11

## 2024-11-30 NOTE — PT/OT/SLP PROGRESS
"Physical Therapy Treatment    Patient Name:  Kasandra Cheek   MRN:  [id number]    Recommendations:     Discharge therapy intensity: Moderate Intensity Therapy   Discharge Equipment Recommendations:    Barriers to discharge: Impaired mobility, Ongoing medical needs, and placement    Assessment:     Kasandra Cheek is a 64 y.o. female admitted with a medical diagnosis of acute metabolic encephalopathy, NSTEMI, hypokalemia, hypomagnesemia, back pain, recent laminectomy of thoracic spine including T9 T11-3 decompression, removal of right iliac bone and S1 screw, extension of fusion T9-S2 iliac bolt. Confirmed with Dr. Mejia pt is to continue wearing TLSO for mobility.  She presents with the following impairments/functional limitations:  .    Rehab Prognosis: Good; patient would benefit from acute skilled PT services to address these deficits and reach maximum level of function.    Recent Surgery: * No surgery found *      Plan:     During this hospitalization, patient would benefit from acute PT services 5 x/week to address the identified rehab impairments via gait training, therapeutic activities, therapeutic exercises, neuromuscular re-education and progress toward the following goals:    Plan of Care Expires:  12/27/24    Subjective     Chief Complaint: "I feel a little nauseated after I get my pain medication."  Patient/Family Comments/goals:   Pain/Comfort:         Objective:     Communicated with RN prior to session.  Patient found HOB elevated with   upon PT entry to room.     General Precautions: Standard, fall  Orthopedic Precautions: spinal precautions  Braces: TLSO  Respiratory Status: Room air  Skin Integrity: Visible skin intact    Functional Mobility:  Bed Mobility:     Supine to Sit: moderate assistance  Transfers:     Sit to Stand:  minimum assistance with rolling walker; 5 trials perf throughout tx  Gait: 15'/15'/30' Yi w/RW; seated rest breaks required 2/2 BUE shoulder pain/weakness and low endurance. " Decreased keli toe clearance noted w/slow stepping ahmet. Vc for improved posture throughout amb.    Balance: sba for static seated balance    Therapeutic Activities/Exercises:  Seated therex BLE x 30 ea prior to amb  LAQ, tawanna, ankle DF/PF    Education:  Patient provided with verbal education education regarding PT role/goals/POC, fall prevention, and safety awareness.  Understanding was verbalized.     Patient left up in chair with all lines intact, call button in reach, geomat cushion, and  present    GOALS:   Multidisciplinary Problems       Physical Therapy Goals          Problem: Physical Therapy    Goal Priority Disciplines Outcome Interventions   Physical Therapy Goal     PT, PT/OT Progressing    Description: Goals to be met by: 24     Patient will increase functional independence with mobility by performin. Supine to sit with Stand-by Assistance  2. Sit to supine with Stand-by Assistance  3. Sit to stand transfer with Stand-by Assistance  4. Bed to chair transfer with Stand-by Assistance using Rolling Walker  5. Gait  x 200 feet with Stand-by Assistance using Rolling Walker.                          Time Tracking:     PT Received On: 24  PT Start Time: 852     PT Stop Time: 915  PT Total Time (min): 23 min     Billable Minutes: Gait Training 1 and Therapeutic Exercise 1    Treatment Type: Treatment  PT/PTA: PTA     Number of PTA visits since last PT visit: 2024

## 2024-11-30 NOTE — PROGRESS NOTES
Inpatient Nutrition Evaluation    Admit Date: 11/24/2024   Total duration of encounter: 6 days   Patient Age: 64 y.o.    Nutrition Recommendation/Prescription     Continue heart healthy diet as tolerated  Boost Original daily (provides 240 kcal and 10 gm protein per serving)  Continue antiemetic PRN  Monitor labs, intake and weight    Nutrition Assessment     Chart Review    Reason Seen: length of stay    Malnutrition Screening Tool Results   Have you recently lost weight without trying?: No  Have you been eating poorly because of a decreased appetite?: No   MST Score: 0   Diagnosis:  Acute metabolic encephalopathy -resolved, at baseline  NSTEMI, likely type 2  Hypokalemia  -replaced  Hypomagnesemia -replaced  Back pain -improving  Recently laminectomy of thoracic spine including T9 T11-3 decompression, removal of right iliac bone and S1 screw, extension of fusion T9-S2 iliac bolt    Relevant Medical History: HTN, HLD, anemia, hypothyroidism, fatty liver disease, lumbar radiculopathy, migraines, osteoporosis, spinal stenosis, TIA, anxiety, depression    Scheduled Medications:  aspirin, 325 mg, Daily  atorvastatin, 80 mg, Daily  baclofen, 20 mg, TID  carvediloL, 3.125 mg, BID  docusate sodium, 250 mg, BID  enoxparin, 40 mg, Daily  ferrous sulfate, 1 tablet, Daily  Lactobacillus acidophilus, 1 capsule, TID WM  levETIRAcetam, 500 mg, BID  levothyroxine, 200 mcg, Before breakfast  losartan, 100 mg, Daily  mirtazapine, 15 mg, QHS  pantoprazole, 40 mg, Daily  sertraline, 100 mg, Daily    Continuous Infusions:   PRN Medications:   Current Facility-Administered Medications:     acetaminophen, 1,000 mg, Oral, Q6H PRN    aluminum-magnesium hydroxide-simethicone, 30 mL, Oral, Q6H PRN    calcium carbonate, 1,000 mg, Oral, QID PRN    HYDROmorphone, 0.5 mg, Intravenous, Q8H PRN    melatonin, 6 mg, Oral, Nightly PRN    naloxone, 0.02 mg, Intravenous, PRN    ondansetron, 4 mg, Intravenous, Q4H PRN    oxyCODONE-acetaminophen, 1  tablet, Oral, Q4H PRN    polyethylene glycol, 17 g, Oral, BID PRN    prochlorperazine, 5 mg, Intravenous, Q6H PRN    Recent Labs   Lab 11/24/24 2123 11/25/24  0138 11/25/24  0359 11/25/24  1356 11/26/24  0438 11/27/24  0356 11/28/24  0515     --  145  --  144 143 141   K 3.2*  --  3.1*  --  3.1* 3.2* 3.9   CALCIUM 9.0  --  8.5  --  8.0* 7.7* 7.5*   PHOS  --   --  2.8  --   --   --   --    MG 1.50*  --  1.80  --   --  1.80  --    *  --  109*  --  105 107 107   CO2 26  --  24  --  28 27 26   BUN 12.1  --  10.7  --  6.4* 7.4* 7.0*   CREATININE 0.80  --  0.69  --  0.65 0.63 0.56   EGFRNORACEVR >60  --  >60  --  >60 >60 >60   GLUCOSE 109  --  121*  --  136* 100 96   BILITOT 0.5  --  0.5  --  0.5 0.4 0.4   ALKPHOS 132  --  134  --  123 109 117   ALT 13  --  15  --  12 11 11   AST 17  --  20  --  15 12 14   ALBUMIN 3.4  --  3.3*  --  2.9* 2.6* 2.5*   CRP  --   --   --  2.20  --   --   --    AMMONIA  --  36.4  --   --   --   --   --    WBC 5.80  --  6.83  --  4.31* 5.78 4.46*   HGB 10.8*  --  10.2*  --  10.1* 9.8* 9.6*   HCT 32.6*  --  30.6*  --  31.2* 29.9* 30.0*     Nutrition Orders:  Diet Heart Healthy  Dietary nutrition supplements Daily; Boost Original Nutritional Drink - Any flavor    Appetite/Oral Intake: good/% of meals  Factors Affecting Nutritional Intake: nausea  Food/P[employer]/Cultural Preferences:  beef broth for breakfast  Food Allergies: no known food allergies  Last Bowel Movement: 11/28/24  Wound(s):  none documented    Comments    11/30/24: Pt reports good intake; reports decreased intake PTA for couple weeks. +occasional nausea. LBM 11/28. Reports unintentional 8# weight loss in couple weeks. Per EMR weight history review, UBW appears ~290-300#. Admit weight of 220# likely inaccurate. Will trend weights. No muscle or fat depletion noted per NFPE. Pt requesting beef broth for breakfast instead of standard tray, will notify kitchen. Pt agreeable to ONS with  "breakfast.    Anthropometrics    Height: 5' 7" (170.2 cm), Height Method: Stated  Last Weight: 99.8 kg (220 lb 0.3 oz) (24 0300), Weight Method: Bed Scale  BMI (Calculated): 34.5  BMI Classification: obese grade I (BMI 30-34.9)     Ideal Body Weight (IBW), Female: 135 lb     % Ideal Body Weight, Female (lb): 162.98 %                    Usual Body Weight (UBW), k kg  % Usual Body Weight: 75.19     Usual Weight Provided By: EMR weight history    Wt Readings from Last 5 Encounters:   24 99.8 kg (220 lb 0.3 oz)   10/30/24 (!) 136.1 kg (300 lb 0.7 oz)   10/28/24 122.5 kg (270 lb)   10/15/24 129 kg (284 lb 6.3 oz)   10/07/24 129 kg (284 lb 6.3 oz)     Weight Change(s) Since Admission:   Wt Readings from Last 1 Encounters:   24 0300 99.8 kg (220 lb 0.3 oz)   24 99.8 kg (220 lb)   Admit Weight: 99.8 kg (220 lb) (24), Weight Method: Bed Scale    Patient Education     Not applicable.    Nutrition Goals & Monitoring     Dietitian will monitor: food and beverage intake, weight, and gastrointestinal profile    Nutrition Risk/Follow-Up: low (follow-up in 5-7 days)  Patients assigned 'low nutrition risk' status do not qualify for a full nutritional assessment but will be monitored and re-evaluated in a 5-7 day time period. Please consult if re-evaluation needed sooner.    "

## 2024-11-30 NOTE — PROGRESS NOTES
[facility] Hospital Medicine Progress Note        Chief Complaint: Inpatient Follow-up for     HPI: 64 y.o. female with a past medical history of hypertension, hyperlipidemia, anemia, hypothyroidism, fatty liver disease, lumbar radiculopathy, migraines, osteoporosis, spinal stenosis, TIA, anxiety, and depression who presented to [facility] on 11/24/2024 via EMS for altered mental status.   reported altered mental status began on Friday, 11/22/2024.   reported patient became very fatigued and was sleeping a lot.   also endorsed agitation and difficulty understanding what patient was saying.  Has been reported prior to Friday patient was in her normal state of health- reports patient normally ambulates with a walker at home.   states that patient has had increasing lower extremity weakness and back pain since fusion last month with Dr. Mejia.   reported that patient was recently inpatient here about 2 weeks ago and was discharged to rehab facility.   stated that patient became frightened when arriving at the rehab facility and left.   states they do not have an appointment with neurosurgeon until she goes through therapy and would like to see him while in patient.   denied any new medications.     Initial vital signs in ED were /81, pulse 103, respirations 20, temperature 36.7° C, and SpO2 96% on room air.  Labs revealed WBC 5.80, RBC 3.47, hemoglobin 10.8, hematocrit 32.6, MCV 93.9, potassium 3.2, chloride 109, magnesium 1.5, , troponin 0.048, TSH 4.728, T4 1.13, and serum alcohol undetectable.  UDS was positive for opiates.  Flu and COVID testing were negative.  UA revealed trace protein, 2+ nitrite, 25 leukocytes, and 6-10 white blood cells.  CT head without contrast revealed no acute abnormality seen.  CT cervical spine without contrast revealed postoperative changes without acute abnormality seen.  Chest x-ray  revealed right-sided pleural density.  CT chest abdomen and pelvis with IV contrast revealed probable old bilateral rib fractures, extensive hardware in the spine prosthesis in the right hip, and hepatomegaly.  EKG revealed normal sinus rhythm with heart rate of 94 beats per minute.  Patient was given IV morphine 4 mg, IV Zofran 4 mg, IV magnesium sulfate 2 g, IV potassium chloride 10 mEq, aspirin 325 mg, and IV Haldol 5 mg in ED. Patient was admitted to hospital medicine service for further medical management.      Patient recently discharged with laminectomy of thoracic spine including T9 T11-3 decompression, removal of right iliac bone and S1 screw, extension of fusion T9-S2 iliac bolt.       CT T spine   Stable postoperative changes of the thoracic and lumbar spine, with perihardware lucencies at T10-T11.  Residual fluid in the subcutaneous soft tissues of the thoracic spine and laminectomy bed of the lumbar spine, may be postoperative.  No enhancing paraspinal mass identified.  Neurosurgery was consulted and they consulted IR patient is status post ultrasound-guided fluid aspiration with almost 18 cc of old dark bloody fluid done on November 26 send it for cultures  Psych consulted patient is on Remeron and sertraline.  Neurology was consulted to since patient is back to baseline they do not recommend any other        Interval Hx:   11/27/2024 Patient seen and examined this morning has a  monitor asking for pain medications  Case was discussed with patient's nurse and  on the floor.    11/28/2024 Dr. English-chart reviewed patient examined.  Mentation is much improved.  Patient is status post ultrasound-guided fluid aspiration of fluid collection thoracic spine.    11/29/2024 Dr. English-clare reviewed patient examined.  Cultures from back aspiration negative at 72 hours.  Refers feeling better.   working on placement    Objective/physical exam:  General: In no acute distress,  afebrile  Chest: Clear to auscultation bilaterally  Heart: RRR, +S1, S2, no appreciable murmur  Abdomen: Soft, nontender, BS +  MSK: Warm, no lower extremity edema, no clubbing or cyanosis  Neurologic: Alert and awake  VITAL SIGNS: 24 HRS MIN & MAX LAST   Temp  Min: 97.8 °F (36.6 °C)  Max: 99.1 °F (37.3 °C) 98.6 °F (37 °C)   BP  Min: 117/70  Max: 150/73 119/71   Pulse  Min: 70  Max: 89  71   Resp  Min: 17  Max: 18 18   SpO2  Min: 95 %  Max: 97 % 97 %     I have reviewed the following labs:  Recent Labs   Lab 11/26/24 0438 11/27/24 0356 11/28/24  0515   WBC 4.31* 5.78 4.46*   RBC 3.26* 3.12* 3.12*   HGB 10.1* 9.8* 9.6*   HCT 31.2* 29.9* 30.0*   MCV 95.7* 95.8* 96.2*   MCH 31.0 31.4* 30.8   MCHC 32.4* 32.8* 32.0*   RDW 13.5 13.6 13.5    218 235   MPV 9.0 8.7 9.1     Recent Labs   Lab 11/24/24 2123 11/25/24 0359 11/26/24 0438 11/27/24 0356 11/28/24  0515    145 144 143 141   K 3.2* 3.1* 3.1* 3.2* 3.9   * 109* 105 107 107   CO2 26 24 28 27 26   BUN 12.1 10.7 6.4* 7.4* 7.0*   CREATININE 0.80 0.69 0.65 0.63 0.56   CALCIUM 9.0 8.5 8.0* 7.7* 7.5*   MG 1.50* 1.80  --  1.80  --    ALBUMIN 3.4 3.3* 2.9* 2.6* 2.5*   ALKPHOS 132 134 123 109 117   ALT 13 15 12 11 11   AST 17 20 15 12 14   BILITOT 0.5 0.5 0.5 0.4 0.4     Microbiology Results (last 7 days)       Procedure Component Value Units Date/Time    Body Fluid Culture [[id number]] Collected: 11/26/24 0823    Order Status: Completed Specimen: Body Fluid from Back Updated: 11/29/24 0942     Body Fluid Culture No Growth At 72 Hours    Anaerobic Culture [[id number]] Collected: 11/26/24 0823    Order Status: Completed Specimen: Body Fluid from Back Updated: 11/29/24 0941     Anaerobe Culture No Anaerobes Isolated    Gram Stain [[id number]] Collected: 11/26/24 0823    Order Status: Completed Specimen: Body Fluid from Back Updated: 11/26/24 1353     GRAM STAIN Few WBC observed      No bacteria seen    Fungal Culture [[id number]] Collected: 11/26/24 0823     Order Status: Sent Specimen: Body Fluid from Back Updated: 11/26/24 0832             See below for Radiology    Assessment/Plan:  Acute metabolic encephalopathy,  -resolved, at baseline  NSTEMI, likely type 2  Hypokalemia   -replaced  Hypomagnesemia,  =-replaced  Back pain  -improving  Recently laminectomy of thoracic spine including T9 T11-3 decompression, removal of right iliac bone and S1 screw, extension of fusion T9-S2 iliac bolt.       history of hypertension, hyperlipidemia, anemia, hypothyroidism, fatty liver disease, lumbar radiculopathy, migraines, osteoporosis, spinal stenosis, TIA, anxiety, and depression    Patient is status post ultrasound-guided fluid aspiration on 11/26 of Residual fluid in the subcutaneous soft tissues of the thoracic spine -  18cc old, dark, bloody fluid .  F cultures negative thus far at 72 hours  Neurosurgery following await culture results  Neurology was consulted and since patient is back to her baseline they recommend no further testing   Psych consulted and following they are recommending Remeron and sertraline  Potassium is low we will supplement  PT and OT consulted / working on placement at [facility]  Mental status is stable  Echocardiogram with preserved ejection fraction/diastolic dysfunction grade 1  Continue with other home medications that includes aspirin, statin, baclofen, Coreg, Keppra, Synthroid, losartan, mirtazapine and sertraline         VTE prophylaxis:  Lovenox    Patient condition:  Stable/    Anticipated discharge and Disposition:   [facility]      All diagnosis and differential diagnosis have been reviewed; assessment and plan has been documented; I have personally reviewed the labs and test results that are presently available; I have reviewed the patients medication list; I have reviewed the consulting providers response and recommendations. I have reviewed or attempted to review medical records based upon their  availability    All of the patient's questions have been  addressed and answered. Patient's is agreeable to the above stated plan. I will continue to monitor closely and make adjustments to medical management as needed.    Portions of this note dictated using EMR integrated voice recognition software, and may be subject to voice recognition errors not corrected at proofreading. Please contact writer for clarification if needed.   _____________________________________________________________________    Malnutrition Status:  Nutrition consulted. Most recent weight and BMI monitored-     Measurements:  Wt Readings from Last 1 Encounters:   11/25/24 99.8 kg (220 lb 0.3 oz)   Body mass index is 34.46 kg/m².    Patient has been screened and assessed by RD.    Malnutrition Type:  Context:    Level:      Malnutrition Characteristic Summary:       Interventions/Recommendations (treatment strategy):        Scheduled Med:   aspirin  325 mg Oral Daily    atorvastatin  80 mg Oral Daily    baclofen  20 mg Oral TID    carvediloL  3.125 mg Oral BID    docusate sodium  250 mg Oral BID    enoxparin  40 mg Subcutaneous Daily    ferrous sulfate  1 tablet Oral Daily    Lactobacillus acidophilus  1 capsule Oral TID WM    levETIRAcetam  500 mg Oral BID    levothyroxine  200 mcg Oral Before breakfast    losartan  100 mg Oral Daily    mirtazapine  15 mg Oral QHS    pantoprazole  40 mg Oral Daily    sertraline  100 mg Oral Daily      Continuous Infusions:     PRN Meds:    Current Facility-Administered Medications:     acetaminophen, 1,000 mg, Oral, Q6H PRN    aluminum-magnesium hydroxide-simethicone, 30 mL, Oral, Q6H PRN    calcium carbonate, 1,000 mg, Oral, QID PRN    HYDROmorphone, 0.5 mg, Intravenous, Q8H PRN    melatonin, 6 mg, Oral, Nightly PRN    naloxone, 0.02 mg, Intravenous, PRN    ondansetron, 4 mg, Intravenous, Q4H PRN    oxyCODONE-acetaminophen, 1 tablet, Oral, Q4H PRN    polyethylene glycol, 17 g, Oral, BID PRN     prochlorperazine, 5 mg, Intravenous, Q6H PRN     Radiology:  I have personally reviewed the following imaging and agree with the radiologist.     Echo    Left Ventricle: The left ventricle is normal in size. Normal wall   thickness. Normal wall motion. There is normal systolic function with a   visually estimated ejection fraction of 55 - 60%. Grade I diastolic   dysfunction.    Right Ventricle: Normal right ventricular cavity size. Systolic   function is normal.    Aortic Valve: The aortic valve is a trileaflet valve. There is no   stenosis. There is no significant regurgitation.    Mitral Valve: There is mitral annular calcification present. There is   normal leaflet mobility. There is mild regurgitation.    Tricuspid Valve: The tricuspid valve is structurally normal. There is   trace regurgitation.    Pericardium: There is no pericardial effusion.      Lennox English MD  Department of Hospital Medicine   [facility]   11/29/2024

## 2024-11-30 NOTE — PROGRESS NOTES
[facility] - 5th Floor Med Surg  Neurosurgery  Progress Note    Subjective:     Interval History:     Patient using walker to ambulate to bathroom to have a bath at this time.    No new issues   Physical therapy to work with patient.      Improving lumbar pain     Post-Op Info:  * No surgery found *          Medications:  Continuous Infusions:  Scheduled Meds:   aspirin  325 mg Oral Daily    atorvastatin  80 mg Oral Daily    baclofen  20 mg Oral TID    carvediloL  3.125 mg Oral BID    docusate sodium  250 mg Oral BID    enoxparin  40 mg Subcutaneous Daily    ferrous sulfate  1 tablet Oral Daily    Lactobacillus acidophilus  1 capsule Oral TID WM    levETIRAcetam  500 mg Oral BID    levothyroxine  200 mcg Oral Before breakfast    losartan  100 mg Oral Daily    mirtazapine  15 mg Oral QHS    pantoprazole  40 mg Oral Daily    sertraline  100 mg Oral Daily     PRN Meds:  Current Facility-Administered Medications:     acetaminophen, 1,000 mg, Oral, Q6H PRN    aluminum-magnesium hydroxide-simethicone, 30 mL, Oral, Q6H PRN    calcium carbonate, 1,000 mg, Oral, QID PRN    HYDROmorphone, 0.5 mg, Intravenous, Q8H PRN    melatonin, 6 mg, Oral, Nightly PRN    naloxone, 0.02 mg, Intravenous, PRN    ondansetron, 4 mg, Intravenous, Q4H PRN    oxyCODONE-acetaminophen, 1 tablet, Oral, Q4H PRN    polyethylene glycol, 17 g, Oral, BID PRN    prochlorperazine, 5 mg, Intravenous, Q6H PRN     Review of Systems  Objective:     Weight: 99.8 kg (220 lb 0.3 oz)  Body mass index is 34.46 kg/m².  Vital Signs (Most Recent):  Temp: 98.2 °F (36.8 °C) (11/30/24 1057)  Pulse: 79 (11/30/24 1057)  Resp: 18 (11/30/24 1115)  BP: 122/65 (11/30/24 1057)  SpO2: 95 % (11/30/24 1057) Vital Signs (24h Range):  Temp:  [97.9 °F (36.6 °C)-99 °F (37.2 °C)] 98.2 °F (36.8 °C)  Pulse:  [71-82] 79  Resp:  [17-18] 18  SpO2:  [93 %-97 %] 95 %  BP: (116-137)/(64-82) 122/65     Date 11/30/24 0700 - 12/01/24 0659   Shift 5535-5601 1347-0575 1157-2326 24  "Hour Total   INTAKE   Shift Total(mL/kg)       OUTPUT   Urine(mL/kg/hr) 650   650   Shift Total(mL/kg) 650(6.5)   650(6.5)   Weight (kg) 99.8 99.8 99.8 99.8                            Neurosurgery Physical Exam    Awake and oriented GCS of 15   Moving all extremities as before   Ambulating with walker    Significant Labs:  No results for input(s): "GLU", "NA", "K", "CL", "CO2", "BUN", "CREATININE", "CALCIUM", "MG" in the last 48 hours.  No results for input(s): "WBC", "HGB", "HCT", "PLT" in the last 48 hours.  No results for input(s): "LABPT", "INR", "APTT" in the last 48 hours.  Microbiology Results (last 7 days)       Procedure Component Value Units Date/Time    Body Fluid Culture [[id number]] Collected: 11/26/24 0823    Order Status: Completed Specimen: Body Fluid from Back Updated: 11/30/24 1108     Body Fluid Culture No growth at 4 days    Anaerobic Culture [[id number]] Collected: 11/26/24 0823    Order Status: Completed Specimen: Body Fluid from Back Updated: 11/29/24 0941     Anaerobe Culture No Anaerobes Isolated    Gram Stain [[id number]] Collected: 11/26/24 0823    Order Status: Completed Specimen: Body Fluid from Back Updated: 11/26/24 1353     GRAM STAIN Few WBC observed      No bacteria seen    Fungal Culture [[id number]] Collected: 11/26/24 0823    Order Status: Sent Specimen: Body Fluid from Back Updated: 11/26/24 0832          Assessment/Plan:    Cultures from aspiration negative  Continue PTOT   Fall precautions   Placement   SCDs   Pain control   Case management    Nothing else to add     Active Diagnoses:    Diagnosis Date Noted POA    Altered mental status [R41.82] 10/30/2024 Yes      Problems Resolved During this Admission:       ENRICO Lucia-BC  Neurosurgery  [facility] - 5th Floor Med Surg    "

## 2024-12-01 LAB — BACTERIA FLD CULT: NORMAL

## 2024-12-01 PROCEDURE — 25000003 PHARM REV CODE 250: Performed by: NURSE PRACTITIONER

## 2024-12-01 PROCEDURE — 63600175 PHARM REV CODE 636 W HCPCS: Performed by: INTERNAL MEDICINE

## 2024-12-01 PROCEDURE — 25000003 PHARM REV CODE 250: Performed by: PHYSICIAN ASSISTANT

## 2024-12-01 PROCEDURE — 21400001 HC TELEMETRY ROOM

## 2024-12-01 PROCEDURE — 25000003 PHARM REV CODE 250

## 2024-12-01 PROCEDURE — 63600175 PHARM REV CODE 636 W HCPCS: Performed by: NURSE PRACTITIONER

## 2024-12-01 PROCEDURE — 25000003 PHARM REV CODE 250: Performed by: INTERNAL MEDICINE

## 2024-12-01 RX ADMIN — Medication 1 CAPSULE: at 12:12

## 2024-12-01 RX ADMIN — ACETAMINOPHEN 1000 MG: 500 TABLET ORAL at 01:12

## 2024-12-01 RX ADMIN — BACLOFEN 20 MG: 10 TABLET ORAL at 08:12

## 2024-12-01 RX ADMIN — ONDANSETRON 4 MG: 2 INJECTION INTRAMUSCULAR; INTRAVENOUS at 12:12

## 2024-12-01 RX ADMIN — SERTRALINE HYDROCHLORIDE 100 MG: 50 TABLET ORAL at 09:12

## 2024-12-01 RX ADMIN — ASPIRIN 325 MG: 325 TABLET, COATED ORAL at 09:12

## 2024-12-01 RX ADMIN — LOSARTAN POTASSIUM 100 MG: 50 TABLET, FILM COATED ORAL at 09:12

## 2024-12-01 RX ADMIN — OXYCODONE HYDROCHLORIDE AND ACETAMINOPHEN 1 TABLET: 5; 325 TABLET ORAL at 04:12

## 2024-12-01 RX ADMIN — HYDROMORPHONE HYDROCHLORIDE 0.5 MG: 2 INJECTION INTRAMUSCULAR; INTRAVENOUS; SUBCUTANEOUS at 12:12

## 2024-12-01 RX ADMIN — PANTOPRAZOLE SODIUM 40 MG: 40 TABLET, DELAYED RELEASE ORAL at 09:12

## 2024-12-01 RX ADMIN — LEVOTHYROXINE SODIUM 200 MCG: 100 TABLET ORAL at 05:12

## 2024-12-01 RX ADMIN — OXYCODONE HYDROCHLORIDE AND ACETAMINOPHEN 1 TABLET: 5; 325 TABLET ORAL at 02:12

## 2024-12-01 RX ADMIN — Medication 1 CAPSULE: at 05:12

## 2024-12-01 RX ADMIN — MIRTAZAPINE 15 MG: 15 TABLET, FILM COATED ORAL at 08:12

## 2024-12-01 RX ADMIN — DOCUSATE SODIUM 250 MG: 50 CAPSULE, LIQUID FILLED ORAL at 09:12

## 2024-12-01 RX ADMIN — HYDROMORPHONE HYDROCHLORIDE 0.5 MG: 2 INJECTION INTRAMUSCULAR; INTRAVENOUS; SUBCUTANEOUS at 09:12

## 2024-12-01 RX ADMIN — BACLOFEN 20 MG: 10 TABLET ORAL at 09:12

## 2024-12-01 RX ADMIN — LEVETIRACETAM 500 MG: 500 TABLET, FILM COATED ORAL at 09:12

## 2024-12-01 RX ADMIN — ENOXAPARIN SODIUM 40 MG: 40 INJECTION SUBCUTANEOUS at 05:12

## 2024-12-01 RX ADMIN — ATORVASTATIN CALCIUM 80 MG: 40 TABLET, FILM COATED ORAL at 09:12

## 2024-12-01 RX ADMIN — CARVEDILOL 3.12 MG: 3.12 TABLET, FILM COATED ORAL at 09:12

## 2024-12-01 RX ADMIN — LEVETIRACETAM 500 MG: 500 TABLET, FILM COATED ORAL at 08:12

## 2024-12-01 RX ADMIN — OXYCODONE HYDROCHLORIDE AND ACETAMINOPHEN 1 TABLET: 5; 325 TABLET ORAL at 07:12

## 2024-12-01 RX ADMIN — DOCUSATE SODIUM 250 MG: 50 CAPSULE, LIQUID FILLED ORAL at 08:12

## 2024-12-01 RX ADMIN — OXYCODONE HYDROCHLORIDE AND ACETAMINOPHEN 1 TABLET: 5; 325 TABLET ORAL at 10:12

## 2024-12-01 RX ADMIN — BACLOFEN 20 MG: 10 TABLET ORAL at 02:12

## 2024-12-01 RX ADMIN — CARVEDILOL 3.12 MG: 3.12 TABLET, FILM COATED ORAL at 08:12

## 2024-12-01 RX ADMIN — FERROUS SULFATE TAB 325 MG (65 MG ELEMENTAL FE) 1 EACH: 325 (65 FE) TAB at 09:12

## 2024-12-01 RX ADMIN — HYDROMORPHONE HYDROCHLORIDE 0.5 MG: 2 INJECTION INTRAMUSCULAR; INTRAVENOUS; SUBCUTANEOUS at 05:12

## 2024-12-01 RX ADMIN — Medication 1 CAPSULE: at 09:12

## 2024-12-01 RX ADMIN — OXYCODONE HYDROCHLORIDE AND ACETAMINOPHEN 1 TABLET: 5; 325 TABLET ORAL at 11:12

## 2024-12-01 RX ADMIN — ONDANSETRON 4 MG: 2 INJECTION INTRAMUSCULAR; INTRAVENOUS at 05:12

## 2024-12-01 NOTE — PROGRESS NOTES
[facility] Hospital Medicine Progress Note        Chief Complaint: Inpatient Follow-up for     HPI: 64 y.o. female with a past medical history of hypertension, hyperlipidemia, anemia, hypothyroidism, fatty liver disease, lumbar radiculopathy, migraines, osteoporosis, spinal stenosis, TIA, anxiety, and depression who presented to [facility] on 11/24/2024 via EMS for altered mental status.   reported altered mental status began on Friday, 11/22/2024.   reported patient became very fatigued and was sleeping a lot.   also endorsed agitation and difficulty understanding what patient was saying.  Has been reported prior to Friday patient was in her normal state of health- reports patient normally ambulates with a walker at home.   states that patient has had increasing lower extremity weakness and back pain since fusion last month with Dr. Mejia.   reported that patient was recently inpatient here about 2 weeks ago and was discharged to rehab facility.   stated that patient became frightened when arriving at the rehab facility and left.   states they do not have an appointment with neurosurgeon until she goes through therapy and would like to see him while in patient.   denied any new medications.     Initial vital signs in ED were /81, pulse 103, respirations 20, temperature 36.7° C, and SpO2 96% on room air.  Labs revealed WBC 5.80, RBC 3.47, hemoglobin 10.8, hematocrit 32.6, MCV 93.9, potassium 3.2, chloride 109, magnesium 1.5, , troponin 0.048, TSH 4.728, T4 1.13, and serum alcohol undetectable.  UDS was positive for opiates.  Flu and COVID testing were negative.  UA revealed trace protein, 2+ nitrite, 25 leukocytes, and 6-10 white blood cells.  CT head without contrast revealed no acute abnormality seen.  CT cervical spine without contrast revealed postoperative changes without acute abnormality seen.  Chest x-ray  revealed right-sided pleural density.  CT chest abdomen and pelvis with IV contrast revealed probable old bilateral rib fractures, extensive hardware in the spine prosthesis in the right hip, and hepatomegaly.  EKG revealed normal sinus rhythm with heart rate of 94 beats per minute.  Patient was given IV morphine 4 mg, IV Zofran 4 mg, IV magnesium sulfate 2 g, IV potassium chloride 10 mEq, aspirin 325 mg, and IV Haldol 5 mg in ED. Patient was admitted to hospital medicine service for further medical management.      Patient recently discharged with laminectomy of thoracic spine including T9 T11-3 decompression, removal of right iliac bone and S1 screw, extension of fusion T9-S2 iliac bolt.       CT T spine   Stable postoperative changes of the thoracic and lumbar spine, with perihardware lucencies at T10-T11.  Residual fluid in the subcutaneous soft tissues of the thoracic spine and laminectomy bed of the lumbar spine, may be postoperative.  No enhancing paraspinal mass identified.  Neurosurgery was consulted and they consulted IR patient is status post ultrasound-guided fluid aspiration with almost 18 cc of old dark bloody fluid done on November 26 send it for cultures  Psych consulted patient is on Remeron and sertraline.  Neurology was consulted to since patient is back to baseline they do not recommend any other        Interval Hx:   11/27/2024 Patient seen and examined this morning has a  monitor asking for pain medications  Case was discussed with patient's nurse and  on the floor.    11/28/2024 Dr. English-chart reviewed patient examined.  Mentation is much improved.  Patient is status post ultrasound-guided fluid aspiration of fluid collection thoracic spine.    11/29/2024 Dr. English-chart reviewed patient examined.  Cultures from back aspiration negative at 72 hours.  Refers feeling better.   working on placement    11/30/2024 Dr. English-nothing new to add today.  Had a decent day.   Ambulating with walker.  Cultures from fluid collection at surgical site negative to date    Objective/physical exam:  General: In no acute distress, afebrile  Chest: Clear to auscultation bilaterally  Heart: RRR, +S1, S2, no appreciable murmur  Abdomen: Soft, nontender, BS +  MSK: Warm, no lower extremity edema, no clubbing or cyanosis  Neurologic: Alert and awake  VITAL SIGNS: 24 HRS MIN & MAX LAST   Temp  Min: 97.9 °F (36.6 °C)  Max: 99 °F (37.2 °C) 98.1 °F (36.7 °C)   BP  Min: 116/64  Max: 148/79 127/73   Pulse  Min: 71  Max: 82  80   Resp  Min: 18  Max: 18 18   SpO2  Min: 93 %  Max: 98 % 98 %     I have reviewed the following labs:  Recent Labs   Lab 11/26/24 0438 11/27/24  0356 11/28/24  0515   WBC 4.31* 5.78 4.46*   RBC 3.26* 3.12* 3.12*   HGB 10.1* 9.8* 9.6*   HCT 31.2* 29.9* 30.0*   MCV 95.7* 95.8* 96.2*   MCH 31.0 31.4* 30.8   MCHC 32.4* 32.8* 32.0*   RDW 13.5 13.6 13.5    218 235   MPV 9.0 8.7 9.1     Recent Labs   Lab 11/24/24 2123 11/25/24 0359 11/26/24 0438 11/27/24  0356 11/28/24  0515    145 144 143 141   K 3.2* 3.1* 3.1* 3.2* 3.9   * 109* 105 107 107   CO2 26 24 28 27 26   BUN 12.1 10.7 6.4* 7.4* 7.0*   CREATININE 0.80 0.69 0.65 0.63 0.56   CALCIUM 9.0 8.5 8.0* 7.7* 7.5*   MG 1.50* 1.80  --  1.80  --    ALBUMIN 3.4 3.3* 2.9* 2.6* 2.5*   ALKPHOS 132 134 123 109 117   ALT 13 15 12 11 11   AST 17 20 15 12 14   BILITOT 0.5 0.5 0.5 0.4 0.4     Microbiology Results (last 7 days)       Procedure Component Value Units Date/Time    Body Fluid Culture [[id number]] Collected: 11/26/24 0823    Order Status: Completed Specimen: Body Fluid from Back Updated: 11/30/24 1108     Body Fluid Culture No growth at 4 days    Anaerobic Culture [[id number]] Collected: 11/26/24 0823    Order Status: Completed Specimen: Body Fluid from Back Updated: 11/29/24 0941     Anaerobe Culture No Anaerobes Isolated    Gram Stain [[id number]] Collected: 11/26/24 0823    Order Status: Completed Specimen: Body  Fluid from Back Updated: 11/26/24 1353     GRAM STAIN Few WBC observed      No bacteria seen    Fungal Culture [[id number]] Collected: 11/26/24 0823    Order Status: Sent Specimen: Body Fluid from Back Updated: 11/26/24 0832             See below for Radiology    Assessment/Plan:  Acute metabolic encephalopathy,  -resolved, at baseline  NSTEMI, likely type 2  Hypokalemia   -replaced  Hypomagnesemia,  =-replaced  Back pain  -improving  Recently laminectomy of thoracic spine including T9 T11-3 decompression, removal of right iliac bone and S1 screw, extension of fusion T9-S2 iliac bolt.       history of hypertension, hyperlipidemia, anemia, hypothyroidism, fatty liver disease, lumbar radiculopathy, migraines, osteoporosis, spinal stenosis, TIA, anxiety, and depression    Patient is status post ultrasound-guided fluid aspiration on 11/26 of Residual fluid in the subcutaneous soft tissues of the thoracic spine -  18cc old, dark, bloody fluid .  F cultures negative thus far at 72 hours  Neurosurgery following await culture results  Neurology was consulted and since patient is back to her baseline they recommend no further testing   Psych consulted and following they are recommending Remeron and sertraline  Potassium is low we will supplement  PT and OT consulted / working on placement at [facility]  Mental status is stable  Echocardiogram with preserved ejection fraction/diastolic dysfunction grade 1  Continue with other home medications that includes aspirin, statin, baclofen, Coreg, Keppra, Synthroid, losartan, mirtazapine and sertraline         VTE prophylaxis:  Lovenox    Patient condition:  Stable/    Anticipated discharge and Disposition:   [facility]      All diagnosis and differential diagnosis have been reviewed; assessment and plan has been documented; I have personally reviewed the labs and test results that are presently available; I have reviewed the patients medication list; I  have reviewed the consulting providers response and recommendations. I have reviewed or attempted to review medical records based upon their availability    All of the patient's questions have been  addressed and answered. Patient's is agreeable to the above stated plan. I will continue to monitor closely and make adjustments to medical management as needed.    Portions of this note dictated using EMR integrated voice recognition software, and may be subject to voice recognition errors not corrected at proofreading. Please contact writer for clarification if needed.   _____________________________________________________________________    Malnutrition Status:  Nutrition consulted. Most recent weight and BMI monitored-     Measurements:  Wt Readings from Last 1 Encounters:   11/25/24 99.8 kg (220 lb 0.3 oz)   Body mass index is 34.46 kg/m².    Patient has been screened and assessed by RD.    Malnutrition Type:  Context:    Level: other (see comments) (Does not meet criteria)    Malnutrition Characteristic Summary:  Weight Loss (Malnutrition): other (see comments) (Unable to assess)  Energy Intake (Malnutrition): other (see comments) (Does not meet criteria)  Fluid Accumulation (Malnutrition): other (see comments) (Unable to assess)    Interventions/Recommendations (treatment strategy):        Scheduled Med:   aspirin  325 mg Oral Daily    atorvastatin  80 mg Oral Daily    baclofen  20 mg Oral TID    carvediloL  3.125 mg Oral BID    docusate sodium  250 mg Oral BID    enoxparin  40 mg Subcutaneous Daily    ferrous sulfate  1 tablet Oral Daily    Lactobacillus acidophilus  1 capsule Oral TID WM    levETIRAcetam  500 mg Oral BID    levothyroxine  200 mcg Oral Before breakfast    losartan  100 mg Oral Daily    mirtazapine  15 mg Oral QHS    pantoprazole  40 mg Oral Daily    sertraline  100 mg Oral Daily      Continuous Infusions:     PRN Meds:    Current Facility-Administered Medications:     acetaminophen, 1,000 mg,  Oral, Q6H PRN    aluminum-magnesium hydroxide-simethicone, 30 mL, Oral, Q6H PRN    calcium carbonate, 1,000 mg, Oral, QID PRN    HYDROmorphone, 0.5 mg, Intravenous, Q8H PRN    melatonin, 6 mg, Oral, Nightly PRN    naloxone, 0.02 mg, Intravenous, PRN    ondansetron, 4 mg, Intravenous, Q4H PRN    oxyCODONE-acetaminophen, 1 tablet, Oral, Q4H PRN    polyethylene glycol, 17 g, Oral, BID PRN    prochlorperazine, 5 mg, Intravenous, Q6H PRN     Radiology:  I have personally reviewed the following imaging and agree with the radiologist.     Echo    Left Ventricle: The left ventricle is normal in size. Normal wall   thickness. Normal wall motion. There is normal systolic function with a   visually estimated ejection fraction of 55 - 60%. Grade I diastolic   dysfunction.    Right Ventricle: Normal right ventricular cavity size. Systolic   function is normal.    Aortic Valve: The aortic valve is a trileaflet valve. There is no   stenosis. There is no significant regurgitation.    Mitral Valve: There is mitral annular calcification present. There is   normal leaflet mobility. There is mild regurgitation.    Tricuspid Valve: The tricuspid valve is structurally normal. There is   trace regurgitation.    Pericardium: There is no pericardial effusion.      Lennox English MD  Department of Hospital Medicine   [facility]   11/30/2024

## 2024-12-02 PROCEDURE — 25000003 PHARM REV CODE 250: Performed by: PHYSICIAN ASSISTANT

## 2024-12-02 PROCEDURE — 25000003 PHARM REV CODE 250: Performed by: INTERNAL MEDICINE

## 2024-12-02 PROCEDURE — 97116 GAIT TRAINING THERAPY: CPT | Mod: CQ

## 2024-12-02 PROCEDURE — 21400001 HC TELEMETRY ROOM

## 2024-12-02 PROCEDURE — 63600175 PHARM REV CODE 636 W HCPCS: Performed by: INTERNAL MEDICINE

## 2024-12-02 PROCEDURE — 25000003 PHARM REV CODE 250

## 2024-12-02 PROCEDURE — 97535 SELF CARE MNGMENT TRAINING: CPT | Mod: CO

## 2024-12-02 PROCEDURE — 63600175 PHARM REV CODE 636 W HCPCS: Performed by: NURSE PRACTITIONER

## 2024-12-02 PROCEDURE — 25000003 PHARM REV CODE 250: Performed by: NURSE PRACTITIONER

## 2024-12-02 PROCEDURE — 97530 THERAPEUTIC ACTIVITIES: CPT | Mod: CQ

## 2024-12-02 RX ADMIN — ONDANSETRON 4 MG: 2 INJECTION INTRAMUSCULAR; INTRAVENOUS at 05:12

## 2024-12-02 RX ADMIN — PANTOPRAZOLE SODIUM 40 MG: 40 TABLET, DELAYED RELEASE ORAL at 09:12

## 2024-12-02 RX ADMIN — OXYCODONE HYDROCHLORIDE AND ACETAMINOPHEN 1 TABLET: 5; 325 TABLET ORAL at 07:12

## 2024-12-02 RX ADMIN — ATORVASTATIN CALCIUM 80 MG: 40 TABLET, FILM COATED ORAL at 09:12

## 2024-12-02 RX ADMIN — FERROUS SULFATE TAB 325 MG (65 MG ELEMENTAL FE) 1 EACH: 325 (65 FE) TAB at 09:12

## 2024-12-02 RX ADMIN — ASPIRIN 325 MG: 325 TABLET, COATED ORAL at 09:12

## 2024-12-02 RX ADMIN — LEVETIRACETAM 500 MG: 500 TABLET, FILM COATED ORAL at 08:12

## 2024-12-02 RX ADMIN — ONDANSETRON 4 MG: 2 INJECTION INTRAMUSCULAR; INTRAVENOUS at 08:12

## 2024-12-02 RX ADMIN — SERTRALINE HYDROCHLORIDE 100 MG: 50 TABLET ORAL at 09:12

## 2024-12-02 RX ADMIN — Medication 1 CAPSULE: at 04:12

## 2024-12-02 RX ADMIN — MIRTAZAPINE 15 MG: 15 TABLET, FILM COATED ORAL at 08:12

## 2024-12-02 RX ADMIN — ACETAMINOPHEN 1000 MG: 500 TABLET ORAL at 02:12

## 2024-12-02 RX ADMIN — HYDROMORPHONE HYDROCHLORIDE 0.5 MG: 2 INJECTION INTRAMUSCULAR; INTRAVENOUS; SUBCUTANEOUS at 12:12

## 2024-12-02 RX ADMIN — CARVEDILOL 3.12 MG: 3.12 TABLET, FILM COATED ORAL at 09:12

## 2024-12-02 RX ADMIN — OXYCODONE HYDROCHLORIDE AND ACETAMINOPHEN 1 TABLET: 5; 325 TABLET ORAL at 08:12

## 2024-12-02 RX ADMIN — BACLOFEN 20 MG: 10 TABLET ORAL at 02:12

## 2024-12-02 RX ADMIN — Medication 1 CAPSULE: at 10:12

## 2024-12-02 RX ADMIN — LEVETIRACETAM 500 MG: 500 TABLET, FILM COATED ORAL at 09:12

## 2024-12-02 RX ADMIN — HYDROMORPHONE HYDROCHLORIDE 0.5 MG: 2 INJECTION INTRAMUSCULAR; INTRAVENOUS; SUBCUTANEOUS at 05:12

## 2024-12-02 RX ADMIN — HYDROMORPHONE HYDROCHLORIDE 0.5 MG: 2 INJECTION INTRAMUSCULAR; INTRAVENOUS; SUBCUTANEOUS at 09:12

## 2024-12-02 RX ADMIN — LOSARTAN POTASSIUM 100 MG: 50 TABLET, FILM COATED ORAL at 09:12

## 2024-12-02 RX ADMIN — BACLOFEN 20 MG: 10 TABLET ORAL at 09:12

## 2024-12-02 RX ADMIN — Medication 1 CAPSULE: at 12:12

## 2024-12-02 RX ADMIN — CARVEDILOL 3.12 MG: 3.12 TABLET, FILM COATED ORAL at 08:12

## 2024-12-02 RX ADMIN — DOCUSATE SODIUM 250 MG: 50 CAPSULE, LIQUID FILLED ORAL at 09:12

## 2024-12-02 RX ADMIN — OXYCODONE HYDROCHLORIDE AND ACETAMINOPHEN 1 TABLET: 5; 325 TABLET ORAL at 04:12

## 2024-12-02 RX ADMIN — OXYCODONE HYDROCHLORIDE AND ACETAMINOPHEN 1 TABLET: 5; 325 TABLET ORAL at 12:12

## 2024-12-02 RX ADMIN — OXYCODONE HYDROCHLORIDE AND ACETAMINOPHEN 1 TABLET: 5; 325 TABLET ORAL at 03:12

## 2024-12-02 RX ADMIN — ENOXAPARIN SODIUM 40 MG: 40 INJECTION SUBCUTANEOUS at 04:12

## 2024-12-02 RX ADMIN — DOCUSATE SODIUM 250 MG: 50 CAPSULE, LIQUID FILLED ORAL at 08:12

## 2024-12-02 RX ADMIN — CALCIUM CARBONATE (ANTACID) CHEW TAB 500 MG 1000 MG: 500 CHEW TAB at 08:12

## 2024-12-02 RX ADMIN — ACETAMINOPHEN 500 MG: 500 TABLET ORAL at 10:12

## 2024-12-02 RX ADMIN — BACLOFEN 20 MG: 10 TABLET ORAL at 08:12

## 2024-12-02 RX ADMIN — LEVOTHYROXINE SODIUM 200 MCG: 100 TABLET ORAL at 05:12

## 2024-12-02 NOTE — PT/OT/SLP PROGRESS
Occupational Therapy   Treatment    Name: Kasandra Cheek  MRN: [id number]  Admitting Diagnosis:  AMS/Laminectomy       Recommendations:     Recommended therapy intensity at discharge: Moderate Intensity Therapy   Discharge Equipment Recommendations:  to be determined by next level of care  Barriers to discharge:       Assessment:     Kasandra Cheek is a 64 y.o. female with a medical diagnosis of AMS/Laminectomy.  She presents with improved balance and increased endurance, adherence given to spinal pxns. Recommending Mod intensity therapy pending progress.  Performance deficits affecting function are weakness, impaired endurance, impaired self care skills, impaired functional mobility, impaired balance.     Rehab Prognosis:  Good; patient would benefit from acute skilled OT services to address these deficits and reach maximum level of function.       Plan:     Patient to be seen 5 x/week to address the above listed problems via self-care/home management, therapeutic activities, therapeutic exercises  Plan of Care Expires: 12/27/24  Plan of Care Reviewed with: patient    Subjective     Pain/Comfort:       Objective:     Communicated with: RN prior to session.  Patient found HOB elevated with   upon OT entry to room.    General Precautions: Standard, fall    Orthopedic Precautions:spinal precautions  Braces: TLSO  Respiratory Status: Room air       Occupational Performance:   (Bed Mobility-Min A) Log Roll adherence given to spinal pxns.  (Sitting balance- SBA)  Min A for donning TLSO  (Sit to stand- Mod A) from EOB  Pt. Ambulating from EOB to bathroom using RW for UE support with balance, CGA for balance using RW.  Pt. Standing at sink with CGA for balance while performing grooming task (brushing teeth) with appropriate preparation and setup.   Pt. Left UIC with all needs within reach.  Therapeutic Positioning    OT interventions performed during the course of today's session in an effort to prevent and/or reduce acquired  pressure injuries:   Therapeutic positioning was provided at the conclusion of session to offload all bony prominences for the prevention and/or reduction of pressure injuries      [facility] 6 Click ADL:      Patient Education:  Patient provided with verbal education education regarding fall prevention, safety awareness, and pressure ulcer prevention.  Additional teaching is warranted.      Patient left up in chair with all lines intact and call button in reach.    GOALS:   Multidisciplinary Problems       Occupational Therapy Goals          Problem: Occupational Therapy    Goal Priority Disciplines Outcome Interventions   Occupational Therapy Goal     OT, PT/OT Progressing    Description: LTG: Pt will perform basic ADLs and ADL transfers with Modified independence using LRAD by discharge.    STG: to be met by d/c    Pt will complete grooming standing at sink with LRAD with SBA.  Pt will complete UB dressing with mod (A), including management of TLSO.  Pt will complete LB dressing with SBA using LRAD.  Pt will complete toileting with SBA using LRAD.  Pt will complete functional mobility to/from toilet and toilet transfer with SBA using LRAD.                         Time Tracking:     OT Date of Treatment: 12/02/24  OT Start Time: 0943  OT Stop Time: 1008  OT Total Time (min): 25 min    Billable Minutes:Self Care/Home Management 2    OT/INDRA: INDRA     Number of INDRA visits since last OT visit: 1    12/2/2024

## 2024-12-02 NOTE — PROGRESS NOTES
[facility] - 5th Floor Med Surg  Neurosurgery  Progress Note    Subjective:     Interval History:     No acute issues overnight.    Ambulating independently   Cultures negative   Discharged to [place] as soon as insurance authorization takes place.    Post-Op Info:  * No surgery found *          Medications:  Continuous Infusions:  Scheduled Meds:   aspirin  325 mg Oral Daily    atorvastatin  80 mg Oral Daily    baclofen  20 mg Oral TID    carvediloL  3.125 mg Oral BID    docusate sodium  250 mg Oral BID    enoxparin  40 mg Subcutaneous Daily    ferrous sulfate  1 tablet Oral Daily    Lactobacillus acidophilus  1 capsule Oral TID WM    levETIRAcetam  500 mg Oral BID    levothyroxine  200 mcg Oral Before breakfast    losartan  100 mg Oral Daily    mirtazapine  15 mg Oral QHS    pantoprazole  40 mg Oral Daily    sertraline  100 mg Oral Daily     PRN Meds:  Current Facility-Administered Medications:     acetaminophen, 1,000 mg, Oral, Q6H PRN    aluminum-magnesium hydroxide-simethicone, 30 mL, Oral, Q6H PRN    calcium carbonate, 1,000 mg, Oral, QID PRN    HYDROmorphone, 0.5 mg, Intravenous, Q8H PRN    melatonin, 6 mg, Oral, Nightly PRN    naloxone, 0.02 mg, Intravenous, PRN    ondansetron, 4 mg, Intravenous, Q4H PRN    oxyCODONE-acetaminophen, 1 tablet, Oral, Q4H PRN    polyethylene glycol, 17 g, Oral, BID PRN    prochlorperazine, 5 mg, Intravenous, Q6H PRN     Review of Systems  Objective:     Weight: 99.8 kg (220 lb 0.3 oz)  Body mass index is 34.46 kg/m².  Vital Signs (Most Recent):  Temp: 98.8 °F (37.1 °C) (12/02/24 1119)  Pulse: 100 (12/02/24 1119)  Resp: 17 (12/02/24 0904)  BP: 136/78 (12/02/24 1119)  SpO2: (!) 94 % (12/02/24 1119) Vital Signs (24h Range):  Temp:  [97.6 °F (36.4 °C)-98.8 °F (37.1 °C)] 98.8 °F (37.1 °C)  Pulse:  [] 100  Resp:  [17-18] 17  SpO2:  [93 %-94 %] 94 %  BP: (111-136)/(64-78) 136/78                              Neurosurgery Physical Exam    Significant Labs:  No  "results for input(s): "GLU", "NA", "K", "CL", "CO2", "BUN", "CREATININE", "CALCIUM", "MG" in the last 48 hours.  No results for input(s): "WBC", "HGB", "HCT", "PLT" in the last 48 hours.  No results for input(s): "LABPT", "INR", "APTT" in the last 48 hours.  Microbiology Results (last 7 days)       Procedure Component Value Units Date/Time    Body Fluid Culture [[id number]] Collected: 11/26/24 0823    Order Status: Completed Specimen: Body Fluid from Back Updated: 12/01/24 0925     Body Fluid Culture Final Report: At 5 days. No growth    Anaerobic Culture [[id number]] Collected: 11/26/24 0823    Order Status: Completed Specimen: Body Fluid from Back Updated: 11/29/24 0941     Anaerobe Culture No Anaerobes Isolated    Gram Stain [[id number]] Collected: 11/26/24 0823    Order Status: Completed Specimen: Body Fluid from Back Updated: 11/26/24 1353     GRAM STAIN Few WBC observed      No bacteria seen    Fungal Culture [[id number]] Collected: 11/26/24 0823    Order Status: Sent Specimen: Body Fluid from Back Updated: 11/26/24 0832            Assessment/Plan:    Nothing else to add   Continue ambulation   PTOT  SCDs   Discharge to [facility]   Case management assisting       Active Diagnoses:    Diagnosis Date Noted POA    Altered mental status [R41.82] 10/30/2024 Yes      Problems Resolved During this Admission:       Nereyda Roberto [facility]-BC  Neurosurgery  [facility] - 5th Floor Med Surg    "

## 2024-12-02 NOTE — PROGRESS NOTES
[facility] Hospital Medicine Progress Note        Chief Complaint: Inpatient Follow-up for     HPI: 64 y.o. female with a past medical history of hypertension, hyperlipidemia, anemia, hypothyroidism, fatty liver disease, lumbar radiculopathy, migraines, osteoporosis, spinal stenosis, TIA, anxiety, and depression who presented to [facility] on 11/24/2024 via EMS for altered mental status.   reported altered mental status began on Friday, 11/22/2024.   reported patient became very fatigued and was sleeping a lot.   also endorsed agitation and difficulty understanding what patient was saying.  Has been reported prior to Friday patient was in her normal state of health- reports patient normally ambulates with a walker at home.   states that patient has had increasing lower extremity weakness and back pain since fusion last month with Dr. Mejia.   reported that patient was recently inpatient here about 2 weeks ago and was discharged to rehab facility.   stated that patient became frightened when arriving at the rehab facility and left.   states they do not have an appointment with neurosurgeon until she goes through therapy and would like to see him while in patient.   denied any new medications.     Initial vital signs in ED were /81, pulse 103, respirations 20, temperature 36.7° C, and SpO2 96% on room air.  Labs revealed WBC 5.80, RBC 3.47, hemoglobin 10.8, hematocrit 32.6, MCV 93.9, potassium 3.2, chloride 109, magnesium 1.5, , troponin 0.048, TSH 4.728, T4 1.13, and serum alcohol undetectable.  UDS was positive for opiates.  Flu and COVID testing were negative.  UA revealed trace protein, 2+ nitrite, 25 leukocytes, and 6-10 white blood cells.  CT head without contrast revealed no acute abnormality seen.  CT cervical spine without contrast revealed postoperative changes without acute abnormality seen.  Chest x-ray  revealed right-sided pleural density.  CT chest abdomen and pelvis with IV contrast revealed probable old bilateral rib fractures, extensive hardware in the spine prosthesis in the right hip, and hepatomegaly.  EKG revealed normal sinus rhythm with heart rate of 94 beats per minute.  Patient was given IV morphine 4 mg, IV Zofran 4 mg, IV magnesium sulfate 2 g, IV potassium chloride 10 mEq, aspirin 325 mg, and IV Haldol 5 mg in ED. Patient was admitted to hospital medicine service for further medical management.      Patient recently discharged with laminectomy of thoracic spine including T9 T11-3 decompression, removal of right iliac bone and S1 screw, extension of fusion T9-S2 iliac bolt.       CT T spine   Stable postoperative changes of the thoracic and lumbar spine, with perihardware lucencies at T10-T11.  Residual fluid in the subcutaneous soft tissues of the thoracic spine and laminectomy bed of the lumbar spine, may be postoperative.  No enhancing paraspinal mass identified.  Neurosurgery was consulted and they consulted IR patient is status post ultrasound-guided fluid aspiration with almost 18 cc of old dark bloody fluid done on November 26 send it for cultures  Psych consulted patient is on Remeron and sertraline.  Neurology was consulted to since patient is back to baseline they do not recommend any other        Interval Hx:   11/27/2024 Patient seen and examined this morning has a  monitor asking for pain medications  Case was discussed with patient's nurse and  on the floor.    11/28/2024 Dr. English-chart reviewed patient examined.  Mentation is much improved.  Patient is status post ultrasound-guided fluid aspiration of fluid collection thoracic spine.    11/29/2024 Dr. English-chart reviewed patient examined.  Cultures from back aspiration negative at 72 hours.  Refers feeling better.   working on placement    11/30/2024 Dr. English-nothing new to add today.  Had a decent day.   Ambulating with walker.  Cultures from fluid collection at surgical site negative to date    12/01/2024 Dr. English-chart reviewed patient examined.  Awaiting placement    Objective/physical exam:  General: In no acute distress, afebrile  Chest: Clear to auscultation bilaterally  Heart: RRR, +S1, S2, no appreciable murmur  Abdomen: Soft, nontender, BS +  MSK: Warm, no lower extremity edema, no clubbing or cyanosis  Neurologic: Alert and awake  VITAL SIGNS: 24 HRS MIN & MAX LAST   Temp  Min: 98 °F (36.7 °C)  Max: 98.8 °F (37.1 °C) 98.7 °F (37.1 °C)   BP  Min: 105/70  Max: 147/72 111/64   Pulse  Min: 70  Max: 87  72   Resp  Min: 17  Max: 18 18   SpO2  Min: 93 %  Max: 95 % (!) 94 %     I have reviewed the following labs:  Recent Labs   Lab 11/26/24 0438 11/27/24 0356 11/28/24  0515   WBC 4.31* 5.78 4.46*   RBC 3.26* 3.12* 3.12*   HGB 10.1* 9.8* 9.6*   HCT 31.2* 29.9* 30.0*   MCV 95.7* 95.8* 96.2*   MCH 31.0 31.4* 30.8   MCHC 32.4* 32.8* 32.0*   RDW 13.5 13.6 13.5    218 235   MPV 9.0 8.7 9.1     Recent Labs   Lab 11/24/24 2123 11/25/24 0359 11/26/24 0438 11/27/24 0356 11/28/24  0515    145 144 143 141   K 3.2* 3.1* 3.1* 3.2* 3.9   * 109* 105 107 107   CO2 26 24 28 27 26   BUN 12.1 10.7 6.4* 7.4* 7.0*   CREATININE 0.80 0.69 0.65 0.63 0.56   CALCIUM 9.0 8.5 8.0* 7.7* 7.5*   MG 1.50* 1.80  --  1.80  --    ALBUMIN 3.4 3.3* 2.9* 2.6* 2.5*   ALKPHOS 132 134 123 109 117   ALT 13 15 12 11 11   AST 17 20 15 12 14   BILITOT 0.5 0.5 0.5 0.4 0.4     Microbiology Results (last 7 days)       Procedure Component Value Units Date/Time    Body Fluid Culture [[id number]] Collected: 11/26/24 0823    Order Status: Completed Specimen: Body Fluid from Back Updated: 12/01/24 0925     Body Fluid Culture Final Report: At 5 days. No growth    Anaerobic Culture [[id number]] Collected: 11/26/24 0823    Order Status: Completed Specimen: Body Fluid from Back Updated: 11/29/24 0941     Anaerobe Culture No Anaerobes Isolated     Gram Stain [[id number]] Collected: 11/26/24 0823    Order Status: Completed Specimen: Body Fluid from Back Updated: 11/26/24 1353     GRAM STAIN Few WBC observed      No bacteria seen    Fungal Culture [[id number]] Collected: 11/26/24 0823    Order Status: Sent Specimen: Body Fluid from Back Updated: 11/26/24 0832             See below for Radiology    Assessment/Plan:  Acute metabolic encephalopathy,  -resolved, at baseline  NSTEMI, likely type 2  Hypokalemia   -replaced  Hypomagnesemia,  =-replaced  Back pain  -improving  Recently laminectomy of thoracic spine including T9 T11-3 decompression, removal of right iliac bone and S1 screw, extension of fusion T9-S2 iliac bolt.       history of hypertension, hyperlipidemia, anemia, hypothyroidism, fatty liver disease, lumbar radiculopathy, migraines, osteoporosis, spinal stenosis, TIA, anxiety, and depression    Patient is status post ultrasound-guided fluid aspiration on 11/26 of Residual fluid in the subcutaneous soft tissues of the thoracic spine -  18cc old, dark, bloody fluid .  F cultures negative thus far at 72 hours  Neurosurgery following await culture results  Neurology was consulted and since patient is back to her baseline they recommend no further testing   Psych consulted and following they are recommending Remeron and sertraline  Potassium is low we will supplement  PT and OT consulted / working on placement at hospitals  Mental status is stable  Echocardiogram with preserved ejection fraction/diastolic dysfunction grade 1  Continue with other home medications that includes aspirin, statin, baclofen, Coreg, Keppra, Synthroid, losartan, mirtazapine and sertraline         VTE prophylaxis:  Lovenox    Patient condition:  Stable/    Anticipated discharge and Disposition:   [facility]/hospitals      All diagnosis and differential diagnosis have been reviewed; assessment and plan has been documented; I have personally reviewed the labs  and test results that are presently available; I have reviewed the patients medication list; I have reviewed the consulting providers response and recommendations. I have reviewed or attempted to review medical records based upon their availability    All of the patient's questions have been  addressed and answered. Patient's is agreeable to the above stated plan. I will continue to monitor closely and make adjustments to medical management as needed.    Portions of this note dictated using EMR integrated voice recognition software, and may be subject to voice recognition errors not corrected at proofreading. Please contact writer for clarification if needed.   _____________________________________________________________________    Malnutrition Status:  Nutrition consulted. Most recent weight and BMI monitored-     Measurements:  Wt Readings from Last 1 Encounters:   11/25/24 99.8 kg (220 lb 0.3 oz)   Body mass index is 34.46 kg/m².    Patient has been screened and assessed by RD.    Malnutrition Type:  Context:    Level: other (see comments) (Does not meet criteria)    Malnutrition Characteristic Summary:  Weight Loss (Malnutrition): other (see comments) (Unable to assess)  Energy Intake (Malnutrition): other (see comments) (Does not meet criteria)  Fluid Accumulation (Malnutrition): other (see comments) (Unable to assess)    Interventions/Recommendations (treatment strategy):        Scheduled Med:   aspirin  325 mg Oral Daily    atorvastatin  80 mg Oral Daily    baclofen  20 mg Oral TID    carvediloL  3.125 mg Oral BID    docusate sodium  250 mg Oral BID    enoxparin  40 mg Subcutaneous Daily    ferrous sulfate  1 tablet Oral Daily    Lactobacillus acidophilus  1 capsule Oral TID WM    levETIRAcetam  500 mg Oral BID    levothyroxine  200 mcg Oral Before breakfast    losartan  100 mg Oral Daily    mirtazapine  15 mg Oral QHS    pantoprazole  40 mg Oral Daily    sertraline  100 mg Oral Daily      Continuous  Infusions:     PRN Meds:    Current Facility-Administered Medications:     acetaminophen, 1,000 mg, Oral, Q6H PRN    aluminum-magnesium hydroxide-simethicone, 30 mL, Oral, Q6H PRN    calcium carbonate, 1,000 mg, Oral, QID PRN    HYDROmorphone, 0.5 mg, Intravenous, Q8H PRN    melatonin, 6 mg, Oral, Nightly PRN    naloxone, 0.02 mg, Intravenous, PRN    ondansetron, 4 mg, Intravenous, Q4H PRN    oxyCODONE-acetaminophen, 1 tablet, Oral, Q4H PRN    polyethylene glycol, 17 g, Oral, BID PRN    prochlorperazine, 5 mg, Intravenous, Q6H PRN     Radiology:  I have personally reviewed the following imaging and agree with the radiologist.     Echo    Left Ventricle: The left ventricle is normal in size. Normal wall   thickness. Normal wall motion. There is normal systolic function with a   visually estimated ejection fraction of 55 - 60%. Grade I diastolic   dysfunction.    Right Ventricle: Normal right ventricular cavity size. Systolic   function is normal.    Aortic Valve: The aortic valve is a trileaflet valve. There is no   stenosis. There is no significant regurgitation.    Mitral Valve: There is mitral annular calcification present. There is   normal leaflet mobility. There is mild regurgitation.    Tricuspid Valve: The tricuspid valve is structurally normal. There is   trace regurgitation.    Pericardium: There is no pericardial effusion.      Lennox English MD  Department of Hospital Medicine   [facility]   12/01/2024

## 2024-12-02 NOTE — PLAN OF CARE
SSC spoke to Rabia at [facility]. They can accept patient tomorrow morning if she is medically ready.

## 2024-12-02 NOTE — PROGRESS NOTES
[facility] Hospital Medicine Progress Note        Chief Complaint: Inpatient Follow-up for     HPI: 64 y.o. female with a past medical history of hypertension, hyperlipidemia, anemia, hypothyroidism, fatty liver disease, lumbar radiculopathy, migraines, osteoporosis, spinal stenosis, TIA, anxiety, and depression who presented to [facility] on 11/24/2024 via EMS for altered mental status.   reported altered mental status began on Friday, 11/22/2024.   reported patient became very fatigued and was sleeping a lot.   also endorsed agitation and difficulty understanding what patient was saying.  Has been reported prior to Friday patient was in her normal state of health- reports patient normally ambulates with a walker at home.   states that patient has had increasing lower extremity weakness and back pain since fusion last month with Dr. Mejia.   reported that patient was recently inpatient here about 2 weeks ago and was discharged to rehab facility.   stated that patient became frightened when arriving at the rehab facility and left.   states they do not have an appointment with neurosurgeon until she goes through therapy and would like to see him while in patient.   denied any new medications.     Initial vital signs in ED were /81, pulse 103, respirations 20, temperature 36.7° C, and SpO2 96% on room air.  Labs revealed WBC 5.80, RBC 3.47, hemoglobin 10.8, hematocrit 32.6, MCV 93.9, potassium 3.2, chloride 109, magnesium 1.5, , troponin 0.048, TSH 4.728, T4 1.13, and serum alcohol undetectable.  UDS was positive for opiates.  Flu and COVID testing were negative.  UA revealed trace protein, 2+ nitrite, 25 leukocytes, and 6-10 white blood cells.  CT head without contrast revealed no acute abnormality seen.  CT cervical spine without contrast revealed postoperative changes without acute abnormality seen.  Chest x-ray  revealed right-sided pleural density.  CT chest abdomen and pelvis with IV contrast revealed probable old bilateral rib fractures, extensive hardware in the spine prosthesis in the right hip, and hepatomegaly.  EKG revealed normal sinus rhythm with heart rate of 94 beats per minute.  Patient was given IV morphine 4 mg, IV Zofran 4 mg, IV magnesium sulfate 2 g, IV potassium chloride 10 mEq, aspirin 325 mg, and IV Haldol 5 mg in ED. Patient was admitted to hospital medicine service for further medical management.      Patient recently discharged with laminectomy of thoracic spine including T9 T11-3 decompression, removal of right iliac bone and S1 screw, extension of fusion T9-S2 iliac bolt.       CT T spine   Stable postoperative changes of the thoracic and lumbar spine, with perihardware lucencies at T10-T11.  Residual fluid in the subcutaneous soft tissues of the thoracic spine and laminectomy bed of the lumbar spine, may be postoperative.  No enhancing paraspinal mass identified.  Neurosurgery was consulted and they consulted IR patient is status post ultrasound-guided fluid aspiration with almost 18 cc of old dark bloody fluid done on November 26 send it for cultures  Psych consulted patient is on Remeron and sertraline.  Neurology was consulted to since patient is back to baseline they do not recommend any other        Interval Hx:   11/27/2024 Patient seen and examined this morning has a  monitor asking for pain medications  Case was discussed with patient's nurse and  on the floor.    11/28/2024 Dr. English-chart reviewed patient examined.  Mentation is much improved.  Patient is status post ultrasound-guided fluid aspiration of fluid collection thoracic spine.    11/29/2024 Dr. English-chart reviewed patient examined.  Cultures from back aspiration negative at 72 hours.  Refers feeling better.   working on placement    11/30/2024 Dr. English-nothing new to add today.  Had a decent day.   Ambulating with walker.  Cultures from fluid collection at surgical site negative to date    12/01/2024 Dr. English-chart reviewed patient examined.  Awaiting placement    12/02/2024 Dr. English-accepted to [facility] for tomorrow a.m..  No complaints        Objective/physical exam:  General: In no acute distress, afebrile  Chest: Clear to auscultation bilaterally  Heart: RRR, +S1, S2, no appreciable murmur  Abdomen: Soft, nontender, BS +  MSK: Warm, no lower extremity edema, no clubbing or cyanosis  Neurologic: Alert and awake  VITAL SIGNS: 24 HRS MIN & MAX LAST   Temp  Min: 97.6 °F (36.4 °C)  Max: 98.8 °F (37.1 °C) 98.8 °F (37.1 °C)   BP  Min: 111/64  Max: 136/78 136/78   Pulse  Min: 70  Max: 100  100   Resp  Min: 17  Max: 18 17   SpO2  Min: 93 %  Max: 94 % (!) 94 %     I have reviewed the following labs:  Recent Labs   Lab 11/26/24 0438 11/27/24 0356 11/28/24  0515   WBC 4.31* 5.78 4.46*   RBC 3.26* 3.12* 3.12*   HGB 10.1* 9.8* 9.6*   HCT 31.2* 29.9* 30.0*   MCV 95.7* 95.8* 96.2*   MCH 31.0 31.4* 30.8   MCHC 32.4* 32.8* 32.0*   RDW 13.5 13.6 13.5    218 235   MPV 9.0 8.7 9.1     Recent Labs   Lab 11/26/24 0438 11/27/24 0356 11/28/24  0515    143 141   K 3.1* 3.2* 3.9    107 107   CO2 28 27 26   BUN 6.4* 7.4* 7.0*   CREATININE 0.65 0.63 0.56   CALCIUM 8.0* 7.7* 7.5*   MG  --  1.80  --    ALBUMIN 2.9* 2.6* 2.5*   ALKPHOS 123 109 117   ALT 12 11 11   AST 15 12 14   BILITOT 0.5 0.4 0.4     Microbiology Results (last 7 days)       Procedure Component Value Units Date/Time    Body Fluid Culture [[id number]] Collected: 11/26/24 0823    Order Status: Completed Specimen: Body Fluid from Back Updated: 12/01/24 0925     Body Fluid Culture Final Report: At 5 days. No growth    Anaerobic Culture [[id number]] Collected: 11/26/24 0823    Order Status: Completed Specimen: Body Fluid from Back Updated: 11/29/24 0941     Anaerobe Culture No Anaerobes Isolated    Gram Stain [[id number]] Collected: 11/26/24 0823     Order Status: Completed Specimen: Body Fluid from Back Updated: 11/26/24 1353     GRAM STAIN Few WBC observed      No bacteria seen    Fungal Culture [[id number]] Collected: 11/26/24 0823    Order Status: Sent Specimen: Body Fluid from Back Updated: 11/26/24 0832             See below for Radiology    Assessment/Plan:  Acute metabolic encephalopathy,  -resolved, at baseline    NSTEMI, likely type 2    Hypokalemia   -replaced  Hypomagnesemia,  -replaced    Back pain  -improving  Recently laminectomy of thoracic spine including T9 T11-3 decompression, removal of right iliac bone and S1 screw, extension of fusion T9-S2 iliac bolt.       history of hypertension, hyperlipidemia, anemia, hypothyroidism, fatty liver disease, lumbar radiculopathy, migraines, osteoporosis, spinal stenosis, TIA, anxiety, and depression    Patient is status post ultrasound-guided fluid aspiration on 11/26 of Residual fluid in the subcutaneous soft tissues of the thoracic spine -  18cc old, dark, bloody fluid .   cultures negative   Neurosurgery following   Neurology was consulted and since patient is back to her baseline they recommend no further testing   Psych consulted and following they are recommending Remeron and sertraline    PT and OT consulted / working on placement at [facility]- tomorrow   Mental status is stable  Echocardiogram with preserved ejection fraction/diastolic dysfunction grade 1  Continue with other home medications that includes aspirin, statin, baclofen, Coreg, Keppra, Synthroid, losartan, mirtazapine and sertraline       Transfer in am           VTE prophylaxis:  Lovenox    Patient condition:  Stable/    Anticipated discharge and Disposition:   /[facility]      All diagnosis and differential diagnosis have been reviewed; assessment and plan has been documented; I have personally reviewed the labs and test results that are presently available; I have reviewed the patients medication  list; I have reviewed the consulting providers response and recommendations. I have reviewed or attempted to review medical records based upon their availability    All of the patient's questions have been  addressed and answered. Patient's is agreeable to the above stated plan. I will continue to monitor closely and make adjustments to medical management as needed.    Portions of this note dictated using EMR integrated voice recognition software, and may be subject to voice recognition errors not corrected at proofreading. Please contact writer for clarification if needed.   _____________________________________________________________________    Malnutrition Status:  Nutrition consulted. Most recent weight and BMI monitored-     Measurements:  Wt Readings from Last 1 Encounters:   11/25/24 99.8 kg (220 lb 0.3 oz)   Body mass index is 34.46 kg/m².    Patient has been screened and assessed by RD.    Malnutrition Type:  Context:    Level: other (see comments) (Does not meet criteria)    Malnutrition Characteristic Summary:  Weight Loss (Malnutrition): other (see comments) (Unable to assess)  Energy Intake (Malnutrition): other (see comments) (Does not meet criteria)  Fluid Accumulation (Malnutrition): other (see comments) (Unable to assess)    Interventions/Recommendations (treatment strategy):        Scheduled Med:   aspirin  325 mg Oral Daily    atorvastatin  80 mg Oral Daily    baclofen  20 mg Oral TID    carvediloL  3.125 mg Oral BID    docusate sodium  250 mg Oral BID    enoxparin  40 mg Subcutaneous Daily    ferrous sulfate  1 tablet Oral Daily    Lactobacillus acidophilus  1 capsule Oral TID WM    levETIRAcetam  500 mg Oral BID    levothyroxine  200 mcg Oral Before breakfast    losartan  100 mg Oral Daily    mirtazapine  15 mg Oral QHS    pantoprazole  40 mg Oral Daily    sertraline  100 mg Oral Daily      Continuous Infusions:     PRN Meds:    Current Facility-Administered Medications:     acetaminophen,  1,000 mg, Oral, Q6H PRN    aluminum-magnesium hydroxide-simethicone, 30 mL, Oral, Q6H PRN    calcium carbonate, 1,000 mg, Oral, QID PRN    HYDROmorphone, 0.5 mg, Intravenous, Q8H PRN    melatonin, 6 mg, Oral, Nightly PRN    naloxone, 0.02 mg, Intravenous, PRN    ondansetron, 4 mg, Intravenous, Q4H PRN    oxyCODONE-acetaminophen, 1 tablet, Oral, Q4H PRN    polyethylene glycol, 17 g, Oral, BID PRN    prochlorperazine, 5 mg, Intravenous, Q6H PRN     Radiology:  I have personally reviewed the following imaging and agree with the radiologist.     Echo    Left Ventricle: The left ventricle is normal in size. Normal wall   thickness. Normal wall motion. There is normal systolic function with a   visually estimated ejection fraction of 55 - 60%. Grade I diastolic   dysfunction.    Right Ventricle: Normal right ventricular cavity size. Systolic   function is normal.    Aortic Valve: The aortic valve is a trileaflet valve. There is no   stenosis. There is no significant regurgitation.    Mitral Valve: There is mitral annular calcification present. There is   normal leaflet mobility. There is mild regurgitation.    Tricuspid Valve: The tricuspid valve is structurally normal. There is   trace regurgitation.    Pericardium: There is no pericardial effusion.      Lennox English MD  Department of Hospital Medicine   [facility]   12/02/2024

## 2024-12-02 NOTE — PT/OT/SLP PROGRESS
Physical Therapy Treatment    Patient Name:  Kasandra Cheek   MRN:  [id number]    Recommendations:     Discharge therapy intensity: Moderate Intensity Therapy   Discharge Equipment Recommendations:    Barriers to discharge: Impaired mobility, Ongoing medical needs, and placement.    Assessment:     Kasandra Cheek is a 64 y.o. female admitted with a medical diagnosis of acute metabolic encephalopathy, NSTEMI, hypokalemia, hypomagnesemia, back pain, recent laminectomy of thoracic spine including T9 T11-3 decompression, removal of right iliac bone and S1 screw, extension of fusion T9-S2 iliac bolt. Confirmed with Dr. Mejia pt is to continue wearing TLSO for mobility.  She presents with the following impairments/functional limitations:  .    Rehab Prognosis: Good; patient would benefit from acute skilled PT services to address these deficits and reach maximum level of function.    Recent Surgery: * No surgery found *      Plan:     During this hospitalization, patient would benefit from acute PT services 5 x/week to address the identified rehab impairments via gait training, therapeutic activities, therapeutic exercises, neuromuscular re-education and progress toward the following goals:    Plan of Care Expires:  12/27/24    Subjective     Chief Complaint: n/a  Patient/Family Comments/goals:   Pain/Comfort:         Objective:     Communicated with RN prior to session.  Patient found supine with   upon PT entry to room.     General Precautions: Standard, fall  Orthopedic Precautions: spinal precautions  Braces: TLSO  Respiratory Status: Room air  Skin Integrity: Visible skin intact    Functional Mobility:  Bed Mobility:     Supine to Sit: minimum assistance; vc for log roll  Transfers:     Sit to Stand:  moderate assistance from EOB with rolling walker; Yi-cga from bschair w/RW  Gait: 8' cga w/RW > bathroom sink to perf ADL; 15'/30'/45' cga w/RW; 3 seated rest breaks required 2/2 low endurance and BLE weakness;   Balance:  cga statix standing for ADL    Therapeutic Activities/Exercises:  1 trial STS from EOB modA w/RW; vc for hand palcement and sequencing  4 trials STS from bschair cga-Yi     Education:  Patient provided with verbal education education regarding PT role/goals/POC, fall prevention, and safety awareness.  Understanding was verbalized.     Patient left up in chair with all lines intact, call button in reach, and  present    GOALS:   Multidisciplinary Problems       Physical Therapy Goals          Problem: Physical Therapy    Goal Priority Disciplines Outcome Interventions   Physical Therapy Goal     PT, PT/OT Progressing    Description: Goals to be met by: 24     Patient will increase functional independence with mobility by performin. Supine to sit with Stand-by Assistance  2. Sit to supine with Stand-by Assistance  3. Sit to stand transfer with Stand-by Assistance  4. Bed to chair transfer with Stand-by Assistance using Rolling Walker  5. Gait  x 200 feet with Stand-by Assistance using Rolling Walker.                          Time Tracking:     PT Received On: 24  PT Start Time: 0943     PT Stop Time: 1008  PT Total Time (min): 25 min     Billable Minutes: Gait Training 1 and Therapeutic Activity 1    Treatment Type: Treatment  PT/PTA: PTA     Number of PTA visits since last PT visit: 2     2024

## 2024-12-03 VITALS
SYSTOLIC BLOOD PRESSURE: 138 MMHG | WEIGHT: 220 LBS | HEIGHT: 67 IN | RESPIRATION RATE: 18 BRPM | OXYGEN SATURATION: 99 % | HEART RATE: 67 BPM | DIASTOLIC BLOOD PRESSURE: 79 MMHG | TEMPERATURE: 99 F | BODY MASS INDEX: 34.53 KG/M2

## 2024-12-03 PROBLEM — E87.6 HYPOKALEMIA: Status: RESOLVED | Noted: 2024-12-03 | Resolved: 2024-12-03

## 2024-12-03 PROBLEM — E87.6 HYPOKALEMIA: Status: ACTIVE | Noted: 2024-12-03

## 2024-12-03 PROBLEM — R41.82 AMS (ALTERED MENTAL STATUS): Status: ACTIVE | Noted: 2024-12-03

## 2024-12-03 PROBLEM — R07.9 CHEST PAIN: Status: ACTIVE | Noted: 2024-12-03

## 2024-12-03 PROBLEM — E83.42 HYPOMAGNESEMIA: Status: ACTIVE | Noted: 2024-12-03

## 2024-12-03 PROBLEM — E83.42 HYPOMAGNESEMIA: Status: RESOLVED | Noted: 2024-12-03 | Resolved: 2024-12-03

## 2024-12-03 PROBLEM — G93.40 ACUTE ENCEPHALOPATHY: Status: ACTIVE | Noted: 2024-12-03

## 2024-12-03 PROCEDURE — 25000003 PHARM REV CODE 250

## 2024-12-03 PROCEDURE — 25000003 PHARM REV CODE 250: Performed by: INTERNAL MEDICINE

## 2024-12-03 PROCEDURE — 63600175 PHARM REV CODE 636 W HCPCS: Performed by: INTERNAL MEDICINE

## 2024-12-03 PROCEDURE — 63600175 PHARM REV CODE 636 W HCPCS: Performed by: NURSE PRACTITIONER

## 2024-12-03 PROCEDURE — 25000003 PHARM REV CODE 250: Performed by: PHYSICIAN ASSISTANT

## 2024-12-03 RX ORDER — MIRTAZAPINE 15 MG/1
15 TABLET, FILM COATED ORAL NIGHTLY
Qty: 30 TABLET | Refills: 0 | Status: SHIPPED | OUTPATIENT
Start: 2024-12-03 | End: 2025-01-02

## 2024-12-03 RX ORDER — SERTRALINE HYDROCHLORIDE 100 MG/1
100 TABLET, FILM COATED ORAL DAILY
Qty: 30 TABLET | Refills: 11 | Status: SHIPPED | OUTPATIENT
Start: 2024-12-04 | End: 2025-12-04

## 2024-12-03 RX ORDER — METHOCARBAMOL 500 MG/1
500 TABLET, FILM COATED ORAL 3 TIMES DAILY
Qty: 30 TABLET | Refills: 0 | Status: SHIPPED | OUTPATIENT
Start: 2024-12-03 | End: 2024-12-13

## 2024-12-03 RX ORDER — OXYCODONE AND ACETAMINOPHEN 5; 325 MG/1; MG/1
1 TABLET ORAL EVERY 6 HOURS PRN
Qty: 28 TABLET | Refills: 0 | Status: SHIPPED | OUTPATIENT
Start: 2024-12-03 | End: 2024-12-10

## 2024-12-03 RX ADMIN — OXYCODONE HYDROCHLORIDE AND ACETAMINOPHEN 1 TABLET: 5; 325 TABLET ORAL at 04:12

## 2024-12-03 RX ADMIN — ATORVASTATIN CALCIUM 80 MG: 40 TABLET, FILM COATED ORAL at 08:12

## 2024-12-03 RX ADMIN — FERROUS SULFATE TAB 325 MG (65 MG ELEMENTAL FE) 1 EACH: 325 (65 FE) TAB at 08:12

## 2024-12-03 RX ADMIN — LOSARTAN POTASSIUM 100 MG: 50 TABLET, FILM COATED ORAL at 08:12

## 2024-12-03 RX ADMIN — BACLOFEN 20 MG: 10 TABLET ORAL at 08:12

## 2024-12-03 RX ADMIN — SERTRALINE HYDROCHLORIDE 100 MG: 50 TABLET ORAL at 08:12

## 2024-12-03 RX ADMIN — DOCUSATE SODIUM 250 MG: 50 CAPSULE, LIQUID FILLED ORAL at 08:12

## 2024-12-03 RX ADMIN — HYDROMORPHONE HYDROCHLORIDE 0.5 MG: 2 INJECTION INTRAMUSCULAR; INTRAVENOUS; SUBCUTANEOUS at 12:12

## 2024-12-03 RX ADMIN — ONDANSETRON 4 MG: 2 INJECTION INTRAMUSCULAR; INTRAVENOUS at 01:12

## 2024-12-03 RX ADMIN — CALCIUM CARBONATE (ANTACID) CHEW TAB 500 MG 1000 MG: 500 CHEW TAB at 04:12

## 2024-12-03 RX ADMIN — OXYCODONE HYDROCHLORIDE AND ACETAMINOPHEN 1 TABLET: 5; 325 TABLET ORAL at 10:12

## 2024-12-03 RX ADMIN — LEVOTHYROXINE SODIUM 200 MCG: 100 TABLET ORAL at 04:12

## 2024-12-03 RX ADMIN — Medication 1 CAPSULE: at 08:12

## 2024-12-03 RX ADMIN — ASPIRIN 325 MG: 325 TABLET, COATED ORAL at 08:12

## 2024-12-03 RX ADMIN — Medication 1 CAPSULE: at 11:12

## 2024-12-03 RX ADMIN — ONDANSETRON 4 MG: 2 INJECTION INTRAMUSCULAR; INTRAVENOUS at 08:12

## 2024-12-03 RX ADMIN — HYDROMORPHONE HYDROCHLORIDE 0.5 MG: 2 INJECTION INTRAMUSCULAR; INTRAVENOUS; SUBCUTANEOUS at 08:12

## 2024-12-03 RX ADMIN — PANTOPRAZOLE SODIUM 40 MG: 40 TABLET, DELAYED RELEASE ORAL at 08:12

## 2024-12-03 RX ADMIN — CARVEDILOL 3.12 MG: 3.12 TABLET, FILM COATED ORAL at 08:12

## 2024-12-03 RX ADMIN — LEVETIRACETAM 500 MG: 500 TABLET, FILM COATED ORAL at 08:12

## 2024-12-03 NOTE — PLAN OF CARE
12/03/24 1022   Final Note   Assessment Type Final Discharge Note   Anticipated Discharge Disposition SNF  ([facility])   Post-Acute Status   Post-Acute Authorization Placement   Post-Acute Placement Status Set-up Complete/Auth obtained   Coverage [employer]   Discharge Delays (!) Ambulance Transport/Facility Transport

## 2024-12-03 NOTE — PLAN OF CARE
Problem: Adult Inpatient Plan of Care  Goal: Plan of Care Review  Outcome: Met  Goal: Patient-Specific Goal (Individualized)  Outcome: Met  Goal: Absence of Hospital-Acquired Illness or Injury  Outcome: Met  Goal: Optimal Comfort and Wellbeing  Outcome: Met  Goal: Readiness for Transition of Care  Outcome: Met     Problem: Wound  Goal: Optimal Coping  Outcome: Met  Goal: Optimal Functional Ability  Outcome: Met  Goal: Absence of Infection Signs and Symptoms  Outcome: Met  Goal: Improved Oral Intake  Outcome: Met  Goal: Optimal Pain Control and Function  Outcome: Met  Goal: Skin Health and Integrity  Outcome: Met  Goal: Optimal Wound Healing  Outcome: Met     Problem: Skin Injury Risk Increased  Goal: Skin Health and Integrity  Outcome: Met     Problem: Physical Therapy  Goal: Physical Therapy Goal  Description: Goals to be met by: 24     Patient will increase functional independence with mobility by performin. Supine to sit with Stand-by Assistance  2. Sit to supine with Stand-by Assistance  3. Sit to stand transfer with Stand-by Assistance  4. Bed to chair transfer with Stand-by Assistance using Rolling Walker  5. Gait  x 200 feet with Stand-by Assistance using Rolling Walker.     Outcome: Met     Problem: Occupational Therapy  Goal: Occupational Therapy Goal  Description: LTG: Pt will perform basic ADLs and ADL transfers with Modified independence using LRAD by discharge.    STG: to be met by d/c    Pt will complete grooming standing at sink with LRAD with SBA.  Pt will complete UB dressing with mod (A), including management of TLSO.  Pt will complete LB dressing with SBA using LRAD.  Pt will complete toileting with SBA using LRAD.  Pt will complete functional mobility to/from toilet and toilet transfer with SBA using LRAD.    Outcome: Met     Problem: Fall Injury Risk  Goal: Absence of Fall and Fall-Related Injury  Outcome: Met

## 2024-12-03 NOTE — PLAN OF CARE
[facility] sent discharge information via [facility] as requested by facility due to not receiving the information in Epic, awaiting nurse to call report to

## 2024-12-03 NOTE — PLAN OF CARE
[facility] [place] [facility] sent discharge information via [facility] to [facility], awaiting nurse to call report to.

## 2024-12-07 NOTE — PHYSICIAN QUERY
Due to the conflicting clinical picture, please clinically validate the NSTEMI, likely type 2. If validated, please provide additional clinical support for the diagnosis.   The condition is not confirmed and/or it has been ruled out, other diagnosis ruled in : demand ischemia without Acute myocardial infarction

## 2024-12-10 LAB — FUNGUS SPEC CULT: NORMAL

## 2024-12-17 ENCOUNTER — HOSPITAL ENCOUNTER (INPATIENT)
Facility: HOSPITAL | Age: 64
LOS: 2 days | Discharge: HOME HEALTH | DRG: 689 | End: 2024-12-19
Attending: EMERGENCY MEDICINE | Admitting: INTERNAL MEDICINE
Payer: MEDICARE

## 2024-12-17 DIAGNOSIS — L40.50 PSORIATIC ARTHRITIS: Primary | ICD-10-CM

## 2024-12-17 DIAGNOSIS — R41.0 DELIRIUM: ICD-10-CM

## 2024-12-17 DIAGNOSIS — R07.9 CHEST PAIN: ICD-10-CM

## 2024-12-17 LAB
ALBUMIN SERPL-MCNC: 3.9 G/DL (ref 3.4–4.8)
ALBUMIN/GLOB SERPL: 1.2 RATIO (ref 1.1–2)
ALP SERPL-CCNC: 137 UNIT/L (ref 40–150)
ALT SERPL-CCNC: 18 UNIT/L (ref 0–55)
AMMONIA PLAS-MSCNC: 26.5 UMOL/L (ref 18–72)
AMPHET UR QL SCN: NEGATIVE
ANION GAP SERPL CALC-SCNC: 14 MEQ/L
AST SERPL-CCNC: 50 UNIT/L (ref 5–34)
BACTERIA #/AREA URNS AUTO: ABNORMAL /HPF
BARBITURATE SCN PRESENT UR: NEGATIVE
BASOPHILS # BLD AUTO: 0.05 X10(3)/MCL
BASOPHILS NFR BLD AUTO: 0.6 %
BENZODIAZ UR QL SCN: NEGATIVE
BILIRUB SERPL-MCNC: 0.5 MG/DL
BILIRUB UR QL STRIP.AUTO: NEGATIVE
BUN SERPL-MCNC: 22.3 MG/DL (ref 9.8–20.1)
CALCIUM SERPL-MCNC: 9.4 MG/DL (ref 8.4–10.2)
CANNABINOIDS UR QL SCN: NEGATIVE
CHLORIDE SERPL-SCNC: 103 MMOL/L (ref 98–107)
CLARITY UR: CLEAR
CO2 SERPL-SCNC: 26 MMOL/L (ref 23–31)
COCAINE UR QL SCN: NEGATIVE
COLOR UR AUTO: YELLOW
CREAT SERPL-MCNC: 0.89 MG/DL (ref 0.55–1.02)
CREAT/UREA NIT SERPL: 25
EOSINOPHIL # BLD AUTO: 0.18 X10(3)/MCL (ref 0–0.9)
EOSINOPHIL NFR BLD AUTO: 2.2 %
ERYTHROCYTE [DISTWIDTH] IN BLOOD BY AUTOMATED COUNT: 13.3 % (ref 11.5–17)
ETHANOL SERPL-MCNC: <10 MG/DL
FENTANYL UR QL SCN: NEGATIVE
GFR SERPLBLD CREATININE-BSD FMLA CKD-EPI: >60 ML/MIN/1.73/M2
GLOBULIN SER-MCNC: 3.2 GM/DL (ref 2.4–3.5)
GLUCOSE SERPL-MCNC: 125 MG/DL (ref 82–115)
GLUCOSE UR QL STRIP: NORMAL
HCT VFR BLD AUTO: 40.7 % (ref 37–47)
HGB BLD-MCNC: 13.7 G/DL (ref 12–16)
HGB UR QL STRIP: NEGATIVE
IMM GRANULOCYTES # BLD AUTO: 0.05 X10(3)/MCL (ref 0–0.04)
IMM GRANULOCYTES NFR BLD AUTO: 0.6 %
KETONES UR QL STRIP: ABNORMAL
LACTATE SERPL-SCNC: 1.9 MMOL/L (ref 0.5–2.2)
LEUKOCYTE ESTERASE UR QL STRIP: 75
LYMPHOCYTES # BLD AUTO: 1.07 X10(3)/MCL (ref 0.6–4.6)
LYMPHOCYTES NFR BLD AUTO: 13.2 %
MCH RBC QN AUTO: 31.6 PG (ref 27–31)
MCHC RBC AUTO-ENTMCNC: 33.7 G/DL (ref 33–36)
MCV RBC AUTO: 93.8 FL (ref 80–94)
MDMA UR QL SCN: NEGATIVE
MONOCYTES # BLD AUTO: 0.57 X10(3)/MCL (ref 0.1–1.3)
MONOCYTES NFR BLD AUTO: 7 %
MUCOUS THREADS URNS QL MICRO: ABNORMAL /LPF
NEUTROPHILS # BLD AUTO: 6.17 X10(3)/MCL (ref 2.1–9.2)
NEUTROPHILS NFR BLD AUTO: 76.4 %
NITRITE UR QL STRIP: ABNORMAL
NRBC BLD AUTO-RTO: 0 %
OPIATES UR QL SCN: POSITIVE
PCP UR QL: NEGATIVE
PH UR STRIP: 6.5 [PH]
PH UR: 6.5 [PH] (ref 3–11)
PLATELET # BLD AUTO: 259 X10(3)/MCL (ref 130–400)
PMV BLD AUTO: 9.1 FL (ref 7.4–10.4)
POTASSIUM SERPL-SCNC: 3.7 MMOL/L (ref 3.5–5.1)
PROT SERPL-MCNC: 7.1 GM/DL (ref 5.8–7.6)
PROT UR QL STRIP: ABNORMAL
RBC # BLD AUTO: 4.34 X10(6)/MCL (ref 4.2–5.4)
RBC #/AREA URNS AUTO: ABNORMAL /HPF
SODIUM SERPL-SCNC: 143 MMOL/L (ref 136–145)
SP GR UR STRIP.AUTO: >1.05 (ref 1–1.03)
SPECIFIC GRAVITY, URINE AUTO (.000) (OHS): >1.05 (ref 1–1.03)
SQUAMOUS #/AREA URNS LPF: ABNORMAL /HPF
TSH SERPL-ACNC: 0.41 UIU/ML (ref 0.35–4.94)
UROBILINOGEN UR STRIP-ACNC: NORMAL
WBC # BLD AUTO: 8.09 X10(3)/MCL (ref 4.5–11.5)
WBC #/AREA URNS AUTO: ABNORMAL /HPF

## 2024-12-17 PROCEDURE — 87040 BLOOD CULTURE FOR BACTERIA: CPT | Performed by: EMERGENCY MEDICINE

## 2024-12-17 PROCEDURE — 25500020 PHARM REV CODE 255: Performed by: EMERGENCY MEDICINE

## 2024-12-17 PROCEDURE — 80307 DRUG TEST PRSMV CHEM ANLYZR: CPT | Performed by: EMERGENCY MEDICINE

## 2024-12-17 PROCEDURE — 83605 ASSAY OF LACTIC ACID: CPT | Performed by: EMERGENCY MEDICINE

## 2024-12-17 PROCEDURE — 99285 EMERGENCY DEPT VISIT HI MDM: CPT | Mod: 25

## 2024-12-17 PROCEDURE — 81001 URINALYSIS AUTO W/SCOPE: CPT | Performed by: EMERGENCY MEDICINE

## 2024-12-17 PROCEDURE — 96361 HYDRATE IV INFUSION ADD-ON: CPT

## 2024-12-17 PROCEDURE — 82077 ASSAY SPEC XCP UR&BREATH IA: CPT | Performed by: EMERGENCY MEDICINE

## 2024-12-17 PROCEDURE — 96375 TX/PRO/DX INJ NEW DRUG ADDON: CPT

## 2024-12-17 PROCEDURE — 11000001 HC ACUTE MED/SURG PRIVATE ROOM

## 2024-12-17 PROCEDURE — 84443 ASSAY THYROID STIM HORMONE: CPT | Performed by: EMERGENCY MEDICINE

## 2024-12-17 PROCEDURE — A4216 STERILE WATER/SALINE, 10 ML: HCPCS

## 2024-12-17 PROCEDURE — 80053 COMPREHEN METABOLIC PANEL: CPT | Performed by: EMERGENCY MEDICINE

## 2024-12-17 PROCEDURE — 25000003 PHARM REV CODE 250: Performed by: EMERGENCY MEDICINE

## 2024-12-17 PROCEDURE — 85025 COMPLETE CBC W/AUTO DIFF WBC: CPT | Performed by: EMERGENCY MEDICINE

## 2024-12-17 PROCEDURE — 25000003 PHARM REV CODE 250: Performed by: NURSE PRACTITIONER

## 2024-12-17 PROCEDURE — 87086 URINE CULTURE/COLONY COUNT: CPT | Performed by: EMERGENCY MEDICINE

## 2024-12-17 PROCEDURE — 63600175 PHARM REV CODE 636 W HCPCS: Performed by: NURSE PRACTITIONER

## 2024-12-17 PROCEDURE — 82140 ASSAY OF AMMONIA: CPT | Performed by: EMERGENCY MEDICINE

## 2024-12-17 PROCEDURE — 25000003 PHARM REV CODE 250

## 2024-12-17 PROCEDURE — 63600175 PHARM REV CODE 636 W HCPCS: Performed by: EMERGENCY MEDICINE

## 2024-12-17 PROCEDURE — 96374 THER/PROPH/DIAG INJ IV PUSH: CPT

## 2024-12-17 RX ORDER — IBUPROFEN 200 MG
24 TABLET ORAL
Status: DISCONTINUED | OUTPATIENT
Start: 2024-12-17 | End: 2024-12-19 | Stop reason: HOSPADM

## 2024-12-17 RX ORDER — HYDROMORPHONE HYDROCHLORIDE 2 MG/ML
1 INJECTION, SOLUTION INTRAMUSCULAR; INTRAVENOUS; SUBCUTANEOUS
Status: COMPLETED | OUTPATIENT
Start: 2024-12-17 | End: 2024-12-17

## 2024-12-17 RX ORDER — CEFTRIAXONE 1 G/1
1 INJECTION, POWDER, FOR SOLUTION INTRAMUSCULAR; INTRAVENOUS
Status: DISCONTINUED | OUTPATIENT
Start: 2024-12-18 | End: 2024-12-19 | Stop reason: HOSPADM

## 2024-12-17 RX ORDER — CEFTRIAXONE 2 G/1
2 INJECTION, POWDER, FOR SOLUTION INTRAMUSCULAR; INTRAVENOUS
Status: COMPLETED | OUTPATIENT
Start: 2024-12-17 | End: 2024-12-17

## 2024-12-17 RX ORDER — ACETAMINOPHEN 500 MG
1000 TABLET ORAL EVERY 6 HOURS PRN
Status: DISCONTINUED | OUTPATIENT
Start: 2024-12-17 | End: 2024-12-19 | Stop reason: HOSPADM

## 2024-12-17 RX ORDER — HYDROMORPHONE HYDROCHLORIDE 2 MG/ML
0.5 INJECTION, SOLUTION INTRAMUSCULAR; INTRAVENOUS; SUBCUTANEOUS
Status: COMPLETED | OUTPATIENT
Start: 2024-12-17 | End: 2024-12-17

## 2024-12-17 RX ORDER — NALOXONE HCL 0.4 MG/ML
0.02 VIAL (ML) INJECTION
Status: DISCONTINUED | OUTPATIENT
Start: 2024-12-17 | End: 2024-12-19 | Stop reason: HOSPADM

## 2024-12-17 RX ORDER — PROCHLORPERAZINE EDISYLATE 5 MG/ML
5 INJECTION INTRAMUSCULAR; INTRAVENOUS EVERY 6 HOURS PRN
Status: DISCONTINUED | OUTPATIENT
Start: 2024-12-17 | End: 2024-12-19 | Stop reason: HOSPADM

## 2024-12-17 RX ORDER — SODIUM CHLORIDE 9 MG/ML
500 INJECTION, SOLUTION INTRAVENOUS
Status: COMPLETED | OUTPATIENT
Start: 2024-12-17 | End: 2024-12-17

## 2024-12-17 RX ORDER — ONDANSETRON HYDROCHLORIDE 2 MG/ML
4 INJECTION, SOLUTION INTRAVENOUS
Status: COMPLETED | OUTPATIENT
Start: 2024-12-17 | End: 2024-12-17

## 2024-12-17 RX ORDER — WATER FOR INJECTION,STERILE
VIAL (ML) INJECTION
Status: COMPLETED
Start: 2024-12-17 | End: 2024-12-17

## 2024-12-17 RX ORDER — IBUPROFEN 200 MG
16 TABLET ORAL
Status: DISCONTINUED | OUTPATIENT
Start: 2024-12-17 | End: 2024-12-19 | Stop reason: HOSPADM

## 2024-12-17 RX ORDER — OXYCODONE HYDROCHLORIDE 5 MG/1
5 TABLET ORAL EVERY 6 HOURS PRN
Status: DISCONTINUED | OUTPATIENT
Start: 2024-12-17 | End: 2024-12-18

## 2024-12-17 RX ORDER — ACETAMINOPHEN 325 MG/1
650 TABLET ORAL EVERY 4 HOURS PRN
Status: DISCONTINUED | OUTPATIENT
Start: 2024-12-17 | End: 2024-12-19 | Stop reason: HOSPADM

## 2024-12-17 RX ORDER — ONDANSETRON HYDROCHLORIDE 2 MG/ML
4 INJECTION, SOLUTION INTRAVENOUS EVERY 4 HOURS PRN
Status: DISCONTINUED | OUTPATIENT
Start: 2024-12-17 | End: 2024-12-19 | Stop reason: HOSPADM

## 2024-12-17 RX ORDER — SODIUM CHLORIDE 0.9 % (FLUSH) 0.9 %
10 SYRINGE (ML) INJECTION
Status: DISCONTINUED | OUTPATIENT
Start: 2024-12-17 | End: 2024-12-19 | Stop reason: HOSPADM

## 2024-12-17 RX ORDER — GLUCAGON 1 MG
1 KIT INJECTION
Status: DISCONTINUED | OUTPATIENT
Start: 2024-12-17 | End: 2024-12-19 | Stop reason: HOSPADM

## 2024-12-17 RX ORDER — MORPHINE SULFATE 4 MG/ML
4 INJECTION, SOLUTION INTRAMUSCULAR; INTRAVENOUS EVERY 4 HOURS PRN
Status: DISCONTINUED | OUTPATIENT
Start: 2024-12-17 | End: 2024-12-19 | Stop reason: HOSPADM

## 2024-12-17 RX ADMIN — ONDANSETRON 4 MG: 2 INJECTION INTRAMUSCULAR; INTRAVENOUS at 07:12

## 2024-12-17 RX ADMIN — IOHEXOL 100 ML: 350 INJECTION, SOLUTION INTRAVENOUS at 08:12

## 2024-12-17 RX ADMIN — MORPHINE SULFATE 4 MG: 4 INJECTION INTRAVENOUS at 06:12

## 2024-12-17 RX ADMIN — MORPHINE SULFATE 4 MG: 4 INJECTION INTRAVENOUS at 10:12

## 2024-12-17 RX ADMIN — OXYCODONE HYDROCHLORIDE 5 MG: 5 TABLET ORAL at 01:12

## 2024-12-17 RX ADMIN — SODIUM CHLORIDE 500 ML: 9 INJECTION, SOLUTION INTRAVENOUS at 06:12

## 2024-12-17 RX ADMIN — CEFTRIAXONE SODIUM 2 G: 2 INJECTION, POWDER, FOR SOLUTION INTRAMUSCULAR; INTRAVENOUS at 11:12

## 2024-12-17 RX ADMIN — ONDANSETRON 4 MG: 2 INJECTION INTRAMUSCULAR; INTRAVENOUS at 01:12

## 2024-12-17 RX ADMIN — WATER 20 ML: 1 INJECTION INTRAMUSCULAR; INTRAVENOUS; SUBCUTANEOUS at 11:12

## 2024-12-17 RX ADMIN — SODIUM CHLORIDE, POTASSIUM CHLORIDE, SODIUM LACTATE AND CALCIUM CHLORIDE 1000 ML: 600; 310; 30; 20 INJECTION, SOLUTION INTRAVENOUS at 08:12

## 2024-12-17 RX ADMIN — HYDROMORPHONE HYDROCHLORIDE 0.5 MG: 2 INJECTION INTRAMUSCULAR; INTRAVENOUS; SUBCUTANEOUS at 08:12

## 2024-12-17 RX ADMIN — ONDANSETRON 4 MG: 2 INJECTION INTRAMUSCULAR; INTRAVENOUS at 10:12

## 2024-12-17 RX ADMIN — HYDROMORPHONE HYDROCHLORIDE 1 MG: 2 INJECTION INTRAMUSCULAR; INTRAVENOUS; SUBCUTANEOUS at 07:12

## 2024-12-17 NOTE — PLAN OF CARE
12/17/24 1445   Discharge Assessment   Assessment Type Discharge Planning Assessment   Confirmed/corrected address, phone number and insurance Yes   Confirmed Demographics Correct on Facesheet   Source of Information patient;family   Communicated CHUCK with patient/caregiver Date not available/Unable to determine   Reason For Admission delirium chest pain   People in Home spouse   Do you expect to return to your current living situation? Yes   Do you have help at home or someone to help you manage your care at home? Yes   Who are your caregiver(s) and their phone number(s)? spouse Leonce   Prior to hospitilization cognitive status: Alert/Oriented   Current cognitive status: Alert/Oriented   Walking or Climbing Stairs Difficulty yes   Walking or Climbing Stairs ambulation difficulty, requires equipment;stair climbing difficulty, requires equipment;transferring difficulty, requires equipment   Mobility Management rollator   Dressing/Bathing Difficulty yes   Dressing/Bathing bathing difficulty, assistance 1 person;dressing difficulty, assistance 1 person   Dressing/Bathing Management good days and bad days needs assist at times   Home Accessibility stairs to enter home   Equipment Currently Used at Home rollator   Readmission within 30 days? Yes   Patient currently being followed by outpatient case management? No   Do you currently have service(s) that help you manage your care at home? Yes   Name and Contact number of agency had HH but did not admit yet   Is the pt/caregiver preference to resume services with current agency Yes   Do you take prescription medications? Yes   Do you have prescription coverage? Yes   Coverage [employer]   Do you have any problems affording any of your prescribed medications? TBD   How do you get to doctors appointments? family or friend will provide   Are you on dialysis? No   Do you take coumadin? No   Discharge Plan A Home;Home Health   Discharge Plan B Home;Home with family   DME Needed  Upon Discharge  none   Discharge Plan discussed with: Patient;Spouse/sig other   Transition of Care Barriers Does not adhere to care plan

## 2024-12-17 NOTE — ED PROVIDER NOTES
Encounter Date: 12/17/2024    SCRIBE #1 NOTE: I, Shannan Quintana, am scribing for, and in the presence of,  Tomás Miller III, MD. I have scribed the following portions of the note - Other sections scribed: HPI, ROS, PE.       History     Chief Complaint   Patient presents with    Back Pain     Arrives med exp 65 reports back sx 10/11 and w/ pain since - c/o pain worsening, AMS confused at this time     Patient is a 65 y/o female with a PMHx of anemia, anxiety, depression, HTN, hypothyroidism, fatty liver disease, spondylosis with radiculopathy, and osteoarthritis presents to the ED via EMS for back pain. EMS reports patient is confused. EMS reports patient had back surgery ~1-2 months ago. EMS reports patient has not been taking her pain medication.     Patient reports having headache, neck pain, back pain, nausea, vomiting, and diarrhea. She denies having chest pain.     The history is provided by the patient and medical records. The history is limited by the condition of the patient. No  was used.     Review of patient's allergies indicates:   Allergen Reactions    Sucralfate Nausea Only     Past Medical History:   Diagnosis Date    Anemia     Anxiety     Depression     Dyspareunia     Encounter for blood transfusion     HTN (hypertension)     Hypothyroidism, unspecified     Liver disease     fatty liver    Lumbar radiculopathy     Menopause     Migraine     Obesity     Osteoporosis     Other spondylosis with radiculopathy, lumbar region     Personal history of fall     Spinal stenosis, lumbar region with neurogenic claudication     TIA (transient ischemic attack) 1994    Unspecified osteoarthritis, unspecified site     Urinary incontinence      Past Surgical History:   Procedure Laterality Date    ABDOMINAL SURGERY      Gastric Bypass    CARPAL TUNNEL RELEASE Bilateral     CERVICAL FUSION  12/28/2021    CHOLECYSTECTOMY  2015    COLONOSCOPY      COLPOSCOPY      FOOT SURGERY Left     FRACTURE  SURGERY  2001    GASTRIC BYPASS  2011    HAND SURGERY Left     thumb    HERNIA REPAIR  2014    JOINT REPLACEMENT  Knee 2015, Hip 2016    LAMINECTOMY OF THORACIC SPINE BY POSTERIOR APPROACH USING COMPUTER-ASSISTED NAVIGATION N/A 10/11/2024    Procedure: LAMINECTOMY, SPINE, THORACIC, POSTERIOR APPROACH, USING COMPUTER-ASSISTED NAVIGATION;  Surgeon: Andra Mejia MD;  Location: [facility] OR;  Service: Neurosurgery;  Laterality: N/A;  T9-L3 DECOMPRESSION / REMOVAL OF RIGHT ILIAC BOLT AND S1 SCREW / EXTENSION OF FUSION  T9-S2 ILIAC BOLT //  PRONE ADENIKE // DRILL // SCOPE // O-ARM //  DIRECT SPINE // NDM //       LUMBAR FUSION  05/28/2021    LUMBAR FUSION  02/21/2023    L3-S1    POSTERIOR LUMBAR INTERBODY FUSION (PLIF) WITH COMPUTER-ASSISTED NAVIGATION N/A 10/11/2024    Procedure: FUSION, SPINE, LUMBAR, PLIF, USING COMPUTER-ASSISTED NAVIGATION;  Surgeon: Andra Mejia MD;  Location: [facility] OR;  Service: Neurosurgery;  Laterality: N/A;    ROTATOR CUFF REPAIR Bilateral 04/12/2022    SMALL INTESTINE SURGERY  2015    SPINE SURGERY  2021 & 2023    TONSILLECTOMY  2017     Family History   Problem Relation Name Age of Onset    Cystic fibrosis Sister Florence Kennedy     Arthritis Sister Florence Kennedy         Ankle,  hands    Depression Sister Florence Kennedy     Hypertension Sister Florence Kennedy         Me, Kasandra Cheek    Vision loss Sister Florence Kennedy     Cystic fibrosis Brother Adriano Kennedy     Early death Brother Adriano Kennedy         CF when 28    Cancer Mother Latoya Kennedy         Breast    Depression Mother Latoya Kennedy     Mental illness Mother Latoya Kennedy     Arthritis Father Eduard Kennedy         Back, knees,  hands    Cancer Father Eduard Kennedy         Lung    Diabetes Father Eduard Brunswick     Hypertension Father Bill Brent     Vision loss Father Eduard Brunswick     Early death Sister Sabina Pfeifferard         CF when 5    Heart failure Maternal Grandmother Letty Ngjertonya         Heart Attack    Cancer  Maternal Aunt Gema Smith         Breast Cancer    Cancer Maternal Aunt Aparna Manrique         Lung Cancer    Cancer Maternal Aunt Jeanie Stacy         Breast Cancer    Cancer Paternal Aunt Hilary Matthews         Skin Cancer    Cancer Paternal Aunt Enma Alford         Skin Cancer     Social History     Tobacco Use    Smoking status: Former     Current packs/day: 0.00     Average packs/day: 1 pack/day for 27.0 years (27.0 ttl pk-yrs)     Types: Cigarettes     Start date: 1975     Quit date: 2002     Years since quittin.9    Smokeless tobacco: Never    Tobacco comments:     off and on when smoking   Substance Use Topics    Alcohol use: Not Currently     Comment: I do not drink    Drug use: Never     Questionable ROS attainable.  Review of Systems   Constitutional:  Negative for fatigue and unexpected weight change.   HENT:  Negative for congestion and rhinorrhea.    Eyes:  Negative for pain.   Respiratory:  Negative for chest tightness, shortness of breath and wheezing.    Cardiovascular:  Negative for chest pain.   Gastrointestinal:  Positive for diarrhea, nausea and vomiting. Negative for abdominal pain and constipation.   Genitourinary:  Negative for dysuria.   Musculoskeletal:  Positive for back pain and neck pain.   Skin:  Negative for rash.   Allergic/Immunologic: Negative for environmental allergies, food allergies and immunocompromised state.   Neurological:  Positive for headaches. Negative for dizziness and speech difficulty.   Hematological:  Does not bruise/bleed easily.   Psychiatric/Behavioral:  Negative for sleep disturbance and suicidal ideas.        Physical Exam     Initial Vitals [24 0626]   BP Pulse Resp Temp SpO2   130/86 96 18 98.2 °F (36.8 °C) 96 %      MAP       --         Physical Exam    Nursing note and vitals reviewed.  Constitutional: She appears well-developed and well-nourished.   HENT:   Head: Normocephalic and atraumatic. Mouth/Throat: Oropharynx is clear and moist.    Eyes: Conjunctivae are normal. Pupils are equal, round, and reactive to light.   Neck: Neck supple.   Normal range of motion.  Cardiovascular:  Normal rate, regular rhythm and normal heart sounds.           Pulmonary/Chest: Breath sounds normal. She has no wheezes. She has no rhonchi. She has no rales.   Abdominal: Abdomen is soft. Bowel sounds are normal.   Musculoskeletal:         General: Normal range of motion.      Cervical back: Normal range of motion and neck supple.     Neurological: She is alert.   Oriented x2.   Skin: Skin is warm and dry. Capillary refill takes less than 2 seconds.   Psychiatric: She has a normal mood and affect. Her behavior is normal. Judgment and thought content normal.         ED Course   Procedures  Labs Reviewed   COMPREHENSIVE METABOLIC PANEL - Abnormal       Result Value    Sodium 143      Potassium 3.7      Chloride 103      CO2 26      Glucose 125 (*)     Blood Urea Nitrogen 22.3 (*)     Creatinine 0.89      Calcium 9.4      Protein Total 7.1      Albumin 3.9      Globulin 3.2      Albumin/Globulin Ratio 1.2      Bilirubin Total 0.5            ALT 18      AST 50 (*)     eGFR >60      Anion Gap 14.0      BUN/Creatinine Ratio 25     CBC WITH DIFFERENTIAL - Abnormal    WBC 8.09      RBC 4.34      Hgb 13.7      Hct 40.7      MCV 93.8      MCH 31.6 (*)     MCHC 33.7      RDW 13.3      Platelet 259      MPV 9.1      Neut % 76.4      Lymph % 13.2      Mono % 7.0      Eos % 2.2      Basophil % 0.6      Lymph # 1.07      Neut # 6.17      Mono # 0.57      Eos # 0.18      Baso # 0.05      IG# 0.05 (*)     IG% 0.6      NRBC% 0.0     URINALYSIS, REFLEX TO URINE CULTURE - Abnormal    Color, UA Yellow      Appearance, UA Clear      Specific Gravity, UA >1.050 (*)     pH, UA 6.5      Protein, UA 1+ (*)     Glucose, UA Normal      Ketones, UA Trace (*)     Blood, UA Negative      Bilirubin, UA Negative      Urobilinogen, UA Normal      Nitrites, UA 2+ (*)     Leukocyte Esterase, UA 75  (*)     RBC, UA 6-10 (*)     WBC, UA 11-20 (*)     Bacteria, UA Moderate (*)     Squamous Epithelial Cells, UA Trace      Mucous, UA Occasional (*)    LACTIC ACID, PLASMA - Normal    Lactic Acid Level 1.9     ALCOHOL,MEDICAL (ETHANOL) - Normal    Ethanol Level <10.0     AMMONIA - Normal    Ammonia Level 26.5     TSH - Normal    TSH 0.405     BLOOD CULTURE [facility]   BLOOD CULTURE [facility]   CULTURE, URINE   CBC W/ AUTO DIFFERENTIAL    Narrative:     The following orders were created for panel order CBC Auto Differential.  Procedure                               Abnormality         Status                     ---------                               -----------         ------                     CBC with Differential[[id number]]       Abnormal            Final result                 Please view results for these tests on the individual orders.          Imaging Results              CT Lumbar Spine W Wo Contrast (Final result)  Result time 12/17/24 11:16:07      Final result by Kimmy Choi MD (12/17/24 11:16:07)                   Impression:      Postoperative posterior paraspinal fluid collection similar to slightly improved compared to previous.  No enlarging fluid collection or internal foci of gas appreciable.    Postsurgical changes of posterior spinal fusion and laminectomies.  Similar perihardware lucency at T10-T11 screws.      Electronically signed by: Kimmy Choi  Date:    12/17/2024  Time:    11:16               Narrative:    EXAMINATION:  CT LUMBAR SPINE W WO CONTRAST; CT THORACIC SPINE W WO CONTRAST    CLINICAL HISTORY:  Low back pain, increased fracture risk;Recent surgery;; Mid-back pain;Recent surgery increasing pain;    TECHNIQUE:  Low dose helical acquired images with axial, sagittal and coronal reformations though the thoracic and lumbar spine prior to after intravenous contrast administration.    All CT scans at this location are performed using dose optimization techniques as appropriate to a  performed exam including the following automated exposure control, adjustment of the mA and/or kV according to patient size and/or use of iterative reconstruction technique    DLP: 7678 mGycm    COMPARISON:  CT lumbar spine 11/25/2024    FINDINGS:  LIMITATIONS: Mild motion degraded exam.    NUMBERING: Last fully formed disc space is designated L5-S1.    BONES: There are postsurgical changes of spinal fusion  from T10 through the kristin.  There is similar lucency about the bilateral T10 and T11 screws suggesting motion.  Similar configuration of sacroiliac screws.  Within the limitations of CT evaluation hardware appears intact.  Similar deformity at T10-T11.  There are laminectomies.  There are old, healed rib fractures.    DISCS: There are disc grafts, similar to previous exam.  Multilevel vacuum phenomenon.    SPINAL CANAL: Postop laminectomies.    SOFT TISSUES: Beam hardening artifact obscures evaluation.  Posterior paraspinal postoperative collection similar to slightly improved compared to previous exam.  No enlarging fluid collection.  No appreciable internal foci of gas.                                       CT Thoracic Spine W Wo Contrast (Final result)  Result time 12/17/24 11:16:07      Final result by Kimmy Choi MD (12/17/24 11:16:07)                   Impression:      Postoperative posterior paraspinal fluid collection similar to slightly improved compared to previous.  No enlarging fluid collection or internal foci of gas appreciable.    Postsurgical changes of posterior spinal fusion and laminectomies.  Similar perihardware lucency at T10-T11 screws.      Electronically signed by: Kimmy Choi  Date:    12/17/2024  Time:    11:16               Narrative:    EXAMINATION:  CT LUMBAR SPINE W WO CONTRAST; CT THORACIC SPINE W WO CONTRAST    CLINICAL HISTORY:  Low back pain, increased fracture risk;Recent surgery;; Mid-back pain;Recent surgery increasing pain;    TECHNIQUE:  Low dose helical  acquired images with axial, sagittal and coronal reformations though the thoracic and lumbar spine prior to after intravenous contrast administration.    All CT scans at this location are performed using dose optimization techniques as appropriate to a performed exam including the following automated exposure control, adjustment of the mA and/or kV according to patient size and/or use of iterative reconstruction technique    DLP: 7678 mGycm    COMPARISON:  CT lumbar spine 11/25/2024    FINDINGS:  LIMITATIONS: Mild motion degraded exam.    NUMBERING: Last fully formed disc space is designated L5-S1.    BONES: There are postsurgical changes of spinal fusion  from T10 through the kristin.  There is similar lucency about the bilateral T10 and T11 screws suggesting motion.  Similar configuration of sacroiliac screws.  Within the limitations of CT evaluation hardware appears intact.  Similar deformity at T10-T11.  There are laminectomies.  There are old, healed rib fractures.    DISCS: There are disc grafts, similar to previous exam.  Multilevel vacuum phenomenon.    SPINAL CANAL: Postop laminectomies.    SOFT TISSUES: Beam hardening artifact obscures evaluation.  Posterior paraspinal postoperative collection similar to slightly improved compared to previous exam.  No enlarging fluid collection.  No appreciable internal foci of gas.                                       CT Head Without Contrast (Final result)  Result time 12/17/24 09:11:20      Final result by Milo Bravo MD (12/17/24 09:11:20)                   Impression:      No acute intracranial findings or significant interval change compared to last month.      Electronically signed by: Milo Bravo  Date:    12/17/2024  Time:    09:11               Narrative:    EXAMINATION:  CT HEAD WITHOUT CONTRAST    CLINICAL HISTORY:  Mental status change, unknown cause;    TECHNIQUE:  CT imaging of the head performed from the skull base to the vertex without intravenous  contrast.  DLP 1041 mGycm. Automatic exposure control, adjustment of mA/kV or iterative reconstruction technique was used to reduce radiation.    COMPARISON:  None Available.    FINDINGS:  There is no acute cortical infarct, hemorrhage or mass lesion.  No new parenchymal attenuation abnormality.  Ventricular size is stable.  There are vascular calcifications.    Visualized paranasal sinuses and mastoid air cells are clear.                                       Medications   sodium chloride 0.9% flush 10 mL (has no administration in time range)   ondansetron injection 4 mg (4 mg Intravenous Given 12/17/24 1341)   prochlorperazine injection Soln 5 mg (has no administration in time range)   acetaminophen tablet 650 mg (has no administration in time range)   acetaminophen tablet 1,000 mg (has no administration in time range)   naloxone 0.4 mg/mL injection 0.02 mg (has no administration in time range)   glucose chewable tablet 16 g (has no administration in time range)   glucose chewable tablet 24 g (has no administration in time range)   dextrose 50% injection 12.5 g (has no administration in time range)   dextrose 50% injection 25 g (has no administration in time range)   glucagon (human recombinant) injection 1 mg (has no administration in time range)   oxyCODONE immediate release tablet 5 mg (5 mg Oral Given 12/17/24 1341)   morphine injection 4 mg (has no administration in time range)   0.9% NaCl infusion (0 mLs Intravenous Stopped 12/17/24 0755)   HYDROmorphone (PF) injection 1 mg (1 mg Intravenous Given 12/17/24 0733)   ondansetron injection 4 mg (4 mg Intravenous Given 12/17/24 0733)   lactated ringers bolus 1,000 mL (0 mLs Intravenous Stopped 12/17/24 0946)   HYDROmorphone (PF) injection 0.5 mg (0.5 mg Intravenous Given 12/17/24 0800)   iohexoL (OMNIPAQUE 350) injection 100 mL (100 mLs Intravenous Given 12/17/24 0835)   cefTRIAXone injection 2 g (2 g Intravenous Given 12/17/24 1117)   sterile water for injection  injection (20 mLs  Given 12/17/24 1117)     Medical Decision Making  The differential diagnosis includes, but is not limited to, hyponatremia, hypernatremia, UTI, dehydration, delirium, and CVA.    CT head without abnormality CT thoracic and cervical spine with improvement of fluid collection and no acute abnormality patient confused but also seems to be acutely aware and asking for pain medication has signed herself out against medical advice twice discussed case with Hospital Medicine patient given Rocephin for UTI    Problems Addressed:  Delirium: complicated acute illness or injury that poses a threat to life or bodily functions    Amount and/or Complexity of Data Reviewed  Independent Historian: EMS     Details: EMS reports patient is confused. EMS reports patient had back surgery ~1-2 months ago. EMS reports patient has not been taking her pain medication.   Labs: ordered.  Radiology: ordered.    Risk  Prescription drug management.  Decision regarding hospitalization.            Scribe Attestation:   Scribe #1: I performed the above scribed service and the documentation accurately describes the services I performed. I attest to the accuracy of the note.    Attending Attestation:           Physician Attestation for Scribe:  Physician Attestation Statement for Scribe #1: I, Tomás Miller III, MD, reviewed documentation, as scribed by Shannan Quintana in my presence, and it is both accurate and complete.             ED Course as of 12/17/24 1356   Tue Dec 17, 2024   0640 Justification for Head CT: Confusion [MB]   0911 Clarification with  patient signed herself out against medical advice from rehab [FK]   1058 Paged Hospital Medicine [MB]   1138 Discussed with Hospital Medicine [MB]      ED Course User Index  [FK] Tomás Miller III, MD  [MB] Shannan Quintana                           Clinical Impression:  Final diagnoses:  [R41.0] Delirium          ED Disposition Condition    Admit                 Paul  Tomás RECINOS MD  12/17/24 2000

## 2024-12-17 NOTE — PT/OT/SLP PROGRESS
Physical Therapy      Patient Name:  Kasandra Cheek   MRN:  [id number]    Patient not seen today secondary to awaiting further documentation in chart from MD regarding back sx prior to mobilization. Will f/u as appropriate.

## 2024-12-17 NOTE — H&P
[facility] Hospital Medicine History & Physical Examination       Patient Name: Kasandra Cheek  MRN: [id number]  Patient Class: IP- Inpatient   Admission Date: 12/17/2024   Admitting Physician: DORI Service   Length of Stay: 0  Attending Physician: Dr. Rose Benitez  Primary Care Provider: Ravinder Mazariegos DO  Face-to-Face encounter date: 12/17/2024  Code Status: Full Code  Chief Complaint: Back Pain (Arrives med exp 65 reports back sx 10/11 and w/ pain since - c/o pain worsening, AMS confused at this time)        Screening for Social Drivers for health:  Patient screened for food insecurity, housing instability, transportation needs, utility difficulties, and interpersonal safety (select all that apply as identified as concern)  []Housing or Food  []Transportation Needs  []Utility Difficulties  []Interpersonal safety  [x]None    Patient information was obtained from patient, patient's family, past medical records and/or ER records.     HISTORY OF PRESENT ILLNESS:   Kasandra Cheek is a 64 y.o. female who PMH includes anemia, anxiety, depression, HTN, hypothyroidism, fatty liver disease, lumbar spondylosis with radiculopathy, osteoarthritis, TIA, migraine headaches; presents to the ED at [facility] on 12/17/2024 with a primary complaint of intractable back pain with difficulty ambulating and altered mental status per family reports.  Patient was recently admitted to our services and was discharged 12/03/2024 [facility] for further therapy services.  It is reported that patient left the rehab facility AMA and went home.  It is reported she has been confused at home, not taking her medication and altered per 's report which EMS was called for e.d. transport.  Of note patient did have back surgery with Dr. Mejia on 10/11/2024.  There are no reports of bowel or bladder dysfunction.  No reports of nausea, vomiting, diarrhea, fever, chills, chest pain or shortness for breath.  Lab work  reviewed demonstrated BUN 22.3, glucose 125, AST 50; other indices unremarkable.  Urinalysis suggestive of acute UTI.  Initial vital signs /86 pulse 96 respirations 18 temperature 98.2° F O2 saturation 96% on room air.  Patient did receive IV hydration, antiemetic therapy, pain medication and was started on IV ceftriaxone therapy.  Patient is admitted to hospital medicine services for further management.    PAST MEDICAL HISTORY:     Past Medical History:   Diagnosis Date    Anemia     Anxiety     Depression     Dyspareunia     Encounter for blood transfusion     HTN (hypertension)     Hypothyroidism, unspecified     Liver disease     fatty liver    Lumbar radiculopathy     Menopause     Migraine     Obesity     Osteoporosis     Other spondylosis with radiculopathy, lumbar region     Personal history of fall     Spinal stenosis, lumbar region with neurogenic claudication     TIA (transient ischemic attack) 1994    Unspecified osteoarthritis, unspecified site     Urinary incontinence        PAST SURGICAL HISTORY:     Past Surgical History:   Procedure Laterality Date    ABDOMINAL SURGERY      Gastric Bypass    CARPAL TUNNEL RELEASE Bilateral     CERVICAL FUSION  12/28/2021    CHOLECYSTECTOMY  2015    COLONOSCOPY      COLPOSCOPY      FOOT SURGERY Left     FRACTURE SURGERY  2001    GASTRIC BYPASS  2011    HAND SURGERY Left     thumb    HERNIA REPAIR  2014    JOINT REPLACEMENT  Knee 2015, Hip 2016    LAMINECTOMY OF THORACIC SPINE BY POSTERIOR APPROACH USING COMPUTER-ASSISTED NAVIGATION N/A 10/11/2024    Procedure: LAMINECTOMY, SPINE, THORACIC, POSTERIOR APPROACH, USING COMPUTER-ASSISTED NAVIGATION;  Surgeon: Andra Mejia MD;  Location: [facility];  Service: Neurosurgery;  Laterality: N/A;  T9-L3 DECOMPRESSION / REMOVAL OF RIGHT ILIAC BOLT AND S1 SCREW / EXTENSION OF FUSION  T9-S2 ILIAC BOLT //  PRONE ADENIKE // DRILL // SCOPE // O-ARM //  DIRECT SPINE // NDM //       LUMBAR FUSION  05/28/2021    LUMBAR FUSION   2023    L3-S1    POSTERIOR LUMBAR INTERBODY FUSION (PLIF) WITH COMPUTER-ASSISTED NAVIGATION N/A 10/11/2024    Procedure: FUSION, SPINE, LUMBAR, PLIF, USING COMPUTER-ASSISTED NAVIGATION;  Surgeon: Andra Mejia MD;  Location: [facility];  Service: Neurosurgery;  Laterality: N/A;    ROTATOR CUFF REPAIR Bilateral 2022    SMALL INTESTINE SURGERY      SPINE SURGERY   &     TONSILLECTOMY         ALLERGIES:   Sucralfate    FAMILY HISTORY:   Reviewed and negative    SOCIAL HISTORY:     Social History     Tobacco Use    Smoking status: Former     Current packs/day: 0.00     Average packs/day: 1 pack/day for 27.0 years (27.0 ttl pk-yrs)     Types: Cigarettes     Start date: 1975     Quit date: 2002     Years since quittin.9    Smokeless tobacco: Never    Tobacco comments:     off and on when smoking   Substance Use Topics    Alcohol use: Not Currently     Comment: I do not drink        HOME MEDICATIONS:   As documented  Prior to Admission medications    Medication Sig Start Date End Date Taking? Authorizing Provider   aspirin (ECOTRIN) 325 MG EC tablet Take 1 tablet (325 mg total) by mouth once daily. 24  Rose Benitez MD   atorvastatin (LIPITOR) 80 MG tablet Take 1 tablet (80 mg total) by mouth once daily. 24  Rose Benitez MD   carvediloL (COREG) 3.125 MG tablet Take 1 tablet (3.125 mg total) by mouth 2 (two) times daily. 24  Rose Benitez MD   cholecalciferol, vitamin D3, (VITAMIN D3) 250 mcg (10,000 unit) Cap capsule Take 1 tablet by mouth Daily. 21   Provider, Historical   cyanocobalamin, vitamin B-12, 2,500 mcg Tab Take 2,500 mcg by mouth Daily.    Provider, Historical   docusate sodium (COLACE) 250 MG capsule Take 250 mg by mouth 2 (two) times a day.    Provider, Historical   ferrous sulfate (FEOSOL) 325 mg (65 mg iron) Tab tablet Take 1 tablet by mouth Daily. 21   Provider, Historical   furosemide (LASIX) 20 MG  tablet Take 20 mg by mouth 2 (two) times daily.    Provider, Historical   levETIRAcetam (KEPPRA) 500 MG Tab Take 1 tablet (500 mg total) by mouth 2 (two) times daily. 11/11/24 12/11/24  Rose Benitez MD   levothyroxine (SYNTHROID) 200 MCG tablet Take 200 mcg by mouth once daily. 7/23/23   Provider, Historical   losartan (COZAAR) 100 MG tablet Take 1 tablet by mouth once daily. 3/31/22   Provider, Historical   mirtazapine (REMERON) 15 MG tablet Take 1 tablet (15 mg total) by mouth every evening. 12/3/24 1/2/25  Lennox English MD   omega-3 fatty acids/fish oil (FISH OIL OMEGA 3-6-9 ORAL)  1/1/22   Provider, Historical   ondansetron (ZOFRAN) 8 MG tablet Take 8 mg by mouth every 6 (six) hours as needed for Nausea.    Provider, Historical   pantoprazole (PROTONIX) 40 MG tablet Take 40 mg by mouth 2 (two) times daily. 4/18/23   Provider, Historical   sertraline (ZOLOFT) 100 MG tablet Take 1 tablet (100 mg total) by mouth once daily. 12/4/24 12/4/25  Lennox English MD   VITAMIN K2 ORAL Take 150 mcg by mouth Daily. 9/1/21   Provider, Historical   ARIPiprazole (ABILIFY) 2 MG Tab Take 2 mg by mouth every morning. 3/9/22 6/8/22  Provider, Historical   diazePAM (VALIUM) 5 MG tablet Take by mouth. 3/31/22 6/8/22  Provider, Historical       REVIEW OF SYSTEMS:   Except as documented, all other systems reviewed and negative     PHYSICAL EXAM:     VITAL SIGNS: 24 HRS MIN & MAX LAST   Temp  Min: 97.9 °F (36.6 °C)  Max: 98.2 °F (36.8 °C) 97.9 °F (36.6 °C)   BP  Min: 96/68  Max: 133/76 133/76   Pulse  Min: 83  Max: 96  83   Resp  Min: 17  Max: 20 19   SpO2  Min: 96 %  Max: 100 % 99 %       General appearance: Well-developed, well-nourished female chronically ill-appearing looks older than stated age; nontoxic, in no apparent distress.  HENT: Atraumatic head. Moist mucous membranes of oral cavity.  Eyes:  PERRL  Lungs: Clear to auscultation bilaterally. No wheezing present.   Heart: Regular rate and rhythm. S1 and S2 present,  cap refill brisk  Abdomen: Soft, non-distended,  Bowel sounds are normal.   Extremities: No cyanosis, clubbing,generalized weakness, lower back TTP  Skin: warm and dry  Neuro: oriented to person and placed, intermittent confusion noted, no acute focal deficits  Psych/mental status:  Flat affect cooperative    LABS AND IMAGING:     Recent Labs   Lab 12/17/24  0640   WBC 8.09   RBC 4.34   HGB 13.7   HCT 40.7   MCV 93.8   MCH 31.6*   MCHC 33.7   RDW 13.3      MPV 9.1       Recent Labs   Lab 12/17/24  0640      K 3.7      CO2 26   BUN 22.3*   CREATININE 0.89   CALCIUM 9.4   ALBUMIN 3.9   ALKPHOS 137   ALT 18   AST 50*   BILITOT 0.5       Microbiology Results (last 7 days)       Procedure Component Value Units Date/Time    Urine culture [[id number]] Collected: 12/17/24 0933    Order Status: Sent Specimen: Urine Updated: 12/17/24 1056    Blood Culture [[id number]] Collected: 12/17/24 0829    Order Status: Resulted Specimen: Blood from Arm, Left Updated: 12/17/24 0837    Blood Culture [[id number]] Collected: 12/17/24 0834    Order Status: Resulted Specimen: Blood from Arm, Right Updated: 12/17/24 0836             CT Lumbar Spine W Wo Contrast  Narrative: EXAMINATION:  CT LUMBAR SPINE W WO CONTRAST; CT THORACIC SPINE W WO CONTRAST    CLINICAL HISTORY:  Low back pain, increased fracture risk;Recent surgery;; Mid-back pain;Recent surgery increasing pain;    TECHNIQUE:  Low dose helical acquired images with axial, sagittal and coronal reformations though the thoracic and lumbar spine prior to after intravenous contrast administration.    All CT scans at this location are performed using dose optimization techniques as appropriate to a performed exam including the following automated exposure control, adjustment of the mA and/or kV according to patient size and/or use of iterative reconstruction technique    DLP: 7678 mGycm    COMPARISON:  CT lumbar spine 11/25/2024    FINDINGS:  LIMITATIONS: Mild motion  degraded exam.    NUMBERING: Last fully formed disc space is designated L5-S1.    BONES: There are postsurgical changes of spinal fusion  from T10 through the kristin.  There is similar lucency about the bilateral T10 and T11 screws suggesting motion.  Similar configuration of sacroiliac screws.  Within the limitations of CT evaluation hardware appears intact.  Similar deformity at T10-T11.  There are laminectomies.  There are old, healed rib fractures.    DISCS: There are disc grafts, similar to previous exam.  Multilevel vacuum phenomenon.    SPINAL CANAL: Postop laminectomies.    SOFT TISSUES: Beam hardening artifact obscures evaluation.  Posterior paraspinal postoperative collection similar to slightly improved compared to previous exam.  No enlarging fluid collection.  No appreciable internal foci of gas.  Impression: Postoperative posterior paraspinal fluid collection similar to slightly improved compared to previous.  No enlarging fluid collection or internal foci of gas appreciable.    Postsurgical changes of posterior spinal fusion and laminectomies.  Similar perihardware lucency at T10-T11 screws.    Electronically signed by: Kimmy Choi  Date:    12/17/2024  Time:    11:16  CT Thoracic Spine W Wo Contrast  Narrative: EXAMINATION:  CT LUMBAR SPINE W WO CONTRAST; CT THORACIC SPINE W WO CONTRAST    CLINICAL HISTORY:  Low back pain, increased fracture risk;Recent surgery;; Mid-back pain;Recent surgery increasing pain;    TECHNIQUE:  Low dose helical acquired images with axial, sagittal and coronal reformations though the thoracic and lumbar spine prior to after intravenous contrast administration.    All CT scans at this location are performed using dose optimization techniques as appropriate to a performed exam including the following automated exposure control, adjustment of the mA and/or kV according to patient size and/or use of iterative reconstruction technique    DLP: 7678 mGycm    COMPARISON:  CT  lumbar spine 11/25/2024    FINDINGS:  LIMITATIONS: Mild motion degraded exam.    NUMBERING: Last fully formed disc space is designated L5-S1.    BONES: There are postsurgical changes of spinal fusion  from T10 through the kristin.  There is similar lucency about the bilateral T10 and T11 screws suggesting motion.  Similar configuration of sacroiliac screws.  Within the limitations of CT evaluation hardware appears intact.  Similar deformity at T10-T11.  There are laminectomies.  There are old, healed rib fractures.    DISCS: There are disc grafts, similar to previous exam.  Multilevel vacuum phenomenon.    SPINAL CANAL: Postop laminectomies.    SOFT TISSUES: Beam hardening artifact obscures evaluation.  Posterior paraspinal postoperative collection similar to slightly improved compared to previous exam.  No enlarging fluid collection.  No appreciable internal foci of gas.  Impression: Postoperative posterior paraspinal fluid collection similar to slightly improved compared to previous.  No enlarging fluid collection or internal foci of gas appreciable.    Postsurgical changes of posterior spinal fusion and laminectomies.  Similar perihardware lucency at T10-T11 screws.    Electronically signed by: Kimmy Choi  Date:    12/17/2024  Time:    11:16  CT Head Without Contrast  Narrative: EXAMINATION:  CT HEAD WITHOUT CONTRAST    CLINICAL HISTORY:  Mental status change, unknown cause;    TECHNIQUE:  CT imaging of the head performed from the skull base to the vertex without intravenous contrast.  DLP 1041 mGycm. Automatic exposure control, adjustment of mA/kV or iterative reconstruction technique was used to reduce radiation.    COMPARISON:  None Available.    FINDINGS:  There is no acute cortical infarct, hemorrhage or mass lesion.  No new parenchymal attenuation abnormality.  Ventricular size is stable.  There are vascular calcifications.    Visualized paranasal sinuses and mastoid air cells are clear.  Impression: No  acute intracranial findings or significant interval change compared to last month.    Electronically signed by: Milo Bravo  Date:    12/17/2024  Time:    09:11        ASSESSMENT & PLAN:   ASSESSMENT:  Acute encephalopathy-metabolic -POA   Acute UTI with hematuria-POA  Lumbar radiculopathy with intractable lower back pain-POA   Medical non-compliance- POA  Weakness- POA    Hx of  CVA, cervical spinal stenosis, chronic back pain, dementia, migraine headaches, chronic pain syndrome with chronic opioid use, anxiety, depression, vertigo, HTN, HLD, hypothyroidism, obesity status post gastric bypass, diverticulosis, osteoporosis, psoriatic arthritis, fibromyalgia, MRSA infection in the past    PLAN:  IV hydration   Follow cultures and sensitivities   Continue with IV ceftriaxone therapy   PRN pain management   Fall precautions   Neurochecks q.4 hours times 24 hours   PT OT eval and treat   Resume home medication as deemed necessary   Repeat lab work in a.m.      VTE Prophylaxis: SCD for DVT prophylaxis and will be advised to be as mobile as possible and sit in a chair as tolerated    Patient condition:  Stable  __________________________________________________________________________  INPATIENT LIST OF MEDICATIONS     Scheduled Meds:   [START ON 12/18/2024] cefTRIAXone (Rocephin) IV (PEDS and ADULTS)  1 g Intravenous Q24H     Continuous Infusions:  PRN Meds:.  Current Facility-Administered Medications:     acetaminophen, 1,000 mg, Oral, Q6H PRN    acetaminophen, 650 mg, Oral, Q4H PRN    dextrose 50%, 12.5 g, Intravenous, PRN    dextrose 50%, 25 g, Intravenous, PRN    glucagon (human recombinant), 1 mg, Intramuscular, PRN    glucose, 16 g, Oral, PRN    glucose, 24 g, Oral, PRN    morphine, 4 mg, Intravenous, Q4H PRN    naloxone, 0.02 mg, Intravenous, PRN    ondansetron, 4 mg, Intravenous, Q4H PRN    oxyCODONE, 5 mg, Oral, Q6H PRN    prochlorperazine, 5 mg, Intravenous, Q6H PRN    sodium chloride 0.9%, 10 mL, Intravenous,  JAMES POLLOCK, LISSETTE Martin have reviewed and discussed the case with   Dr. Phoebe Blackwell.  Please see the following addendum for further assessment and plan from their attending MD.  LISSETTE Shearer   12/17/2024    ________________________________________________________________________________    MD Addendum:  I, Dr. Phoebe Blackwell M.D assumed care of this patient today at 1:00 pm  For the patient encounter, I performed the substantive portion of the visit, I reviewed the NP/PA documentation, treatment plan, and medical decision making.  I had face to face time with this patient     A. History:  Reviewed and noted in detail.  Patient is a 64-year-old female that presented with a primary complaint of intractable back pain with difficulty ambulating and altered mental status per family.  She was admitted in discharged to a rehab facility.  Apparently patient had left the rehab facility and gone home against medical advice.  However at home she got confused, was not taking her medication and has been called EMS.  No complaints of bowel or bladder dysfunction.  No complaints of nausea, vomiting, diarrhea, fever or chills or shortness on breath.  Labs are reviewed with mild elevation in BUN and AST.  Urinalysis was suggestive of acute UTI.  Patient admitted to the hospital and initiated on IV ceftriaxone.        B. Physical exam:  General appearance:  Looks older than stated age, no acute distress   HENT: Atraumatic head. Moist mucous membranes of oral cavity.  Eyes:  PERRL  Lungs: Clear to auscultation bilaterally. No wheezing present.   Heart: Regular rate and rhythm. S1 and S2 present, cap refill brisk  Abdomen: Soft, non-distended,  Bowel sounds are normal.   Skin: warm and dry  Neuro:  Confused, asking for her mother    C. Medical decision making:  -patient being admitted to the hospital   -empiric treatment with Rocephin for noted UTI, follow-up on cultures and tailor antibiotics as  appropriate   -neuro checks q.4 hours   -p.r.n. pain management   -fall precautions   -therapy consult  -home medications reviewed and resumed as appropriate     Discharge Planning and Disposition: No mobility needs. Ambulating well. Good social support system.   Anticipated discharge    All diagnosis and differential diagnosis have been reviewed; assessment and plan has been documented; I have personally reviewed the labs and test results that are presently available; I have reviewed the patients medication list; I have reviewed the consulting providers response and recommendations. I have reviewed or attempted to review medical records based upon their availability.    All of the patient and family questions have been addressed and answered. Patient's is agreeable to the above stated plan. I will continue to monitor closely and make adjustments to medical management as needed.

## 2024-12-17 NOTE — PLAN OF CARE
Patient admitted to this floor states that she left the nursing home due to back pain. States that she tried to fix it at home and could not. Spoke to  he states that he has been dispensing the medicine and he is certain that she has not been over doing the medicine. Patient has been placed twice and signed self out AMA this will limit options should she need placement again

## 2024-12-18 LAB
ALBUMIN SERPL-MCNC: 2.9 G/DL (ref 3.4–4.8)
ALBUMIN/GLOB SERPL: 1.3 RATIO (ref 1.1–2)
ALP SERPL-CCNC: 109 UNIT/L (ref 40–150)
ALT SERPL-CCNC: 15 UNIT/L (ref 0–55)
ANION GAP SERPL CALC-SCNC: 8 MEQ/L
AST SERPL-CCNC: 37 UNIT/L (ref 5–34)
BASOPHILS # BLD AUTO: 0.04 X10(3)/MCL
BASOPHILS NFR BLD AUTO: 0.7 %
BILIRUB SERPL-MCNC: 0.4 MG/DL
BUN SERPL-MCNC: 15 MG/DL (ref 9.8–20.1)
CALCIUM SERPL-MCNC: 8.2 MG/DL (ref 8.4–10.2)
CHLORIDE SERPL-SCNC: 108 MMOL/L (ref 98–107)
CO2 SERPL-SCNC: 29 MMOL/L (ref 23–31)
CREAT SERPL-MCNC: 0.69 MG/DL (ref 0.55–1.02)
CREAT/UREA NIT SERPL: 22
EOSINOPHIL # BLD AUTO: 0.15 X10(3)/MCL (ref 0–0.9)
EOSINOPHIL NFR BLD AUTO: 2.8 %
ERYTHROCYTE [DISTWIDTH] IN BLOOD BY AUTOMATED COUNT: 13.4 % (ref 11.5–17)
GFR SERPLBLD CREATININE-BSD FMLA CKD-EPI: >60 ML/MIN/1.73/M2
GLOBULIN SER-MCNC: 2.2 GM/DL (ref 2.4–3.5)
GLUCOSE SERPL-MCNC: 95 MG/DL (ref 82–115)
HCT VFR BLD AUTO: 31.9 % (ref 37–47)
HGB BLD-MCNC: 10.7 G/DL (ref 12–16)
IMM GRANULOCYTES # BLD AUTO: 0.03 X10(3)/MCL (ref 0–0.04)
IMM GRANULOCYTES NFR BLD AUTO: 0.6 %
LYMPHOCYTES # BLD AUTO: 1.58 X10(3)/MCL (ref 0.6–4.6)
LYMPHOCYTES NFR BLD AUTO: 29.6 %
MCH RBC QN AUTO: 31.8 PG (ref 27–31)
MCHC RBC AUTO-ENTMCNC: 33.5 G/DL (ref 33–36)
MCV RBC AUTO: 94.7 FL (ref 80–94)
MONOCYTES # BLD AUTO: 0.46 X10(3)/MCL (ref 0.1–1.3)
MONOCYTES NFR BLD AUTO: 8.6 %
NEUTROPHILS # BLD AUTO: 3.08 X10(3)/MCL (ref 2.1–9.2)
NEUTROPHILS NFR BLD AUTO: 57.7 %
NRBC BLD AUTO-RTO: 0 %
PLATELET # BLD AUTO: 196 X10(3)/MCL (ref 130–400)
PMV BLD AUTO: 8.9 FL (ref 7.4–10.4)
POTASSIUM SERPL-SCNC: 3 MMOL/L (ref 3.5–5.1)
PROT SERPL-MCNC: 5.1 GM/DL (ref 5.8–7.6)
RBC # BLD AUTO: 3.37 X10(6)/MCL (ref 4.2–5.4)
SODIUM SERPL-SCNC: 145 MMOL/L (ref 136–145)
WBC # BLD AUTO: 5.34 X10(3)/MCL (ref 4.5–11.5)

## 2024-12-18 PROCEDURE — 97530 THERAPEUTIC ACTIVITIES: CPT

## 2024-12-18 PROCEDURE — 97162 PT EVAL MOD COMPLEX 30 MIN: CPT

## 2024-12-18 PROCEDURE — 80053 COMPREHEN METABOLIC PANEL: CPT | Performed by: NURSE PRACTITIONER

## 2024-12-18 PROCEDURE — 11000001 HC ACUTE MED/SURG PRIVATE ROOM

## 2024-12-18 PROCEDURE — 25000003 PHARM REV CODE 250: Performed by: STUDENT IN AN ORGANIZED HEALTH CARE EDUCATION/TRAINING PROGRAM

## 2024-12-18 PROCEDURE — 25000003 PHARM REV CODE 250: Performed by: NURSE PRACTITIONER

## 2024-12-18 PROCEDURE — 36415 COLL VENOUS BLD VENIPUNCTURE: CPT | Performed by: NURSE PRACTITIONER

## 2024-12-18 PROCEDURE — 63600175 PHARM REV CODE 636 W HCPCS: Performed by: NURSE PRACTITIONER

## 2024-12-18 PROCEDURE — 85025 COMPLETE CBC W/AUTO DIFF WBC: CPT | Performed by: NURSE PRACTITIONER

## 2024-12-18 RX ORDER — CARVEDILOL 3.12 MG/1
3.12 TABLET ORAL 2 TIMES DAILY
Status: DISCONTINUED | OUTPATIENT
Start: 2024-12-18 | End: 2024-12-19 | Stop reason: HOSPADM

## 2024-12-18 RX ORDER — OXYCODONE HYDROCHLORIDE 5 MG/1
20 TABLET ORAL EVERY 6 HOURS PRN
Status: DISCONTINUED | OUTPATIENT
Start: 2024-12-18 | End: 2024-12-19 | Stop reason: HOSPADM

## 2024-12-18 RX ORDER — SERTRALINE HYDROCHLORIDE 50 MG/1
100 TABLET, FILM COATED ORAL DAILY
Status: DISCONTINUED | OUTPATIENT
Start: 2024-12-18 | End: 2024-12-19 | Stop reason: HOSPADM

## 2024-12-18 RX ORDER — PANTOPRAZOLE SODIUM 40 MG/1
40 TABLET, DELAYED RELEASE ORAL 2 TIMES DAILY
Status: DISCONTINUED | OUTPATIENT
Start: 2024-12-18 | End: 2024-12-19 | Stop reason: HOSPADM

## 2024-12-18 RX ORDER — CALCIUM CARBONATE 200(500)MG
500 TABLET,CHEWABLE ORAL 2 TIMES DAILY PRN
Status: DISCONTINUED | OUTPATIENT
Start: 2024-12-18 | End: 2024-12-19 | Stop reason: HOSPADM

## 2024-12-18 RX ORDER — MIRTAZAPINE 15 MG/1
15 TABLET, FILM COATED ORAL NIGHTLY
Status: DISCONTINUED | OUTPATIENT
Start: 2024-12-18 | End: 2024-12-19 | Stop reason: HOSPADM

## 2024-12-18 RX ORDER — ATORVASTATIN CALCIUM 40 MG/1
80 TABLET, FILM COATED ORAL DAILY
Status: DISCONTINUED | OUTPATIENT
Start: 2024-12-18 | End: 2024-12-19 | Stop reason: HOSPADM

## 2024-12-18 RX ORDER — LEVETIRACETAM 500 MG/1
500 TABLET ORAL 2 TIMES DAILY
Status: DISCONTINUED | OUTPATIENT
Start: 2024-12-18 | End: 2024-12-19 | Stop reason: HOSPADM

## 2024-12-18 RX ORDER — ENOXAPARIN SODIUM 100 MG/ML
40 INJECTION SUBCUTANEOUS EVERY 24 HOURS
Status: DISCONTINUED | OUTPATIENT
Start: 2024-12-19 | End: 2024-12-19 | Stop reason: HOSPADM

## 2024-12-18 RX ORDER — LEVOTHYROXINE SODIUM 100 UG/1
200 TABLET ORAL DAILY
Status: DISCONTINUED | OUTPATIENT
Start: 2024-12-18 | End: 2024-12-19 | Stop reason: HOSPADM

## 2024-12-18 RX ADMIN — ATORVASTATIN CALCIUM 80 MG: 40 TABLET, FILM COATED ORAL at 01:12

## 2024-12-18 RX ADMIN — PROCHLORPERAZINE EDISYLATE 5 MG: 5 INJECTION INTRAMUSCULAR; INTRAVENOUS at 11:12

## 2024-12-18 RX ADMIN — LEVOTHYROXINE SODIUM 200 MCG: 100 TABLET ORAL at 01:12

## 2024-12-18 RX ADMIN — POTASSIUM BICARBONATE 40 MEQ: 391 TABLET, EFFERVESCENT ORAL at 01:12

## 2024-12-18 RX ADMIN — PANTOPRAZOLE SODIUM 40 MG: 40 TABLET, DELAYED RELEASE ORAL at 09:12

## 2024-12-18 RX ADMIN — ONDANSETRON 4 MG: 2 INJECTION INTRAMUSCULAR; INTRAVENOUS at 02:12

## 2024-12-18 RX ADMIN — MORPHINE SULFATE 4 MG: 4 INJECTION INTRAVENOUS at 04:12

## 2024-12-18 RX ADMIN — SERTRALINE HYDROCHLORIDE 100 MG: 50 TABLET ORAL at 01:12

## 2024-12-18 RX ADMIN — ACETAMINOPHEN 1000 MG: 500 TABLET ORAL at 09:12

## 2024-12-18 RX ADMIN — MORPHINE SULFATE 4 MG: 4 INJECTION INTRAVENOUS at 09:12

## 2024-12-18 RX ADMIN — CARVEDILOL 3.12 MG: 3.12 TABLET, FILM COATED ORAL at 09:12

## 2024-12-18 RX ADMIN — DOCUSATE SODIUM 250 MG: 50 CAPSULE, LIQUID FILLED ORAL at 01:12

## 2024-12-18 RX ADMIN — MORPHINE SULFATE 4 MG: 4 INJECTION INTRAVENOUS at 02:12

## 2024-12-18 RX ADMIN — LEVETIRACETAM 500 MG: 500 TABLET, FILM COATED ORAL at 09:12

## 2024-12-18 RX ADMIN — PROCHLORPERAZINE EDISYLATE 5 MG: 5 INJECTION INTRAMUSCULAR; INTRAVENOUS at 09:12

## 2024-12-18 RX ADMIN — MORPHINE SULFATE 4 MG: 4 INJECTION INTRAVENOUS at 11:12

## 2024-12-18 RX ADMIN — ONDANSETRON 4 MG: 2 INJECTION INTRAMUSCULAR; INTRAVENOUS at 09:12

## 2024-12-18 RX ADMIN — MIRTAZAPINE 15 MG: 15 TABLET, FILM COATED ORAL at 09:12

## 2024-12-18 RX ADMIN — OXYCODONE HYDROCHLORIDE 5 MG: 5 TABLET ORAL at 01:12

## 2024-12-18 RX ADMIN — CEFTRIAXONE SODIUM 1 G: 1 INJECTION, POWDER, FOR SOLUTION INTRAMUSCULAR; INTRAVENOUS at 09:12

## 2024-12-18 RX ADMIN — MORPHINE SULFATE 4 MG: 4 INJECTION INTRAVENOUS at 07:12

## 2024-12-18 NOTE — PLAN OF CARE
Problem: Physical Therapy  Goal: Physical Therapy Goal  Description: Goals to be met by: d/c     Patient will increase functional independence with mobility by performin. Supine to sit with Modified [place]  2. Sit to supine with Modified [place]  3. Sit to stand transfer with Modified [place]  4. Bed to chair transfer with Modified [place] using Rolling Walker  5. Gait  x 150 feet with Modified [place]quincy using Rolling Walker.   6. Ascend/descend 2 stairs without Handrails Stand-by Assistance using Least Restrictive Assistive Device.     Outcome: Progressing

## 2024-12-18 NOTE — PLAN OF CARE
Problem: Violence Risk or Actual  Goal: Anger and Impulse Control  Outcome: Progressing     Problem: Adult Inpatient Plan of Care  Goal: Plan of Care Review  Outcome: Progressing  Goal: Patient-Specific Goal (Individualized)  Outcome: Progressing  Goal: Absence of Hospital-Acquired Illness or Injury  Outcome: Progressing  Goal: Optimal Comfort and Wellbeing  Outcome: Progressing  Goal: Readiness for Transition of Care  Outcome: Progressing     Problem: Skin Injury Risk Increased  Goal: Skin Health and Integrity  Outcome: Progressing     Problem: Fall Injury Risk  Goal: Absence of Fall and Fall-Related Injury  Outcome: Progressing

## 2024-12-18 NOTE — PT/OT/SLP EVAL
Physical Therapy Evaluation    Patient Name:  Kasandra Cheek   MRN:  [id number]    Recommendations:     Discharge therapy intensity: Low Intensity Therapy   Discharge Equipment Recommendations: none   Barriers to discharge: Ongoing medical needs    Assessment:     Kasandra Cheek is a 64 y.o. female admitted with a medical diagnosis of intractable back pain, UTI.  She presents with the following impairments/functional limitations: weakness, impaired balance, impaired endurance, impaired functional mobility, gait instability, decreased lower extremity function. Pt tolerated session fair, BP stable throughout despite complaints of dizziness with diaphoresis. Pt and spouse demonstrated safe ability to mobilize OOB and to chair with use of RW. Pts spouse assisted in donning TLSO at EOB. Pt has signed out of SNF AMA x 2 in the past 2 months. Pt and spouse educated on safe mobilization in the home setting with low intensity therapy, both in agreement.     Rehab Prognosis: Good; patient would benefit from acute skilled PT services to address these deficits and reach maximum level of function.    Recent Surgery: * No surgery found *      Plan:     During this hospitalization, patient would benefit from acute PT services 6 x/week to address the identified rehab impairments via gait training, therapeutic activities, therapeutic exercises and progress toward the following goals:    Plan of Care Expires:  01/25/25    Subjective     Chief Complaint: nausea  Patient/Family Comments/goals: to go home  Pain/Comfort:  Pain Rating 1: 0/10    Patients cultural, spiritual, Protestant conflicts given the current situation:      Living Environment:  Pt lives with spouse in a SLH with 2 steps to enter without railings. Pt has left SNF AMA x 2 attempts in the past 2 months, goes home for ~2-3 days prior to re-admission to hospital.   Prior to admission, patients level of function was SBA/CGA for t/f and ambulation with RW, Joseph for bed.   Equipment used at home: walker, rolling, rollator, wheelchair, shower chair.  DME owned (not currently used): none.  Upon discharge, patient will have assistance from spouse.    Objective:     Communicated with RN prior to session.  Patient found HOB elevated with peripheral IV, PureWick  upon PT entry to room.    General Precautions: Standard,    Orthopedic Precautions:spinal precautions   Braces: TLSO  Respiratory Status: Room air  Blood Pressure: 137/74 sitting EOB, 155/63 standing      Exams:  RLE Strength: WFL  LLE Strength: WFL  Skin integrity: Visible skin intact      Functional Mobility:  Bed Mobility:     Supine to Sit: minimum assistance  Transfers:     Sit to Stand:  contact guard assistance with rolling walker  Bed to Chair: contact guard assistance with  rolling walker  using  Step Transfer  Gait: Pt ambulated 10' with RW CGA. No LOB or safety concerns. Appropriate AD management with turning.       AM-PAC 6 CLICK MOBILITY  Total Score:20       Treatment & Education:    Patient and spouse were provided with verbal education education regarding PT role/goals/POC, fall prevention, and safety awareness.  Understanding was verbalized.     Patient left up in chair with all lines intact, call button in reach, and RN notified.    GOALS:   Multidisciplinary Problems       Physical Therapy Goals          Problem: Physical Therapy    Goal Priority Disciplines Outcome Interventions   Physical Therapy Goal     PT, PT/OT Progressing    Description: Goals to be met by: d/c     Patient will increase functional independence with mobility by performin. Supine to sit with Modified [place]  2. Sit to supine with Modified [place]  3. Sit to stand transfer with Modified [place]  4. Bed to chair transfer with Modified [place] using Rolling Walker  5. Gait  x 150 feet with Modified [place] using Rolling Walker.   6. Ascend/descend 2 stairs without Handrails Stand-by Assistance using Least  Restrictive Assistive Device.                          History:     Past Medical History:   Diagnosis Date    Anemia     Anxiety     Depression     Dyspareunia     Encounter for blood transfusion     HTN (hypertension)     Hypothyroidism, unspecified     Liver disease     fatty liver    Lumbar radiculopathy     Menopause     Migraine     Obesity     Osteoporosis     Other spondylosis with radiculopathy, lumbar region     Personal history of fall     Spinal stenosis, lumbar region with neurogenic claudication     TIA (transient ischemic attack) 1994    Unspecified osteoarthritis, unspecified site     Urinary incontinence        Past Surgical History:   Procedure Laterality Date    ABDOMINAL SURGERY      Gastric Bypass    CARPAL TUNNEL RELEASE Bilateral     CERVICAL FUSION  12/28/2021    CHOLECYSTECTOMY  2015    COLONOSCOPY      COLPOSCOPY      FOOT SURGERY Left     FRACTURE SURGERY  2001    GASTRIC BYPASS  2011    HAND SURGERY Left     thumb    HERNIA REPAIR  2014    JOINT REPLACEMENT  Knee 2015, Hip 2016    LAMINECTOMY OF THORACIC SPINE BY POSTERIOR APPROACH USING COMPUTER-ASSISTED NAVIGATION N/A 10/11/2024    Procedure: LAMINECTOMY, SPINE, THORACIC, POSTERIOR APPROACH, USING COMPUTER-ASSISTED NAVIGATION;  Surgeon: Andra Mejia MD;  Location: [facility];  Service: Neurosurgery;  Laterality: N/A;  T9-L3 DECOMPRESSION / REMOVAL OF RIGHT ILIAC BOLT AND S1 SCREW / EXTENSION OF FUSION  T9-S2 ILIAC BOLT //  PRONE ADENIKE // DRILL // SCOPE // O-ARM //  DIRECT SPINE // NDM //       LUMBAR FUSION  05/28/2021    LUMBAR FUSION  02/21/2023    L3-S1    POSTERIOR LUMBAR INTERBODY FUSION (PLIF) WITH COMPUTER-ASSISTED NAVIGATION N/A 10/11/2024    Procedure: FUSION, SPINE, LUMBAR, PLIF, USING COMPUTER-ASSISTED NAVIGATION;  Surgeon: Andra Mejia MD;  Location: [facility];  Service: Neurosurgery;  Laterality: N/A;    ROTATOR CUFF REPAIR Bilateral 04/12/2022    SMALL INTESTINE SURGERY  2015    SPINE SURGERY  2021 & 2023     TONSILLECTOMY  2017       Time Tracking:     PT Received On: 12/18/24  PT Start Time: 1351     PT Stop Time: 1422  PT Total Time (min): 31 min     Billable Minutes: Evaluation 20 and Therapeutic Activity 11      12/18/2024

## 2024-12-18 NOTE — PT/OT/SLP PROGRESS
Occupational Therapy  Visit Attempt   Patient Name:  Kasandra Cheek   MRN:  [id number]    Patient not seen today secondary to Other (Comment) (pt without TLSO in room; reports  to bring later today.). Will follow-up as available.    12/18/2024

## 2024-12-19 VITALS
SYSTOLIC BLOOD PRESSURE: 112 MMHG | DIASTOLIC BLOOD PRESSURE: 66 MMHG | HEIGHT: 67 IN | HEART RATE: 85 BPM | TEMPERATURE: 98 F | BODY MASS INDEX: 34.53 KG/M2 | RESPIRATION RATE: 18 BRPM | WEIGHT: 220 LBS | OXYGEN SATURATION: 94 %

## 2024-12-19 LAB
ANION GAP SERPL CALC-SCNC: 8 MEQ/L
BACTERIA UR CULT: ABNORMAL
BUN SERPL-MCNC: 10.3 MG/DL (ref 9.8–20.1)
CALCIUM SERPL-MCNC: 8.1 MG/DL (ref 8.4–10.2)
CHLORIDE SERPL-SCNC: 107 MMOL/L (ref 98–107)
CO2 SERPL-SCNC: 29 MMOL/L (ref 23–31)
CREAT SERPL-MCNC: 0.61 MG/DL (ref 0.55–1.02)
CREAT/UREA NIT SERPL: 17
ERYTHROCYTE [DISTWIDTH] IN BLOOD BY AUTOMATED COUNT: 13.2 % (ref 11.5–17)
GFR SERPLBLD CREATININE-BSD FMLA CKD-EPI: >60 ML/MIN/1.73/M2
GLUCOSE SERPL-MCNC: 100 MG/DL (ref 82–115)
HCT VFR BLD AUTO: 35.9 % (ref 37–47)
HGB BLD-MCNC: 11.9 G/DL (ref 12–16)
MCH RBC QN AUTO: 31.4 PG (ref 27–31)
MCHC RBC AUTO-ENTMCNC: 33.1 G/DL (ref 33–36)
MCV RBC AUTO: 94.7 FL (ref 80–94)
NRBC BLD AUTO-RTO: 0 %
PLATELET # BLD AUTO: 202 X10(3)/MCL (ref 130–400)
PMV BLD AUTO: 8.8 FL (ref 7.4–10.4)
POTASSIUM SERPL-SCNC: 3.2 MMOL/L (ref 3.5–5.1)
RBC # BLD AUTO: 3.79 X10(6)/MCL (ref 4.2–5.4)
SODIUM SERPL-SCNC: 144 MMOL/L (ref 136–145)
WBC # BLD AUTO: 4.85 X10(3)/MCL (ref 4.5–11.5)

## 2024-12-19 PROCEDURE — 80048 BASIC METABOLIC PNL TOTAL CA: CPT | Performed by: STUDENT IN AN ORGANIZED HEALTH CARE EDUCATION/TRAINING PROGRAM

## 2024-12-19 PROCEDURE — 36415 COLL VENOUS BLD VENIPUNCTURE: CPT | Performed by: STUDENT IN AN ORGANIZED HEALTH CARE EDUCATION/TRAINING PROGRAM

## 2024-12-19 PROCEDURE — 25000003 PHARM REV CODE 250: Performed by: NURSE PRACTITIONER

## 2024-12-19 PROCEDURE — 85027 COMPLETE CBC AUTOMATED: CPT | Performed by: STUDENT IN AN ORGANIZED HEALTH CARE EDUCATION/TRAINING PROGRAM

## 2024-12-19 PROCEDURE — 25000003 PHARM REV CODE 250: Performed by: STUDENT IN AN ORGANIZED HEALTH CARE EDUCATION/TRAINING PROGRAM

## 2024-12-19 PROCEDURE — 63600175 PHARM REV CODE 636 W HCPCS: Performed by: NURSE PRACTITIONER

## 2024-12-19 PROCEDURE — 97116 GAIT TRAINING THERAPY: CPT | Mod: CQ

## 2024-12-19 PROCEDURE — 97166 OT EVAL MOD COMPLEX 45 MIN: CPT

## 2024-12-19 RX ORDER — CARVEDILOL 3.12 MG/1
3.12 TABLET ORAL 2 TIMES DAILY
Qty: 60 TABLET | Refills: 0 | Status: SHIPPED | OUTPATIENT
Start: 2024-12-19 | End: 2025-01-18

## 2024-12-19 RX ORDER — ASPIRIN 325 MG
325 TABLET, DELAYED RELEASE (ENTERIC COATED) ORAL DAILY
Qty: 30 TABLET | Refills: 6 | Status: SHIPPED | OUTPATIENT
Start: 2024-12-19

## 2024-12-19 RX ORDER — ATORVASTATIN CALCIUM 80 MG/1
80 TABLET, FILM COATED ORAL DAILY
Qty: 30 TABLET | Refills: 0 | Status: SHIPPED | OUTPATIENT
Start: 2024-12-19 | End: 2025-01-18

## 2024-12-19 RX ORDER — LEVETIRACETAM 500 MG/1
500 TABLET ORAL 2 TIMES DAILY
Qty: 60 TABLET | Refills: 0 | Status: SHIPPED | OUTPATIENT
Start: 2024-12-19 | End: 2025-01-18

## 2024-12-19 RX ORDER — CEFDINIR 300 MG/1
300 CAPSULE ORAL 2 TIMES DAILY
Qty: 12 CAPSULE | Refills: 0 | Status: SHIPPED | OUTPATIENT
Start: 2024-12-19 | End: 2024-12-25

## 2024-12-19 RX ADMIN — ONDANSETRON 4 MG: 2 INJECTION INTRAMUSCULAR; INTRAVENOUS at 05:12

## 2024-12-19 RX ADMIN — ACETAMINOPHEN 1000 MG: 500 TABLET ORAL at 09:12

## 2024-12-19 RX ADMIN — ACETAMINOPHEN 650 MG: 325 TABLET, FILM COATED ORAL at 05:12

## 2024-12-19 RX ADMIN — SERTRALINE HYDROCHLORIDE 100 MG: 50 TABLET ORAL at 09:12

## 2024-12-19 RX ADMIN — MORPHINE SULFATE 4 MG: 4 INJECTION INTRAVENOUS at 01:12

## 2024-12-19 RX ADMIN — LEVOTHYROXINE SODIUM 200 MCG: 100 TABLET ORAL at 09:12

## 2024-12-19 RX ADMIN — CARVEDILOL 3.12 MG: 3.12 TABLET, FILM COATED ORAL at 09:12

## 2024-12-19 RX ADMIN — ATORVASTATIN CALCIUM 80 MG: 40 TABLET, FILM COATED ORAL at 09:12

## 2024-12-19 RX ADMIN — PANTOPRAZOLE SODIUM 40 MG: 40 TABLET, DELAYED RELEASE ORAL at 09:12

## 2024-12-19 RX ADMIN — MORPHINE SULFATE 4 MG: 4 INJECTION INTRAVENOUS at 05:12

## 2024-12-19 RX ADMIN — LEVETIRACETAM 500 MG: 500 TABLET, FILM COATED ORAL at 09:12

## 2024-12-19 NOTE — PT/OT/SLP EVAL
Occupational Therapy  Evaluation    Name: Kasandra Cheek  MRN: [id number]  Admitting Diagnosis: back pain   Recent Surgery: * No surgery found *      Recommendations:     Discharge therapy intensity: Low Intensity Therapy (with 24/7 assist)   Discharge Equipment Recommendations:  none  Barriers to discharge:  Other (Comment) (ongoing medical needs)    Assessment:     Kasandra Cheek is a 64 y.o. female with a medical diagnosis of intractable back pain and UTI. She is A&Ox3 and tolerated OT evaluation well. She presents with the following performance deficits affecting function: weakness, impaired endurance, impaired self care skills, impaired functional mobility, gait instability, impaired balance, orthopedic precautions, decreased safety awareness. She required min A for bed mobility and CGA for functional mobility within room using rolling walker. Pt educated on log rolling technique to maintain spinal precautions with bed mobility; verbalized understanding. Pt would benefit from dressing equipment to maintain spinal precautions with lower body dressing. Recommend low intensity therapy upon d/c with 24/7 assist.     Rehab Prognosis: Good; patient would benefit from acute skilled OT services to address these deficits and reach maximum level of function.       Plan:     Patient to be seen 5 x/week to address the above listed problems via self-care/home management  Plan of Care Expires: 01/19/25  Plan of Care Reviewed with: patient, spouse    Subjective     Chief Complaint: n/a  Patient/Family Comments/goals: to get better and return home     Occupational Profile:  Living Environment: Pt lives with her  in a single level home with x2 steps to enter and no HR. Pt has a walk-in shower with a shower chair.   Previous level of function: Pt reports being independent with ADLs prior.   Roles and Routines: mother, wife   Equipment Used at Home: walker, rolling, rollator, wheelchair, shower chair  Assistance upon Discharge:  Pt will have assist from her  upon d/c.     Pain/Comfort:  Pain Rating 1: 0/10    Patients cultural, spiritual, Yazdanism conflicts given the current situation: no    Objective:     OT communicated with RN prior to session.      Patient was found HOB elevated with PureWick, peripheral IV upon OT entry to room.    General Precautions: Standard,    Orthopedic Precautions: spinal precautions  Braces: TLSO    Vital Signs: Respiratory Status: on room air    Bed Mobility:    Patient completed Supine to Sit with minimum assistance    Functional Mobility/Transfers:  Patient completed Sit <> Stand Transfer with contact guard assistance  with  rolling walker   Patient completed Bed <> Chair Transfer using Step Transfer technique with contact guard assistance with rolling walker  Patient completed Toilet Transfer Step Transfer technique with contact guard assistance with  rolling walker    Activities of Daily Living:  Upper Body Dressing: maximal assistance to don gown and TLSO while sitting EOB.   Toileting: contact guard assistance to complete toilet t/f.     AMPAC 6 Click ADL:  AMPAC Total Score: 20    Functional Cognition:  Orientation: oriented to Person, Place, and Time  Safety Awareness: Impaired.      Visual Perceptual Skills:  Intact    Upper Extremity Function:  Right Upper Extremity:   WFL    Left Upper Extremity:  WFL    Balance:   Overall CGA    Therapeutic Positioning  Risk for acquired pressure injuries is decreased due to ability to get to BSC/toilet with assist.    OT interventions performed during the course of today's session:   Therapeutic positioning was provided at the conclusion of session to offload all bony prominences for the prevention and/or reduction of pressure injuries    Skin assessment: all bony prominences were assessed    Findings:  Visible skin intact.     OT recommendations for therapeutic positioning throughout hospitalization:   Follow [facility] Pressure Injury Prevention  Protocol    Patient Education:  Patient provided with verbal education education regarding OT role/goals/POC, fall prevention, safety awareness, Discharge/DME recommendations, and pressure ulcer prevention.  Understanding was verbalized.     Patient left up in chair with all lines intact, call button in reach, and RN notified.    GOALS:   Multidisciplinary Problems       Occupational Therapy Goals          Problem: Occupational Therapy    Goal Priority Disciplines Outcome Interventions   Occupational Therapy Goal     OT, PT/OT Progressing    Description: LTG: Pt will perform basic ADLs and ADL transfers with Modified independence using LRAD by discharge.    STG: to be met by 1/19/25    Pt will complete grooming standing at sink with LRAD with SBA.  Pt will complete UB dressing with SBA.  Pt will complete LB dressing with SBA using LRAD and AE prn.  Pt will complete toileting with SBA using LRAD.  Pt will complete functional mobility to/from toilet and toilet transfer with SBA using LRAD.                        History:     Past Medical History:   Diagnosis Date    Anemia     Anxiety     Depression     Dyspareunia     Encounter for blood transfusion     HTN (hypertension)     Hypothyroidism, unspecified     Liver disease     fatty liver    Lumbar radiculopathy     Menopause     Migraine     Obesity     Osteoporosis     Other spondylosis with radiculopathy, lumbar region     Personal history of fall     Spinal stenosis, lumbar region with neurogenic claudication     TIA (transient ischemic attack) 1994    Unspecified osteoarthritis, unspecified site     Urinary incontinence          Past Surgical History:   Procedure Laterality Date    ABDOMINAL SURGERY      Gastric Bypass    CARPAL TUNNEL RELEASE Bilateral     CERVICAL FUSION  12/28/2021    CHOLECYSTECTOMY  2015    COLONOSCOPY      COLPOSCOPY      FOOT SURGERY Left     FRACTURE SURGERY  2001    GASTRIC BYPASS  2011    HAND SURGERY Left     thumb    HERNIA REPAIR   2014    JOINT REPLACEMENT  Knee 2015, Hip 2016    LAMINECTOMY OF THORACIC SPINE BY POSTERIOR APPROACH USING COMPUTER-ASSISTED NAVIGATION N/A 10/11/2024    Procedure: LAMINECTOMY, SPINE, THORACIC, POSTERIOR APPROACH, USING COMPUTER-ASSISTED NAVIGATION;  Surgeon: Andra Mejia MD;  Location: [facility];  Service: Neurosurgery;  Laterality: N/A;  T9-L3 DECOMPRESSION / REMOVAL OF RIGHT ILIAC BOLT AND S1 SCREW / EXTENSION OF FUSION  T9-S2 ILIAC BOLT //  PRONE ADENIKE // DRILL // SCOPE // O-ARM //  DIRECT SPINE // NDM //       LUMBAR FUSION  05/28/2021    LUMBAR FUSION  02/21/2023    L3-S1    POSTERIOR LUMBAR INTERBODY FUSION (PLIF) WITH COMPUTER-ASSISTED NAVIGATION N/A 10/11/2024    Procedure: FUSION, SPINE, LUMBAR, PLIF, USING COMPUTER-ASSISTED NAVIGATION;  Surgeon: Andra Mejia MD;  Location: [facility];  Service: Neurosurgery;  Laterality: N/A;    ROTATOR CUFF REPAIR Bilateral 04/12/2022    SMALL INTESTINE SURGERY  2015    SPINE SURGERY  2021 & 2023    TONSILLECTOMY  2017       Time Tracking:     OT Date of Treatment: 12/19/24  OT Start Time: 0845  OT Stop Time: 0901  OT Total Time (min): 16 min    Billable Minutes:Evaluation Moderate Complexity.     12/19/2024

## 2024-12-19 NOTE — PLAN OF CARE
Problem: Occupational Therapy  Goal: Occupational Therapy Goal  Description: LTG: Pt will perform basic ADLs and ADL transfers with Modified independence using LRAD by discharge.    STG: to be met by 1/19/25    Pt will complete grooming standing at sink with LRAD with SBA.  Pt will complete UB dressing with SBA.  Pt will complete LB dressing with SBA using LRAD and AE prn.  Pt will complete toileting with SBA using LRAD.  Pt will complete functional mobility to/from toilet and toilet transfer with SBA using LRAD.   Outcome: Progressing

## 2024-12-19 NOTE — PLAN OF CARE
[facility] called they will not accept patient back due to frequent hospitalizations and frequent sign out AMA in SNF

## 2024-12-19 NOTE — PROGRESS NOTES
[facility] Hospital Medicine Progress Note        Chief Complaint: Inpatient Follow-up for     HPI:   Kasandra Cheek is a 64 y.o. female who PMH includes anemia, anxiety, depression, HTN, hypothyroidism, fatty liver disease, lumbar spondylosis with radiculopathy, osteoarthritis, TIA, migraine headaches; presents to the ED at [facility] on 12/17/2024 with a primary complaint of intractable back pain with difficulty ambulating and altered mental status per family reports.  Patient was recently admitted to our services and was discharged 12/03/2024 [facility] [place] [facility]  [place] for further therapy services.  It is reported that patient left the rehab facility AMA and went home.  It is reported she has been confused at home, not taking her medication and altered per 's report which EMS was called for e.d. transport.  Of note patient did have back surgery with Dr. Mejia on 10/11/2024.  There are no reports of bowel or bladder dysfunction.  No reports of nausea, vomiting, diarrhea, fever, chills, chest pain or shortness for breath.  Lab work reviewed demonstrated BUN 22.3, glucose 125, AST 50; other indices unremarkable.  Urinalysis suggestive of acute UTI.  Initial vital signs /86 pulse 96 respirations 18 temperature 98.2° F O2 saturation 96% on room air.  Patient did receive IV hydration, antiemetic therapy, pain medication and was started on IV ceftriaxone therapy.  Patient is admitted to hospital medicine services for further management.    Interval Hx:   Patient examined bedside.  Spouse by bedside.  No acute overnight events.  Wants her home medication resumed.  Denied any other concerns at this time.  Discussed urine cultures    Case was discussed with patient's nurse and  on the floor.    Objective/physical exam:  General: In no acute distress, afebrile  Chest: Clear to auscultation bilaterally  Heart: RRR, +S1, S2, no appreciable murmur  Abdomen: Soft, nontender, BS  +  MSK: Warm, no lower extremity edema, no clubbing or cyanosis  Neurologic: Alert and oriented x4, Cranial nerve II-XII intact, Strength 5/5 in all 4 extremities    VITAL SIGNS: 24 HRS MIN & MAX LAST   Temp  Min: 97.9 °F (36.6 °C)  Max: 99.7 °F (37.6 °C) 98 °F (36.7 °C)   BP  Min: 103/68  Max: 129/63 125/71   Pulse  Min: 75  Max: 82  76   Resp  Min: 16  Max: 19 18   SpO2  Min: 92 %  Max: 96 % 96 %     I have reviewed the following labs:  Recent Labs   Lab 12/17/24  0640 12/18/24  0405   WBC 8.09 5.34   RBC 4.34 3.37*   HGB 13.7 10.7*   HCT 40.7 31.9*   MCV 93.8 94.7*   MCH 31.6* 31.8*   MCHC 33.7 33.5   RDW 13.3 13.4    196   MPV 9.1 8.9     Recent Labs   Lab 12/17/24  0640 12/18/24  0405    145   K 3.7 3.0*    108*   CO2 26 29   BUN 22.3* 15.0   CREATININE 0.89 0.69   CALCIUM 9.4 8.2*   ALBUMIN 3.9 2.9*   ALKPHOS 137 109   ALT 18 15   AST 50* 37*   BILITOT 0.5 0.4     Microbiology Results (last 7 days)       Procedure Component Value Units Date/Time    Blood Culture [[id number]]  (Normal) Collected: 12/17/24 0829    Order Status: Completed Specimen: Blood from Arm, Left Updated: 12/18/24 0901     Blood Culture No Growth At 24 Hours    Blood Culture [[id number]]  (Normal) Collected: 12/17/24 0834    Order Status: Completed Specimen: Blood from Arm, Right Updated: 12/18/24 0901     Blood Culture No Growth At 24 Hours    Urine culture [[id number]]  (Abnormal) Collected: 12/17/24 0933    Order Status: Completed Specimen: Urine Updated: 12/18/24 0828     Urine Culture >/= 100,000 colonies/ml Gram-negative Rods             See below for Radiology    Assessment/Plan:  Acute encephalopathy-metabolic -POA , resolved  Acute UTI with hematuria-POA  Lumbar radiculopathy with intractable lower back pain-POA   Medical non-compliance- POA  Weakness- POA    Urine culture growing greater than 100,000 colonies of Gram-negative rods   Blood cultures in the complete and deescalate antibiotics   Blood cultures  normal to date   Continue Rocephin, day 2  Appropriate home medications resumed  Encephalopathy resolved, patient is at her baseline  Resume home pain meds   Continue PT and OT, recommend home health  Potassium replaced recheck in a.m.  Labs in a.m.    VTE prophylaxis:  Lovenox    Patient condition:  Stable/Fair/Guarded/ Serious/ Critical    Anticipated discharge and Disposition:   pending sensitivities of urine cultures      All diagnosis and differential diagnosis have been reviewed; assessment and plan has been documented; I have personally reviewed the labs and test results that are presently available; I have reviewed the patients medication list; I have reviewed the consulting providers response and recommendations. I have reviewed or attempted to review medical records based upon their availability    All of the patient's questions have been  addressed and answered. Patient's is agreeable to the above stated plan. I will continue to monitor closely and make adjustments to medical management as needed.    Portions of this note dictated using EMR integrated voice recognition software, and may be subject to voice recognition errors not corrected at proofreading. Please contact writer for clarification if needed.   _____________________________________________________________________    Malnutrition Status:  Nutrition consulted. Most recent weight and BMI monitored-     Measurements:  Wt Readings from Last 1 Encounters:   12/17/24 99.8 kg (220 lb)   Body mass index is 34.46 kg/m².    Patient has been screened and assessed by RD.    Malnutrition Type:  Context:    Level:      Malnutrition Characteristic Summary:       Interventions/Recommendations (treatment strategy):        Scheduled Med:   atorvastatin  80 mg Oral Daily    carvediloL  3.125 mg Oral BID    cefTRIAXone (Rocephin) IV (PEDS and ADULTS)  1 g Intravenous Q24H    docusate sodium  250 mg Oral BID    levETIRAcetam  500 mg Oral BID    levothyroxine  200  mcg Oral Daily    mirtazapine  15 mg Oral QHS    pantoprazole  40 mg Oral BID    sertraline  100 mg Oral Daily      Continuous Infusions:     PRN Meds:    Current Facility-Administered Medications:     acetaminophen, 1,000 mg, Oral, Q6H PRN    acetaminophen, 650 mg, Oral, Q4H PRN    calcium carbonate, 500 mg, Oral, BID PRN    dextrose 50%, 12.5 g, Intravenous, PRN    dextrose 50%, 25 g, Intravenous, PRN    glucagon (human recombinant), 1 mg, Intramuscular, PRN    glucose, 16 g, Oral, PRN    glucose, 24 g, Oral, PRN    morphine, 4 mg, Intravenous, Q4H PRN    naloxone, 0.02 mg, Intravenous, PRN    ondansetron, 4 mg, Intravenous, Q4H PRN    oxyCODONE, 5 mg, Oral, Q6H PRN    prochlorperazine, 5 mg, Intravenous, Q6H PRN    sodium chloride 0.9%, 10 mL, Intravenous, PRN     Radiology:  I have personally reviewed the following imaging and agree with the radiologist.     CT Lumbar Spine W Wo Contrast  Narrative: EXAMINATION:  CT LUMBAR SPINE W WO CONTRAST; CT THORACIC SPINE W WO CONTRAST    CLINICAL HISTORY:  Low back pain, increased fracture risk;Recent surgery;; Mid-back pain;Recent surgery increasing pain;    TECHNIQUE:  Low dose helical acquired images with axial, sagittal and coronal reformations though the thoracic and lumbar spine prior to after intravenous contrast administration.    All CT scans at this location are performed using dose optimization techniques as appropriate to a performed exam including the following automated exposure control, adjustment of the mA and/or kV according to patient size and/or use of iterative reconstruction technique    DLP: 7678 mGycm    COMPARISON:  CT lumbar spine 11/25/2024    FINDINGS:  LIMITATIONS: Mild motion degraded exam.    NUMBERING: Last fully formed disc space is designated L5-S1.    BONES: There are postsurgical changes of spinal fusion  from T10 through the kristin.  There is similar lucency about the bilateral T10 and T11 screws suggesting motion.  Similar configuration  of sacroiliac screws.  Within the limitations of CT evaluation hardware appears intact.  Similar deformity at T10-T11.  There are laminectomies.  There are old, healed rib fractures.    DISCS: There are disc grafts, similar to previous exam.  Multilevel vacuum phenomenon.    SPINAL CANAL: Postop laminectomies.    SOFT TISSUES: Beam hardening artifact obscures evaluation.  Posterior paraspinal postoperative collection similar to slightly improved compared to previous exam.  No enlarging fluid collection.  No appreciable internal foci of gas.  Impression: Postoperative posterior paraspinal fluid collection similar to slightly improved compared to previous.  No enlarging fluid collection or internal foci of gas appreciable.    Postsurgical changes of posterior spinal fusion and laminectomies.  Similar perihardware lucency at T10-T11 screws.    Electronically signed by: Kimmy Choi  Date:    12/17/2024  Time:    11:16  CT Thoracic Spine W Wo Contrast  Narrative: EXAMINATION:  CT LUMBAR SPINE W WO CONTRAST; CT THORACIC SPINE W WO CONTRAST    CLINICAL HISTORY:  Low back pain, increased fracture risk;Recent surgery;; Mid-back pain;Recent surgery increasing pain;    TECHNIQUE:  Low dose helical acquired images with axial, sagittal and coronal reformations though the thoracic and lumbar spine prior to after intravenous contrast administration.    All CT scans at this location are performed using dose optimization techniques as appropriate to a performed exam including the following automated exposure control, adjustment of the mA and/or kV according to patient size and/or use of iterative reconstruction technique    DLP: 7678 mGycm    COMPARISON:  CT lumbar spine 11/25/2024    FINDINGS:  LIMITATIONS: Mild motion degraded exam.    NUMBERING: Last fully formed disc space is designated L5-S1.    BONES: There are postsurgical changes of spinal fusion  from T10 through the kristin.  There is similar lucency about the bilateral  T10 and T11 screws suggesting motion.  Similar configuration of sacroiliac screws.  Within the limitations of CT evaluation hardware appears intact.  Similar deformity at T10-T11.  There are laminectomies.  There are old, healed rib fractures.    DISCS: There are disc grafts, similar to previous exam.  Multilevel vacuum phenomenon.    SPINAL CANAL: Postop laminectomies.    SOFT TISSUES: Beam hardening artifact obscures evaluation.  Posterior paraspinal postoperative collection similar to slightly improved compared to previous exam.  No enlarging fluid collection.  No appreciable internal foci of gas.  Impression: Postoperative posterior paraspinal fluid collection similar to slightly improved compared to previous.  No enlarging fluid collection or internal foci of gas appreciable.    Postsurgical changes of posterior spinal fusion and laminectomies.  Similar perihardware lucency at T10-T11 screws.    Electronically signed by: Kimmy Choi  Date:    12/17/2024  Time:    11:16  CT Head Without Contrast  Narrative: EXAMINATION:  CT HEAD WITHOUT CONTRAST    CLINICAL HISTORY:  Mental status change, unknown cause;    TECHNIQUE:  CT imaging of the head performed from the skull base to the vertex without intravenous contrast.  DLP 1041 mGycm. Automatic exposure control, adjustment of mA/kV or iterative reconstruction technique was used to reduce radiation.    COMPARISON:  None Available.    FINDINGS:  There is no acute cortical infarct, hemorrhage or mass lesion.  No new parenchymal attenuation abnormality.  Ventricular size is stable.  There are vascular calcifications.    Visualized paranasal sinuses and mastoid air cells are clear.  Impression: No acute intracranial findings or significant interval change compared to last month.    Electronically signed by: Milo Bravo  Date:    12/17/2024  Time:    09:11      Francesca Curry DO  Department of Hospital Medicine  [facility]  12/18/2024

## 2024-12-19 NOTE — PT/OT/SLP PROGRESS
Physical Therapy Treatment    Patient Name:  Kasandra Cheek   MRN:  [id number]    Recommendations:     Discharge therapy intensity: Low Intensity Therapy   Discharge Equipment Recommendations: none  Barriers to discharge: None    Assessment:     Kasandra Cheek is a 64 y.o. female admitted with a medical diagnosis of UTI.  She presents with the following impairments/functional limitations: weakness, impaired balance, impaired endurance, impaired functional mobility, gait instability, decreased lower extremity function.    Rehab Prognosis: Good; patient would benefit from acute skilled PT services to address these deficits and reach maximum level of function.    Recent Surgery: * No surgery found *      Plan:     During this hospitalization, patient would benefit from acute PT services 6 x/week to address the identified rehab impairments via gait training, therapeutic activities, therapeutic exercises and progress toward the following goals:    Plan of Care Expires:  01/25/25    Subjective     Chief Complaint:   Patient/Family Comments/goals:   Pain/Comfort:         Objective:     Communicated with RN prior to session.  Patient found up in chair with peripheral IV, PureWick upon PT entry to room.     General Precautions: Standard,    Orthopedic Precautions: spinal precautions  Braces: TLSO  Respiratory Status: Room air  Skin Integrity: Visible skin intact    Functional Mobility:  Transfers:     Sit to Stand:  stand by assistance with rolling walker  Gait: 45' sba w/RW; slow stepping ahmet noted.  Stairs:  Pt ascended/descended 2 stair(s) with No Assistive Device with right handrail with Moderate Assistance.     Education:  Patient provided with verbal education education regarding PT role/goals/POC, fall prevention, and safety awareness.  Understanding was verbalized.     Patient left up in chair with all lines intact and call button in reach    GOALS:   Multidisciplinary Problems       Physical Therapy Goals           Problem: Physical Therapy    Goal Priority Disciplines Outcome Interventions   Physical Therapy Goal     PT, PT/OT Progressing    Description: Goals to be met by: d/c     Patient will increase functional independence with mobility by performin. Supine to sit with Modified [place]  2. Sit to supine with Modified [place]  3. Sit to stand transfer with Modified [place]  4. Bed to chair transfer with Modified [place] using Rolling Walker  5. Gait  x 150 feet with Modified [place] using Rolling Walker.   6. Ascend/descend 2 stairs without Handrails Stand-by Assistance using Least Restrictive Assistive Device.                          Time Tracking:     PT Received On: 24  PT Start Time: 1037     PT Stop Time: 1053  PT Total Time (min): 16 min     Billable Minutes: Gait Training 1    Treatment Type: Treatment  PT/PTA: PTA     Number of PTA visits since last PT visit: 2024

## 2024-12-19 NOTE — PLAN OF CARE
Spoke to patient about discharge plan will go home with  and resume with [facility] home health. Wants scripts sent to pharmacy in [place]. Verified that was done. Spouse will provide transportation    12/19/24 1008   Final Note   Assessment Type Final Discharge Note   Anticipated Discharge Disposition Home-Health  (resume [facility])   Post-Acute Status   Post-Acute Authorization Home Health   Home Health Status Set-up Complete/Auth obtained   Coverage [employer]   Discharge Delays None known at this time

## 2024-12-20 ENCOUNTER — PATIENT OUTREACH (OUTPATIENT)
Dept: ADMINISTRATIVE | Facility: CLINIC | Age: 64
End: 2024-12-20
Payer: MEDICARE

## 2024-12-20 NOTE — PROGRESS NOTES
2nd attempt-C3 nurse attempted to contact Kasandra Cheek for a TCC post hospital discharge follow up call. No answer. Voicemail left.  The patient does not have a scheduled HOSFU appointment, and the pt does not have an [facility] PCP.

## 2024-12-20 NOTE — PROGRESS NOTES
C3 nurse attempted to contact Kasandra Cheek for a TCC post hospital discharge follow up call. No answer. The patient does not have a scheduled HOSFU appointment, and the pt does not have an [facility] PCP.

## 2024-12-22 LAB
BACTERIA BLD CULT: NORMAL
BACTERIA BLD CULT: NORMAL

## 2025-01-10 NOTE — DISCHARGE SUMMARY
[facility] Hospital Medicine Discharge Summary    Admit Date: 12/17/2024  Discharge Date and Time: 12/19/2024  Admitting Physician:  Team  Discharging Physician: Eleno Cates MD.  Primary Care Physician: Ravinder Mazariegos DO  Consults: Hospital Medicine    Discharge Diagnoses:  Acute encephalopathy - Metabolic  Acute UTI with hematuria  Lumbar radiculopathy with intractable lower back pain  Weakness    Hospital Course:   64 y.o. female who PMH includes anemia, anxiety, depression, HTN, hypothyroidism, fatty liver disease, lumbar spondylosis with radiculopathy, osteoarthritis, TIA, migraine headaches; presents to the ED at [facility] on 12/17/2024 with a primary complaint of intractable back pain with difficulty ambulating and altered mental status per family reports.  Patient was recently admitted to our services and was discharged 12/03/2024 [facility] [place] [facility]  [place] for further therapy services.  It is reported that patient left the rehab facility AMA and went home.  It is reported she has been confused at home, not taking her medication and altered per 's report which EMS was called for e.d. transport.  Of note patient did have back surgery with Dr. Mejia on 10/11/2024.  There are no reports of bowel or bladder dysfunction.  No reports of nausea, vomiting, diarrhea, fever, chills, chest pain or shortness for breath.  Lab work reviewed demonstrated BUN 22.3, glucose 125, AST 50; other indices unremarkable.  Urinalysis suggestive of acute UTI.  Initial vital signs /86 pulse 96 respirations 18 temperature 98.2° F O2 saturation 96% on room air.  Patient did receive IV hydration, antiemetic therapy, pain medication and was started on IV ceftriaxone therapy.  Patient is admitted to hospital medicine services for further management. UCX showed E Coli. BCX negative. Mentation improved.    Pt was seen and examined on the day of discharge. Denied any new complaints. Switched to PO  "Cefdinir and planned for DC.     Vitals:  VITAL SIGNS: 24 HRS MIN & MAX LAST   No data recorded 97.9 °F (36.6 °C)   No data recorded 112/66   No data recorded  85   No data recorded 18   No data recorded (!) 94 %       Physical Exam:  General: alert lady lying comfortably in bed, in no acute distress  HENT: oral and oropharyngeal mucosa moist, pink, with no erythema or exudates, no ear pain or discharge  Neck: normal neck movement, no lymph nodes or swellings, no JVD or Carotid bruit  Respiratory: clear breathing sounds bilaterally, no crackles, rales, ronchi or wheezes  Cardiovascular: clear S1 and S2, no murmurs, rubs or gallops  Peripheral Vascular: no lesions, ulcers or erosions, normal peripheral pulses and no pedal edema  Gastrointestinal: soft, non-tender, non-distended abdomen, no guarding, rigidity or rebound tenderness, normal bowel sounds  Integumentary: normal skin color, no rashes or lesions  Neuro: AAO x 3; motor strength 5/5 in B/L UEs & LEs; sensation intact to gross and fine touch B/L; CN II-XII grossly intact    Procedures Performed: No admission procedures for hospital encounter.     Significant Diagnostic Studies: See Full reports for all details    No results for input(s): "WBC", "RBC", "HGB", "HCT", "MCV", "MCH", "MCHC", "RDW", "PLT", "MPV", "GRAN", "LYMPH", "MONO", "BASO", "NRBC" in the last 168 hours.    No results for input(s): "NA", "K", "CL", "CO2", "ANIONGAP", "BUN", "CREATININE", "GLU", "CALCIUM", "PH", "MG", "ALBUMIN", "PROT", "ALKPHOS", "ALT", "AST", "BILITOT" in the last 168 hours.     Microbiology Results (last 7 days)       Procedure Component Value Units Date/Time    Urine culture [[id number]]  (Abnormal)  (Susceptibility) Collected: 12/17/24 3191    Order Status: Completed Specimen: Urine Updated: 12/19/24 0741     Urine Culture >/= 100,000 colonies/ml Escherichia coli             CT Lumbar Spine W Wo Contrast  Narrative: EXAMINATION:  CT LUMBAR SPINE W WO CONTRAST; CT THORACIC " SPINE W WO CONTRAST    CLINICAL HISTORY:  Low back pain, increased fracture risk;Recent surgery;; Mid-back pain;Recent surgery increasing pain;    TECHNIQUE:  Low dose helical acquired images with axial, sagittal and coronal reformations though the thoracic and lumbar spine prior to after intravenous contrast administration.    All CT scans at this location are performed using dose optimization techniques as appropriate to a performed exam including the following automated exposure control, adjustment of the mA and/or kV according to patient size and/or use of iterative reconstruction technique    DLP: 7678 mGycm    COMPARISON:  CT lumbar spine 11/25/2024    FINDINGS:  LIMITATIONS: Mild motion degraded exam.    NUMBERING: Last fully formed disc space is designated L5-S1.    BONES: There are postsurgical changes of spinal fusion  from T10 through the kristin.  There is similar lucency about the bilateral T10 and T11 screws suggesting motion.  Similar configuration of sacroiliac screws.  Within the limitations of CT evaluation hardware appears intact.  Similar deformity at T10-T11.  There are laminectomies.  There are old, healed rib fractures.    DISCS: There are disc grafts, similar to previous exam.  Multilevel vacuum phenomenon.    SPINAL CANAL: Postop laminectomies.    SOFT TISSUES: Beam hardening artifact obscures evaluation.  Posterior paraspinal postoperative collection similar to slightly improved compared to previous exam.  No enlarging fluid collection.  No appreciable internal foci of gas.  Impression: Postoperative posterior paraspinal fluid collection similar to slightly improved compared to previous.  No enlarging fluid collection or internal foci of gas appreciable.    Postsurgical changes of posterior spinal fusion and laminectomies.  Similar perihardware lucency at T10-T11 screws.    Electronically signed by: Kimmy Choi  Date:    12/17/2024  Time:    11:16  CT Thoracic Spine W Wo  Contrast  Narrative: EXAMINATION:  CT LUMBAR SPINE W WO CONTRAST; CT THORACIC SPINE W WO CONTRAST    CLINICAL HISTORY:  Low back pain, increased fracture risk;Recent surgery;; Mid-back pain;Recent surgery increasing pain;    TECHNIQUE:  Low dose helical acquired images with axial, sagittal and coronal reformations though the thoracic and lumbar spine prior to after intravenous contrast administration.    All CT scans at this location are performed using dose optimization techniques as appropriate to a performed exam including the following automated exposure control, adjustment of the mA and/or kV according to patient size and/or use of iterative reconstruction technique    DLP: 7678 mGycm    COMPARISON:  CT lumbar spine 11/25/2024    FINDINGS:  LIMITATIONS: Mild motion degraded exam.    NUMBERING: Last fully formed disc space is designated L5-S1.    BONES: There are postsurgical changes of spinal fusion  from T10 through the kristin.  There is similar lucency about the bilateral T10 and T11 screws suggesting motion.  Similar configuration of sacroiliac screws.  Within the limitations of CT evaluation hardware appears intact.  Similar deformity at T10-T11.  There are laminectomies.  There are old, healed rib fractures.    DISCS: There are disc grafts, similar to previous exam.  Multilevel vacuum phenomenon.    SPINAL CANAL: Postop laminectomies.    SOFT TISSUES: Beam hardening artifact obscures evaluation.  Posterior paraspinal postoperative collection similar to slightly improved compared to previous exam.  No enlarging fluid collection.  No appreciable internal foci of gas.  Impression: Postoperative posterior paraspinal fluid collection similar to slightly improved compared to previous.  No enlarging fluid collection or internal foci of gas appreciable.    Postsurgical changes of posterior spinal fusion and laminectomies.  Similar perihardware lucency at T10-T11 screws.    Electronically signed by: Kimmy  Jimbo  Date:    12/17/2024  Time:    11:16  CT Head Without Contrast  Narrative: EXAMINATION:  CT HEAD WITHOUT CONTRAST    CLINICAL HISTORY:  Mental status change, unknown cause;    TECHNIQUE:  CT imaging of the head performed from the skull base to the vertex without intravenous contrast.  DLP 1041 mGycm. Automatic exposure control, adjustment of mA/kV or iterative reconstruction technique was used to reduce radiation.    COMPARISON:  None Available.    FINDINGS:  There is no acute cortical infarct, hemorrhage or mass lesion.  No new parenchymal attenuation abnormality.  Ventricular size is stable.  There are vascular calcifications.    Visualized paranasal sinuses and mastoid air cells are clear.  Impression: No acute intracranial findings or significant interval change compared to last month.    Electronically signed by: Milo Bravo  Date:    12/17/2024  Time:    09:11         Medication List        CONTINUE taking these medications      aspirin 325 MG EC tablet  Commonly known as: ECOTRIN  Take 1 tablet (325 mg total) by mouth once daily.     atorvastatin 80 MG tablet  Commonly known as: LIPITOR  Take 1 tablet (80 mg total) by mouth once daily.     carvediloL 3.125 MG tablet  Commonly known as: COREG  Take 1 tablet (3.125 mg total) by mouth 2 (two) times daily.     cholecalciferol (vitamin D3) 250 mcg (10,000 unit) Cap capsule  Commonly known as: VITAMIN D3     cyanocobalamin (vitamin B-12) 2,500 mcg Tab     docusate sodium 250 MG capsule  Commonly known as: COLACE     ferrous sulfate 325 mg (65 mg iron) Tab tablet  Commonly known as: FEOSOL     FISH OIL OMEGA 3-6-9 ORAL     furosemide 20 MG tablet  Commonly known as: LASIX     levETIRAcetam 500 MG Tab  Commonly known as: KEPPRA  Take 1 tablet (500 mg total) by mouth 2 (two) times daily.     levothyroxine 200 MCG tablet  Commonly known as: SYNTHROID     losartan 100 MG tablet  Commonly known as: COZAAR     mirtazapine 15 MG tablet  Commonly known as:  REMERON  Take 1 tablet (15 mg total) by mouth every evening.     ondansetron 8 MG tablet  Commonly known as: ZOFRAN     pantoprazole 40 MG tablet  Commonly known as: PROTONIX     sertraline 100 MG tablet  Commonly known as: ZOLOFT  Take 1 tablet (100 mg total) by mouth once daily.     VITAMIN K2 ORAL            ASK your doctor about these medications      cefdinir 300 MG capsule  Commonly known as: OMNICEF  Take 1 capsule (300 mg total) by mouth 2 (two) times daily. for 6 days  Ask about: Should I take this medication?               Where to Get Your Medications        These medications were sent to [id number] - [place], [place] - [address] [facility] [address] [facility]  [address] [address]      Phone: [phone]   aspirin 325 MG EC tablet  atorvastatin 80 MG tablet  carvediloL 3.125 MG tablet  cefdinir 300 MG capsule  levETIRAcetam 500 MG Tab          Explained in detail to the patient about the discharge plan, medications, and follow-up visits. Pt understands and agrees with the treatment plan  Discharge Disposition: Home-Health Care [facility]   Discharged Condition: stable  Diet-    Medications Per DC med rec  Activities as tolerated   Contact information for after-discharge care       Home Medical Care       [facility] .    Service: Home Health Services  Contact information:  [facility] [address] [phone]                                 For further questions contact hospitalist office    Discharge time 33 minutes    For worsening symptoms, chest pain, shortness of breath, increased abdominal pain, high grade fever, stroke or stroke like symptoms, immediately go to the nearest Emergency Room or call 911 as soon as possible.      Eleno Ferrer M.D on 1/10/2025. at 12:59 PM.

## 2025-01-17 DIAGNOSIS — M48.04 SPINAL STENOSIS OF THORACIC REGION: ICD-10-CM

## 2025-01-17 DIAGNOSIS — M54.2 CERVICALGIA: Primary | ICD-10-CM

## 2025-01-17 DIAGNOSIS — M54.16 LUMBAR RADICULOPATHY: ICD-10-CM

## 2025-01-29 DIAGNOSIS — M48.04 THORACIC STENOSIS: ICD-10-CM

## 2025-01-29 DIAGNOSIS — M54.16 LUMBAR RADICULOPATHY: ICD-10-CM

## 2025-01-29 DIAGNOSIS — M54.2 CERVICALGIA: Primary | ICD-10-CM

## 2025-01-30 ENCOUNTER — HOSPITAL ENCOUNTER (OUTPATIENT)
Dept: RADIOLOGY | Facility: HOSPITAL | Age: 65
Discharge: HOME | End: 2025-01-30
Attending: NEUROLOGICAL SURGERY
Payer: MEDICARE

## 2025-01-30 DIAGNOSIS — M54.16 LUMBAR RADICULOPATHY: ICD-10-CM

## 2025-01-30 DIAGNOSIS — M48.04 SPINAL STENOSIS OF THORACIC REGION: ICD-10-CM

## 2025-01-30 DIAGNOSIS — M48.04 THORACIC STENOSIS: ICD-10-CM

## 2025-01-30 DIAGNOSIS — M54.2 CERVICALGIA: ICD-10-CM

## 2025-01-30 PROCEDURE — 72125 CT NECK SPINE W/O DYE: CPT | Mod: TC

## 2025-01-30 PROCEDURE — 72131 CT LUMBAR SPINE W/O DYE: CPT | Mod: TC

## 2025-01-30 PROCEDURE — 72128 CT CHEST SPINE W/O DYE: CPT | Mod: TC

## 2025-01-30 PROCEDURE — 72082 X-RAY EXAM ENTIRE SPI 2/3 VW: CPT | Mod: TC

## 2025-04-23 DIAGNOSIS — R42 DIZZINESS AND GIDDINESS: Primary | ICD-10-CM

## (undated) DEVICE — Device

## (undated) DEVICE — DRAPE O-ARM STERILE

## (undated) DEVICE — DISH PETRI MED 3.5IN

## (undated) DEVICE — TRAY CATH FOL SIL URIMTR 16FR

## (undated) DEVICE — DRAPE SURG W/TWL 17 5/8X23

## (undated) DEVICE — TUBING ANTICOAGULANT

## (undated) DEVICE — STRIP MEDI WND CLSR 1/2X4IN

## (undated) DEVICE — SUT VICRYL PLUS 3-0 X-1 18IN

## (undated) DEVICE — SUT VICRYL 4-0 18 P-3

## (undated) DEVICE — TUBE SUCTION FRAZIER VENT 10FR

## (undated) DEVICE — SUT VICRYL PLUS 0 CT-1 18IN

## (undated) DEVICE — BLADE EZ CLEAN 2 1/2

## (undated) DEVICE — RESERVOIR JACKSON-PRATT 100CC

## (undated) DEVICE — SUT BONE WAX 2.5 GRMS 12/BX

## (undated) DEVICE — PAD DERMAPROX XL 22X14X.5IN

## (undated) DEVICE — GOWN X-LG STERILE BACK

## (undated) DEVICE — INSTRUMENT FRAZIER 10FR W/VENT

## (undated) DEVICE — DRAIN ROUND SUCTION 10FR

## (undated) DEVICE — DRESSING TELFA N ADH 3X8

## (undated) DEVICE — DRAPE ORTH SPLIT 77X108IN

## (undated) DEVICE — COVER CAMERA OPERATING ROOM

## (undated) DEVICE — ELECTRODE PATIENT RETURN DISP

## (undated) DEVICE — BUR BONE CUT MICRO TPS 3X3.8MM

## (undated) DEVICE — KIT SURGICAL TURNOVER

## (undated) DEVICE — SOL NACL IRR 1000ML BTL

## (undated) DEVICE — ELECTRODE BLADE E-Z CLEAN 4IN

## (undated) DEVICE — PILLOW HEAD REST

## (undated) DEVICE — ADHESIVE MASTISOL VIAL 48/BX

## (undated) DEVICE — GLOVE PROTEXIS LTX MICRO  7.5

## (undated) DEVICE — KIT C.A.T.S. FAST START

## (undated) DEVICE — NDL HYPO 22GX1 1/2 SYR 10ML LL

## (undated) DEVICE — GLOVE PROTEXIS BLUE LATEX 8

## (undated) DEVICE — COVER HD BACK TABLE 6FT

## (undated) DEVICE — ELECTRODE BLD EXT 6.50 ST DISP

## (undated) DEVICE — PREP CARTRIDGE

## (undated) DEVICE — TUBING SILICON CLR 3/16IN 10FT

## (undated) DEVICE — PROBE BALL TIP 2.3MM

## (undated) DEVICE — KIT SURGIFLO HEMOSTATIC MATRIX

## (undated) DEVICE — BOWL STERILE LARGE 32OZ

## (undated) DEVICE — SHEET AVIENE MICFIB 1.4X1.4IN

## (undated) DEVICE — SPONGE GAUZE 16PLY 4X4

## (undated) DEVICE — APPLICATOR CHLORAPREP ORN 26ML

## (undated) DEVICE — KIT POS JACKSON TABLE NO HDRST

## (undated) DEVICE — DRAPE TOP 53X102IN

## (undated) DEVICE — SPHERE NDI PASSIVE

## (undated) DEVICE — BLADE MILL+ BONE MEDIUM DISP

## (undated) DEVICE — DRAPE OPMI STERILE

## (undated) DEVICE — GLOVE SIGNATURE MICRO LTX 7.5